# Patient Record
Sex: MALE | Race: OTHER | NOT HISPANIC OR LATINO | URBAN - METROPOLITAN AREA
[De-identification: names, ages, dates, MRNs, and addresses within clinical notes are randomized per-mention and may not be internally consistent; named-entity substitution may affect disease eponyms.]

---

## 2020-09-24 ENCOUNTER — INPATIENT (INPATIENT)
Facility: HOSPITAL | Age: 80
LOS: 1 days | Discharge: HOME | DRG: 603 | End: 2020-09-26
Attending: INTERNAL MEDICINE | Admitting: INTERNAL MEDICINE
Payer: MEDICARE

## 2020-09-24 VITALS
WEIGHT: 134.04 LBS | RESPIRATION RATE: 16 BRPM | TEMPERATURE: 99 F | DIASTOLIC BLOOD PRESSURE: 75 MMHG | OXYGEN SATURATION: 95 % | SYSTOLIC BLOOD PRESSURE: 167 MMHG | HEIGHT: 60 IN | HEART RATE: 79 BPM

## 2020-09-24 LAB
ALBUMIN SERPL ELPH-MCNC: 4.6 G/DL — SIGNIFICANT CHANGE UP (ref 3.3–5)
ALP SERPL-CCNC: 78 U/L — SIGNIFICANT CHANGE UP (ref 40–120)
ALT FLD-CCNC: 30 U/L — SIGNIFICANT CHANGE UP (ref 10–45)
ANION GAP SERPL CALC-SCNC: 15 MMOL/L — SIGNIFICANT CHANGE UP (ref 5–17)
APPEARANCE UR: CLEAR — SIGNIFICANT CHANGE UP
APTT BLD: 30.6 SEC — SIGNIFICANT CHANGE UP (ref 27.5–35.5)
AST SERPL-CCNC: 26 U/L — SIGNIFICANT CHANGE UP (ref 10–40)
BASE EXCESS BLDV CALC-SCNC: 1.4 MMOL/L — SIGNIFICANT CHANGE UP (ref -2–2)
BASOPHILS # BLD AUTO: 0.03 K/UL — SIGNIFICANT CHANGE UP (ref 0–0.2)
BASOPHILS NFR BLD AUTO: 0.5 % — SIGNIFICANT CHANGE UP (ref 0–2)
BILIRUB SERPL-MCNC: 0.4 MG/DL — SIGNIFICANT CHANGE UP (ref 0.2–1.2)
BILIRUB UR-MCNC: NEGATIVE — SIGNIFICANT CHANGE UP
BUN SERPL-MCNC: 27 MG/DL — HIGH (ref 7–23)
CA-I SERPL-SCNC: 1.25 MMOL/L — SIGNIFICANT CHANGE UP (ref 1.12–1.3)
CALCIUM SERPL-MCNC: 9.9 MG/DL — SIGNIFICANT CHANGE UP (ref 8.4–10.5)
CHLORIDE BLDV-SCNC: 103 MMOL/L — SIGNIFICANT CHANGE UP (ref 96–108)
CHLORIDE SERPL-SCNC: 102 MMOL/L — SIGNIFICANT CHANGE UP (ref 96–108)
CO2 BLDV-SCNC: 30 MMOL/L — SIGNIFICANT CHANGE UP (ref 22–30)
CO2 SERPL-SCNC: 25 MMOL/L — SIGNIFICANT CHANGE UP (ref 22–31)
COLOR SPEC: SIGNIFICANT CHANGE UP
CREAT SERPL-MCNC: 1.09 MG/DL — SIGNIFICANT CHANGE UP (ref 0.5–1.3)
DIFF PNL FLD: NEGATIVE — SIGNIFICANT CHANGE UP
EOSINOPHIL # BLD AUTO: 0.12 K/UL — SIGNIFICANT CHANGE UP (ref 0–0.5)
EOSINOPHIL NFR BLD AUTO: 1.8 % — SIGNIFICANT CHANGE UP (ref 0–6)
GAS PNL BLDV: 139 MMOL/L — SIGNIFICANT CHANGE UP (ref 135–145)
GAS PNL BLDV: SIGNIFICANT CHANGE UP
GAS PNL BLDV: SIGNIFICANT CHANGE UP
GLUCOSE BLDV-MCNC: 193 MG/DL — HIGH (ref 70–99)
GLUCOSE SERPL-MCNC: 195 MG/DL — HIGH (ref 70–99)
GLUCOSE UR QL: ABNORMAL
HCO3 BLDV-SCNC: 29 MMOL/L — SIGNIFICANT CHANGE UP (ref 21–29)
HCT VFR BLD CALC: 43 % — SIGNIFICANT CHANGE UP (ref 39–50)
HCT VFR BLDA CALC: 46 % — SIGNIFICANT CHANGE UP (ref 39–50)
HGB BLD CALC-MCNC: 14.8 G/DL — SIGNIFICANT CHANGE UP (ref 13–17)
HGB BLD-MCNC: 13.8 G/DL — SIGNIFICANT CHANGE UP (ref 13–17)
IMM GRANULOCYTES NFR BLD AUTO: 0.2 % — SIGNIFICANT CHANGE UP (ref 0–1.5)
INR BLD: 1.01 RATIO — SIGNIFICANT CHANGE UP (ref 0.88–1.16)
KETONES UR-MCNC: NEGATIVE — SIGNIFICANT CHANGE UP
LACTATE BLDV-MCNC: 3.3 MMOL/L — HIGH (ref 0.7–2)
LEUKOCYTE ESTERASE UR-ACNC: NEGATIVE — SIGNIFICANT CHANGE UP
LYMPHOCYTES # BLD AUTO: 1.96 K/UL — SIGNIFICANT CHANGE UP (ref 1–3.3)
LYMPHOCYTES # BLD AUTO: 29.4 % — SIGNIFICANT CHANGE UP (ref 13–44)
MCHC RBC-ENTMCNC: 31.5 PG — SIGNIFICANT CHANGE UP (ref 27–34)
MCHC RBC-ENTMCNC: 32.1 GM/DL — SIGNIFICANT CHANGE UP (ref 32–36)
MCV RBC AUTO: 98.2 FL — SIGNIFICANT CHANGE UP (ref 80–100)
MONOCYTES # BLD AUTO: 0.5 K/UL — SIGNIFICANT CHANGE UP (ref 0–0.9)
MONOCYTES NFR BLD AUTO: 7.5 % — SIGNIFICANT CHANGE UP (ref 2–14)
NEUTROPHILS # BLD AUTO: 4.04 K/UL — SIGNIFICANT CHANGE UP (ref 1.8–7.4)
NEUTROPHILS NFR BLD AUTO: 60.6 % — SIGNIFICANT CHANGE UP (ref 43–77)
NITRITE UR-MCNC: NEGATIVE — SIGNIFICANT CHANGE UP
NRBC # BLD: 0 /100 WBCS — SIGNIFICANT CHANGE UP (ref 0–0)
PCO2 BLDV: 59 MMHG — HIGH (ref 35–50)
PH BLDV: 7.31 — LOW (ref 7.35–7.45)
PH UR: 6 — SIGNIFICANT CHANGE UP (ref 5–8)
PLATELET # BLD AUTO: 167 K/UL — SIGNIFICANT CHANGE UP (ref 150–400)
PO2 BLDV: 24 MMHG — LOW (ref 25–45)
POTASSIUM BLDV-SCNC: 4.3 MMOL/L — SIGNIFICANT CHANGE UP (ref 3.5–5.3)
POTASSIUM SERPL-MCNC: 4.7 MMOL/L — SIGNIFICANT CHANGE UP (ref 3.5–5.3)
POTASSIUM SERPL-SCNC: 4.7 MMOL/L — SIGNIFICANT CHANGE UP (ref 3.5–5.3)
PROT SERPL-MCNC: 7.3 G/DL — SIGNIFICANT CHANGE UP (ref 6–8.3)
PROT UR-MCNC: SIGNIFICANT CHANGE UP
PROTHROM AB SERPL-ACNC: 12 SEC — SIGNIFICANT CHANGE UP (ref 10.6–13.6)
RBC # BLD: 4.38 M/UL — SIGNIFICANT CHANGE UP (ref 4.2–5.8)
RBC # FLD: 12.2 % — SIGNIFICANT CHANGE UP (ref 10.3–14.5)
SAO2 % BLDV: 33 % — LOW (ref 67–88)
SODIUM SERPL-SCNC: 142 MMOL/L — SIGNIFICANT CHANGE UP (ref 135–145)
SP GR SPEC: 1.03 — HIGH (ref 1.01–1.02)
UROBILINOGEN FLD QL: NEGATIVE — SIGNIFICANT CHANGE UP
WBC # BLD: 6.66 K/UL — SIGNIFICANT CHANGE UP (ref 3.8–10.5)
WBC # FLD AUTO: 6.66 K/UL — SIGNIFICANT CHANGE UP (ref 3.8–10.5)

## 2020-09-24 PROCEDURE — 99285 EMERGENCY DEPT VISIT HI MDM: CPT | Mod: CS,GC

## 2020-09-24 PROCEDURE — 73610 X-RAY EXAM OF ANKLE: CPT | Mod: 26,LT

## 2020-09-24 PROCEDURE — 73590 X-RAY EXAM OF LOWER LEG: CPT | Mod: 26,LT

## 2020-09-24 PROCEDURE — 73620 X-RAY EXAM OF FOOT: CPT | Mod: 26,LT

## 2020-09-24 RX ORDER — SODIUM CHLORIDE 9 MG/ML
1000 INJECTION INTRAMUSCULAR; INTRAVENOUS; SUBCUTANEOUS ONCE
Refills: 0 | Status: COMPLETED | OUTPATIENT
Start: 2020-09-24 | End: 2020-09-24

## 2020-09-24 RX ADMIN — Medication 100 MILLIGRAM(S): at 23:38

## 2020-09-24 RX ADMIN — SODIUM CHLORIDE 500 MILLILITER(S): 9 INJECTION INTRAMUSCULAR; INTRAVENOUS; SUBCUTANEOUS at 23:39

## 2020-09-24 NOTE — ED ADULT NURSE NOTE - OBJECTIVE STATEMENT
PT 80 year old male, A/O X3.PT came in through triage due to left leg injury. PMH- HTN, HLD, DM2. PSH- Open heart surgery (2014), stents placed (2019, on aspirin). PT state PT 80 year old male, A/O X3.PT came in through triage due to left leg injury. PMH- HTN, HLD, DM2. PSH- Open heart surgery (2014), stents placed (2019, on aspirin). PT states 5 days ago, a mirror fell on his left shin. Since that day, PT left lower extremity and left foot became increasingly swollen, red, occasional burning sensation. Ambulates independently. Skin- laceration on left shin (white, no discharge), red. Left foot-red, bruising, 2+ pitting edema. Pedal pulses palpable on right foot. Denies SOB, chest pain, fever, chills, cough, N/V/D, dysuria, lightheadedness/ dizziness, numbness/ tingling.

## 2020-09-25 DIAGNOSIS — L03.90 CELLULITIS, UNSPECIFIED: ICD-10-CM

## 2020-09-25 DIAGNOSIS — L03.116 CELLULITIS OF LEFT LOWER LIMB: ICD-10-CM

## 2020-09-25 DIAGNOSIS — Z29.9 ENCOUNTER FOR PROPHYLACTIC MEASURES, UNSPECIFIED: ICD-10-CM

## 2020-09-25 DIAGNOSIS — Z95.1 PRESENCE OF AORTOCORONARY BYPASS GRAFT: Chronic | ICD-10-CM

## 2020-09-25 DIAGNOSIS — Z95.2 PRESENCE OF PROSTHETIC HEART VALVE: Chronic | ICD-10-CM

## 2020-09-25 DIAGNOSIS — I25.10 ATHEROSCLEROTIC HEART DISEASE OF NATIVE CORONARY ARTERY WITHOUT ANGINA PECTORIS: ICD-10-CM

## 2020-09-25 DIAGNOSIS — E11.9 TYPE 2 DIABETES MELLITUS WITHOUT COMPLICATIONS: ICD-10-CM

## 2020-09-25 LAB
ANION GAP SERPL CALC-SCNC: 11 MMOL/L — SIGNIFICANT CHANGE UP (ref 5–17)
BASOPHILS # BLD AUTO: 0.04 K/UL — SIGNIFICANT CHANGE UP (ref 0–0.2)
BASOPHILS NFR BLD AUTO: 0.5 % — SIGNIFICANT CHANGE UP (ref 0–2)
BUN SERPL-MCNC: 19 MG/DL — SIGNIFICANT CHANGE UP (ref 7–23)
CALCIUM SERPL-MCNC: 9.5 MG/DL — SIGNIFICANT CHANGE UP (ref 8.4–10.5)
CHLORIDE SERPL-SCNC: 103 MMOL/L — SIGNIFICANT CHANGE UP (ref 96–108)
CO2 SERPL-SCNC: 25 MMOL/L — SIGNIFICANT CHANGE UP (ref 22–31)
CREAT SERPL-MCNC: 0.81 MG/DL — SIGNIFICANT CHANGE UP (ref 0.5–1.3)
CULTURE RESULTS: SIGNIFICANT CHANGE UP
EOSINOPHIL # BLD AUTO: 0.14 K/UL — SIGNIFICANT CHANGE UP (ref 0–0.5)
EOSINOPHIL NFR BLD AUTO: 1.9 % — SIGNIFICANT CHANGE UP (ref 0–6)
GLUCOSE SERPL-MCNC: 194 MG/DL — HIGH (ref 70–99)
HCT VFR BLD CALC: 40.4 % — SIGNIFICANT CHANGE UP (ref 39–50)
HGB BLD-MCNC: 13.3 G/DL — SIGNIFICANT CHANGE UP (ref 13–17)
IMM GRANULOCYTES NFR BLD AUTO: 0.1 % — SIGNIFICANT CHANGE UP (ref 0–1.5)
LACTATE BLDV-MCNC: 2.5 MMOL/L — HIGH (ref 0.7–2)
LACTATE SERPL-SCNC: 1.1 MMOL/L — SIGNIFICANT CHANGE UP (ref 0.7–2)
LYMPHOCYTES # BLD AUTO: 2.66 K/UL — SIGNIFICANT CHANGE UP (ref 1–3.3)
LYMPHOCYTES # BLD AUTO: 35.5 % — SIGNIFICANT CHANGE UP (ref 13–44)
MCHC RBC-ENTMCNC: 31.5 PG — SIGNIFICANT CHANGE UP (ref 27–34)
MCHC RBC-ENTMCNC: 32.9 GM/DL — SIGNIFICANT CHANGE UP (ref 32–36)
MCV RBC AUTO: 95.7 FL — SIGNIFICANT CHANGE UP (ref 80–100)
MONOCYTES # BLD AUTO: 0.55 K/UL — SIGNIFICANT CHANGE UP (ref 0–0.9)
MONOCYTES NFR BLD AUTO: 7.3 % — SIGNIFICANT CHANGE UP (ref 2–14)
NEUTROPHILS # BLD AUTO: 4.1 K/UL — SIGNIFICANT CHANGE UP (ref 1.8–7.4)
NEUTROPHILS NFR BLD AUTO: 54.7 % — SIGNIFICANT CHANGE UP (ref 43–77)
NRBC # BLD: 0 /100 WBCS — SIGNIFICANT CHANGE UP (ref 0–0)
PLATELET # BLD AUTO: 170 K/UL — SIGNIFICANT CHANGE UP (ref 150–400)
POTASSIUM SERPL-MCNC: 4.2 MMOL/L — SIGNIFICANT CHANGE UP (ref 3.5–5.3)
POTASSIUM SERPL-SCNC: 4.2 MMOL/L — SIGNIFICANT CHANGE UP (ref 3.5–5.3)
RBC # BLD: 4.22 M/UL — SIGNIFICANT CHANGE UP (ref 4.2–5.8)
RBC # FLD: 12.2 % — SIGNIFICANT CHANGE UP (ref 10.3–14.5)
SARS-COV-2 IGG SERPL QL IA: NEGATIVE — SIGNIFICANT CHANGE UP
SARS-COV-2 IGM SERPL IA-ACNC: <0.1 INDEX — SIGNIFICANT CHANGE UP
SARS-COV-2 RNA SPEC QL NAA+PROBE: SIGNIFICANT CHANGE UP
SODIUM SERPL-SCNC: 139 MMOL/L — SIGNIFICANT CHANGE UP (ref 135–145)
SPECIMEN SOURCE: SIGNIFICANT CHANGE UP
WBC # BLD: 7.5 K/UL — SIGNIFICANT CHANGE UP (ref 3.8–10.5)
WBC # FLD AUTO: 7.5 K/UL — SIGNIFICANT CHANGE UP (ref 3.8–10.5)

## 2020-09-25 PROCEDURE — 99223 1ST HOSP IP/OBS HIGH 75: CPT

## 2020-09-25 PROCEDURE — 93925 LOWER EXTREMITY STUDY: CPT | Mod: 26

## 2020-09-25 PROCEDURE — 99222 1ST HOSP IP/OBS MODERATE 55: CPT

## 2020-09-25 RX ORDER — LISINOPRIL 2.5 MG/1
20 TABLET ORAL DAILY
Refills: 0 | Status: DISCONTINUED | OUTPATIENT
Start: 2020-09-25 | End: 2020-09-26

## 2020-09-25 RX ORDER — ATORVASTATIN CALCIUM 80 MG/1
10 TABLET, FILM COATED ORAL AT BEDTIME
Refills: 0 | Status: DISCONTINUED | OUTPATIENT
Start: 2020-09-25 | End: 2020-09-26

## 2020-09-25 RX ORDER — DEXTROSE 50 % IN WATER 50 %
25 SYRINGE (ML) INTRAVENOUS ONCE
Refills: 0 | Status: DISCONTINUED | OUTPATIENT
Start: 2020-09-25 | End: 2020-09-26

## 2020-09-25 RX ORDER — DEXTROSE 50 % IN WATER 50 %
12.5 SYRINGE (ML) INTRAVENOUS ONCE
Refills: 0 | Status: DISCONTINUED | OUTPATIENT
Start: 2020-09-25 | End: 2020-09-26

## 2020-09-25 RX ORDER — PIPERACILLIN AND TAZOBACTAM 4; .5 G/20ML; G/20ML
3.38 INJECTION, POWDER, LYOPHILIZED, FOR SOLUTION INTRAVENOUS ONCE
Refills: 0 | Status: COMPLETED | OUTPATIENT
Start: 2020-09-25 | End: 2020-09-25

## 2020-09-25 RX ORDER — ASPIRIN/CALCIUM CARB/MAGNESIUM 324 MG
81 TABLET ORAL DAILY
Refills: 0 | Status: DISCONTINUED | OUTPATIENT
Start: 2020-09-25 | End: 2020-09-26

## 2020-09-25 RX ORDER — VANCOMYCIN HCL 1 G
1000 VIAL (EA) INTRAVENOUS EVERY 12 HOURS
Refills: 0 | Status: DISCONTINUED | OUTPATIENT
Start: 2020-09-25 | End: 2020-09-26

## 2020-09-25 RX ORDER — CLOPIDOGREL BISULFATE 75 MG/1
75 TABLET, FILM COATED ORAL DAILY
Refills: 0 | Status: DISCONTINUED | OUTPATIENT
Start: 2020-09-25 | End: 2020-09-26

## 2020-09-25 RX ORDER — PIPERACILLIN AND TAZOBACTAM 4; .5 G/20ML; G/20ML
3.38 INJECTION, POWDER, LYOPHILIZED, FOR SOLUTION INTRAVENOUS EVERY 8 HOURS
Refills: 0 | Status: DISCONTINUED | OUTPATIENT
Start: 2020-09-25 | End: 2020-09-25

## 2020-09-25 RX ORDER — GLUCAGON INJECTION, SOLUTION 0.5 MG/.1ML
1 INJECTION, SOLUTION SUBCUTANEOUS ONCE
Refills: 0 | Status: DISCONTINUED | OUTPATIENT
Start: 2020-09-25 | End: 2020-09-26

## 2020-09-25 RX ORDER — DEXTROSE 50 % IN WATER 50 %
15 SYRINGE (ML) INTRAVENOUS ONCE
Refills: 0 | Status: DISCONTINUED | OUTPATIENT
Start: 2020-09-25 | End: 2020-09-26

## 2020-09-25 RX ORDER — SODIUM CHLORIDE 9 MG/ML
1000 INJECTION, SOLUTION INTRAVENOUS
Refills: 0 | Status: DISCONTINUED | OUTPATIENT
Start: 2020-09-25 | End: 2020-09-26

## 2020-09-25 RX ORDER — METFORMIN HYDROCHLORIDE 850 MG/1
1000 TABLET ORAL
Refills: 0 | Status: DISCONTINUED | OUTPATIENT
Start: 2020-09-25 | End: 2020-09-26

## 2020-09-25 RX ORDER — METOPROLOL TARTRATE 50 MG
50 TABLET ORAL DAILY
Refills: 0 | Status: DISCONTINUED | OUTPATIENT
Start: 2020-09-25 | End: 2020-09-26

## 2020-09-25 RX ORDER — INSULIN LISPRO 100/ML
VIAL (ML) SUBCUTANEOUS
Refills: 0 | Status: DISCONTINUED | OUTPATIENT
Start: 2020-09-25 | End: 2020-09-26

## 2020-09-25 RX ORDER — INSULIN LISPRO 100/ML
VIAL (ML) SUBCUTANEOUS AT BEDTIME
Refills: 0 | Status: DISCONTINUED | OUTPATIENT
Start: 2020-09-25 | End: 2020-09-26

## 2020-09-25 RX ADMIN — CLOPIDOGREL BISULFATE 75 MILLIGRAM(S): 75 TABLET, FILM COATED ORAL at 13:49

## 2020-09-25 RX ADMIN — METFORMIN HYDROCHLORIDE 1000 MILLIGRAM(S): 850 TABLET ORAL at 08:56

## 2020-09-25 RX ADMIN — Medication 250 MILLIGRAM(S): at 17:30

## 2020-09-25 RX ADMIN — Medication 1: at 08:55

## 2020-09-25 RX ADMIN — ATORVASTATIN CALCIUM 10 MILLIGRAM(S): 80 TABLET, FILM COATED ORAL at 22:01

## 2020-09-25 RX ADMIN — Medication 900 MILLIGRAM(S): at 00:54

## 2020-09-25 RX ADMIN — Medication 1: at 17:29

## 2020-09-25 RX ADMIN — METFORMIN HYDROCHLORIDE 1000 MILLIGRAM(S): 850 TABLET ORAL at 18:16

## 2020-09-25 RX ADMIN — Medication 250 MILLIGRAM(S): at 06:53

## 2020-09-25 RX ADMIN — PIPERACILLIN AND TAZOBACTAM 200 GRAM(S): 4; .5 INJECTION, POWDER, LYOPHILIZED, FOR SOLUTION INTRAVENOUS at 06:16

## 2020-09-25 RX ADMIN — Medication 81 MILLIGRAM(S): at 13:49

## 2020-09-25 RX ADMIN — PIPERACILLIN AND TAZOBACTAM 25 GRAM(S): 4; .5 INJECTION, POWDER, LYOPHILIZED, FOR SOLUTION INTRAVENOUS at 13:49

## 2020-09-25 RX ADMIN — Medication 50 MILLIGRAM(S): at 06:19

## 2020-09-25 RX ADMIN — LISINOPRIL 20 MILLIGRAM(S): 2.5 TABLET ORAL at 06:19

## 2020-09-25 NOTE — H&P ADULT - NSHPLABSRESULTS_GEN_ALL_CORE
T(F): 98 (25 Sep 2020 01:59), Max: 99.3 (24 Sep 2020 20:45)  HR: 71 - 84  BP: 150/70 - 173/59  BP(mean): 91 - 96  RR: 16 - 18  SpO2: 95% - 98%

## 2020-09-25 NOTE — ED PROVIDER NOTE - NS ED ROS FT
Gen: Denies fever, weight loss  CV: Denies chest pain, palpitations  HEENT: Denies neck pain, sore throat, eye discharge  Skin: + LLE redness and mild warmth  Resp: Denies SOB, cough  Endo: Denies sensitivity to heat, cold, increased urination  GI: Denies constipation, nausea, vomiting  Msk: Denies back pain, LE swelling, + mild LLE pain  : Denies dysuria, increased frequency  Neuro: Denies LOC, weakness, seizures  Psych: Denies hx of psych, hallucinations, SI

## 2020-09-25 NOTE — ED PROVIDER NOTE - PHYSICAL EXAMINATION
Gen: Alert and oriented. Lying comfortably in bed. Answering questions appropriately  HEENT: extra occular movements intact, no nasal discharge, mucous membranes moist  CV: Regular rate and rhythm, +S1/S2, no murmurs/rubs/gallops, pedal pulses equal and intact bl.   Resp: Clear to ausculation bilaterally, no wheezes/rhonchi/rales  GI: Abdomen soft non-distended, non tender to palpation, no masses  MSK: LLE redness, mild abrasion on left shin. non draining. Mild edema at left foot  Neuro: A&Ox4, following commands, moving all four extremities spontaneously, dorsiflexion and plantarflexion intact on left.   Psych: appropriate mood

## 2020-09-25 NOTE — CONSULT NOTE ADULT - ASSESSMENT
79 yo M with NIDDM2, CAD s/p 3v CABG/bovine AVR 2016, s/p stent 2019 p/w acute LLE redness and swelling.  Patient states he dropped a mirror on left lower shin 5days ago with a small laceration (approximately 1.5 cm) initially. The wound continued to heal, but he noticed swelling and redness of L foot and ankle with intermittent pain/pressure the day of presentation which progressed rapidly. Denies fevers, chill, n/v.  
80 year old male with DM2, CAD s/p 3V CABG/bovine AVR 2016, s/p stent in 2019 with left leg erythema and small laceration after dropping a mirror on it. He has remained afebrile, WBC WNL. Wound healing well. Erythema improving, almost resolved. Blood cultures sent.    Recommend:  #Left leg cellulitis/ small laceration  -Clinically improving  -Discontinue zosyn  -Continue vancomycin  -Monitor vanco level  -F/U blood cultures  -Check MRSA surveillance PCR swab  -If MRSA swab negative, can discharge on keflex 500 mg PO q 6 hours to complete a total of 7 days.    #CAD   -Per primary team    Federico Finnegan MD  Pager [phone]  After 5pm/weekends call [phone]    Discussed plan with NP.

## 2020-09-25 NOTE — PATIENT PROFILE ADULT - NSPROREFERSVCHOMEDIABETES_GEN_A_NUR
Patient recently dropped off forms for Dr. Katelynn Gamez to fill out. Per Dr. Katelynn Gamez: Patient needs to make an appointment. Attempted to reach patient by telephone, no answer. Detailed message left on answering machine informing patient of above. Forms under keyboard in nurse Jain's office, will hold onto forms until appointment. If patient returns call please schedule appointment. no

## 2020-09-25 NOTE — CONSULT NOTE ADULT - SUBJECTIVE AND OBJECTIVE BOX
HPI:  81 yo M with NIDDM2, CAD s/p 3v CABG/bovine AVR , s/p stent 2019 p/w acute LLE redness and swelling.  Patient states he dropped a mirror on left lower shin 5days ago with a small laceration (approximately 1.5 cm) initially. The wound continued to heal, but he noticed swelling and redness of L foot and ankle with intermittent pain/pressure the day of presentation which progressed rapidly. Denies fevers, chill, n/v.       PAST MEDICAL & SURGICAL HISTORY:  3-vessel CAD  s/p 3v CABG 2016, s/p stent 2019    Diabetes    S/P AVR (aortic valve replacement)  bovine 2016    S/P CABG x 3  2016        Allergies    No Known Allergies    Intolerances        ANTIMICROBIALS:  piperacillin/tazobactam IVPB.. 3.375 every 8 hours  vancomycin  IVPB 1000 every 12 hours      OTHER MEDS:  aspirin enteric coated 81 milliGRAM(s) Oral daily  atorvastatin 10 milliGRAM(s) Oral at bedtime  clopidogrel Tablet 75 milliGRAM(s) Oral daily  dextrose 40% Gel 15 Gram(s) Oral once PRN  dextrose 5%. 1000 milliLiter(s) IV Continuous <Continuous>  dextrose 50% Injectable 12.5 Gram(s) IV Push once  dextrose 50% Injectable 25 Gram(s) IV Push once  dextrose 50% Injectable 25 Gram(s) IV Push once  glucagon  Injectable 1 milliGRAM(s) IntraMuscular once PRN  insulin lispro (HumaLOG) corrective regimen sliding scale   SubCutaneous three times a day before meals  insulin lispro (HumaLOG) corrective regimen sliding scale   SubCutaneous at bedtime  lisinopril 20 milliGRAM(s) Oral daily  metFORMIN 1000 milliGRAM(s) Oral two times a day  metoprolol succinate ER 50 milliGRAM(s) Oral daily      SOCIAL HISTORY:    FAMILY HISTORY:  FH: heart disease  father    FH: diabetes mellitus  mother, siblings        Drug Dosing Weight  Height (cm): 152.4 (25 Sep 2020 03:49)  Weight (kg): 60.2 (25 Sep 2020 03:49)  BMI (kg/m2): 25.9 (25 Sep 2020 03:49)  BSA (m2): 1.57 (25 Sep 2020 03:49)    PE:    Vital Signs Last 24 Hrs  T(C): 36.7 (25 Sep 2020 03:49), Max: 37.4 (24 Sep 2020 20:45)  T(F): 98 (25 Sep 2020 03:49), Max: 99.3 (24 Sep 2020 20:45)  HR: 75 (25 Sep 2020 06:33) (71 - 93)  BP: 157/75 (25 Sep 2020 06:33) (150/70 - 173/59)  BP(mean): 96 (25 Sep 2020 00:59) (91 - 96)  RR: 16 (25 Sep 2020 06:33) (16 - 18)  SpO2: 94% (25 Sep 2020 03:49) (94% - 98%)    Gen: AOx3, NAD, non-toxic, pleasant  CV: S1+S2 normal, no murmurs, nontachycardic  Resp: Clear bilat, no resp distress, no crackles/wheezes  Abd: Soft, nontender, +BS  Ext: No LE edema, no wounds  : No Greenberg  IV/Skin: No thrombophlebitis  Msk: No low back pain, no arthralgias, no joint swelling  Neuro: No sensory deficits, no motor deficits    LABS:                          13.3   7.50  )-----------( 170      ( 25 Sep 2020 09:48 )             40.4       09-25    139  |  103  |  19  ----------------------------<  194<H>  4.2   |  25  |  0.81    Ca    9.5      25 Sep 2020 09:48    TPro  7.3  /  Alb  4.6  /  TBili  0.4  /  DBili  x   /  AST  26  /  ALT  30  /  AlkPhos  78  09-24      Urinalysis Basic - ( 24 Sep 2020 23:32 )    Color: Light Yellow / Appearance: Clear / S.029 / pH: x  Gluc: x / Ketone: Negative  / Bili: Negative / Urobili: Negative   Blood: x / Protein: Trace / Nitrite: Negative   Leuk Esterase: Negative / RBC: x / WBC x   Sq Epi: x / Non Sq Epi: x / Bacteria: x    MICROBIOLOGY:  v    RADIOLOGY:    < from: Xray Tibia + Fibula 2 Views, Left (20 @ 23:30) >  IMPRESSION:  No acute fracture or dislocation. No soft tissue gas.      < end of copied text >   HPI:  81 yo M with NIDDM2, CAD s/p 3v CABG/bovine AVR 2016, s/p stent 2019 presents with acute LLE redness and swelling.  Patient states he dropped a mirror on left lower shin 5 days ago with a small laceration (approximately 1.5 cm) initially. The wound continued to heal, but he noticed swelling and redness of L foot and ankle with intermittent pain/pressure the day of presentation which progressed rapidly. Denies fevers, chill. WBC WNL. Patient with minimal redness today. States pain resolved.     PAST MEDICAL & SURGICAL HISTORY:  3-vessel CAD  s/p 3v CABG 2016, s/p stent 2019    Diabetes    S/P AVR (aortic valve replacement)  bovine 2016    S/P CABG x 3  2016      Allergies    No Known Allergies    Intolerances    ANTIMICROBIALS:  piperacillin/tazobactam IVPB.. 3.375 every 8 hours  vancomycin  IVPB 1000 every 12 hours    OTHER MEDS:  aspirin enteric coated 81 milliGRAM(s) Oral daily  atorvastatin 10 milliGRAM(s) Oral at bedtime  clopidogrel Tablet 75 milliGRAM(s) Oral daily  dextrose 40% Gel 15 Gram(s) Oral once PRN  dextrose 5%. 1000 milliLiter(s) IV Continuous <Continuous>  dextrose 50% Injectable 12.5 Gram(s) IV Push once  dextrose 50% Injectable 25 Gram(s) IV Push once  dextrose 50% Injectable 25 Gram(s) IV Push once  glucagon  Injectable 1 milliGRAM(s) IntraMuscular once PRN  insulin lispro (HumaLOG) corrective regimen sliding scale   SubCutaneous three times a day before meals  insulin lispro (HumaLOG) corrective regimen sliding scale   SubCutaneous at bedtime  lisinopril 20 milliGRAM(s) Oral daily  metFORMIN 1000 milliGRAM(s) Oral two times a day  metoprolol succinate ER 50 milliGRAM(s) Oral daily    SOCIAL HISTORY: Former smoker, no alcohol, no drug use    FAMILY HISTORY:  FH: heart disease  father    FH: diabetes mellitus  mother, siblings    Drug Dosing Weight  Height (cm): 152.4 (25 Sep 2020 03:49)  Weight (kg): 60.2 (25 Sep 2020 03:49)  BMI (kg/m2): 25.9 (25 Sep 2020 03:49)  BSA (m2): 1.57 (25 Sep 2020 03:49)    PE:    Vital Signs Last 24 Hrs  T(C): 36.7 (25 Sep 2020 03:49), Max: 37.4 (24 Sep 2020 20:45)  T(F): 98 (25 Sep 2020 03:49), Max: 99.3 (24 Sep 2020 20:45)  HR: 75 (25 Sep 2020 06:33) (71 - 93)  BP: 157/75 (25 Sep 2020 06:33) (150/70 - 173/59)  BP(mean): 96 (25 Sep 2020 00:59) (91 - 96)  RR: 16 (25 Sep 2020 06:33) (16 - 18)  SpO2: 94% (25 Sep 2020 03:49) (94% - 98%)    Gen: AOx3, NAD  CV: S1+S2 normal, no murmurs  Resp: Clear bilat, no resp distress  Abd: Soft, nontender, +BS  Ext: No LE edema, no wounds  : No Greenberg  IV/Skin: No thrombophlebitis, left leg healing wound, minimal erythema  Msk: No low back pain, no arthralgias, no joint swelling  Neuro: No sensory deficits, no motor deficits    LABS:                          13.3   7.50  )-----------( 170      ( 25 Sep 2020 09:48 )             40.4       09-25    139  |  103  |  19  ----------------------------<  194<H>  4.2   |  25  |  0.81    Ca    9.5      25 Sep 2020 09:48    TPro  7.3  /  Alb  4.6  /  TBili  0.4  /  DBili  x   /  AST  26  /  ALT  30  /  AlkPhos  78  09-24      Urinalysis Basic - ( 24 Sep 2020 23:32 )    Color: Light Yellow / Appearance: Clear / S.029 / pH: x  Gluc: x / Ketone: Negative  / Bili: Negative / Urobili: Negative   Blood: x / Protein: Trace / Nitrite: Negative   Leuk Esterase: Negative / RBC: x / WBC x   Sq Epi: x / Non Sq Epi: x / Bacteria: x    MICROBIOLOGY:  v    RADIOLOGY:    < from: Xray Tibia + Fibula 2 Views, Left (20 @ 23:30) >  IMPRESSION:  No acute fracture or dislocation. No soft tissue gas.      < end of copied text >

## 2020-09-25 NOTE — H&P ADULT - PROBLEM SELECTOR PLAN 1
rapidly progressing cellulitis in a diabetic patient  - will treat with Vanco and Zosyn for now  - will monitor clinically for response  - will f/u BCx, lactate improving s/p 1L NS

## 2020-09-25 NOTE — CONSULT NOTE ADULT - SUBJECTIVE AND OBJECTIVE BOX
CHIEF COMPLAINT:      HISTORY OF PRESENT ILLNESS:  81 yo M with NIDDM2, CAD s/p 3v CABG/bovine AVR 2016, s/p stent 2019 p/w acute LLE redness and swelling.  Patient states he dropped a mirror on left lower shin 5days ago with a small laceration (approximately 1.5 cm) initially. The wound continued to heal, but he noticed swelling and redness of L foot and ankle with intermittent pain/pressure the day of presentation which progressed rapidly. Denies fevers, chill, n/v.    PAST MEDICAL & SURGICAL HISTORY:  3-vessel CAD  s/p 3v CABG 2016, s/p stent 2019    Diabetes    S/P AVR (aortic valve replacement)  bovine 2016    S/P CABG x 3  2016            MEDICATIONS:  aspirin enteric coated 81 milliGRAM(s) Oral daily  clopidogrel Tablet 75 milliGRAM(s) Oral daily  lisinopril 20 milliGRAM(s) Oral daily  metoprolol succinate ER 50 milliGRAM(s) Oral daily    piperacillin/tazobactam IVPB.. 3.375 Gram(s) IV Intermittent every 8 hours  vancomycin  IVPB 1000 milliGRAM(s) IV Intermittent every 12 hours          atorvastatin 10 milliGRAM(s) Oral at bedtime  dextrose 40% Gel 15 Gram(s) Oral once PRN  dextrose 50% Injectable 12.5 Gram(s) IV Push once  dextrose 50% Injectable 25 Gram(s) IV Push once  dextrose 50% Injectable 25 Gram(s) IV Push once  glucagon  Injectable 1 milliGRAM(s) IntraMuscular once PRN  insulin lispro (HumaLOG) corrective regimen sliding scale   SubCutaneous three times a day before meals  insulin lispro (HumaLOG) corrective regimen sliding scale   SubCutaneous at bedtime  metFORMIN 1000 milliGRAM(s) Oral two times a day    dextrose 5%. 1000 milliLiter(s) IV Continuous <Continuous>      FAMILY HISTORY:  FH: heart disease  father    FH: diabetes mellitus  mother, siblings        SOCIAL HISTORY:    [ ] Non-smoker  [ ] Smoker  [ ] Alcohol    Allergies    No Known Allergies    Intolerances    	    REVIEW OF SYSTEMS:  CONSTITUTIONAL: No fever, weight loss, or fatigue  EYES: No eye pain, visual disturbances, or discharge  ENMT:  No difficulty hearing, tinnitus, vertigo; No sinus or throat pain  NECK: No pain or stiffness  RESPIRATORY: No cough, wheezing, chills or hemoptysis; No Shortness of Breath  CARDIOVASCULAR: No chest pain, palpitations, passing out, dizziness, or leg swelling  GASTROINTESTINAL: No abdominal or epigastric pain. No nausea, vomiting, or hematemesis; No diarrhea or constipation. No melena or hematochezia.  GENITOURINARY: No dysuria, frequency, hematuria, or incontinence  NEUROLOGICAL: No headaches, memory loss, loss of strength, numbness, or tremors  SKIN: No itching, burning, rashes, +lesions   LYMPH Nodes: No enlarged glands  ENDOCRINE: No heat or cold intolerance; No hair loss  MUSCULOSKELETAL:+ joint pain or swelling; No muscle, back, or extremity pain  PSYCHIATRIC: No depression, anxiety, mood swings, or difficulty sleeping  HEME/LYMPH: No easy bruising, or bleeding gums  ALLERY AND IMMUNOLOGIC: No hives or eczema	    [ ] All others negative	  [ ] Unable to obtain    PHYSICAL EXAM:  T(C): 36.7 (09-25-20 @ 03:49), Max: 37.4 (09-24-20 @ 20:45)  HR: 75 (09-25-20 @ 06:33) (71 - 93)  BP: 157/75 (09-25-20 @ 06:33) (150/70 - 173/59)  RR: 16 (09-25-20 @ 06:33) (16 - 18)  SpO2: 94% (09-25-20 @ 03:49) (94% - 98%)  Wt(kg): --  I&O's Summary    24 Sep 2020 07:01  -  25 Sep 2020 07:00  --------------------------------------------------------  IN: 470 mL / OUT: 0 mL / NET: 470 mL        Appearance: NAD	  HEENT:   Normal oral mucosa, PERRL, EOMI	  Lymphatic: No lymphadenopathy  Cardiovascular: Normal S1 S2, No JVD, No murmurs, No edema  Respiratory: Lungs clear to auscultation	  Psychiatry: A & O x 3, Mood & affect appropriate  Gastrointestinal:  Soft, Non-tender, + BS	  Skin: No rashes, No ecchymoses, No cyanosis	  Neurologic: Non-focal  Extremities: L foot ecchymosis, small wound mid shin are with erythema   Vascular: Peripheral pulses palpable 2+ bilaterally    TELEMETRY: 	    ECG:  	  RADIOLOGY:  < from: Xray Tibia + Fibula 2 Views, Left (09.24.20 @ 23:30) >    EXAM:  FOOT 2VIEWS LT                          EXAM:  TIBIA AND FIBULA AP AND LAT LT                          EXAM:  ANKLE LEFT (MINIMUM 3 VIEWS)                            PROCEDURE DATE:  09/24/2020            INTERPRETATION:  CLINICAL INFORMATION: Left foot redness.    EXAM: 3 views of the left ankle, and 2 views of the left foot, and 2 views of the left lower leg.    COMPARISON: No similar prior studies available for comparison.    FINDINGS:  No acute fracture or dislocation of the left foot, left ankle, left lower leg  Preserved joint spaces. Degenerative-appearing cystic change in the medial first metatarsal head.  Vascular calcifications. Soft tissue edema about the left ankle. No tracking soft tissue gas collections or radiopaque foreign bodies. No periosteal reaction or cortical destruction to suggest osteomyelitis. Calcaneal enthesophyte.    IMPRESSION:  No acute fracture or dislocation. No soft tissue gas.              CLARE ROWAN M.D., RADIOLOGY RESIDENT  This document has been electronically signed.  ALONZO BAEZA M.D., ATTENDING RADIOLOGIST  This document has been electronically signed. Sep 25 2020  8:23AM    < end of copied text >    OTHER: 	  	  LABS:	 	    CARDIAC MARKERS:      COVID-19 PCR: NotDetec: This test has been validated by [employer] to be accurate;                             13.3   7.50  )-----------( 170      ( 25 Sep 2020 09:48 )             40.4     09-25    139  |  103  |  19  ----------------------------<  194<H>  4.2   |  25  |  0.81    Ca    9.5      25 Sep 2020 09:48    TPro  7.3  /  Alb  4.6  /  TBili  0.4  /  DBili  x   /  AST  26  /  ALT  30  /  AlkPhos  78  09-24    proBNP:   Lipid Profile:   HgA1c:   TSH:

## 2020-09-25 NOTE — H&P ADULT - NSHPATTENDINGPLANDISCUSS_GEN_ALL_CORE
Dr. Salgado who requested for initial evaluation/H&P and will assume care of this patient later today.

## 2020-09-25 NOTE — ED PROVIDER NOTE - CLINICAL SUMMARY MEDICAL DECISION MAKING FREE TEXT BOX
Joseph Frankel PGY2: 79 yo with DM presenting for LLE redness. VSS. Patient looks well and is non toxic appearing. PE as above. Most likely cellulitis. Bruising on left toes most likely secondary to mirror falling on them. Do not look necrotic at all. will get labs, xrays to ro gas and fracture, and AB. Will admit. Joseph Frankel PGY2: 81 yo with DM presenting for LLE redness. VSS. Patient looks well and is non toxic appearing. PE as above. Most likely cellulitis. Bruising on left toes most likely secondary to mirror falling on them. Do not look necrotic at all. will get labs, xrays to ro gas and fracture, and AB. Will admit.    STEFANY Early MD: 81 yo with DM presenting for LLE redness. VSS. Bruising on left toes most likely secondary to mirror falling on them. No open wounds to LEs. Plan: basic labs, IV abx given diabetic and difficult to treat on its own, XR to r/o fx or gas, TBA

## 2020-09-25 NOTE — ED PROVIDER NOTE - ATTENDING CONTRIBUTION TO CARE
I saw and evaluated patient with resident. I discussed H+P and MDM with resident. I agree with the statements made by the resident unless otherwise noted.    The care of this patient was in support of the [facility] countermeasures to Covid-19.

## 2020-09-25 NOTE — H&P ADULT - HISTORY OF PRESENT ILLNESS
81 yo M with T2DM, CAD sp stents and bypass presenting for LLE redness. States 4 days ago he dropped a mirror on left shin. Initially with some pain and a small abrasion on left shin. Denies any pus or oozing. Today noticed some redness on shin and bruising on toes. Denies fevers, chill, n/v. 81 yo M with NIDDM2, CAD s/p 3v CABG/bovine AVR 2016, s/p stent 2019 p/w acute LLE redness and swelling.  Patient states he dropped a mirror on left lower shin 5days ago with a small laceration (approximately 1.5 cm) initially. The wound continued to heal, but he noticed swelling and redness of L foot and ankle with intermittent pain/pressure the day of presentation which progressed rapidly. Denies fevers, chill, n/v.

## 2020-09-25 NOTE — ED PROVIDER NOTE - OBJECTIVE STATEMENT
81 yo M with T2DM, CAD sp stents and bypass presenting for LLE redness. States 4 days ago he dropped a mirror on left shin. Initially with some pain and a small abrasion on left shin. Denies any pus or oozing. Today noticed some redness on shin and bruising on toes. Denies fevers, chill, n/v.

## 2020-09-25 NOTE — H&P ADULT - ASSESSMENT
79 yo M with T2DM, CAD sp stents and bypass presenting for LLE redness 79 yo M with NIDDM2, CAD s/p 3v CABG/bovine AVR 2016, s/p stent 2019 p/w acute LLE redness and swelling a/w cellulitis.

## 2020-09-26 ENCOUNTER — TRANSCRIPTION ENCOUNTER (OUTPATIENT)
Age: 80
End: 2020-09-26

## 2020-09-26 VITALS
DIASTOLIC BLOOD PRESSURE: 66 MMHG | HEART RATE: 80 BPM | TEMPERATURE: 98 F | RESPIRATION RATE: 18 BRPM | SYSTOLIC BLOOD PRESSURE: 153 MMHG | OXYGEN SATURATION: 93 %

## 2020-09-26 LAB
A1C WITH ESTIMATED AVERAGE GLUCOSE RESULT: 5.7 % — HIGH (ref 4–5.6)
ESTIMATED AVERAGE GLUCOSE: 117 MG/DL — HIGH (ref 68–114)
MRSA PCR RESULT.: SIGNIFICANT CHANGE UP
S AUREUS DNA NOSE QL NAA+PROBE: SIGNIFICANT CHANGE UP
VANCOMYCIN TROUGH SERPL-MCNC: 13.3 UG/ML — SIGNIFICANT CHANGE UP (ref 10–20)

## 2020-09-26 PROCEDURE — 82803 BLOOD GASES ANY COMBINATION: CPT

## 2020-09-26 PROCEDURE — 81003 URINALYSIS AUTO W/O SCOPE: CPT

## 2020-09-26 PROCEDURE — 87641 MR-STAPH DNA AMP PROBE: CPT

## 2020-09-26 PROCEDURE — 73620 X-RAY EXAM OF FOOT: CPT

## 2020-09-26 PROCEDURE — 85730 THROMBOPLASTIN TIME PARTIAL: CPT

## 2020-09-26 PROCEDURE — 83605 ASSAY OF LACTIC ACID: CPT

## 2020-09-26 PROCEDURE — 99285 EMERGENCY DEPT VISIT HI MDM: CPT | Mod: 25

## 2020-09-26 PROCEDURE — U0003: CPT

## 2020-09-26 PROCEDURE — 85014 HEMATOCRIT: CPT

## 2020-09-26 PROCEDURE — 85025 COMPLETE CBC W/AUTO DIFF WBC: CPT

## 2020-09-26 PROCEDURE — 93925 LOWER EXTREMITY STUDY: CPT

## 2020-09-26 PROCEDURE — 82330 ASSAY OF CALCIUM: CPT

## 2020-09-26 PROCEDURE — 82962 GLUCOSE BLOOD TEST: CPT

## 2020-09-26 PROCEDURE — 73610 X-RAY EXAM OF ANKLE: CPT

## 2020-09-26 PROCEDURE — 82947 ASSAY GLUCOSE BLOOD QUANT: CPT

## 2020-09-26 PROCEDURE — 80202 ASSAY OF VANCOMYCIN: CPT

## 2020-09-26 PROCEDURE — 85610 PROTHROMBIN TIME: CPT

## 2020-09-26 PROCEDURE — 96365 THER/PROPH/DIAG IV INF INIT: CPT

## 2020-09-26 PROCEDURE — 84132 ASSAY OF SERUM POTASSIUM: CPT

## 2020-09-26 PROCEDURE — 86769 SARS-COV-2 COVID-19 ANTIBODY: CPT

## 2020-09-26 PROCEDURE — 82565 ASSAY OF CREATININE: CPT

## 2020-09-26 PROCEDURE — 82435 ASSAY OF BLOOD CHLORIDE: CPT

## 2020-09-26 PROCEDURE — 85018 HEMOGLOBIN: CPT

## 2020-09-26 PROCEDURE — 80053 COMPREHEN METABOLIC PANEL: CPT

## 2020-09-26 PROCEDURE — 87086 URINE CULTURE/COLONY COUNT: CPT

## 2020-09-26 PROCEDURE — 87040 BLOOD CULTURE FOR BACTERIA: CPT

## 2020-09-26 PROCEDURE — 87640 STAPH A DNA AMP PROBE: CPT

## 2020-09-26 PROCEDURE — 73590 X-RAY EXAM OF LOWER LEG: CPT

## 2020-09-26 PROCEDURE — 84295 ASSAY OF SERUM SODIUM: CPT

## 2020-09-26 PROCEDURE — 80048 BASIC METABOLIC PNL TOTAL CA: CPT

## 2020-09-26 PROCEDURE — 93005 ELECTROCARDIOGRAM TRACING: CPT

## 2020-09-26 PROCEDURE — 83036 HEMOGLOBIN GLYCOSYLATED A1C: CPT

## 2020-09-26 RX ORDER — CEPHALEXIN 500 MG
1 CAPSULE ORAL
Qty: 28 | Refills: 0
Start: 2020-09-26 | End: 2020-10-02

## 2020-09-26 RX ADMIN — Medication 1: at 12:27

## 2020-09-26 RX ADMIN — Medication 250 MILLIGRAM(S): at 17:15

## 2020-09-26 RX ADMIN — CLOPIDOGREL BISULFATE 75 MILLIGRAM(S): 75 TABLET, FILM COATED ORAL at 11:29

## 2020-09-26 RX ADMIN — Medication 50 MILLIGRAM(S): at 05:35

## 2020-09-26 RX ADMIN — ATORVASTATIN CALCIUM 10 MILLIGRAM(S): 80 TABLET, FILM COATED ORAL at 21:18

## 2020-09-26 RX ADMIN — Medication 81 MILLIGRAM(S): at 11:29

## 2020-09-26 RX ADMIN — METFORMIN HYDROCHLORIDE 1000 MILLIGRAM(S): 850 TABLET ORAL at 05:35

## 2020-09-26 RX ADMIN — Medication 250 MILLIGRAM(S): at 05:35

## 2020-09-26 RX ADMIN — METFORMIN HYDROCHLORIDE 1000 MILLIGRAM(S): 850 TABLET ORAL at 17:14

## 2020-09-26 RX ADMIN — LISINOPRIL 20 MILLIGRAM(S): 2.5 TABLET ORAL at 05:35

## 2020-09-26 RX ADMIN — Medication 1: at 09:10

## 2020-09-26 NOTE — DISCHARGE NOTE PROVIDER - NSDCCPCAREPLAN_GEN_ALL_CORE_FT
PRINCIPAL DISCHARGE DIAGNOSIS  Diagnosis: Cellulitis  Assessment and Plan of Treatment:       SECONDARY DISCHARGE DIAGNOSES  Diagnosis: Sepsis  Assessment and Plan of Treatment:

## 2020-09-26 NOTE — DISCHARGE NOTE PROVIDER - HOSPITAL COURSE
79 yo M with NIDDM2, CAD s/p 3v CABG/bovine AVR 2016, s/p stent 2019 p/w acute LLE redness and swelling.  Patient states he dropped a mirror on left lower shin 5days ago with a small laceration (approximately 1.5 cm) initially. The wound continued to heal, but he noticed swelling and redness of L foot and ankle with intermittent pain/pressure the day of presentation which progressed rapidly. Denies fevers, chill, n/v.   81 yo M with NIDDM2, CAD s/p 3v CABG/bovine AVR 2016, s/p stent 2019 p/w acute LLE redness and swelling.  Patient states he dropped a mirror on left lower shin 5days ago with a small laceration (approximately 1.5 cm) initially. The wound continued to heal, but he noticed swelling and redness of L foot and ankle with intermittent pain/pressure the day of presentation which progressed rapidly. Denies fevers, chill, n/v.  Pt was placed on IV antibiotics Vanco and Zosyn with good responses, will discharge with oral antibiotics Keflex for 1 week.     Hospital course will be done by attending.

## 2020-09-26 NOTE — CHART NOTE - NSCHARTNOTEFT_GEN_A_CORE
Discharge Note:    requested by attending to faciliate pt to d/c home.  V/s, labs, diagnostics reviewed, pt stable to d/c now.  medication reconcilation reviewed, revised, and resolved with Dr Salgado who medically cleared w/ f/u as direced. please refer to d/c note for hospital details.

## 2020-09-26 NOTE — DISCHARGE NOTE PROVIDER - NSDCACTIVITY_GEN_ALL_CORE
Walking - Outdoors allowed/No heavy lifting/straining/Bathing allowed/Showering allowed/Walking - Indoors allowed

## 2020-09-26 NOTE — DISCHARGE NOTE PROVIDER - CARE PROVIDER_API CALL
Lazaro Salgado  CARDIOLOGY  [address] [place] [address], [facility] [address]  [place] [address]  Phone: [phone]  Fax: [phone]  Follow Up Time:

## 2020-09-26 NOTE — DISCHARGE NOTE PROVIDER - NSDCMRMEDTOKEN_GEN_ALL_CORE_FT
Aspir 81 oral delayed release tablet: 1 tab(s) orally once a day  glyBURIDE 2.5 mg oral tablet: 1 tab(s) orally once a day  Invokana 100 mg oral tablet: 1 tab(s) orally once a day  Keflex 500 mg oral capsule: 1 cap(s) orally every 6 hours   Lipitor 10 mg oral tablet: 1 tab(s) orally once a day  metFORMIN 1000 mg oral tablet: 1 tab(s) orally 2 times a day  Plavix 75 mg oral tablet: 1 tab(s) orally once a day  ramipril 5 mg oral capsule: 1 cap(s) orally once a day  Toprol-XL 50 mg oral tablet, extended release: 1 tab(s) orally once a day

## 2020-09-27 RX ORDER — CEPHALEXIN 500 MG
1 CAPSULE ORAL
Qty: 28 | Refills: 0
Start: 2020-09-27 | End: 2020-10-03

## 2020-09-30 LAB
CULTURE RESULTS: SIGNIFICANT CHANGE UP
CULTURE RESULTS: SIGNIFICANT CHANGE UP
SPECIMEN SOURCE: SIGNIFICANT CHANGE UP
SPECIMEN SOURCE: SIGNIFICANT CHANGE UP

## 2020-10-01 PROBLEM — Z00.00 ENCOUNTER FOR PREVENTIVE HEALTH EXAMINATION: Status: ACTIVE | Noted: 2020-10-01

## 2020-10-12 ENCOUNTER — APPOINTMENT (OUTPATIENT)
Dept: VASCULAR SURGERY | Facility: CLINIC | Age: 80
End: 2020-10-12
Payer: MEDICARE

## 2020-10-12 PROCEDURE — 93923 UPR/LXTR ART STDY 3+ LVLS: CPT

## 2020-10-12 PROCEDURE — 99203 OFFICE O/P NEW LOW 30 MIN: CPT

## 2020-10-12 NOTE — CONSULT LETTER
[Dear  ___] : Dear  [unfilled], [Consult Letter:] : I had the pleasure of evaluating your patient, [unfilled]. [Please see my note below.] : Please see my note below. [Consult Closing:] : Thank you very much for allowing me to participate in the care of this patient.  If you have any questions, please do not hesitate to contact me. [Sincerely,] : Sincerely, [FreeTextEntry3] : Dominic Salgado M.D., F.RACHANAS., R.P.NAYE.I.\par  of Vascular Surgery\par Assistant Professor of Radiology\par Director of Endovascular Program/ Vascular Access Center\par [facility]

## 2020-10-12 NOTE — PHYSICAL EXAM
[Normal Breath Sounds] : Normal breath sounds [Normal Heart Sounds] : normal heart sounds [2+] : left 2+ [Ankle Swelling (On Exam)] : present [Ankle Swelling On The Left] : of the left ankle [Ankle Swelling On The Right] : mild [Skin Ulcer] : ulcer [Alert] : alert [Oriented to Person] : oriented to person [Oriented to Place] : oriented to place [JVD] : no jugular venous distention  [Varicose Veins Of Lower Extremities] : not present [] : not present [Abdomen Masses] : No abdominal masses

## 2020-10-12 NOTE — HISTORY OF PRESENT ILLNESS
[FreeTextEntry1] : Patient is a 80-year-old male with past medical history significant for diabetes, status post trauma to the left lower extremity with subsequent cellulitis.  Patient was admitted to the hospital and received intravenous antibiotics with improvement of the redness.  This was about 2 weeks ago and currently patient has no fever or chills.  Left leg is somewhat swollen even though the swelling has improved significantly since last week.  No rest pain no claudication.  No history of smoking.

## 2020-10-12 NOTE — ASSESSMENT
[FreeTextEntry1] : Patient with left leg resolving cellulitis with small ulceration.  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  No vascular intervention necessary.  Continue local wound care with antibiotics and conservative management.  Follow-up in 6 months.

## 2020-10-13 PROBLEM — I25.10 ATHEROSCLEROTIC HEART DISEASE OF NATIVE CORONARY ARTERY WITHOUT ANGINA PECTORIS: Chronic | Status: ACTIVE | Noted: 2020-09-25

## 2021-08-16 ENCOUNTER — APPOINTMENT (OUTPATIENT)
Dept: VASCULAR SURGERY | Facility: CLINIC | Age: 81
End: 2021-08-16
Payer: MEDICARE

## 2021-08-16 VITALS
HEART RATE: 67 BPM | DIASTOLIC BLOOD PRESSURE: 57 MMHG | HEIGHT: 60 IN | TEMPERATURE: 97.8 F | BODY MASS INDEX: 25.91 KG/M2 | WEIGHT: 132 LBS | SYSTOLIC BLOOD PRESSURE: 136 MMHG

## 2021-08-16 PROCEDURE — 93970 EXTREMITY STUDY: CPT

## 2021-08-16 PROCEDURE — 99213 OFFICE O/P EST LOW 20 MIN: CPT

## 2021-08-18 NOTE — PHYSICAL EXAM
[2+] : left 2+ [Ankle Swelling (On Exam)] : present [Ankle Swelling On The Right] : mild [Alert] : alert [Normal Breath Sounds] : Normal breath sounds [JVD] : no jugular venous distention  [Normal Heart Sounds] : normal heart sounds [Ankle Swelling On The Left] : of the left ankle [Varicose Veins Of Lower Extremities] : not present [] : not present [Abdomen Masses] : No abdominal masses [Skin Ulcer] : ulcer [Oriented to Person] : oriented to person [Oriented to Place] : oriented to place

## 2021-08-18 NOTE — ASSESSMENT
[FreeTextEntry1] : 80 yo male with history of dm presents for follow up with b/l lower extremity venous stasis skin changes \par \par venous duplex shows heavily calcified gsv of the left lower extremity and right gsv in the calf only, no evidence of insufficiency noted \par \par at this time no vascular surgical intervention would recommend diabetic socks and elevation \par pt to follow up as needed

## 2021-08-18 NOTE — HISTORY OF PRESENT ILLNESS
[FreeTextEntry1] : 80 yo male with history of dm presents for follow up.  pt without any complaints denies any history of lower extremity claudications, states that he is able to walk more then 2-3 blocks without stopping.  pt denies any lower extremity rest pain.  pt does report occasional edema of the lower extremities and some skin changes of the medial malleolus

## 2021-09-01 ENCOUNTER — APPOINTMENT (OUTPATIENT)
Dept: CT IMAGING | Facility: CLINIC | Age: 81
End: 2021-09-01
Payer: MEDICARE

## 2021-09-01 ENCOUNTER — OUTPATIENT (OUTPATIENT)
Dept: OUTPATIENT SERVICES | Facility: HOSPITAL | Age: 81
LOS: 1 days | End: 2021-09-01
Payer: MEDICARE

## 2021-09-01 DIAGNOSIS — Z95.2 PRESENCE OF PROSTHETIC HEART VALVE: Chronic | ICD-10-CM

## 2021-09-01 DIAGNOSIS — Z95.1 PRESENCE OF AORTOCORONARY BYPASS GRAFT: Chronic | ICD-10-CM

## 2021-09-01 DIAGNOSIS — Z00.8 ENCOUNTER FOR OTHER GENERAL EXAMINATION: ICD-10-CM

## 2021-09-01 PROCEDURE — 82565 ASSAY OF CREATININE: CPT

## 2021-09-01 PROCEDURE — 70498 CT ANGIOGRAPHY NECK: CPT | Mod: 26,MH

## 2021-09-01 PROCEDURE — 70498 CT ANGIOGRAPHY NECK: CPT | Mod: MH

## 2021-09-03 ENCOUNTER — APPOINTMENT (OUTPATIENT)
Dept: VASCULAR SURGERY | Facility: CLINIC | Age: 81
End: 2021-09-03
Payer: MEDICARE

## 2021-09-03 VITALS
DIASTOLIC BLOOD PRESSURE: 80 MMHG | HEIGHT: 60 IN | HEART RATE: 64 BPM | WEIGHT: 134 LBS | BODY MASS INDEX: 26.31 KG/M2 | SYSTOLIC BLOOD PRESSURE: 154 MMHG

## 2021-09-03 DIAGNOSIS — Z87.891 PERSONAL HISTORY OF NICOTINE DEPENDENCE: ICD-10-CM

## 2021-09-03 DIAGNOSIS — Z86.39 PERSONAL HISTORY OF OTHER ENDOCRINE, NUTRITIONAL AND METABOLIC DISEASE: ICD-10-CM

## 2021-09-03 DIAGNOSIS — Z86.79 PERSONAL HISTORY OF OTHER DISEASES OF THE CIRCULATORY SYSTEM: ICD-10-CM

## 2021-09-03 PROBLEM — Z00.00 ENCOUNTER FOR PREVENTIVE HEALTH EXAMINATION: Noted: 2021-09-03

## 2021-09-03 PROCEDURE — 99214 OFFICE O/P EST MOD 30 MIN: CPT

## 2021-09-03 RX ORDER — EMPAGLIFLOZIN 25 MG/1
TABLET, FILM COATED ORAL
Refills: 0 | Status: ACTIVE | COMMUNITY

## 2021-09-03 NOTE — HISTORY OF PRESENT ILLNESS
[FreeTextEntry1] : Patient is a 81-year-old with history of coronary artery disease who is being seen by our office for lower extremity circulation evaluation in the past, comes in today for evaluation of carotid disease.  No history of CVA or TIA.  Patient had carotid duplex and CAT scan done through the cardiology office which showed severe left-sided internal carotid artery stenosis.  Currently patient denies any recent TIA symptoms.  No significant claudication or rest pain.  Patient currently is on aspirin and Plavix.

## 2021-09-03 NOTE — PHYSICAL EXAM
[JVD] : no jugular venous distention  [Normal Breath Sounds] : Normal breath sounds [Normal Heart Sounds] : normal heart sounds [2+] : left 2+ [Right Carotid Bruit] : right carotid bruit heard [Ankle Swelling (On Exam)] : not present [Varicose Veins Of Lower Extremities] : not present [] : not present [Abdomen Masses] : No abdominal masses

## 2021-09-03 NOTE — ASSESSMENT
[FreeTextEntry1] : Patient with asymptomatic left-sided severe carotid stenosis.  Will recommend left carotid endarterectomy.  I had a long conversation with the patient and the patient's son regarding risks benefits and alternative modes of treatment including risk of stroke.  They agree with the procedure.  We will schedule him after he comes back from their trip to [place].  Continue aspirin and Plavix.

## 2021-09-06 ENCOUNTER — APPOINTMENT (OUTPATIENT)
Dept: CT IMAGING | Facility: CLINIC | Age: 81
End: 2021-09-06

## 2021-09-17 ENCOUNTER — APPOINTMENT (OUTPATIENT)
Dept: VASCULAR SURGERY | Facility: CLINIC | Age: 81
End: 2021-09-17

## 2021-10-01 ENCOUNTER — OUTPATIENT (OUTPATIENT)
Dept: OUTPATIENT SERVICES | Facility: HOSPITAL | Age: 81
LOS: 1 days | End: 2021-10-01
Payer: MEDICARE

## 2021-10-01 VITALS
OXYGEN SATURATION: 97 % | HEART RATE: 64 BPM | HEIGHT: 60 IN | TEMPERATURE: 98 F | DIASTOLIC BLOOD PRESSURE: 78 MMHG | RESPIRATION RATE: 18 BRPM | WEIGHT: 132.06 LBS | SYSTOLIC BLOOD PRESSURE: 142 MMHG

## 2021-10-01 DIAGNOSIS — Z95.5 PRESENCE OF CORONARY ANGIOPLASTY IMPLANT AND GRAFT: Chronic | ICD-10-CM

## 2021-10-01 DIAGNOSIS — I65.29 OCCLUSION AND STENOSIS OF UNSPECIFIED CAROTID ARTERY: ICD-10-CM

## 2021-10-01 DIAGNOSIS — E11.9 TYPE 2 DIABETES MELLITUS WITHOUT COMPLICATIONS: ICD-10-CM

## 2021-10-01 DIAGNOSIS — Z95.1 PRESENCE OF AORTOCORONARY BYPASS GRAFT: Chronic | ICD-10-CM

## 2021-10-01 DIAGNOSIS — I25.10 ATHEROSCLEROTIC HEART DISEASE OF NATIVE CORONARY ARTERY WITHOUT ANGINA PECTORIS: ICD-10-CM

## 2021-10-01 DIAGNOSIS — Z01.818 ENCOUNTER FOR OTHER PREPROCEDURAL EXAMINATION: ICD-10-CM

## 2021-10-01 DIAGNOSIS — Z95.2 PRESENCE OF PROSTHETIC HEART VALVE: Chronic | ICD-10-CM

## 2021-10-01 DIAGNOSIS — Z29.9 ENCOUNTER FOR PROPHYLACTIC MEASURES, UNSPECIFIED: ICD-10-CM

## 2021-10-01 LAB
A1C WITH ESTIMATED AVERAGE GLUCOSE RESULT: 6.8 % — HIGH (ref 4–5.6)
ANION GAP SERPL CALC-SCNC: 13 MMOL/L — SIGNIFICANT CHANGE UP (ref 5–17)
BLD GP AB SCN SERPL QL: NEGATIVE — SIGNIFICANT CHANGE UP
BUN SERPL-MCNC: 22 MG/DL — SIGNIFICANT CHANGE UP (ref 7–23)
CALCIUM SERPL-MCNC: 10 MG/DL — SIGNIFICANT CHANGE UP (ref 8.4–10.5)
CHLORIDE SERPL-SCNC: 106 MMOL/L — SIGNIFICANT CHANGE UP (ref 96–108)
CO2 SERPL-SCNC: 24 MMOL/L — SIGNIFICANT CHANGE UP (ref 22–31)
CREAT SERPL-MCNC: 0.83 MG/DL — SIGNIFICANT CHANGE UP (ref 0.5–1.3)
ESTIMATED AVERAGE GLUCOSE: 148 MG/DL — HIGH (ref 68–114)
GLUCOSE SERPL-MCNC: 184 MG/DL — HIGH (ref 70–99)
HCT VFR BLD CALC: 43.4 % — SIGNIFICANT CHANGE UP (ref 39–50)
HGB BLD-MCNC: 14.1 G/DL — SIGNIFICANT CHANGE UP (ref 13–17)
MCHC RBC-ENTMCNC: 31.1 PG — SIGNIFICANT CHANGE UP (ref 27–34)
MCHC RBC-ENTMCNC: 32.5 GM/DL — SIGNIFICANT CHANGE UP (ref 32–36)
MCV RBC AUTO: 95.6 FL — SIGNIFICANT CHANGE UP (ref 80–100)
NRBC # BLD: 0 /100 WBCS — SIGNIFICANT CHANGE UP (ref 0–0)
PLATELET # BLD AUTO: 163 K/UL — SIGNIFICANT CHANGE UP (ref 150–400)
POTASSIUM SERPL-MCNC: 4.1 MMOL/L — SIGNIFICANT CHANGE UP (ref 3.5–5.3)
POTASSIUM SERPL-SCNC: 4.1 MMOL/L — SIGNIFICANT CHANGE UP (ref 3.5–5.3)
RBC # BLD: 4.54 M/UL — SIGNIFICANT CHANGE UP (ref 4.2–5.8)
RBC # FLD: 12.3 % — SIGNIFICANT CHANGE UP (ref 10.3–14.5)
RH IG SCN BLD-IMP: POSITIVE — SIGNIFICANT CHANGE UP
SODIUM SERPL-SCNC: 143 MMOL/L — SIGNIFICANT CHANGE UP (ref 135–145)
WBC # BLD: 6.01 K/UL — SIGNIFICANT CHANGE UP (ref 3.8–10.5)
WBC # FLD AUTO: 6.01 K/UL — SIGNIFICANT CHANGE UP (ref 3.8–10.5)

## 2021-10-01 PROCEDURE — 86901 BLOOD TYPING SEROLOGIC RH(D): CPT

## 2021-10-01 PROCEDURE — 86900 BLOOD TYPING SEROLOGIC ABO: CPT

## 2021-10-01 PROCEDURE — 36415 COLL VENOUS BLD VENIPUNCTURE: CPT

## 2021-10-01 PROCEDURE — G0463: CPT

## 2021-10-01 PROCEDURE — 80048 BASIC METABOLIC PNL TOTAL CA: CPT

## 2021-10-01 PROCEDURE — 85027 COMPLETE CBC AUTOMATED: CPT

## 2021-10-01 PROCEDURE — 86850 RBC ANTIBODY SCREEN: CPT

## 2021-10-01 PROCEDURE — 83036 HEMOGLOBIN GLYCOSYLATED A1C: CPT

## 2021-10-01 RX ORDER — METOPROLOL TARTRATE 50 MG
1 TABLET ORAL
Qty: 0 | Refills: 0 | DISCHARGE

## 2021-10-01 RX ORDER — CEFAZOLIN SODIUM 1 G
2000 VIAL (EA) INJECTION ONCE
Refills: 0 | Status: DISCONTINUED | OUTPATIENT
Start: 2021-10-07 | End: 2021-10-08

## 2021-10-01 RX ORDER — CANAGLIFLOZIN 100 MG/1
1 TABLET, FILM COATED ORAL
Qty: 0 | Refills: 0 | DISCHARGE

## 2021-10-01 RX ORDER — ATORVASTATIN CALCIUM 80 MG/1
1 TABLET, FILM COATED ORAL
Qty: 0 | Refills: 0 | DISCHARGE

## 2021-10-01 RX ORDER — GLYBURIDE 5 MG
1 TABLET ORAL
Qty: 0 | Refills: 0 | DISCHARGE

## 2021-10-01 NOTE — H&P PST ADULT - NSICDXPASTSURGICALHX_GEN_ALL_CORE_FT
PAST SURGICAL HISTORY:  S/P AVR (aortic valve replacement) bovine 2016    S/P CABG x 3 2016    S/P coronary artery stent placement x1 2019

## 2021-10-01 NOTE — H&P PST ADULT - ASSESSMENT
CAPRINI SCORE [CLOT updated 18]    AGE RELATED RISK FACTORS                                                       MOBILITY RELATED FACTORS  [ ] Age 41-60 years                                            (1 Point)                    [ ] Bed rest                                                        (1 Point)  [ ] Age: 61-74 years                                           (2 Points)                  [ ] Plaster cast                                                   (2 Points)  [ ] Age= 75 years                                              (3 Points)                    [ ] Bed bound for more than 72 hours                 (2 Points)    DISEASE RELATED RISK FACTORS                                               GENDER SPECIFIC FACTORS  [ ] Edema in the lower extremities                       (1 Point)              [ ] Pregnancy                                                     (1 Point)  [ ] Varicose veins                                               (1 Point)                     [ ] Post-partum < 6 weeks                                   (1 Point)             [ ] BMI > 25 Kg/m2                                            (1 Point)                     [ ] Hormonal therapy  or oral contraception          (1 Point)                 [ ] Sepsis (in the previous month)                        (1 Point)               [ ] History of pregnancy complications                 (1 point)  [ ] Pneumonia or serious lung disease                                               [ ] Unexplained or recurrent                     (1 Point)           (in the previous month)                               (1 Point)  [ ] Abnormal pulmonary function test                     (1 Point)                 SURGERY RELATED RISK FACTORS  [ ] Acute myocardial infarction                              (1 Point)               [ ]  Section                                             (1 Point)  [ ] Congestive heart failure (in the previous month)  (1 Point)      [ ] Minor surgery                                                  (1 Point)   [ ] Inflammatory bowel disease                             (1 Point)               [ ] Arthroscopic surgery                                        (2 Points)  [ ] Central venous access                                      (2 Points)                [ ] General surgery lasting more than 45 minutes (2 points)  [ ] Present or previous malignancy                     (2 Points)                [ ] Elective arthroplasty                                         (5 points)    [ ] Stroke (in the previous month)                          (5 Points)                                                                                                                                                           HEMATOLOGY RELATED FACTORS                                                 TRAUMA RELATED RISK FACTORS  [ ] Prior episodes of VTE                                     (3 Points)                [ ] Fracture of the hip, pelvis, or leg                       (5 Points)  [ ] Positive family history for VTE                         (3 Points)             [ ] Acute spinal cord injury (in the previous month)  (5 Points)  [ ] Prothrombin 61569 A                                     (3 Points)               [ ] Paralysis  (less than 1 month)                             (5 Points)  [ ] Factor V Leiden                                             (3 Points)                  [ ] Multiple Trauma within 1 month                        (5 Points)  [ ] Lupus anticoagulants                                     (3 Points)                                                           [ ] Anticardiolipin antibodies                               (3 Points)                                                       [ ] High homocysteine in the blood                      (3 Points)                                             [ ] Other congenital or acquired thrombophilia      (3 Points)                                                [ ] Heparin induced thrombocytopenia                  (3 Points)                                     Total Score [  6   ]

## 2021-10-01 NOTE — H&P PST ADULT - NSICDXPASTMEDICALHX_GEN_ALL_CORE_FT
PAST MEDICAL HISTORY:  3-vessel CAD s/p 3v CABG 2016, s/p stent 2019    DM2 (diabetes mellitus, type 2)     HLD (hyperlipidemia)     HTN (hypertension)     Occlusion and stenosis of unspecified carotid artery

## 2021-10-01 NOTE — H&P PST ADULT - PROBLEM SELECTOR PLAN 4
Last dose of Plavix on 10/3/21 and pt to continue ASA. Followed by Cards. Routine testing (Echo & Stress) completed ~11 days ago as per pt.

## 2021-10-01 NOTE — H&P PST ADULT - NSWEIGHTCALCTOOLDRUG_GEN_A_CORE
Class I (easy) - visualization of the soft palate, fauces, uvula, and both anterior and posterior pillars  used

## 2021-10-01 NOTE — H&P PST ADULT - PROBLEM SELECTOR PLAN 2
FS to be done in AM day of sx. Written instructions given regarding all medications as well. HgbA1C drawn in PST today.

## 2021-10-01 NOTE — H&P PST ADULT - HISTORY OF PRESENT ILLNESS
81yr old male presents to PST for a Left Carotid Endarterectomy w/ EEG Monitoring on 10/7/21. Pt states he had routine testing which showed severe left sided carotid stenosis. He is currently asymptomatic, denies TIA's, CVA, headaches, vision change, dizziness or syncope. PMH: HTN, HLD, DM2 and CAD s/p CABG x3/AVR and stent x1 (on Plavix and ASA). Denies recent fevers, chills, cough, chest pain or SOB and feels well otherwise. States had routine cardiac work-up ~11 days ago. Scheduled for COVID PCR at [facility] on 10/4/21.

## 2021-10-01 NOTE — H&P PST ADULT - PROBLEM SELECTOR PLAN 1
Pt scheduled for sx n 10/7/21. Surgical and chlorhexidine instructions reviewed w/ pt. Written instructions also provided. COVID testing scheduled at [facility] on 10/4/21.

## 2021-10-04 ENCOUNTER — OUTPATIENT (OUTPATIENT)
Dept: OUTPATIENT SERVICES | Facility: HOSPITAL | Age: 81
LOS: 1 days | End: 2021-10-04

## 2021-10-04 DIAGNOSIS — Z11.52 ENCOUNTER FOR SCREENING FOR COVID-19: ICD-10-CM

## 2021-10-04 DIAGNOSIS — Z95.1 PRESENCE OF AORTOCORONARY BYPASS GRAFT: Chronic | ICD-10-CM

## 2021-10-04 DIAGNOSIS — Z95.5 PRESENCE OF CORONARY ANGIOPLASTY IMPLANT AND GRAFT: Chronic | ICD-10-CM

## 2021-10-04 DIAGNOSIS — Z95.2 PRESENCE OF PROSTHETIC HEART VALVE: Chronic | ICD-10-CM

## 2021-10-04 LAB — SARS-COV-2 RNA SPEC QL NAA+PROBE: SIGNIFICANT CHANGE UP

## 2021-10-06 ENCOUNTER — TRANSCRIPTION ENCOUNTER (OUTPATIENT)
Age: 81
End: 2021-10-06

## 2021-10-06 NOTE — PRE-ANESTHESIA EVALUATION ADULT - NSANTHNECKRD_ENT_A_CORE
----- Message from Polly Rosenberg NP sent at 5/9/2017  3:52 PM CDT -----  Stool cultures are all normal   No

## 2021-10-06 NOTE — PRE-ANESTHESIA EVALUATION ADULT - NSANTHPMHFT_GEN_ALL_CORE
81 M PMH: CAD ( s/p CABG x3 '16, PCI '19), AVR '16, HTN. HLD, T2DM ( A1C=6.8), NKA, found with >90% Left Carotid Stenosis on routine screening, Pt asymptomatic . For Surgery 10/7/21

## 2021-10-07 ENCOUNTER — APPOINTMENT (OUTPATIENT)
Dept: VASCULAR SURGERY | Facility: HOSPITAL | Age: 81
End: 2021-10-07
Payer: MEDICARE

## 2021-10-07 ENCOUNTER — INPATIENT (INPATIENT)
Facility: HOSPITAL | Age: 81
LOS: 0 days | Discharge: HOME | DRG: 38 | End: 2021-10-08
Attending: SURGERY | Admitting: SURGERY
Payer: MEDICARE

## 2021-10-07 ENCOUNTER — TRANSCRIPTION ENCOUNTER (OUTPATIENT)
Age: 81
End: 2021-10-07

## 2021-10-07 ENCOUNTER — RESULT REVIEW (OUTPATIENT)
Age: 81
End: 2021-10-07

## 2021-10-07 VITALS
DIASTOLIC BLOOD PRESSURE: 65 MMHG | RESPIRATION RATE: 16 BRPM | WEIGHT: 132.06 LBS | HEIGHT: 60 IN | SYSTOLIC BLOOD PRESSURE: 170 MMHG | OXYGEN SATURATION: 96 % | TEMPERATURE: 97 F | HEART RATE: 79 BPM

## 2021-10-07 DIAGNOSIS — Z95.1 PRESENCE OF AORTOCORONARY BYPASS GRAFT: Chronic | ICD-10-CM

## 2021-10-07 DIAGNOSIS — Z95.2 PRESENCE OF PROSTHETIC HEART VALVE: ICD-10-CM

## 2021-10-07 DIAGNOSIS — Z95.5 PRESENCE OF CORONARY ANGIOPLASTY IMPLANT AND GRAFT: Chronic | ICD-10-CM

## 2021-10-07 DIAGNOSIS — I65.29 OCCLUSION AND STENOSIS OF UNSPECIFIED CAROTID ARTERY: ICD-10-CM

## 2021-10-07 DIAGNOSIS — Z95.2 PRESENCE OF PROSTHETIC HEART VALVE: Chronic | ICD-10-CM

## 2021-10-07 LAB
GLUCOSE BLDC GLUCOMTR-MCNC: 152 MG/DL — HIGH (ref 70–99)
GLUCOSE BLDC GLUCOMTR-MCNC: 159 MG/DL — HIGH (ref 70–99)
GLUCOSE BLDC GLUCOMTR-MCNC: 170 MG/DL — HIGH (ref 70–99)
RH IG SCN BLD-IMP: POSITIVE — SIGNIFICANT CHANGE UP

## 2021-10-07 PROCEDURE — 35301 RECHANNELING OF ARTERY: CPT | Mod: LT

## 2021-10-07 PROCEDURE — 88304 TISSUE EXAM BY PATHOLOGIST: CPT | Mod: 26

## 2021-10-07 PROCEDURE — 88311 DECALCIFY TISSUE: CPT | Mod: 26

## 2021-10-07 RX ORDER — METOPROLOL TARTRATE 50 MG
100 TABLET ORAL DAILY
Refills: 0 | Status: DISCONTINUED | OUTPATIENT
Start: 2021-10-07 | End: 2021-10-08

## 2021-10-07 RX ORDER — GLIMEPIRIDE 1 MG
1 TABLET ORAL
Qty: 0 | Refills: 0 | DISCHARGE

## 2021-10-07 RX ORDER — CLOPIDOGREL BISULFATE 75 MG/1
1 TABLET, FILM COATED ORAL
Qty: 0 | Refills: 0 | DISCHARGE

## 2021-10-07 RX ORDER — CHLORHEXIDINE GLUCONATE 213 G/1000ML
1 SOLUTION TOPICAL ONCE
Refills: 0 | Status: DISCONTINUED | OUTPATIENT
Start: 2021-10-07 | End: 2021-10-07

## 2021-10-07 RX ORDER — ASPIRIN/CALCIUM CARB/MAGNESIUM 324 MG
81 TABLET ORAL DAILY
Refills: 0 | Status: DISCONTINUED | OUTPATIENT
Start: 2021-10-07 | End: 2021-10-08

## 2021-10-07 RX ORDER — DEXTROSE MONOHYDRATE, SODIUM CHLORIDE, AND POTASSIUM CHLORIDE 50; .745; 4.5 G/1000ML; G/1000ML; G/1000ML
1000 INJECTION, SOLUTION INTRAVENOUS
Refills: 0 | Status: DISCONTINUED | OUTPATIENT
Start: 2021-10-07 | End: 2021-10-08

## 2021-10-07 RX ORDER — ASPIRIN/CALCIUM CARB/MAGNESIUM 324 MG
1 TABLET ORAL
Qty: 0 | Refills: 0 | DISCHARGE

## 2021-10-07 RX ORDER — INSULIN LISPRO 100/ML
VIAL (ML) SUBCUTANEOUS
Refills: 0 | Status: DISCONTINUED | OUTPATIENT
Start: 2021-10-07 | End: 2021-10-08

## 2021-10-07 RX ORDER — EMPAGLIFLOZIN 10 MG/1
1 TABLET, FILM COATED ORAL
Qty: 0 | Refills: 0 | DISCHARGE

## 2021-10-07 RX ORDER — ATORVASTATIN CALCIUM 80 MG/1
80 TABLET, FILM COATED ORAL AT BEDTIME
Refills: 0 | Status: DISCONTINUED | OUTPATIENT
Start: 2021-10-07 | End: 2021-10-08

## 2021-10-07 RX ORDER — FENTANYL CITRATE 50 UG/ML
25 INJECTION INTRAVENOUS
Refills: 0 | Status: DISCONTINUED | OUTPATIENT
Start: 2021-10-07 | End: 2021-10-07

## 2021-10-07 RX ORDER — METOPROLOL TARTRATE 50 MG
1 TABLET ORAL
Qty: 0 | Refills: 0 | DISCHARGE

## 2021-10-07 RX ORDER — SODIUM CHLORIDE 9 MG/ML
3 INJECTION INTRAMUSCULAR; INTRAVENOUS; SUBCUTANEOUS EVERY 8 HOURS
Refills: 0 | Status: DISCONTINUED | OUTPATIENT
Start: 2021-10-07 | End: 2021-10-07

## 2021-10-07 RX ORDER — PHENYLEPHRINE HYDROCHLORIDE 10 MG/ML
0.3 INJECTION INTRAVENOUS
Qty: 40 | Refills: 0 | Status: DISCONTINUED | OUTPATIENT
Start: 2021-10-07 | End: 2021-10-07

## 2021-10-07 RX ORDER — CLOPIDOGREL BISULFATE 75 MG/1
75 TABLET, FILM COATED ORAL DAILY
Refills: 0 | Status: DISCONTINUED | OUTPATIENT
Start: 2021-10-07 | End: 2021-10-08

## 2021-10-07 RX ORDER — ONDANSETRON 8 MG/1
4 TABLET, FILM COATED ORAL EVERY 6 HOURS
Refills: 0 | Status: DISCONTINUED | OUTPATIENT
Start: 2021-10-07 | End: 2021-10-08

## 2021-10-07 RX ORDER — HEPARIN SODIUM 5000 [USP'U]/ML
5000 INJECTION INTRAVENOUS; SUBCUTANEOUS EVERY 8 HOURS
Refills: 0 | Status: DISCONTINUED | OUTPATIENT
Start: 2021-10-07 | End: 2021-10-08

## 2021-10-07 RX ORDER — ONDANSETRON 8 MG/1
4 TABLET, FILM COATED ORAL ONCE
Refills: 0 | Status: DISCONTINUED | OUTPATIENT
Start: 2021-10-07 | End: 2021-10-07

## 2021-10-07 RX ORDER — ACETAMINOPHEN 500 MG
650 TABLET ORAL EVERY 6 HOURS
Refills: 0 | Status: DISCONTINUED | OUTPATIENT
Start: 2021-10-07 | End: 2021-10-08

## 2021-10-07 RX ORDER — LIDOCAINE HCL 20 MG/ML
0.2 VIAL (ML) INJECTION ONCE
Refills: 0 | Status: DISCONTINUED | OUTPATIENT
Start: 2021-10-07 | End: 2021-10-07

## 2021-10-07 RX ORDER — INFLUENZA VIRUS VACCINE 15; 15; 15; 15 UG/.5ML; UG/.5ML; UG/.5ML; UG/.5ML
0.5 SUSPENSION INTRAMUSCULAR ONCE
Refills: 0 | Status: DISCONTINUED | OUTPATIENT
Start: 2021-10-07 | End: 2021-10-08

## 2021-10-07 RX ORDER — ATORVASTATIN CALCIUM 80 MG/1
1 TABLET, FILM COATED ORAL
Qty: 0 | Refills: 0 | DISCHARGE

## 2021-10-07 RX ORDER — RAMIPRIL 5 MG
1 CAPSULE ORAL
Qty: 0 | Refills: 0 | DISCHARGE

## 2021-10-07 RX ORDER — METFORMIN HYDROCHLORIDE 850 MG/1
1 TABLET ORAL
Qty: 0 | Refills: 0 | DISCHARGE

## 2021-10-07 RX ORDER — LISINOPRIL 2.5 MG/1
5 TABLET ORAL DAILY
Refills: 0 | Status: DISCONTINUED | OUTPATIENT
Start: 2021-10-07 | End: 2021-10-08

## 2021-10-07 RX ADMIN — Medication 650 MILLIGRAM(S): at 20:07

## 2021-10-07 RX ADMIN — PHENYLEPHRINE HYDROCHLORIDE 6.74 MICROGRAM(S)/KG/MIN: 10 INJECTION INTRAVENOUS at 11:35

## 2021-10-07 RX ADMIN — Medication 1: at 17:18

## 2021-10-07 RX ADMIN — Medication 81 MILLIGRAM(S): at 13:14

## 2021-10-07 RX ADMIN — CLOPIDOGREL BISULFATE 75 MILLIGRAM(S): 75 TABLET, FILM COATED ORAL at 13:14

## 2021-10-07 RX ADMIN — HEPARIN SODIUM 5000 UNIT(S): 5000 INJECTION INTRAVENOUS; SUBCUTANEOUS at 21:20

## 2021-10-07 RX ADMIN — ATORVASTATIN CALCIUM 80 MILLIGRAM(S): 80 TABLET, FILM COATED ORAL at 21:20

## 2021-10-07 RX ADMIN — HEPARIN SODIUM 5000 UNIT(S): 5000 INJECTION INTRAVENOUS; SUBCUTANEOUS at 13:18

## 2021-10-07 RX ADMIN — Medication 650 MILLIGRAM(S): at 19:37

## 2021-10-07 RX ADMIN — DEXTROSE MONOHYDRATE, SODIUM CHLORIDE, AND POTASSIUM CHLORIDE 75 MILLILITER(S): 50; .745; 4.5 INJECTION, SOLUTION INTRAVENOUS at 13:34

## 2021-10-07 NOTE — CONSULT NOTE ADULT - PROBLEM SELECTOR RECOMMENDATION 3
s/p bioprosthetic AVR   has moderate bioprosthetic AS  Stable and compensated at present time   cont with home meds

## 2021-10-07 NOTE — DISCHARGE NOTE NURSING/CASE MANAGEMENT/SOCIAL WORK - PATIENT PORTAL LINK FT
You can access the FollowMyHealth Patient Portal offered by [facility] by registering at the following website: [email or web address]. By joining [employer]’s FollowMyHealth portal, you will also be able to view your health information using other applications (apps) compatible with our system.

## 2021-10-07 NOTE — DISCHARGE NOTE PROVIDER - NSDCCPTREATMENT_GEN_ALL_CORE_FT
PRINCIPAL PROCEDURE  Procedure: Left carotid endarterectomy  Findings and Treatment: · Operative Findings	dacron patch

## 2021-10-07 NOTE — PROGRESS NOTE ADULT - SUBJECTIVE AND OBJECTIVE BOX
SURGERY DAILY PROGRESS NOTE    STATUS POST:  Left carotid endarterectomy    Left carotid endarterectomy        SUBJECTIVE: Pt seen and examined at bedside. Admits to some incisional pain, otherwise feels well. Denies chest pain, SOB, palpitations, HA, fever, chills, N/V, dizziness.  Tolerating clears and voiding.  Has not ambulated yet.      OBJECTIVE:  Vital Signs Last 24 Hrs  T(C): 36.3 (07 Oct 2021 10:50), Max: 36.3 (07 Oct 2021 06:42)  T(F): 97.3 (07 Oct 2021 10:50), Max: 97.3 (07 Oct 2021 06:42)  HR: 75 (07 Oct 2021 17:00) (68 - 97)  BP: 116/57 (07 Oct 2021 17:00) (99/53 - 170/65)  BP(mean): 79 (07 Oct 2021 17:00) (71 - 86)  RR: 16 (07 Oct 2021 17:00) (14 - 16)  SpO2: 95% (07 Oct 2021 17:00) (95% - 100%)    I&O's Summary    07 Oct 2021 07:01  -  07 Oct 2021 17:28  --------------------------------------------------------  IN: 320.2 mL / OUT: 935 mL / NET: -614.8 mL        Physical Exam:  General Appearance: Appears well, NAD, A& O x 3  Neuro: CN intact  Neck: Supple, no active bleeding or hematoma present, dressing c/d/i, JACQUI- ss  Chest: Equal expansion bilaterally, equal breath sounds  CV: Pulse regular presently  Abdomen: Soft, ND, ND  Extremities: Grossly symmetric, SCD's in place, FROM/ sensation/motor intact b/l    LABS:                RADIOLOGY & ADDITIONAL STUDIES:

## 2021-10-07 NOTE — CONSULT NOTE ADULT - SUBJECTIVE AND OBJECTIVE BOX
CHIEF COMPLAINT:    HISTORY OF PRESENT ILLNESS:  in PACU at present time.       PAST MEDICAL & SURGICAL HISTORY:  3-vessel CAD  s/p 3v CABG 2016, s/p stent 2019    HTN (hypertension)    DM2 (diabetes mellitus, type 2)    Occlusion and stenosis of unspecified carotid artery    HLD (hyperlipidemia)    S/P CABG x 3  2016    S/P AVR (aortic valve replacement)  bovine 2016    S/P coronary artery stent placement  x1 2019            MEDICATIONS:  aspirin enteric coated 81 milliGRAM(s) Oral daily  clopidogrel Tablet 75 milliGRAM(s) Oral daily  heparin   Injectable 5000 Unit(s) SubCutaneous every 8 hours  lisinopril 5 milliGRAM(s) Oral daily  metoprolol succinate  milliGRAM(s) Oral daily  phenylephrine    Infusion 0.3 MICROgram(s)/kG/Min IV Continuous <Continuous>    ceFAZolin   IVPB 2000 milliGRAM(s) IV Intermittent once      fentaNYL    Injectable 25 MICROGram(s) IV Push every 5 minutes PRN  ondansetron Injectable 4 milliGRAM(s) IV Push every 6 hours PRN  ondansetron Injectable 4 milliGRAM(s) IV Push once PRN      atorvastatin 80 milliGRAM(s) Oral at bedtime    dextrose 5% + sodium chloride 0.45% with potassium chloride 20 mEq/L 1000 milliLiter(s) IV Continuous <Continuous>      FAMILY HISTORY:  FH: diabetes mellitus  mother, siblings    FH: heart disease  father        SOCIAL HISTORY:    [ ] Non-smoker  [ ] Smoker  [ ] Alcohol    Allergies    No Known Allergies    Intolerances    	    REVIEW OF SYSTEMS:  CONSTITUTIONAL: No fever, weight loss, + fatigue  EYES: No eye pain, visual disturbances, or discharge  ENMT:  No difficulty hearing, tinnitus, vertigo; No sinus or throat pain  NECK: No pain or stiffness  RESPIRATORY: No cough, wheezing, chills or hemoptysis; No Shortness of Breath  CARDIOVASCULAR: No chest pain, palpitations, passing out, dizziness, or leg swelling  GASTROINTESTINAL: No abdominal or epigastric pain. No nausea, vomiting, or hematemesis; No diarrhea or constipation. No melena or hematochezia.  GENITOURINARY: No dysuria, frequency, hematuria, or incontinence  NEUROLOGICAL: No headaches, memory loss, loss of strength, numbness, or tremors  SKIN: No itching, burning, rashes, or lesions   LYMPH Nodes: No enlarged glands  ENDOCRINE: No heat or cold intolerance; No hair loss  MUSCULOSKELETAL: No joint pain or swelling; No muscle, back, or extremity pain  PSYCHIATRIC: No depression, anxiety, mood swings, or difficulty sleeping  HEME/LYMPH: No easy bruising, or bleeding gums  ALLERY AND IMMUNOLOGIC: No hives or eczema	    [ ] All others negative	  [ ] Unable to obtain    PHYSICAL EXAM:  T(C): 36.3 (10-07-21 @ 10:50), Max: 36.3 (10-07-21 @ 06:42)  HR: 72 (10-07-21 @ 12:30) (70 - 97)  BP: 117/58 (10-07-21 @ 12:30) (99/53 - 170/65)  RR: 14 (10-07-21 @ 12:30) (14 - 16)  SpO2: 100% (10-07-21 @ 12:30) (96% - 100%)  Wt(kg): --  I&O's Summary    07 Oct 2021 07:01  -  07 Oct 2021 12:38  --------------------------------------------------------  IN: 9 mL / OUT: 0 mL / NET: 9 mL        Appearance: NAD  HEENT:  Dry  oral mucosa, PERRL, EOMI	  Lymphatic: No lymphadenopathy L CEA bandaged   Cardiovascular: Normal S1 S2, No JVD, + murmurs, No edema  Respiratory: Lungs clear to auscultation	  Psychiatry: A & O x 3, Mood & affect appropriate  Gastrointestinal:  Soft, Non-tender, + BS	  Skin: No rashes, No ecchymoses, No cyanosis	  Neurologic: Non-focal  Extremities: Normal range of motion, No clubbing, cyanosis or edema  Vascular: Peripheral pulses palpable 2+ bilaterally    TELEMETRY: 	SR    ECG:  	NSR Anterior infarct, (Chronic)  RADIOLOGY: < from: CT Angio Neck w/ IV Cont (09.01.21 @ 08:48) >    EXAM:  CT ANGIO NECK (W)AW IC        PROCEDURE DATE:  09/01/2021           INTERPRETATION:  Clinical indications: Internal carotid artery stenosis.    Following injection of approximately 90 cc of Omnipaque 350 IV CTA of the neck was performed. Coronal and sagittal structures were performed. 3-D MIPS were performed.    Calcification is seen involving the bilateral carotid bifurcation region.    Calcification involving the origin of the proximal right common carotid artery is seen. Mild narrowing of the distal right common carotid artery is seen with associated soft tissue plaque. Using the NASCET criteria there is evidence of a 62% stenosis involving the proximal right internal carotid artery. Associated area of ulceration is suspected.    Greater than 90% stenosis is seen involving the distalmost aspect of the left common carotid as well as proximal left internal carotid artery. This does have associated calcification and soft tissue plaque. This area of stenosis extends approximately 1.3 cm in length. Mild narrowing of the proximal left external carotid artery is seen.    Both vertebral arteries demonstrate normal enhancement.    Calcifications are seen involving both distal internal carotid arteries with associated areas of stenosis. The stenosis is more prominent on the left side than the right.    Evaluation of the cervical of [place] appears unremarkable.    Evaluation of the soft tissue neck region appears normal.    The visualized portions of both lung apices appear clear.    Impression: Carotid stenosis as described above.    --- End of Report ---               TOM LOREDO MD; Attending Radiologist   This document has been electronically signed. Sep  2 2021  2:22PM    < end of copied text >    OTHER: 	  	  LABS:	 	    CARDIAC MARKERS:                    proBNP:   Lipid Profile:   HgA1c:   TSH:

## 2021-10-07 NOTE — DISCHARGE NOTE PROVIDER - NSDCCPCAREPLAN_GEN_ALL_CORE_FT
PRINCIPAL DISCHARGE DIAGNOSIS  Diagnosis: Occlusion and stenosis of unspecified carotid artery  Assessment and Plan of Treatment: Return to ER for temperatures greater than 101, chills sweats, pain not controlled with pain medications, persistent headaches, nausea and/or vomiting, asymmetrical weakness, neurological or mental status changes.  Please keep incision sites clean and dry, shower only.  Do not bathe or immerse incision sites in water for a prolonged amount of time.  May remove gauze dressing in 24hrs.  Steri-strips may fall of on their own in the shower.   follow up with Dr. Salgado within 2 weeks upon discharge.  follow up with your PMD within 1 week regarding your hospitalization and surgery.

## 2021-10-07 NOTE — CONSULT NOTE ADULT - ASSESSMENT
81yr old male w/ HTN, HLD, DM2 and CAD s/p CABG x3/AVR and stent x1 (on Plavix and ASA) . He had routine testing which showed severe left sided carotid stenosis.    He is now s/p Left Carotid Endarterectomy w/ EEG Monitoring on 10/7/21   he denies chest pain or SOB and feels well otherwise.

## 2021-10-07 NOTE — DISCHARGE NOTE PROVIDER - HOSPITAL COURSE
81yr old male presents to PST for a Left Carotid Endarterectomy w/ EEG Monitoring on 10/7/21. Pt states he had routine testing which showed severe left sided carotid stenosis. He is currently asymptomatic, denies TIA's, CVA, headaches, vision change, dizziness or syncope. PMH: HTN, HLD, DM2 and CAD s/p CABG x3/AVR and stent x1 (on Plavix and ASA). Denies recent fevers, chills, cough, chest pain or SOB and feels well otherwise. States had routine cardiac work-up ~11 days ago. Scheduled for COVID PCR at [facility] on 10/4/21.     Pt. tolerated procedure well and was transferred to the recovery room where pt was closely managed for postoperative pain and blood pressure control, and then transferred to the floor without incidence.  Pt. was mainly managed for postoperative pain, wound care, as well as close monitoring of fluid resuscitation, neurological status and electrolyte repletion.  Pt has been tolerating a diet, voiding, ambulating, and the pain is now well controlled.   JACQUI tej prior to dc.  Pt. is ready for discharge home in stable condition, and will follow up in two weeks as an outpatient with Dr. Salgado within 2 weeks upon discharge.

## 2021-10-07 NOTE — DISCHARGE NOTE PROVIDER - NSDCMRMEDTOKEN_GEN_ALL_CORE_FT
Aspir 81 oral delayed release tablet: 1 tab(s) orally once a day  atorvastatin 80 mg oral tablet: 1 tab(s) orally once a day  glimepiride 2 mg oral tablet: 1 tab(s) orally once a day  Jardiance 10 mg oral tablet: 1 tab(s) orally once a day (in the morning)  metFORMIN 1000 mg oral tablet: 1 tab(s) orally 2 times a day  metoprolol succinate 100 mg oral tablet, extended release: 1 tab(s) orally once a day  Plavix 75 mg oral tablet: 1 tab(s) orally once a day; last dose on 10/3/21 for sx  ramipril 5 mg oral capsule: 1 cap(s) orally once a day   acetaminophen 325 mg oral tablet: 2 tab(s) orally every 6 hours, As needed, Mild Pain (1 - 3), Moderate Pain (4 - 6), Severe Pain (7 - 10)  Aspir 81 oral delayed release tablet: 1 tab(s) orally once a day  atorvastatin 80 mg oral tablet: 1 tab(s) orally once a day  glimepiride 2 mg oral tablet: 1 tab(s) orally once a day  Jardiance 10 mg oral tablet: 1 tab(s) orally once a day (in the morning)  metFORMIN 1000 mg oral tablet: 1 tab(s) orally 2 times a day  metoprolol succinate 100 mg oral tablet, extended release: 1 tab(s) orally once a day  Plavix 75 mg oral tablet: 1 tab(s) orally once a day; last dose on 10/3/21 for sx  ramipril 5 mg oral capsule: 1 cap(s) orally once a day

## 2021-10-07 NOTE — PATIENT PROFILE ADULT - FLU SEASON?
Patient found crying in bed. Said she is feeling anxious, like she is about to have a panic 
attack despite receiving PRN anxiety medication at approximately 0040. Offered self to 
patient and used grounding techniques as previously instructed by family member. Asked 
patient if she would like to notify . Patient agreed to notify . Called 
 and informed him of situation.  said he is on his way. Patient currently 
resting in recliner chair. No s/s of distress or c/o pain noted as of this time. 
Respirations even and unlabored. All safety measures in place. Will continue to monitor. Yes...

## 2021-10-07 NOTE — DISCHARGE NOTE PROVIDER - CARE PROVIDER_API CALL
Dominic Salgado)  Vascular Surgery  [address], [address]  [place] [address]  Phone: [phone]  Fax: [phone]  Follow Up Time: 2 weeks

## 2021-10-07 NOTE — PROGRESS NOTE ADULT - ASSESSMENT
ASSESSMENT/PLAN: 81yr old male w/ HTN, HLD, DM2 and CAD s/p CABG x3/AVR and stent x1 (on Plavix and ASA).  Now s/p L CEA, recovering appropriately.    - CLD  - dvt ppx  - statin/asa/plavix  - OOB/ambulate  - AM labs  - JACQUI monitor output  - I&Os  - dispo: likely dc home tomorrow    Vascular Surgery   [id number]

## 2021-10-08 VITALS — DIASTOLIC BLOOD PRESSURE: 73 MMHG | SYSTOLIC BLOOD PRESSURE: 126 MMHG | HEART RATE: 80 BPM | TEMPERATURE: 98 F

## 2021-10-08 LAB
A1C WITH ESTIMATED AVERAGE GLUCOSE RESULT: 6.5 % — HIGH (ref 4–5.6)
ANION GAP SERPL CALC-SCNC: 13 MMOL/L — SIGNIFICANT CHANGE UP (ref 5–17)
BUN SERPL-MCNC: 12 MG/DL — SIGNIFICANT CHANGE UP (ref 7–23)
CALCIUM SERPL-MCNC: 9.1 MG/DL — SIGNIFICANT CHANGE UP (ref 8.4–10.5)
CHLORIDE SERPL-SCNC: 103 MMOL/L — SIGNIFICANT CHANGE UP (ref 96–108)
CO2 SERPL-SCNC: 24 MMOL/L — SIGNIFICANT CHANGE UP (ref 22–31)
COVID-19 SPIKE DOMAIN AB INTERP: POSITIVE
COVID-19 SPIKE DOMAIN ANTIBODY RESULT: 182 U/ML — HIGH
CREAT SERPL-MCNC: 0.86 MG/DL — SIGNIFICANT CHANGE UP (ref 0.5–1.3)
ESTIMATED AVERAGE GLUCOSE: 140 MG/DL — HIGH (ref 68–114)
GLUCOSE BLDC GLUCOMTR-MCNC: 205 MG/DL — HIGH (ref 70–99)
GLUCOSE SERPL-MCNC: 200 MG/DL — HIGH (ref 70–99)
HCT VFR BLD CALC: 39.2 % — SIGNIFICANT CHANGE UP (ref 39–50)
HGB BLD-MCNC: 12.6 G/DL — LOW (ref 13–17)
LDLC SERPL DIRECT ASSAY-MCNC: 50 MG/DL — SIGNIFICANT CHANGE UP
MAGNESIUM SERPL-MCNC: 2 MG/DL — SIGNIFICANT CHANGE UP (ref 1.6–2.6)
MCHC RBC-ENTMCNC: 31 PG — SIGNIFICANT CHANGE UP (ref 27–34)
MCHC RBC-ENTMCNC: 32.1 GM/DL — SIGNIFICANT CHANGE UP (ref 32–36)
MCV RBC AUTO: 96.6 FL — SIGNIFICANT CHANGE UP (ref 80–100)
NRBC # BLD: 0 /100 WBCS — SIGNIFICANT CHANGE UP (ref 0–0)
PHOSPHATE SERPL-MCNC: 2.6 MG/DL — SIGNIFICANT CHANGE UP (ref 2.5–4.5)
PLATELET # BLD AUTO: 146 K/UL — LOW (ref 150–400)
POTASSIUM SERPL-MCNC: 4.6 MMOL/L — SIGNIFICANT CHANGE UP (ref 3.5–5.3)
POTASSIUM SERPL-SCNC: 4.6 MMOL/L — SIGNIFICANT CHANGE UP (ref 3.5–5.3)
RBC # BLD: 4.06 M/UL — LOW (ref 4.2–5.8)
RBC # FLD: 12.5 % — SIGNIFICANT CHANGE UP (ref 10.3–14.5)
SARS-COV-2 IGG+IGM SERPL QL IA: 182 U/ML — HIGH
SARS-COV-2 IGG+IGM SERPL QL IA: POSITIVE
SODIUM SERPL-SCNC: 140 MMOL/L — SIGNIFICANT CHANGE UP (ref 135–145)
WBC # BLD: 5.75 K/UL — SIGNIFICANT CHANGE UP (ref 3.8–10.5)
WBC # FLD AUTO: 5.75 K/UL — SIGNIFICANT CHANGE UP (ref 3.8–10.5)

## 2021-10-08 RX ORDER — ACETAMINOPHEN 500 MG
2 TABLET ORAL
Qty: 0 | Refills: 0 | DISCHARGE
Start: 2021-10-08

## 2021-10-08 RX ADMIN — Medication 81 MILLIGRAM(S): at 13:26

## 2021-10-08 RX ADMIN — Medication 100 MILLIGRAM(S): at 05:57

## 2021-10-08 RX ADMIN — CLOPIDOGREL BISULFATE 75 MILLIGRAM(S): 75 TABLET, FILM COATED ORAL at 13:26

## 2021-10-08 RX ADMIN — Medication 2: at 08:13

## 2021-10-08 RX ADMIN — HEPARIN SODIUM 5000 UNIT(S): 5000 INJECTION INTRAVENOUS; SUBCUTANEOUS at 05:56

## 2021-10-08 RX ADMIN — LISINOPRIL 5 MILLIGRAM(S): 2.5 TABLET ORAL at 05:57

## 2021-10-08 NOTE — PROGRESS NOTE ADULT - SUBJECTIVE AND OBJECTIVE BOX
VASCULAR SURGERY PROGRESS NOTE  POD #1 -- L CEA      SUBJECTIVE  Pt seen and examined at bedside. No complaints.  Pain controlled. Denies N/V. Tolerating diet.   No acute events overnight.       PMHx  ·	3-vessel CAD  ·	HTN (hypertension)  ·	DM2 (diabetes mellitus, type 2)  ·	HLD (hyperlipidemia)      OBJECTIVE:    PHYSICAL EXAM   General Appearance: Appears well, NAD  HEENT: dressing clean, dry, intact. JPx1 serosang  Resp: Patent airway, non-labored breathing  CV: RRR  Abdomen: Soft, Nontender, Nondistended      Vital Signs Last 24 Hrs  T(C): 36.7 (08 Oct 2021 01:03), Max: 37.2 (07 Oct 2021 19:58)  T(F): 98 (08 Oct 2021 01:03), Max: 99 (07 Oct 2021 19:58)  HR: 81 (08 Oct 2021 01:03) (68 - 97)  BP: 109/69 (08 Oct 2021 01:03) (99/53 - 170/65)  BP(mean): 84 (07 Oct 2021 18:00) (71 - 86)  RR: 18 (08 Oct 2021 01:03) (14 - 18)  SpO2: 94% (08 Oct 2021 01:03) (93% - 100%)    I's & O's    10-07-21 @ 07:01  -  10-08-21 @ 01:47  --------------------------------------------------------  IN: 680.2 mL / OUT: 1605 mL / NET: -924.8 mL      MEDICATIONS:  ·	DVT PROPHYLAXIS: heparin   Injectable 5000 Unit(s)  ·	ANTIBIOTICS: ceFAZolin   IVPB 2000 milliGRAM(s)     VASCULAR SURGERY PROGRESS NOTE  POD #1 -- L CEA      SUBJECTIVE  Pt seen and examined at bedside. No complaints.  Pain controlled. Denies N/V. Tolerating diet.   No acute events overnight.       PMHx  ·	3-vessel CAD  ·	HTN (hypertension)  ·	DM2 (diabetes mellitus, type 2)  ·	HLD (hyperlipidemia)      OBJECTIVE:    PHYSICAL EXAM   General Appearance: Appears well, NAD, A&O x3  HEENT: dressing clean, dry, intact. JPx1 serosang  Resp: Patent airway, non-labored breathing  CV: RRR  Abdomen: Soft, Nontender, Nondistended  Extremities: symmetrical, FROM, sensation/motor intact    Vital Signs Last 24 Hrs  T(C): 36.7 (08 Oct 2021 05:42), Max: 37.2 (07 Oct 2021 19:58)  T(F): 98.1 (08 Oct 2021 05:42), Max: 99 (07 Oct 2021 19:58)  HR: 83 (08 Oct 2021 05:42) (68 - 97)  BP: 140/56 (08 Oct 2021 05:42) (99/53 - 147/72)  BP(mean): 84 (07 Oct 2021 18:00) (71 - 86)  RR: 16 (08 Oct 2021 05:42) (14 - 18)  SpO2: 95% (08 Oct 2021 05:42) (93% - 100%)        I&O's Detail    07 Oct 2021 07:01  -  08 Oct 2021 07:00  --------------------------------------------------------  IN:    dextrose 5% + sodium chloride 0.45% w/ Additives: 1200 mL    Oral Fluid: 240 mL    Phenylephrine: 20.2 mL  Total IN: 1460.2 mL    OUT:    Bulb (mL): 50 mL    Voided (mL): 1810 mL  Total OUT: 1860 mL    Total NET: -399.8 mL          MEDICATIONS:  ·	DVT PROPHYLAXIS: heparin   Injectable 5000 Unit(s)  ·	ANTIBIOTICS: ceFAZolin   IVPB 2000 milliGRAM(s)

## 2021-10-08 NOTE — PROGRESS NOTE ADULT - SUBJECTIVE AND OBJECTIVE BOX
Subjective: Patient seen and examined. No new events except as noted.   POD 1 L CEA   drain removed this am   feels ok   no cp or sob   REVIEW OF SYSTEMS:    CONSTITUTIONAL: + weakness, fevers or chills  EYES/ENT: No visual changes;  No vertigo or throat pain   NECK: No pain or stiffness  RESPIRATORY: No cough, wheezing, hemoptysis; No shortness of breath  CARDIOVASCULAR: No chest pain or palpitations  GASTROINTESTINAL: No abdominal or epigastric pain. No nausea, vomiting, or hematemesis; No diarrhea or constipation. No melena or hematochezia.  GENITOURINARY: No dysuria, frequency or hematuria  NEUROLOGICAL: No numbness or weakness  SKIN: No itching, burning, rashes, or lesions   All other review of systems is negative unless indicated above.    MEDICATIONS:  MEDICATIONS  (STANDING):  aspirin enteric coated 81 milliGRAM(s) Oral daily  atorvastatin 80 milliGRAM(s) Oral at bedtime  ceFAZolin   IVPB 2000 milliGRAM(s) IV Intermittent once  clopidogrel Tablet 75 milliGRAM(s) Oral daily  heparin   Injectable 5000 Unit(s) SubCutaneous every 8 hours  influenza   Vaccine 0.5 milliLiter(s) IntraMuscular once  insulin lispro (ADMELOG) corrective regimen sliding scale   SubCutaneous three times a day before meals  lisinopril 5 milliGRAM(s) Oral daily  metoprolol succinate  milliGRAM(s) Oral daily      PHYSICAL EXAM:  T(C): 36.7 (10-08-21 @ 05:42), Max: 37.2 (10-07-21 @ 19:58)  HR: 83 (10-08-21 @ 05:42) (68 - 97)  BP: 140/56 (10-08-21 @ 05:42) (99/53 - 147/72)  RR: 16 (10-08-21 @ 05:42) (14 - 18)  SpO2: 95% (10-08-21 @ 05:42) (93% - 100%)  Wt(kg): --  I&O's Summary    07 Oct 2021 07:01  -  08 Oct 2021 07:00  --------------------------------------------------------  IN: 1460.2 mL / OUT: 1860 mL / NET: -399.8 mL          Appearance: NAD  HEENT:  Dry  oral mucosa, PERRL, EOMI	  Lymphatic: No lymphadenopathy L CEA bandaged   Cardiovascular: Normal S1 S2, No JVD, + murmurs, No edema  Respiratory: Lungs clear to auscultation	  Psychiatry: A & O x 3, Mood & affect appropriate  Gastrointestinal:  Soft, Non-tender, + BS	  Skin: No rashes, No ecchymoses, No cyanosis	  Neurologic: Non-focal  Extremities: Normal range of motion, No clubbing, cyanosis or edema  Vascular: Peripheral pulses palpable 2+ bilaterally        LABS:    CARDIAC MARKERS:                                12.6   5.75  )-----------( 146      ( 08 Oct 2021 06:31 )             39.2     10-08    140  |  103  |  12  ----------------------------<  200<H>  4.6   |  24  |  0.86    Ca    9.1      08 Oct 2021 06:29  Phos  2.6     10-08  Mg     2.0     10-08      proBNP:   Lipid Profile:   HgA1c:   TSH:             TELEMETRY: 	    ECG:  	  RADIOLOGY:   DIAGNOSTIC TESTING:  [ ] Echocardiogram:  [ ]  Catheterization:  [ ] Stress Test:    OTHER:

## 2021-10-08 NOTE — PROGRESS NOTE ADULT - ASSESSMENT
81y M w/PMHx of HTN, HLD, DM2 and CAD s/p CABG x3/AVR and stent x1 (on Plavix and ASA). Now s/p L CEA, recovering appropriately. Dispo today.    PLAN  - advance to regular diet  - SQH q8h  - statin/asa/plavix  - OOB/ambulate  - AM labs  - JACQUI monitor output  - I&Os      VASCULAR  x9007    81y M w/PMHx of HTN, HLD, DM2 and CAD s/p CABG x3/AVR and stent x1 (on Plavix and ASA). Now s/p L CEA, recovering appropriately. Dispo today.    PLAN  - advance to regular diet  - SQH q8h  - statin/asa/plavix  - OOB/ambulate  - AM labs  - d/c JACQUI  - I&Os  - dispo: dc home today    VASCULAR  [id number]

## 2021-10-08 NOTE — PROGRESS NOTE ADULT - PROBLEM SELECTOR PLAN 3
s/p bioprosthetic AVR   has moderate bioprosthetic AS  Stable and compensated at present time   cont with home meds.

## 2021-10-13 LAB — SURGICAL PATHOLOGY STUDY: SIGNIFICANT CHANGE UP

## 2021-10-25 ENCOUNTER — APPOINTMENT (OUTPATIENT)
Dept: VASCULAR SURGERY | Facility: CLINIC | Age: 81
End: 2021-10-25
Payer: MEDICARE

## 2021-10-25 VITALS
HEART RATE: 70 BPM | HEIGHT: 60 IN | WEIGHT: 133 LBS | SYSTOLIC BLOOD PRESSURE: 140 MMHG | BODY MASS INDEX: 26.11 KG/M2 | DIASTOLIC BLOOD PRESSURE: 78 MMHG

## 2021-10-25 PROBLEM — I10 ESSENTIAL (PRIMARY) HYPERTENSION: Chronic | Status: ACTIVE | Noted: 2021-10-01

## 2021-10-25 PROBLEM — E78.5 HYPERLIPIDEMIA, UNSPECIFIED: Chronic | Status: ACTIVE | Noted: 2021-10-01

## 2021-10-25 PROBLEM — I65.29 OCCLUSION AND STENOSIS OF UNSPECIFIED CAROTID ARTERY: Chronic | Status: ACTIVE | Noted: 2021-10-01

## 2021-10-25 PROBLEM — E11.9 TYPE 2 DIABETES MELLITUS WITHOUT COMPLICATIONS: Chronic | Status: ACTIVE | Noted: 2021-10-01

## 2021-10-25 PROCEDURE — 99024 POSTOP FOLLOW-UP VISIT: CPT

## 2021-10-25 NOTE — REASON FOR VISIT
[de-identified] : Status post left carotid endarterectomy.  Patient with no complaints.  No difficulty swallowing.  No complaint of neurological deficits.  Incision is clean.  Well-healed.  Follow-up in 2 to 3 weeks for carotid duplex.

## 2021-10-26 PROCEDURE — C1768: CPT

## 2021-10-26 PROCEDURE — U0005: CPT

## 2021-10-26 PROCEDURE — C1889: CPT

## 2021-10-26 PROCEDURE — 88304 TISSUE EXAM BY PATHOLOGIST: CPT

## 2021-10-26 PROCEDURE — 86769 SARS-COV-2 COVID-19 ANTIBODY: CPT

## 2021-10-26 PROCEDURE — 83036 HEMOGLOBIN GLYCOSYLATED A1C: CPT

## 2021-10-26 PROCEDURE — 88311 DECALCIFY TISSUE: CPT

## 2021-10-26 PROCEDURE — C9399: CPT

## 2021-10-26 PROCEDURE — 83735 ASSAY OF MAGNESIUM: CPT

## 2021-10-26 PROCEDURE — 80048 BASIC METABOLIC PNL TOTAL CA: CPT

## 2021-10-26 PROCEDURE — 85027 COMPLETE CBC AUTOMATED: CPT

## 2021-10-26 PROCEDURE — 83721 ASSAY OF BLOOD LIPOPROTEIN: CPT

## 2021-10-26 PROCEDURE — U0003: CPT

## 2021-10-26 PROCEDURE — 84100 ASSAY OF PHOSPHORUS: CPT

## 2021-10-26 PROCEDURE — 82962 GLUCOSE BLOOD TEST: CPT

## 2021-10-26 PROCEDURE — C9803: CPT

## 2021-11-22 ENCOUNTER — APPOINTMENT (OUTPATIENT)
Dept: VASCULAR SURGERY | Facility: CLINIC | Age: 81
End: 2021-11-22
Payer: MEDICARE

## 2021-11-22 PROCEDURE — 93880 EXTRACRANIAL BILAT STUDY: CPT

## 2021-11-22 PROCEDURE — 99024 POSTOP FOLLOW-UP VISIT: CPT

## 2021-11-22 NOTE — REASON FOR VISIT
[de-identified] : Status post left carotid endarterectomy.  Patient with no complaints.  No difficulty swallowing.  No complaint of neurological deficits.  Incision is clean.  Well-healed.  Follow-up in 6 months for carotid duplex.

## 2022-05-23 ENCOUNTER — APPOINTMENT (OUTPATIENT)
Dept: VASCULAR SURGERY | Facility: CLINIC | Age: 82
End: 2022-05-23
Payer: MEDICARE

## 2022-05-23 PROCEDURE — 93880 EXTRACRANIAL BILAT STUDY: CPT

## 2022-06-28 NOTE — H&P PST ADULT - BLOOD AVOIDANCE/RESTRICTIONS, PROFILE
Detail Level: Detailed Quality 226: Preventive Care And Screening: Tobacco Use: Screening And Cessation Intervention: Patient screened for tobacco use and is an ex/non-smoker Quality 431: Preventive Care And Screening: Unhealthy Alcohol Use - Screening: Patient not identified as an unhealthy alcohol user when screened for unhealthy alcohol use using a systematic screening method Quality 110: Preventive Care And Screening: Influenza Immunization: Influenza Immunization previously received during influenza season Quality 130: Documentation Of Current Medications In The Medical Record: Current Medications Documented none

## 2022-10-31 NOTE — H&P PST ADULT - NEGATIVE PSYCHIATRIC SYMPTOMS
PT RESTING IN BED. VITAL SIGNS STABLE NO OTHER NEEDS AT THIS TIME. no suicidal ideation/no depression/no anxiety

## 2023-08-01 NOTE — H&P ADULT - PROBLEM SELECTOR PROBLEM 3
[FreeTextEntry1] : Mrs. Guzman is a pleasant 61-year-old  woman with history of chronic hepatitis B currently managed on entecavir since 07/2021, prediabetes, on metformin, hyperlipidemia, on rosuvastatin, breast cancer, s/p left breast mastectomy (07/2018), completed chemotherapy (7/2019) and is in remission and has since been on anastrozole.   # Hepatitis B:  - We have discussed the natural history of the disease and the potential benefits of continued antiviral treatment for viral suppression for reducing risks of cirrhosis and hepatocellular carcinoma (HCC). We have discussed the importance of continued 100% medication adherence without gaps in treatment and that discontinuation of her  treatment carries a risk of HBV reactivation/flare that could potentially be life-threatening.  -Risks and benefits of switching from Entecavir to Vemlidy discussed with patient and her daughter. I will follow up with our Hepatology Pharmacist, Leonor Layton to discuss cost breakdown.  - Continue Entecavir 0.5 mg po daily, refilled today. -Her most recent labs (03/22/2023) with stable liver chemistries and detectable HBV PCR with a low viral load.  - Monitor labs including HBV DNA every 6 months (ordered today), and HBsAg and HBsAb annually. UA and serum phosphorus ordered today to monitor for entecavir nephrotoxicity.  - We have discussed the importance of lifelong surveillance for HCC every 6 months. No imagning available for review today. Abdominal US ordered today. Recent AFP results are <8.3 ng/mL (03/23/23), continue to monitor with routine labs. - FibroScan ordered with next follow up.   # Health maintenance:  - HAV IgG ordered today to assess for immunity.  - DEXA: reportedly had a DEXA done in [place] that suggested osteopenia. Pending repeat testing with oncologist.  - Ms. Guzman was counseled to: abstain from alcohol and all illicit drugs; avoid use of herbal and dietary supplements due to potential hepatotoxicity; limit use of acetaminophen to <2 grams per day.   Next Follow up: 1 month with Dr. Bennett   Plan of care reviewed and discussed with Dr. Donald Ponce, YVROSE-BC Hepatology Nurse Practitioner  [facility] [address] [place] [address] [place] [address] Tel: [phone]  |  Fax: [phone]  Coronary artery disease involving native coronary artery of native heart without angina pectoris

## 2024-06-18 ENCOUNTER — APPOINTMENT (OUTPATIENT)
Dept: CARDIOTHORACIC SURGERY | Facility: CLINIC | Age: 84
End: 2024-06-18
Payer: MEDICARE

## 2024-06-18 ENCOUNTER — RESULT REVIEW (OUTPATIENT)
Age: 84
End: 2024-06-18

## 2024-06-18 ENCOUNTER — APPOINTMENT (OUTPATIENT)
Dept: CARDIOTHORACIC SURGERY | Facility: CLINIC | Age: 84
End: 2024-06-18

## 2024-06-18 ENCOUNTER — OUTPATIENT (OUTPATIENT)
Dept: OUTPATIENT SERVICES | Facility: HOSPITAL | Age: 84
LOS: 1 days | End: 2024-06-18
Payer: MEDICARE

## 2024-06-18 VITALS
HEIGHT: 60 IN | WEIGHT: 125 LBS | SYSTOLIC BLOOD PRESSURE: 195 MMHG | DIASTOLIC BLOOD PRESSURE: 69 MMHG | RESPIRATION RATE: 16 BRPM | BODY MASS INDEX: 24.54 KG/M2 | OXYGEN SATURATION: 95 % | HEART RATE: 79 BPM

## 2024-06-18 VITALS — DIASTOLIC BLOOD PRESSURE: 92 MMHG | SYSTOLIC BLOOD PRESSURE: 179 MMHG

## 2024-06-18 DIAGNOSIS — Z95.1 PRESENCE OF AORTOCORONARY BYPASS GRAFT: Chronic | ICD-10-CM

## 2024-06-18 DIAGNOSIS — I35.0 NONRHEUMATIC AORTIC (VALVE) STENOSIS: ICD-10-CM

## 2024-06-18 DIAGNOSIS — Z95.2 PRESENCE OF PROSTHETIC HEART VALVE: Chronic | ICD-10-CM

## 2024-06-18 DIAGNOSIS — Z95.5 PRESENCE OF CORONARY ANGIOPLASTY IMPLANT AND GRAFT: Chronic | ICD-10-CM

## 2024-06-18 DIAGNOSIS — T82.01XA BREAKDOWN (MECHANICAL) OF HEART VALVE PROSTHESIS, INITIAL ENCOUNTER: ICD-10-CM

## 2024-06-18 PROCEDURE — 99205 OFFICE O/P NEW HI 60 MIN: CPT

## 2024-06-18 PROCEDURE — 93306 TTE W/DOPPLER COMPLETE: CPT

## 2024-06-18 PROCEDURE — 93306 TTE W/DOPPLER COMPLETE: CPT | Mod: 26

## 2024-06-18 RX ORDER — CLOPIDOGREL BISULFATE 75 MG/1
75 TABLET, FILM COATED ORAL
Refills: 0 | Status: DISCONTINUED | COMMUNITY
End: 2024-06-18

## 2024-06-18 RX ORDER — GLIMEPIRIDE 2 MG/1
2 TABLET ORAL DAILY
Refills: 0 | Status: ACTIVE | COMMUNITY
Start: 2024-06-18

## 2024-06-18 RX ORDER — METOPROLOL SUCCINATE 100 MG/1
100 TABLET, EXTENDED RELEASE ORAL DAILY
Refills: 0 | Status: ACTIVE | COMMUNITY
Start: 2024-06-18

## 2024-06-18 RX ORDER — EMPAGLIFLOZIN 10 MG/1
10 TABLET, FILM COATED ORAL DAILY
Refills: 0 | Status: ACTIVE | COMMUNITY
Start: 2024-06-18

## 2024-06-18 RX ORDER — ATORVASTATIN CALCIUM 80 MG/1
80 TABLET, FILM COATED ORAL DAILY
Refills: 0 | Status: ACTIVE | COMMUNITY
Start: 2024-06-18

## 2024-06-18 RX ORDER — GLIMEPIRIDE 4 MG/1
4 TABLET ORAL
Refills: 0 | Status: DISCONTINUED | COMMUNITY
End: 2024-06-18

## 2024-06-18 RX ORDER — CLOPIDOGREL BISULFATE 75 MG/1
75 TABLET, FILM COATED ORAL DAILY
Qty: 1 | Refills: 3 | Status: ACTIVE | COMMUNITY
Start: 2024-06-18

## 2024-06-18 RX ORDER — APIXABAN 2.5 MG/1
2.5 TABLET, FILM COATED ORAL
Refills: 0 | Status: ACTIVE | COMMUNITY
Start: 2024-06-18

## 2024-06-18 RX ORDER — RAMIPRIL 5 MG/1
5 CAPSULE ORAL DAILY
Refills: 0 | Status: ACTIVE | COMMUNITY
Start: 2024-06-18

## 2024-06-18 RX ORDER — METFORMIN HYDROCHLORIDE 625 MG/1
TABLET ORAL
Refills: 0 | Status: ACTIVE | COMMUNITY

## 2024-06-18 RX ORDER — METFORMIN HYDROCHLORIDE 500 MG/1
500 TABLET, COATED ORAL TWICE DAILY
Refills: 0 | Status: ACTIVE | COMMUNITY
Start: 2024-06-18

## 2024-06-18 RX ORDER — ATORVASTATIN CALCIUM 40 MG/1
40 TABLET, FILM COATED ORAL
Refills: 0 | Status: DISCONTINUED | COMMUNITY
End: 2024-06-18

## 2024-06-18 RX ORDER — METOPROLOL TARTRATE 75 MG/1
TABLET, FILM COATED ORAL
Refills: 0 | Status: ACTIVE | COMMUNITY

## 2024-06-18 RX ORDER — ASPIRIN 81 MG
81 TABLET, DELAYED RELEASE (ENTERIC COATED) ORAL
Refills: 0 | Status: DISCONTINUED | COMMUNITY
End: 2024-06-18

## 2024-06-18 NOTE — DISCUSSION/SUMMARY
[FreeTextEntry1] : ENAYATIAN: Symptomatic severe AS of a bio AVR (size and type unknown, done at [facility] in 2011). CAD with prior stents. Family is away from 6/25-7/8. I suggested he do his CT next week to confirm if his anatomy is suitable for a TAV in [place]. He would like to do the catheterization after 7/8/24 when his son returns from vacation. His symptoms are minor and stable at this time. If his anatomy is suitable, I would plan on a Valve in Valve TAVR in mid July. Please try and either obtain the OP report from [facility] and/or call the manufacturers to get the information on his AVR.

## 2024-06-18 NOTE — CARDIOLOGY SUMMARY
[de-identified] : 5/29/24: PMD: JOSIANE 0.7 cm2, mean Gradient 20 mmHg, peak 44 mmHg, AoV .3. m/s, Mild AI, Mild to moderate MR. LVEF 75%

## 2024-06-18 NOTE — REASON FOR VISIT
[Structural Heart and Valve Disease] : structural heart and valve disease [Consultation] : a consultation visit [FreeTextEntry3] :

## 2024-06-18 NOTE — DISCUSSION/SUMMARY
[FreeTextEntry1] : ENAYATIAN: Symptomatic severe AS of a bio AVR (size and type unknown, done at [facility] in 2011). CAD with prior stents. Family is away from 6/25-7/8. I suggested he do his CT next week to confirm if his anatomy is suitable for a TAV in [place]. He would like to do the catheterization after 7/8/24 when his son returns from vacation. His symptoms are minor and stable at this time. If his anatomy is suitable, I would plan on a Valve in Valve TAVR in mid July. Please try and either obtain the OP report from [facility] and/or call the manufacturers to get the information on his AVR.

## 2024-06-18 NOTE — REVIEW OF SYSTEMS
[Feeling Fatigued] : feeling fatigued [Lower Ext Edema] : lower extremity edema [Negative] : Heme/Lymph [Fever] : no fever [Chills] : no chills [SOB] : no shortness of breath [Dyspnea on exertion] : not dyspnea during exertion [Chest Discomfort] : no chest discomfort

## 2024-06-18 NOTE — ASSESSMENT
[FreeTextEntry1] : Mr. Bautista is an 85 y/o male who is referred to us by Dr. Salgado for evaluation of his Aortic Stenosis. He has a past medical history significant for CAD with PCI and CABG, HTN, Type 2 DM, HPL, AFib, Left CEA and PCI.  He had a Transthoracic Echocardiogram today which was evaluated with Dr. 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  He has mild JACOBSEN and exertional fatigue that has progressed markedly over the last 2 mths.  His valve was done in [facility] but he does not have information on the valve.  He will need Cardiac CT and then Cardiac cath prior to Valve in Valve TAVR which is his preference and ours.

## 2024-06-18 NOTE — HISTORY OF PRESENT ILLNESS
[Diabetes Mellitus] : Diabetes Mellitus [Oral] : controlled with oral diabetes medication [Dyslipidemia] : Dyslipidemia [Hypertension] : Hypertension [CVD Carotid Stenosis] : CVD Carotid Stenosis   [Left] : Left [Heart Failure within 2 Weeks] : Heart Failure in last 2 weeks [Class III] : Class III [Prior Heart Failure] : Prior Heart Failure [FreeTextEntry1] : Mr. Bautista is an 85 y/o male who is referred to us by Dr. Salgado for evaluation of his Aortic Stenosis. He has a past medical history significant for CAD with PCI and CABG, HTN, Type 2 DM, HPL, AFib, Left CEA and PCI.  He had a Transthoracic Echocardiogram today which was evaluated with Dr. Betzy Figueroa, and demonstrates    NYHA Classification: STS 30 day Mortality Risk Score:

## 2024-06-18 NOTE — PHYSICAL EXAM
[Well Developed] : well developed [No Acute Distress] : no acute distress [Normal S1, S2] : normal S1, S2 [No Rub] : no rub [Murmur] : murmur [Clear Lung Fields] : clear lung fields [Good Air Entry] : good air entry [No Respiratory Distress] : no respiratory distress  [No Edema] : no edema [No Cyanosis] : no cyanosis [Normal] : alert and oriented, normal memory [de-identified] : II/VI ASH

## 2024-06-18 NOTE — CARDIOLOGY SUMMARY
[de-identified] : 5/29/24: PMD: JOSIANE 0.7 cm2, mean Gradient 20 mmHg, peak 44 mmHg, AoV .3. m/s, Mild AI, Mild to moderate MR. LVEF 75%

## 2024-06-18 NOTE — PHYSICAL EXAM
[Well Developed] : well developed [No Acute Distress] : no acute distress [Normal S1, S2] : normal S1, S2 [No Rub] : no rub [Murmur] : murmur [Clear Lung Fields] : clear lung fields [Good Air Entry] : good air entry [No Respiratory Distress] : no respiratory distress  [No Edema] : no edema [No Cyanosis] : no cyanosis [Normal] : alert and oriented, normal memory [de-identified] : II/VI ASH

## 2024-06-18 NOTE — ASSESSMENT
[FreeTextEntry1] : Mr. Bautista is an 83 y/o male who is referred to us by Dr. Salgado for evaluation of his Aortic Stenosis. He has a past medical history significant for CAD with PCI and CABG, HTN, Type 2 DM, HPL, AFib, Left CEA and PCI.  He had a Transthoracic Echocardiogram today which was evaluated with Dr. 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  He has mild JACOBSEN and exertional fatigue that has progressed markedly over the last 2 mths.  His valve was done in [facility] but he does not have information on the valve.  He will need Cardiac CT and then Cardiac cath prior to Valve in Valve TAVR which is his preference and ours.

## 2024-06-19 LAB
ALBUMIN SERPL ELPH-MCNC: 4.7 G/DL
ALP BLD-CCNC: 114 U/L
ALT SERPL-CCNC: 53 U/L
ANION GAP SERPL CALC-SCNC: 13 MMOL/L
AST SERPL-CCNC: 32 U/L
BASOPHILS # BLD AUTO: 0.03 K/UL
BASOPHILS NFR BLD AUTO: 0.4 %
BILIRUB SERPL-MCNC: 0.4 MG/DL
BUN SERPL-MCNC: 27 MG/DL
CALCIUM SERPL-MCNC: 10.2 MG/DL
CHLORIDE SERPL-SCNC: 100 MMOL/L
CO2 SERPL-SCNC: 26 MMOL/L
CREAT SERPL-MCNC: 1.01 MG/DL
EGFR: 74 ML/MIN/1.73M2
EOSINOPHIL # BLD AUTO: 0.08 K/UL
EOSINOPHIL NFR BLD AUTO: 1 %
GLUCOSE SERPL-MCNC: 255 MG/DL
HCT VFR BLD CALC: 47.8 %
HGB BLD-MCNC: 15.5 G/DL
IMM GRANULOCYTES NFR BLD AUTO: 0.2 %
LYMPHOCYTES # BLD AUTO: 3.37 K/UL
LYMPHOCYTES NFR BLD AUTO: 41.2 %
MAN DIFF?: NORMAL
MCHC RBC-ENTMCNC: 30.5 PG
MCHC RBC-ENTMCNC: 32.4 GM/DL
MCV RBC AUTO: 94.1 FL
MONOCYTES # BLD AUTO: 0.68 K/UL
MONOCYTES NFR BLD AUTO: 8.3 %
NEUTROPHILS # BLD AUTO: 4 K/UL
NEUTROPHILS NFR BLD AUTO: 48.9 %
NT-PROBNP SERPL-MCNC: 1269 PG/ML
PLATELET # BLD AUTO: 194 K/UL
POTASSIUM SERPL-SCNC: 5.3 MMOL/L
PROT SERPL-MCNC: 7.9 G/DL
RBC # BLD: 5.08 M/UL
RBC # FLD: 12.7 %
SODIUM SERPL-SCNC: 140 MMOL/L
WBC # FLD AUTO: 8.18 K/UL

## 2024-06-29 ENCOUNTER — NON-APPOINTMENT (OUTPATIENT)
Age: 84
End: 2024-06-29

## 2024-07-12 ENCOUNTER — OUTPATIENT (OUTPATIENT)
Dept: OUTPATIENT SERVICES | Facility: HOSPITAL | Age: 84
LOS: 1 days | End: 2024-07-12
Payer: MEDICARE

## 2024-07-12 ENCOUNTER — NON-APPOINTMENT (OUTPATIENT)
Age: 84
End: 2024-07-12

## 2024-07-12 ENCOUNTER — APPOINTMENT (OUTPATIENT)
Dept: CARDIOLOGY | Facility: CLINIC | Age: 84
End: 2024-07-12

## 2024-07-12 VITALS
TEMPERATURE: 98 F | WEIGHT: 121.03 LBS | OXYGEN SATURATION: 97 % | RESPIRATION RATE: 16 BRPM | HEIGHT: 60 IN | DIASTOLIC BLOOD PRESSURE: 78 MMHG | HEART RATE: 72 BPM | SYSTOLIC BLOOD PRESSURE: 122 MMHG

## 2024-07-12 DIAGNOSIS — I48.91 UNSPECIFIED ATRIAL FIBRILLATION: ICD-10-CM

## 2024-07-12 DIAGNOSIS — I25.10 ATHEROSCLEROTIC HEART DISEASE OF NATIVE CORONARY ARTERY WITHOUT ANGINA PECTORIS: ICD-10-CM

## 2024-07-12 DIAGNOSIS — Z98.890 OTHER SPECIFIED POSTPROCEDURAL STATES: Chronic | ICD-10-CM

## 2024-07-12 DIAGNOSIS — T82.01XA BREAKDOWN (MECHANICAL) OF HEART VALVE PROSTHESIS, INITIAL ENCOUNTER: ICD-10-CM

## 2024-07-12 DIAGNOSIS — Z95.5 PRESENCE OF CORONARY ANGIOPLASTY IMPLANT AND GRAFT: Chronic | ICD-10-CM

## 2024-07-12 DIAGNOSIS — Z00.00 ENCOUNTER FOR GENERAL ADULT MEDICAL EXAMINATION WITHOUT ABNORMAL FINDINGS: ICD-10-CM

## 2024-07-12 DIAGNOSIS — I35.0 NONRHEUMATIC AORTIC (VALVE) STENOSIS: ICD-10-CM

## 2024-07-12 DIAGNOSIS — Z01.818 ENCOUNTER FOR OTHER PREPROCEDURAL EXAMINATION: ICD-10-CM

## 2024-07-12 DIAGNOSIS — Z95.2 PRESENCE OF PROSTHETIC HEART VALVE: Chronic | ICD-10-CM

## 2024-07-12 DIAGNOSIS — Z95.1 PRESENCE OF AORTOCORONARY BYPASS GRAFT: Chronic | ICD-10-CM

## 2024-07-12 DIAGNOSIS — Z29.9 ENCOUNTER FOR PROPHYLACTIC MEASURES, UNSPECIFIED: ICD-10-CM

## 2024-07-12 DIAGNOSIS — E11.9 TYPE 2 DIABETES MELLITUS WITHOUT COMPLICATIONS: ICD-10-CM

## 2024-07-12 LAB
ADD ON TEST-SPECIMEN IN LAB: SIGNIFICANT CHANGE UP
ALBUMIN SERPL ELPH-MCNC: 4.5 G/DL — SIGNIFICANT CHANGE UP (ref 3.3–5)
ALP SERPL-CCNC: 120 U/L — SIGNIFICANT CHANGE UP (ref 40–120)
ALT FLD-CCNC: 44 U/L — SIGNIFICANT CHANGE UP (ref 10–45)
ANION GAP SERPL CALC-SCNC: 15 MMOL/L — SIGNIFICANT CHANGE UP (ref 5–17)
AST SERPL-CCNC: 30 U/L — SIGNIFICANT CHANGE UP (ref 10–40)
BASOPHILS # BLD AUTO: 0.03 K/UL — SIGNIFICANT CHANGE UP (ref 0–0.2)
BASOPHILS NFR BLD AUTO: 0.4 % — SIGNIFICANT CHANGE UP (ref 0–2)
BILIRUB SERPL-MCNC: 0.4 MG/DL — SIGNIFICANT CHANGE UP (ref 0.2–1.2)
BLD GP AB SCN SERPL QL: NEGATIVE — SIGNIFICANT CHANGE UP
BUN SERPL-MCNC: 28 MG/DL — HIGH (ref 7–23)
CALCIUM SERPL-MCNC: 10.3 MG/DL — SIGNIFICANT CHANGE UP (ref 8.4–10.5)
CHLORIDE SERPL-SCNC: 99 MMOL/L — SIGNIFICANT CHANGE UP (ref 96–108)
CO2 SERPL-SCNC: 23 MMOL/L — SIGNIFICANT CHANGE UP (ref 22–31)
CREAT SERPL-MCNC: 1 MG/DL — SIGNIFICANT CHANGE UP (ref 0.5–1.3)
EGFR: 75 ML/MIN/1.73M2 — SIGNIFICANT CHANGE UP
EOSINOPHIL # BLD AUTO: 0.07 K/UL — SIGNIFICANT CHANGE UP (ref 0–0.5)
EOSINOPHIL NFR BLD AUTO: 1 % — SIGNIFICANT CHANGE UP (ref 0–6)
GLUCOSE SERPL-MCNC: 287 MG/DL — HIGH (ref 70–99)
HCT VFR BLD CALC: 44.7 % — SIGNIFICANT CHANGE UP (ref 39–50)
HGB BLD-MCNC: 14.5 G/DL — SIGNIFICANT CHANGE UP (ref 13–17)
IMM GRANULOCYTES NFR BLD AUTO: 0.1 % — SIGNIFICANT CHANGE UP (ref 0–0.9)
LYMPHOCYTES # BLD AUTO: 2.32 K/UL — SIGNIFICANT CHANGE UP (ref 1–3.3)
LYMPHOCYTES # BLD AUTO: 33.4 % — SIGNIFICANT CHANGE UP (ref 13–44)
MCHC RBC-ENTMCNC: 31.2 PG — SIGNIFICANT CHANGE UP (ref 27–34)
MCHC RBC-ENTMCNC: 32.4 GM/DL — SIGNIFICANT CHANGE UP (ref 32–36)
MCV RBC AUTO: 96.1 FL — SIGNIFICANT CHANGE UP (ref 80–100)
MONOCYTES # BLD AUTO: 0.35 K/UL — SIGNIFICANT CHANGE UP (ref 0–0.9)
MONOCYTES NFR BLD AUTO: 5 % — SIGNIFICANT CHANGE UP (ref 2–14)
MRSA PCR RESULT.: SIGNIFICANT CHANGE UP
NEUTROPHILS # BLD AUTO: 4.16 K/UL — SIGNIFICANT CHANGE UP (ref 1.8–7.4)
NEUTROPHILS NFR BLD AUTO: 60.1 % — SIGNIFICANT CHANGE UP (ref 43–77)
PLATELET # BLD AUTO: 223 K/UL — SIGNIFICANT CHANGE UP (ref 150–400)
POTASSIUM SERPL-MCNC: 5.7 MMOL/L — HIGH (ref 3.5–5.3)
POTASSIUM SERPL-SCNC: 5.7 MMOL/L — HIGH (ref 3.5–5.3)
PROT SERPL-MCNC: 8.1 G/DL — SIGNIFICANT CHANGE UP (ref 6–8.3)
RBC # BLD: 4.65 M/UL — SIGNIFICANT CHANGE UP (ref 4.2–5.8)
RBC # FLD: 13.2 % — SIGNIFICANT CHANGE UP (ref 10.3–14.5)
RH IG SCN BLD-IMP: POSITIVE — SIGNIFICANT CHANGE UP
S AUREUS DNA NOSE QL NAA+PROBE: SIGNIFICANT CHANGE UP
SODIUM SERPL-SCNC: 137 MMOL/L — SIGNIFICANT CHANGE UP (ref 135–145)
WBC # BLD: 6.94 K/UL — SIGNIFICANT CHANGE UP (ref 3.8–10.5)
WBC # FLD AUTO: 6.94 K/UL — SIGNIFICANT CHANGE UP (ref 3.8–10.5)

## 2024-07-12 PROCEDURE — 83036 HEMOGLOBIN GLYCOSYLATED A1C: CPT

## 2024-07-12 PROCEDURE — 86923 COMPATIBILITY TEST ELECTRIC: CPT

## 2024-07-12 PROCEDURE — 87640 STAPH A DNA AMP PROBE: CPT

## 2024-07-12 PROCEDURE — 74174 CTA ABD&PLVS W/CONTRAST: CPT | Mod: 26

## 2024-07-12 PROCEDURE — 86901 BLOOD TYPING SEROLOGIC RH(D): CPT

## 2024-07-12 PROCEDURE — 86850 RBC ANTIBODY SCREEN: CPT

## 2024-07-12 PROCEDURE — 80053 COMPREHEN METABOLIC PANEL: CPT

## 2024-07-12 PROCEDURE — 74174 CTA ABD&PLVS W/CONTRAST: CPT

## 2024-07-12 PROCEDURE — 75574 CT ANGIO HRT W/3D IMAGE: CPT | Mod: 26

## 2024-07-12 PROCEDURE — 75574 CT ANGIO HRT W/3D IMAGE: CPT

## 2024-07-12 PROCEDURE — G0463: CPT

## 2024-07-12 PROCEDURE — 71275 CT ANGIOGRAPHY CHEST: CPT

## 2024-07-12 PROCEDURE — 86900 BLOOD TYPING SEROLOGIC ABO: CPT

## 2024-07-12 PROCEDURE — 87641 MR-STAPH DNA AMP PROBE: CPT

## 2024-07-12 PROCEDURE — 71275 CT ANGIOGRAPHY CHEST: CPT | Mod: 26

## 2024-07-12 PROCEDURE — 85025 COMPLETE CBC W/AUTO DIFF WBC: CPT

## 2024-07-12 RX ORDER — CEFUROXIME AXETIL 250 MG
1500 TABLET ORAL ONCE
Refills: 0 | Status: COMPLETED | OUTPATIENT
Start: 2024-07-24 | End: 2024-07-24

## 2024-07-12 NOTE — H&P PST ADULT - HISTORY OF PRESENT ILLNESS
84 year old RHD male presents in PST with his son reports having " easily getting tired recenly ". Pt states for  the past few months, he noticed getting tired easily he becomes more fatigued with his usual activities, so he does everything slower. Pt also states he go upstairs just the last little bit" he "has to relax for 10 seconds and then I go" on with his activity. He lives with his wife and is independent in his ADL's.  Recent echocardiogram by cardiologist,  consistent with severe degeneration/stenosis of the bioprosthetic AVR . Presents in PST prior to scheduled TAVR on 07/25/26.  Denies  CP, dizziness, recent fevers, chills, cough, chest pain or SOB and feels well otherwise, he sleeps with one pillow and has not woken up with SOB or has had no hospitalizations.      PMH/PSH  significant for  HTN, HLD, DM2 , CAD with PCI CABG x3/ AVR( 2011, [facility], He does not have his valve card) and coronary stent x1 ( 5/2019, on Plavix ),  AFib  on Eliquis (last dose 7/21), Left Carotid Endarterectomy on 10/7/21.?

## 2024-07-12 NOTE — H&P PST ADULT - NSICDXPASTSURGICALHX_GEN_ALL_CORE_FT
PAST SURGICAL HISTORY:  History of CEA (carotid endarterectomy)     S/P AVR (aortic valve replacement) bovine 2016    S/P CABG x 3 2016    S/P coronary artery stent placement x1 2019

## 2024-07-12 NOTE — H&P PST ADULT - PROBLEM SELECTOR PLAN 5
Will follow up with labs/  Preop HgA1c ordered Instructed last dose Jardiance 7/21 , No Metformin  & or Glimepiride the morning of surgery. STAT FS  on the day of surgery ordered.

## 2024-07-12 NOTE — H&P PST ADULT - ASSESSMENT
TAVR on 24    Activity:   walks about a mile 3-4x/week, take stairs  Energy Expenditure score (DASI SCORE METS): 4.3  Symptoms : tired "denies chest pain, palpitations, dyspnea, dizziness or  JACOBSEN,   Dental: Patient denies Loose teeth/ + Denture upper & lower.       CAPRINI SCORE [CLOT updated 18]    AGE RELATED RISK FACTORS                                                       MOBILITY RELATED FACTORS  [ ] Age 41-60 years                                            (1 Point)                    [ ] Bed rest                                                        (1 Point)  [ ] Age: 61-74 years                                           (2 Points)                  [ ] Plaster cast                                                   (2 Points)  [x ] Age= 75 years                                              (3 Points)                    [ ] Bed bound for more than 72 hours                 (2 Points)    DISEASE RELATED RISK FACTORS                                               GENDER SPECIFIC FACTORS  [x ] Edema in the lower extremities                       (1 Point)              [ ] Pregnancy                                                     (1 Point)  [ ] Varicose veins                                               (1 Point)                     [ ] Post-partum < 6 weeks                                   (1 Point)             [ ] BMI > 25 Kg/m2                                            (1 Point)                     [ ] Hormonal therapy  or oral contraception          (1 Point)                 [ ] Sepsis (in the previous month)                        (1 Point)               [ ] History of pregnancy complications                 (1 point)  [ ] Pneumonia or serious lung disease                                               [ ] Unexplained or recurrent                     (1 Point)           (in the previous month)                               (1 Point)  [ ] Abnormal pulmonary function test                     (1 Point)                 SURGERY RELATED RISK FACTORS  [ ] Acute myocardial infarction                              (1 Point)               [ ]  Section                                             (1 Point)  [ ] Congestive heart failure (in the previous month)  (1 Point)      [ ] Minor surgery                                                  (1 Point)   [ ] Inflammatory bowel disease                             (1 Point)               [ ] Arthroscopic surgery                                        (2 Points)  [ ] Central venous access                                      (2 Points)                [x ] General surgery lasting more than 45 minutes (2 points)  [ ] Present or previous malignancy                     (2 Points)                [ ] Elective arthroplasty                                         (5 points)    [ ] Stroke (in the previous month)                          (5 Points)                                                                                                                                                           HEMATOLOGY RELATED FACTORS                                                 TRAUMA RELATED RISK FACTORS  [ ] Prior episodes of VTE                                     (3 Points)                [ ] Fracture of the hip, pelvis, or leg                       (5 Points)  [ ] Positive family history for VTE                         (3 Points)             [ ] Acute spinal cord injury (in the previous month)  (5 Points)  [ ] Prothrombin 94127 A                                     (3 Points)               [ ] Paralysis  (less than 1 month)                             (5 Points)  [ ] Factor V Leiden                                             (3 Points)                  [ ] Multiple Trauma within 1 month                        (5 Points)  [ ] Lupus anticoagulants                                     (3 Points)                                                           [ ] Anticardiolipin antibodies                               (3 Points)                                                       [ ] High homocysteine in the blood                      (3 Points)                                             [ ] Other congenital or acquired thrombophilia      (3 Points)                                                [ ] Heparin induced thrombocytopenia                  (3 Points)                                     Total Score [  6   ]

## 2024-07-12 NOTE — H&P PST ADULT - MUSCULOSKELETAL
negative ROM intact/no joint swelling/no joint warmth/no calf tenderness/normal gait/strength 5/5 bilateral upper extremities/strength 5/5 bilateral lower extremities

## 2024-07-12 NOTE — H&P PST ADULT - PROBLEM SELECTOR PLAN 1
TAVR On 07/25/24  Preop cbc anemia index, CMP, Aic, BNP, T/s, mrsa/mssa sent.  Advised to take Metoprolol & Plavix in AM of suregry .  No ramipril in AM of suregry.     Instructed to inform surgeon & seek medical attention if any changes in health  status like fever, chills, rash, infections, chest pain or any changes in health status like loss of taste or smell, throat pain or diarhea . PST  instructions given in written/ explained about NPO, PREOP SKIN CARE  with antibacterial chlorhexidine wash x3 days preop(Hibicleans given) and ARRIVAL TIME  2 hours prior to OR time in [place] 1> sda.Pre-op education provided - all questions answered. Pt verbalized understanding.

## 2024-07-12 NOTE — H&P PST ADULT - NSICDXPROCEDURE_GEN_ALL_CORE_FT
PROCEDURES:  TAVR, open femoral artery approach 12-Jul-2024 10:45:01 TAVR on 07/25/24 [facility]

## 2024-07-13 LAB
A1C WITH ESTIMATED AVERAGE GLUCOSE RESULT: 8.5 % — HIGH (ref 4–5.6)
ESTIMATED AVERAGE GLUCOSE: 197 MG/DL — HIGH (ref 68–114)

## 2024-07-17 RX ORDER — APIXABAN 5 MG/1
1 TABLET, FILM COATED ORAL
Qty: 0 | Refills: 0 | DISCHARGE
Start: 2024-07-17

## 2024-07-18 NOTE — H&P CARDIOLOGY - HISTORY OF PRESENT ILLNESS
Cardiologist: Dr. Lazaro Salgado  CTS: Dr. Lazaro Cisse  Structuralist: Dr. Pedro Sevilla  Pharmacy: [facility] ([address] [place] [id number])    83 y/o male w/ PMHx of HTN, HLD, Type 2 Diabetes, CAD s/p CABG x3/bioprosthetic AVR 2011 at [facility] and subsequent PCI 5/2019 (on Plavix, last dose _), Afib (on Eliquis, last dose __), severe aortic valve degeneration/stenosis, left carotid stenosis s/p CEA 10/2021 initially presented to CTS office c/o increased fatigue w/ ADLs. Reports he has to relax prior to continuing with his daily activities. Recent echo by cardiologist revealed severe degeneration/stenosis of bioprosthetic AVR. In light of pt's risk factors, CCS class II-III anginal-equivalent symptoms and abnormal TTE; pt referred to [facility] for recommended cardiac catheterization w/ possible intervention if clinically indicated as pre-operative testing prior to scheduled TAVR 7/25/24.     Review of studies:  TTE 6/18/24: LVEF 56%, no regional WMA, mild LA dilatation, bioprosthetic valve replacement present in aortic position (peak transaortic velocity 3.64m/s, pGr 52.9mmHg, mGr 23.7, LVOT/aortic valve VTI ratio 0.27, JOSIANE  0.83 cm², EOA and DVI c/w severe degeneration/stenosis of aortic bioprosthesis) Cardiologist: Dr. Lazaro Salgado  CTS: Dr. Lazaro Cisse  Structuralist: Dr. Pedro Sevilla  Pharmacy: [facility] ([address] [place] [id number])    85 y/o male w/ PMHx of HTN, HLD, Type 2 Diabetes, CAD s/p CABG x3/bioprosthetic AVR 2011 at [facility] and subsequent PCI 5/2019 (on Plavix, last dose this AM), Afib (on Eliquis, last dose 7/19/24), severe aortic valve degeneration/stenosis, left carotid stenosis s/p CEA 10/2021 initially presented to CTS office c/o increased fatigue w/ ADLs. Reports he has to relax prior to continuing with his daily activities. Recent echo by cardiologist revealed severe degeneration/stenosis of bioprosthetic AVR. In light of pt's risk factors, CCS class II-III anginal-equivalent symptoms and abnormal TTE; pt referred to [facility] for recommended cardiac catheterization w/ possible intervention if clinically indicated as pre-operative testing prior to scheduled TAVR 7/25/24.     Review of studies:  TTE 6/18/24: LVEF 56%, no regional WMA, mild LA dilatation, bioprosthetic valve replacement present in aortic position (peak transaortic velocity 3.64m/s, pGr 52.9mmHg, mGr 23.7, LVOT/aortic valve VTI ratio 0.27, JOSIANE  0.83 cm², EOA and DVI c/w severe degeneration/stenosis of aortic bioprosthesis)

## 2024-07-18 NOTE — H&P CARDIOLOGY - NSICDXPASTMEDICALHX_GEN_ALL_CORE_FT
PAST MEDICAL HISTORY:  3-vessel CAD s/p 3v CABG 2016, s/p stent 2019    Chronic atrial fibrillation     DM2 (diabetes mellitus, type 2)     HLD (hyperlipidemia)     HTN (hypertension)     Occlusion and stenosis of unspecified carotid artery

## 2024-07-19 ENCOUNTER — TRANSCRIPTION ENCOUNTER (OUTPATIENT)
Age: 84
End: 2024-07-19

## 2024-07-19 ENCOUNTER — OUTPATIENT (OUTPATIENT)
Dept: OUTPATIENT SERVICES | Facility: HOSPITAL | Age: 84
LOS: 1 days | End: 2024-07-19
Payer: MEDICARE

## 2024-07-19 VITALS
HEART RATE: 85 BPM | WEIGHT: 125 LBS | DIASTOLIC BLOOD PRESSURE: 71 MMHG | TEMPERATURE: 98 F | HEIGHT: 60 IN | SYSTOLIC BLOOD PRESSURE: 166 MMHG | RESPIRATION RATE: 16 BRPM | OXYGEN SATURATION: 95 %

## 2024-07-19 VITALS
SYSTOLIC BLOOD PRESSURE: 153 MMHG | RESPIRATION RATE: 18 BRPM | OXYGEN SATURATION: 95 % | DIASTOLIC BLOOD PRESSURE: 75 MMHG | HEART RATE: 70 BPM

## 2024-07-19 DIAGNOSIS — Z98.890 OTHER SPECIFIED POSTPROCEDURAL STATES: Chronic | ICD-10-CM

## 2024-07-19 DIAGNOSIS — Z95.5 PRESENCE OF CORONARY ANGIOPLASTY IMPLANT AND GRAFT: Chronic | ICD-10-CM

## 2024-07-19 DIAGNOSIS — Z95.2 PRESENCE OF PROSTHETIC HEART VALVE: Chronic | ICD-10-CM

## 2024-07-19 DIAGNOSIS — I35.0 NONRHEUMATIC AORTIC (VALVE) STENOSIS: ICD-10-CM

## 2024-07-19 DIAGNOSIS — Z95.1 PRESENCE OF AORTOCORONARY BYPASS GRAFT: Chronic | ICD-10-CM

## 2024-07-19 LAB
ANION GAP SERPL CALC-SCNC: 13 MMOL/L — SIGNIFICANT CHANGE UP (ref 5–17)
BUN SERPL-MCNC: 22 MG/DL — SIGNIFICANT CHANGE UP (ref 7–23)
CALCIUM SERPL-MCNC: 9.8 MG/DL — SIGNIFICANT CHANGE UP (ref 8.4–10.5)
CHLORIDE SERPL-SCNC: 102 MMOL/L — SIGNIFICANT CHANGE UP (ref 96–108)
CO2 SERPL-SCNC: 25 MMOL/L — SIGNIFICANT CHANGE UP (ref 22–31)
CREAT SERPL-MCNC: 0.93 MG/DL — SIGNIFICANT CHANGE UP (ref 0.5–1.3)
EGFR: 82 ML/MIN/1.73M2 — SIGNIFICANT CHANGE UP
GLUCOSE BLDC GLUCOMTR-MCNC: 226 MG/DL — HIGH (ref 70–99)
GLUCOSE SERPL-MCNC: 248 MG/DL — HIGH (ref 70–99)
HCT VFR BLD CALC: 45.2 % — SIGNIFICANT CHANGE UP (ref 39–50)
HGB BLD-MCNC: 14.8 G/DL — SIGNIFICANT CHANGE UP (ref 13–17)
MCHC RBC-ENTMCNC: 31.5 PG — SIGNIFICANT CHANGE UP (ref 27–34)
MCHC RBC-ENTMCNC: 32.7 GM/DL — SIGNIFICANT CHANGE UP (ref 32–36)
MCV RBC AUTO: 96.2 FL — SIGNIFICANT CHANGE UP (ref 80–100)
NRBC # BLD: 0 /100 WBCS — SIGNIFICANT CHANGE UP (ref 0–0)
PLATELET # BLD AUTO: 164 K/UL — SIGNIFICANT CHANGE UP (ref 150–400)
POTASSIUM SERPL-MCNC: 4.7 MMOL/L — SIGNIFICANT CHANGE UP (ref 3.5–5.3)
POTASSIUM SERPL-SCNC: 4.7 MMOL/L — SIGNIFICANT CHANGE UP (ref 3.5–5.3)
RBC # BLD: 4.7 M/UL — SIGNIFICANT CHANGE UP (ref 4.2–5.8)
RBC # FLD: 13.6 % — SIGNIFICANT CHANGE UP (ref 10.3–14.5)
SODIUM SERPL-SCNC: 140 MMOL/L — SIGNIFICANT CHANGE UP (ref 135–145)
WBC # BLD: 6.22 K/UL — SIGNIFICANT CHANGE UP (ref 3.8–10.5)
WBC # FLD AUTO: 6.22 K/UL — SIGNIFICANT CHANGE UP (ref 3.8–10.5)

## 2024-07-19 PROCEDURE — 93455 CORONARY ART/GRFT ANGIO S&I: CPT

## 2024-07-19 PROCEDURE — 93567 NJX CAR CTH SPRVLV AORTGRPHY: CPT

## 2024-07-19 PROCEDURE — 93010 ELECTROCARDIOGRAM REPORT: CPT

## 2024-07-19 PROCEDURE — C1769: CPT

## 2024-07-19 PROCEDURE — 99152 MOD SED SAME PHYS/QHP 5/>YRS: CPT

## 2024-07-19 PROCEDURE — 80048 BASIC METABOLIC PNL TOTAL CA: CPT

## 2024-07-19 PROCEDURE — 93005 ELECTROCARDIOGRAM TRACING: CPT

## 2024-07-19 PROCEDURE — 93455 CORONARY ART/GRFT ANGIO S&I: CPT | Mod: 26

## 2024-07-19 PROCEDURE — 85027 COMPLETE CBC AUTOMATED: CPT

## 2024-07-19 PROCEDURE — C1887: CPT

## 2024-07-19 PROCEDURE — 82962 GLUCOSE BLOOD TEST: CPT

## 2024-07-19 PROCEDURE — C1894: CPT

## 2024-07-19 PROCEDURE — T1013: CPT

## 2024-07-19 NOTE — ASU DISCHARGE PLAN (ADULT/PEDIATRIC) - NS MD DC FALL RISK RISK
For information on Fall & Injury Prevention, visit: [email or web address] OR  [email or web address] OR  https://www.cdc.gov/steadi/patient.html

## 2024-07-19 NOTE — ASU PATIENT PROFILE, ADULT - FALL HARM RISK - UNIVERSAL INTERVENTIONS
Bed in lowest position, wheels locked, appropriate side rails in place/Call bell, personal items and telephone in reach/Instruct patient to call for assistance before getting out of bed or chair/Non-slip footwear when patient is out of bed/[place] to call system/Physically safe environment - no spills, clutter or unnecessary equipment/Purposeful Proactive Rounding/Room/bathroom lighting operational, light cord in reach

## 2024-07-19 NOTE — ASU DISCHARGE PLAN (ADULT/PEDIATRIC) - CARE PROVIDER_API CALL
Pedro Sevilla  Interventional Cardiology  [address] [place] [address]  Phone: [phone]  Fax: [phone]  Follow Up Time: Routine    Lazaro Salgado  Cardiology  [address], [address]  [place] [address]  Phone: [phone]  Fax: [phone]  Established Patient  Follow Up Time: 1 month

## 2024-07-19 NOTE — ASU DISCHARGE PLAN (ADULT/PEDIATRIC) - PROVIDER TOKENS
PROVIDER:[TOKEN:[[id number]],FOLLOWUP:[Routine]],PROVIDER:[TOKEN:[[id number]],FOLLOWUP:[1 month],ESTABLISHEDPATIENT:[T]]

## 2024-07-23 ENCOUNTER — TRANSCRIPTION ENCOUNTER (OUTPATIENT)
Age: 84
End: 2024-07-23

## 2024-07-24 ENCOUNTER — RESULT REVIEW (OUTPATIENT)
Age: 84
End: 2024-07-24

## 2024-07-24 ENCOUNTER — APPOINTMENT (OUTPATIENT)
Dept: CARDIOTHORACIC SURGERY | Facility: HOSPITAL | Age: 84
End: 2024-07-24

## 2024-07-24 ENCOUNTER — INPATIENT (INPATIENT)
Facility: HOSPITAL | Age: 84
LOS: 6 days | Discharge: NURSING, REHAB OR LONG-TERM CARE | DRG: 266 | End: 2024-07-31
Attending: THORACIC SURGERY (CARDIOTHORACIC VASCULAR SURGERY) | Admitting: THORACIC SURGERY (CARDIOTHORACIC VASCULAR SURGERY)
Payer: MEDICARE

## 2024-07-24 VITALS
WEIGHT: 121.25 LBS | HEIGHT: 60 IN | HEART RATE: 70 BPM | SYSTOLIC BLOOD PRESSURE: 148 MMHG | RESPIRATION RATE: 18 BRPM | TEMPERATURE: 98 F | DIASTOLIC BLOOD PRESSURE: 63 MMHG

## 2024-07-24 DIAGNOSIS — Z95.2 PRESENCE OF PROSTHETIC HEART VALVE: Chronic | ICD-10-CM

## 2024-07-24 DIAGNOSIS — Z95.1 PRESENCE OF AORTOCORONARY BYPASS GRAFT: Chronic | ICD-10-CM

## 2024-07-24 DIAGNOSIS — Z95.5 PRESENCE OF CORONARY ANGIOPLASTY IMPLANT AND GRAFT: Chronic | ICD-10-CM

## 2024-07-24 DIAGNOSIS — Z98.890 OTHER SPECIFIED POSTPROCEDURAL STATES: Chronic | ICD-10-CM

## 2024-07-24 DIAGNOSIS — I35.0 NONRHEUMATIC AORTIC (VALVE) STENOSIS: ICD-10-CM

## 2024-07-24 LAB
ANION GAP SERPL CALC-SCNC: 24 MMOL/L — HIGH (ref 5–17)
APTT BLD: 76.9 SEC — HIGH (ref 24.5–35.6)
BUN SERPL-MCNC: 25 MG/DL — HIGH (ref 7–23)
CALCIUM SERPL-MCNC: 7.9 MG/DL — LOW (ref 8.4–10.5)
CHLORIDE SERPL-SCNC: 103 MMOL/L — SIGNIFICANT CHANGE UP (ref 96–108)
CO2 SERPL-SCNC: 17 MMOL/L — LOW (ref 22–31)
CREAT SERPL-MCNC: 1.09 MG/DL — SIGNIFICANT CHANGE UP (ref 0.5–1.3)
EGFR: 68 ML/MIN/1.73M2 — SIGNIFICANT CHANGE UP
GAS PNL BLDA: SIGNIFICANT CHANGE UP
GLUCOSE BLDC GLUCOMTR-MCNC: 215 MG/DL — HIGH (ref 70–99)
GLUCOSE BLDC GLUCOMTR-MCNC: 244 MG/DL — HIGH (ref 70–99)
GLUCOSE SERPL-MCNC: 227 MG/DL — HIGH (ref 70–99)
HCT VFR BLD CALC: 30.8 % — LOW (ref 39–50)
HGB BLD-MCNC: 10 G/DL — LOW (ref 13–17)
INR BLD: 1.33 RATIO — HIGH (ref 0.85–1.18)
MCHC RBC-ENTMCNC: 32.3 PG — SIGNIFICANT CHANGE UP (ref 27–34)
MCHC RBC-ENTMCNC: 32.5 GM/DL — SIGNIFICANT CHANGE UP (ref 32–36)
MCV RBC AUTO: 99.4 FL — SIGNIFICANT CHANGE UP (ref 80–100)
NRBC # BLD: 0 /100 WBCS — SIGNIFICANT CHANGE UP (ref 0–0)
PLATELET # BLD AUTO: 166 K/UL — SIGNIFICANT CHANGE UP (ref 150–400)
POTASSIUM SERPL-MCNC: 5.2 MMOL/L — SIGNIFICANT CHANGE UP (ref 3.5–5.3)
POTASSIUM SERPL-SCNC: 5.2 MMOL/L — SIGNIFICANT CHANGE UP (ref 3.5–5.3)
PROTHROM AB SERPL-ACNC: 13.8 SEC — HIGH (ref 9.5–13)
RBC # BLD: 3.1 M/UL — LOW (ref 4.2–5.8)
RBC # FLD: 13.9 % — SIGNIFICANT CHANGE UP (ref 10.3–14.5)
SODIUM SERPL-SCNC: 144 MMOL/L — SIGNIFICANT CHANGE UP (ref 135–145)
WBC # BLD: 11.09 K/UL — HIGH (ref 3.8–10.5)
WBC # FLD AUTO: 11.09 K/UL — HIGH (ref 3.8–10.5)

## 2024-07-24 PROCEDURE — 35665 BPG ILIOFEMORAL: CPT | Mod: LT

## 2024-07-24 PROCEDURE — 35558 ART BYP GRFT FEM-FEMORAL: CPT | Mod: GC,LT

## 2024-07-24 PROCEDURE — 88304 TISSUE EXAM BY PATHOLOGIST: CPT | Mod: 26

## 2024-07-24 PROCEDURE — 93355 ECHO TRANSESOPHAGEAL (TEE): CPT

## 2024-07-24 PROCEDURE — 35302 RECHANNELING OF ARTERY: CPT | Mod: GC,LT

## 2024-07-24 PROCEDURE — 88300 SURGICAL PATH GROSS: CPT | Mod: 26,59

## 2024-07-24 PROCEDURE — 33361 REPLACE AORTIC VALVE PERQ: CPT | Mod: 62,Q0

## 2024-07-24 PROCEDURE — 93010 ELECTROCARDIOGRAM REPORT: CPT

## 2024-07-24 DEVICE — SURGIFOAM PAD 8CM X 12.5CM X 10MM (100): Type: IMPLANTABLE DEVICE | Status: FUNCTIONAL

## 2024-07-24 DEVICE — GELFOAM 12 X 7MM: Type: IMPLANTABLE DEVICE | Site: LEFT | Status: FUNCTIONAL

## 2024-07-24 DEVICE — CATH FOGARTY 3FR X 80CM: Type: IMPLANTABLE DEVICE | Status: FUNCTIONAL

## 2024-07-24 DEVICE — SYS DELIVERY EVOLUT PROPLUS 23/26/29 MM: Type: IMPLANTABLE DEVICE | Status: FUNCTIONAL

## 2024-07-24 DEVICE — SHEATH INTRODUCER TERUMO PINNACLE CORONARY 8FR X 10CM X 0.038" MINI WIRE: Type: IMPLANTABLE DEVICE | Status: FUNCTIONAL

## 2024-07-24 DEVICE — GRAFT VASC PROPATEN 6MMX60X70CM TW REMOV RING: Type: IMPLANTABLE DEVICE | Status: FUNCTIONAL

## 2024-07-24 DEVICE — SURGICEL FIBRILLAR 2 X 4": Type: IMPLANTABLE DEVICE | Site: LEFT | Status: FUNCTIONAL

## 2024-07-24 DEVICE — CATH VASCULAR EXPO VENTRICULAR PIGTAIL CURVE (PIG) 0.045" X 5FR X 100CM: Type: IMPLANTABLE DEVICE | Status: FUNCTIONAL

## 2024-07-24 DEVICE — CATH FOGARTY 5FR X 80CM: Type: IMPLANTABLE DEVICE | Site: LEFT | Status: FUNCTIONAL

## 2024-07-24 DEVICE — IMPLANTABLE DEVICE: Type: IMPLANTABLE DEVICE | Site: LEFT | Status: FUNCTIONAL

## 2024-07-24 DEVICE — GWIRE STR .038X180 STIFF LONG TAPR: Type: IMPLANTABLE DEVICE | Status: FUNCTIONAL

## 2024-07-24 DEVICE — OCCLUDER INTERNAL VESSEL FLO-RESTER 1 X 12MM: Type: IMPLANTABLE DEVICE | Site: LEFT | Status: FUNCTIONAL

## 2024-07-24 DEVICE — CATH ANGIO OMNI FLUSH 4FR X 65CM: Type: IMPLANTABLE DEVICE | Status: FUNCTIONAL

## 2024-07-24 DEVICE — CATH DIAG IMPULSE 5F FR4 MOD: Type: IMPLANTABLE DEVICE | Status: FUNCTIONAL

## 2024-07-24 DEVICE — LIGATING CLIPS WECK HORIZON SMALL-WIDE (RED) 24: Type: IMPLANTABLE DEVICE | Status: FUNCTIONAL

## 2024-07-24 DEVICE — CATH VASCULAR EXPO VENTRICULAR PIGTAIL CURVE (PIG 145) 0.045" X 5FR X 10CM: Type: IMPLANTABLE DEVICE | Status: FUNCTIONAL

## 2024-07-24 DEVICE — IMPLANTABLE DEVICE: Type: IMPLANTABLE DEVICE | Status: FUNCTIONAL

## 2024-07-24 DEVICE — SUT PERCLOSE PROGLIDE 6FR: Type: IMPLANTABLE DEVICE | Status: FUNCTIONAL

## 2024-07-24 DEVICE — GWIRE INQWIRE JTIP .035X260MM: Type: IMPLANTABLE DEVICE | Status: FUNCTIONAL

## 2024-07-24 DEVICE — KIT PACE ELCTR BIPOLAR 5FR: Type: IMPLANTABLE DEVICE | Status: FUNCTIONAL

## 2024-07-24 DEVICE — CLIP APPLIER COVIDIEN SURGICLIP 11.5" MEDIUM: Type: IMPLANTABLE DEVICE | Site: LEFT | Status: FUNCTIONAL

## 2024-07-24 DEVICE — SURGCEL 4 X 8": Type: IMPLANTABLE DEVICE | Site: LEFT | Status: FUNCTIONAL

## 2024-07-24 DEVICE — CLIP APPLIER COVIDIEN SURGICLIP II 9.75" MEDIUM: Type: IMPLANTABLE DEVICE | Site: LEFT | Status: FUNCTIONAL

## 2024-07-24 DEVICE — CATH FOGARTY 3FR X 80CM: Type: IMPLANTABLE DEVICE | Site: LEFT | Status: FUNCTIONAL

## 2024-07-24 DEVICE — LIGATING CLIPS WECK HORIZON MEDIUM (BLUE) 24: Type: IMPLANTABLE DEVICE | Status: FUNCTIONAL

## 2024-07-24 DEVICE — AGENT HEMOSTATIC HEMOBLAST 1.65G 10CM: Type: IMPLANTABLE DEVICE | Site: LEFT | Status: FUNCTIONAL

## 2024-07-24 DEVICE — ANGIOSEAL VASC CLOS VIP 8FR: Type: IMPLANTABLE DEVICE | Status: FUNCTIONAL

## 2024-07-24 DEVICE — VLV AORTIC EVOLUT FX PLUS 23MM: Type: IMPLANTABLE DEVICE | Status: FUNCTIONAL

## 2024-07-24 DEVICE — CATH EMB FOGARTY 6FRX80CM: Type: IMPLANTABLE DEVICE | Site: LEFT | Status: FUNCTIONAL

## 2024-07-24 DEVICE — CLIP APPLIER COVIDIEN SURGICLIP III 9" SM: Type: IMPLANTABLE DEVICE | Site: LEFT | Status: FUNCTIONAL

## 2024-07-24 DEVICE — BLLN CATH TRUE DILATION 18MMX4.5CM: Type: IMPLANTABLE DEVICE | Status: FUNCTIONAL

## 2024-07-24 DEVICE — INTRODUCER SHEATH DRYSEAL FLEX 18FR X 33CM: Type: IMPLANTABLE DEVICE | Status: FUNCTIONAL

## 2024-07-24 DEVICE — SHEATH INTRODUCER TERUMO PINNACLE CORONARY 6FR X 10CM X 0.038" MINI WIRE: Type: IMPLANTABLE DEVICE | Status: FUNCTIONAL

## 2024-07-24 DEVICE — WIRE GD SAFARI2 275CM XSML CRV: Type: IMPLANTABLE DEVICE | Status: FUNCTIONAL

## 2024-07-24 DEVICE — GLDWIRE ADVANTAGE .035X180 CM: Type: IMPLANTABLE DEVICE | Status: FUNCTIONAL

## 2024-07-24 DEVICE — KIT A-LINE 1LUM 18G X 16CM: Type: IMPLANTABLE DEVICE | Status: FUNCTIONAL

## 2024-07-24 DEVICE — CATH DIAG 5F AL1: Type: IMPLANTABLE DEVICE | Status: FUNCTIONAL

## 2024-07-24 DEVICE — GWIRE GUID  0.035INX150CM: Type: IMPLANTABLE DEVICE | Status: FUNCTIONAL

## 2024-07-24 DEVICE — SEALANT VISTASEAL FIBRIN HUMAN 2ML: Type: IMPLANTABLE DEVICE | Status: FUNCTIONAL

## 2024-07-24 DEVICE — KIT RADIAL MINI ACCESS PRELUDE SHEATH: Type: IMPLANTABLE DEVICE | Status: FUNCTIONAL

## 2024-07-24 DEVICE — VISTASEAL FIBRIN HUMAN 4ML: Type: IMPLANTABLE DEVICE | Site: LEFT | Status: FUNCTIONAL

## 2024-07-24 DEVICE — INTRO SHEATH PERC 6FRX45CM: Type: IMPLANTABLE DEVICE | Status: FUNCTIONAL

## 2024-07-24 DEVICE — SURGIFLO HEMOSTATIC MATRIX KIT: Type: IMPLANTABLE DEVICE | Site: LEFT | Status: FUNCTIONAL

## 2024-07-24 DEVICE — LOADING SYSTEM EVOLUT FX 23-29MM: Type: IMPLANTABLE DEVICE | Status: FUNCTIONAL

## 2024-07-24 DEVICE — CATH FOGARTY 4FR X 80CM: Type: IMPLANTABLE DEVICE | Site: LEFT | Status: FUNCTIONAL

## 2024-07-24 DEVICE — GUIDEWIRE AMPLATZ EXTRA-STIFF CURVED .035" X 260CM: Type: IMPLANTABLE DEVICE | Status: FUNCTIONAL

## 2024-07-24 RX ORDER — ATORVASTATIN CALCIUM 40 MG/1
80 TABLET, FILM COATED ORAL AT BEDTIME
Refills: 0 | Status: DISCONTINUED | OUTPATIENT
Start: 2024-07-24 | End: 2024-07-24

## 2024-07-24 RX ORDER — ASPIRIN 500 MG
325 TABLET ORAL ONCE
Refills: 0 | Status: COMPLETED | OUTPATIENT
Start: 2024-07-24 | End: 2024-07-24

## 2024-07-24 RX ORDER — BACTERIOSTATIC SODIUM CHLORIDE 0.9 %
3 VIAL (ML) INJECTION EVERY 8 HOURS
Refills: 0 | Status: DISCONTINUED | OUTPATIENT
Start: 2024-07-24 | End: 2024-07-24

## 2024-07-24 RX ORDER — CEFUROXIME AXETIL 250 MG
1500 TABLET ORAL EVERY 8 HOURS
Refills: 0 | Status: DISCONTINUED | OUTPATIENT
Start: 2024-07-24 | End: 2024-07-24

## 2024-07-24 RX ORDER — DEXTROSE MONOHYDRATE, SODIUM CHLORIDE, SODIUM LACTATE, CALCIUM CHLORIDE, MAGNESIUM CHLORIDE 1.5; 538; 448; 18.4; 5.08 G/100ML; MG/100ML; MG/100ML; MG/100ML; MG/100ML
1000 SOLUTION INTRAPERITONEAL
Refills: 0 | Status: DISCONTINUED | OUTPATIENT
Start: 2024-07-24 | End: 2024-07-24

## 2024-07-24 RX ORDER — CLOPIDOGREL BISULFATE 75 MG/1
600 TABLET, FILM COATED ORAL ONCE
Refills: 0 | Status: COMPLETED | OUTPATIENT
Start: 2024-07-24 | End: 2024-07-24

## 2024-07-24 RX ORDER — HEPARIN SODIUM 1000 [USP'U]/ML
800 INJECTION, SOLUTION INTRAVENOUS; SUBCUTANEOUS
Qty: 25000 | Refills: 0 | Status: DISCONTINUED | OUTPATIENT
Start: 2024-07-24 | End: 2024-07-24

## 2024-07-24 RX ORDER — GLUCAGON INJECTION, SOLUTION 0.5 MG/.1ML
1 INJECTION, SOLUTION SUBCUTANEOUS ONCE
Refills: 0 | Status: DISCONTINUED | OUTPATIENT
Start: 2024-07-24 | End: 2024-07-24

## 2024-07-24 RX ORDER — DEXTROSE 4 G
25 TABLET,CHEWABLE ORAL ONCE
Refills: 0 | Status: DISCONTINUED | OUTPATIENT
Start: 2024-07-24 | End: 2024-07-24

## 2024-07-24 RX ORDER — HYDROMORPHONE HCL IN 0.9% NACL 0.2 MG/ML
0.25 PLASTIC BAG, INJECTION (ML) INTRAVENOUS
Refills: 0 | Status: DISCONTINUED | OUTPATIENT
Start: 2024-07-24 | End: 2024-07-24

## 2024-07-24 RX ORDER — INSULIN LISPRO 100/ML
VIAL (ML) SUBCUTANEOUS
Refills: 0 | Status: DISCONTINUED | OUTPATIENT
Start: 2024-07-24 | End: 2024-07-24

## 2024-07-24 RX ORDER — DEXTROSE 4 G
15 TABLET,CHEWABLE ORAL ONCE
Refills: 0 | Status: DISCONTINUED | OUTPATIENT
Start: 2024-07-24 | End: 2024-07-24

## 2024-07-24 RX ADMIN — Medication 325 MILLIGRAM(S): at 18:44

## 2024-07-24 RX ADMIN — Medication 0.25 MILLIGRAM(S): at 20:05

## 2024-07-24 RX ADMIN — CLOPIDOGREL BISULFATE 600 MILLIGRAM(S): 75 TABLET, FILM COATED ORAL at 18:52

## 2024-07-24 RX ADMIN — Medication 4: at 19:43

## 2024-07-24 RX ADMIN — Medication 0.25 MILLIGRAM(S): at 19:49

## 2024-07-24 NOTE — BRIEF OPERATIVE NOTE - NSICDXBRIEFPROCEDURE_GEN_ALL_CORE_FT
PROCEDURES:  Creation of bypass from right iliac artery to right femoral artery with endarterectomy 24-Jul-2024 17:08:08  William Christie  
PROCEDURES:  Percutaneous transcatheter aortic valve replacement (TAVR) by femoral artery approach 24-Jul-2024 17:28:23  Ricardo Lam

## 2024-07-24 NOTE — CONSULT NOTE ADULT - SUBJECTIVE AND OBJECTIVE BOX
CHIEF COMPLAINT:    HISTORY OF PRESENT ILLNESS:    PAST MEDICAL & SURGICAL HISTORY:  84 year old well known to me from office with recent worsening exertional fatigue.  Pt states for  the past few months, he noticed getting tired easily he becomes more fatigued with his usual activities, so he does everything slower. Pt also states he go upstairs just the last little bit" he "has to relax for 10 seconds and then I go" on with his activity. He lives with his wife and is independent in his ADL's.  Recent echocardiogram by in my office consistent with severe degeneration/stenosis of the bioprosthetic AVR . Hes  scheduled TAVRtoday.   Denies  CP, dizziness, recent fevers, chills, cough, chest pain or SOB and feels well otherwise, he sleeps with one pillow and has not woken up with SOB or has had no hospitalizations.     PMH/PSH  significant for  HTN, HLD, DM2 , CAD with PCI CABG x3/ AVR( 2011, [facility], He does not have his valve card) and coronary stent x1 ( 5/2019, on Plavix ),  AFib  on Eliquis (last dose 7/21), Left Carotid Endarterectomy on 10/7/21.?      3-vessel CAD  s/p 3v CABG 2016, s/p stent 2019      HTN (hypertension)      DM2 (diabetes mellitus, type 2)< from: Cardiac Catheterization (07.19.24 @ 08:23) >    Occlusion and stenosis of unspecified carotid artery  HLD (hyperlipidemia)  Chronic atrial fibrillation  S/P CABG x 3  2016  S/P AVR (aortic valve replacement)  bovine 2016  S/P coronary artery stent placement  x1 2019  History of CEA (carotid endarterectomy)  MEDICATIONS:  cefuroxime  IVPB 1500 milliGRAM(s) IV Intermittent once  FAMILY HISTORY:  FH: diabetes mellitus  mother, siblings    FH: heart disease  fathe  SOCIAL HISTORY:    [ ] Non-smoker  [ ] Smoker  [ ] Alcohol    Allergies    No Known Allergies    Intolerances  REVIEW OF SYSTEMS:  CONSTITUTIONAL: No fever, weight loss, +fatigue  EYES: No eye pain, visual disturbances, or discharge  ENMT:  No difficulty hearing, tinnitus, vertigo; No sinus or throat pain  NECK: No pain or stiffness  RESPIRATORY: No cough, wheezing, chills or hemoptysis; + Shortness of Breath  CARDIOVASCULAR: No chest pain, palpitations, passing out, dizziness, or leg swelling  GASTROINTESTINAL: No abdominal or epigastric pain. No nausea, vomiting, or hematemesis; No diarrhea or constipation. No melena or hematochezia.  GENITOURINARY: No dysuria, frequency, hematuria, or incontinence  NEUROLOGICAL: No headaches, memory loss, loss of strength, numbness, or tremors  SKIN: No itching, burning, rashes, or lesions   LYMPH Nodes: No enlarged glands  ENDOCRINE: No heat or cold intolerance; No hair loss  MUSCULOSKELETAL: No joint pain or swelling; No muscle, back, or extremity pain  PSYCHIATRIC: No depression, anxiety, mood swings, or difficulty sleeping  HEME/LYMPH: No easy bruising, or bleeding gums  ALLERY AND IMMUNOLOGIC: No hives or eczema	    [ ] All others negative	  [ ] Unable to obtain    PHYSICAL EXAM:  T(C): 36.7 (07-24-24 @ 08:35), Max: 36.7 (07-24-24 @ 08:32)  HR: 70 (07-24-24 @ 08:35) (70 - 70)  BP: 148/63 (07-24-24 @ 08:35) (148/63 - 148/63)  RR: 18 (07-24-24 @ 08:35) (18 - 18)  SpO2: 96% (07-24-24 @ 08:35) (96% - 96%)  Wt(kg): --  I&O's Summary      Appearance: NAD  HEENT:   Normal oral mucosa, PERRL, EOMI	  Lymphatic: No lymphadenopathy  Cardiovascular: Irregular  S1 S2, No JVD, 4/6 systolic  murmurs, No edema  Respiratory: Lungs clear to auscultation	  Psychiatry: A & O x 3, Mood & affect appropriate  Gastrointestinal:  Soft, Non-tender, + BS	  Skin: No rashes, No ecchymoses, No cyanosis	  Neurologic: Non-focal  Extremities: Normal range of motion, No clubbing, cyanosis or edema  Vascular: Peripheral pulses palpable 2+ bilaterally    TELEMETRY: 	 AF   ECG:  	< from: TTE W or WO Ultrasound Enhancing Agent (06.18.24 @ 12:14) >    TRANSTHORACIC ECHOCARDIOGRAM REPORT  ________________________________________________________________________________                                      _______       Pt. Name:       ERIBERTO GOLDSTEIN  Study Date:    6/18/2024  MRN:            [id number]          YOB: 1940  Accession #:    [id number]           Age:           84 years  Account#:       [id number]        Gender:        M  Visit ID#  Heart Rate:     77 bpm              Height:        60.00 in (152.40 cm)  Rhythm:        atrial fibrillation Weight:        125.00 lb (56.70 kg)  Blood Pressure: 160/65 mmHg         BSA/BMI:       1.53 m² / 24.41 kg/m²  ________________________________________________________________________________________  Referring Physician:  [id number] Pedro Sevilla  Interpreting Physician: Betzy Figueroa  Primary Sonographer:    Devi Barrientos    CPT:               ECHO TTE WO CON COMP W DOPP - 02317.m  Indication(s):     Nonrheumatic aortic (valve) stenosis - I35.0  Procedure:         Transthoracic echocardiogram with 2-D, M-mode and complete                     spectral and color flow Doppler.  Ordering Location: [facility]  Admission Status:  Outpatient  Study Information: Image quality for this study is good.    _______________________________________________________________________________________     CONCLUSIONS:      1. The left ventricular cavity is normal in size. Left ventricular systolic function is normal with a calculated ejection fraction of 56 % by the De Oliveira's biplane method of disks. There are no regional wall motion abnormalities seen.   2. The right ventricular cavity is normal in size and normal systolic function.   3. Mild left atrial dilatation.   4. A bioprosthetic valve replacement is present in the aortic position. The prosthetic valve has thickened leaflets. The peak transaortic velocity is 3.64 m/s, peak transaortic gradient is 52.9 mmHg and mean transaortic gradient is 23.7 mmHg with an LVOT/aortic valve VTI ratio of 0.27. The aortic valve acceleration time is 111 msec. The effective orifice area is estimated at 0.83 cm² by the continuity equation. The EOA and DVI are consistent with severe degeneration/stenosis of the aortic bioprosthesis. There is no intravalvular regurgitation. There is no paravalvular regurgitation.   5. There is no other significant valve disease.   6. No prior echocardiogram is available for comparison.    ________________________________________________________________________________________  FINDINGS:     Left Ventricle:  The left ventricular cavity is normal in size. Left ventricular systolic function is normal with a calculated ejection fraction of 56 % by the De Oliveira's biplane method of disks. There are no regional wall motion abnormalities seen. Analysis of left ventricular diastolic function and filling pressure is made challenging by the presence of atrial fibrillation.     Right Ventricle:  The right ventricular cavity is normal in size and normal systolic function.     Left Atrium:  The left atriumis mildly dilated with an indexed volume of 41.02 ml/m².     Right Atrium:  The right atrium is normal in size with an indexed volume of 17.62 ml/m² and an indexed area of 8.67 cm²/m².     Aortic Valve:  A bioprosthetic valve replacement is present in the aortic position. The prosthetic valve has thickened leaflets. The peak transaortic velocity is 3.64 m/s, peak transaortic gradient is 52.9 mmHg and mean transaortic gradient is 23.7 mmHg with an LVOT/aortic valve VTI ratio of 0.27. The aortic valve acceleration time is 111 msec. The effective orifice area is estimated at 0.83 cm² by the continuity equation. There is degeneration of the aortic valve prosthesis. There is no intravalvular regurgitation. There is no paravalvular regurgitation. The EOA and DVI are consistent with severe degeneration/stenosis of the aortic bioprosthesis.     Mitral Valve:  Structurally normal mitral valve with normal leaflet excursion. There is calcification of the mitral valve annulus. There is mild mitral regurgitation. There is blunting of the pulmonary venous flow consistent with elevated left atrial pressure.     Tricuspid Valve:  Structurally normal tricuspid valve with normal leaflet excursion. There is trace tricuspid regurgitation. There is insufficient tricuspid regurgitation detected to calculate pulmonary artery systolic pressure.     Pulmonic Valve:  Structurally normal pulmonic valve with normal leaflet excursion. There is trace pulmonic regurgitation.     Aorta:  The aortic root at the sinuses of Valsalva is normal in size, measuring 2.90 cm (indexed 1.90 cm/m²).     Pericardium:  No pericardial effusion seen.     Systemic Veins:  The inferior vena cava is normal in size (normal <2.1cm) with abnormal inspiratory collapse (abnormal <50%) consistent with mildly elevated right atrial pressure (~8, range 5-10mmHg).  ____________________________________________________________________    < end of copied text >    RADIOLOGY:  < from: CT Angio Chest TAVR DEWEY w/wo IV Cont (07.12.24 @ 10:34) >    ACC: [id number] EXAM:  CT ANGIO AB PEL TAVR DEWEY WAWIC   ORDERED BY: GIA GEORGE     ACC: [id number] EXAM:  CT ANGIO CHEST TAVR DEWEY WAWIC   ORDERED BY: GIA GEORGE     ACC: [id number] EXAM:  CT ANGIO CORONARY TAVR DEWEY IC   ORDERED BY: GIA GEORGE     PROCEDURE DATE:  07/12/2024          INTERPRETATION:  Indication:  Transcatheter aortic valve replacement   (TAVR)    History:  Mr. Goldstein is an 84-year-old man with bioprosthetic aortic   valve dysfunction.    Scanner:  [employer]    Acquisitions:  1.  Non-contrast prospectively-gated 320-multidetector volumetric   computed tomography heart  2.  Contrast-enhanced retrospectively-gated 320-multidetector volumetric   computed tomography heart  3.  Non-gated helical computed tomography chest, abdomen, and pelvis  4.  Delayed-phase prospectively-gated 320-multidetector volumetric   computed tomography heart    Contrast:  70 mL Omnipaque 350    Quality:  Good    Post-processing:  Three-dimensional volume-rendered and maximal intensity   projection images reconstructed with Vitrea software.    FINDINGS:    Cardiovascular:  Median sternotomy wires and surgical clips noted.    There is no thoracic aortic dissection or aneurysm. Plaque-stenosis noted   at the origin of the left common carotid artery. Moderate to severe   predominantly calcified plaque is present in the thoracic and abdominal   aorta.    Aortic valve:  Bioprosthetic aortic with mild cusp calcification and   thickening    Mobility in the left coronary cusp appears decreased.    Aortic root:  Mildly calcified  Ascending aorta:  Mildly calcified  Aortic arch:  Moderately calcified  Descending thoracic aorta:  Moderately calcified  Abdominal aorta:  Severely calcified    This study was not optimized for coronary artery or bypass graft   evaluation. Coronary artery calcifications are noted in the coronary   arteries. The left internal mammary artery is anastomosed to the left   anterior descending coronary artery. A graft (with a short stent at its   origin) originating from the anterior aspect of the ascending aorta at   the level of the main pulmonary artery is anastomosed to the right   posterior descending artery. Please refer to invasive coronary   angiography performed pre-TAVR for coronary artery and bypass graft   assessment.    There is no pericardial effusion.    No filling defect noted in the left atrial appendage. The left atrium is   dilated.    There is mild mitral annular calcification and leaflet thickening.    Aortic measurements  Bioprosthetic aortic valve inner diameter (systole, mm):  15.9  Perpendicular bioprosthetic aortic valve inner diameter (systole, mm):    15.0  Bioprosthetic aortic valve inner perimeter (systole, mm):  49  Bioprosthetic aortic valve inner area (systole, mm2):  188  LVOT minimum diameter (systole, mm):  18.4  LVOT 90 cross (systole, mm):  25.2  LVOT calcification:  Moderate  Bioprosthetic aortic valve to Left Main coronary artery (diastole, mm):    6.4  Bioprosthetic aortic valve to Right Coronary artery (diastole, mm):  6.1  Sinus of Valsalva diameter, Right coronary (diastole, mm):  21.4  Sinus of Valsalva diameter, Left coronary (diastole, mm):  21.7  Sinus of Valsalva diameter, Non coronary (diastole, mm):  22.1  Diameter at the sinotubular junction (diastole, mm):  21.1  Maximum ascending aorta diameter at 40 mm above bioprosthetic aortic   valve (diastole, mm):  28.7  Aortic root angulation (diastole, degrees):  42.6    Abdominal Aorta:        Minimum lumen diameter (MLD) (mm):  8.6        Diameter perpendicular to MLD (mm):  10.1    Peripheral vascular measurements:    Right Common Iliac:        Minimum Lumen Diameter (mm):  6.5        Diameter perpendicular to MLD (mm):  7.4        Calcification:  Moderate    LeftCommon Iliac:         Minimum Lumen Diameter (mm):  6.5        Diameter perpendicular to MLD (mm):  7.2        Calcification:  Moderate    Right External Iliac:        Minimum Lumen Diameter (mm):  7.1        Diameter perpendicular to MLD (mm):  7.4        Calcification:  Minimal    Left External Iliac:        Minimum Lumen Diameter (mm):  7.1        Diameter perpendicular to MLD (mm):  7.4        Calcification:  Mild    Right Femoral:        Minimum Lumen Diameter (mm):  3.4        Diameter perpendicular to MLD (mm):  6.7        Calcification:  Moderate    Left Femoral:        Minimum Lumen Diameter (mm):  5.9        Diameter perpendicular to MLD (mm):  5.9        Calcification:  Mild    Non-cardiac:  No enlarged axillary, mediastinal or hilar lymph nodes. Evaluation of the   lungs demonstrate minimal bilateral lower lung linear areas of   atelectasis and/or scarring. Tiny foci of calcifications within the lower   lobes, left greater than right. No central endobronchial lesions.    The liver, spleen, pancreas, adrenal glands and the kidneys are   unremarkable. Cholelithiasis. The urinary bladder is distended. Prostate   is noted.    No bowel obstruction or free intraperitoneal air. Appendix is normal.   Sternotomy wires. Degenerativechanges of the spine.    IMPRESSION:  1.  Bioprosthetic aortic valve with mildly thickened and calcified cusps.   Mobility in the left coronary cusp appears decreased.  2.  Aortic and peripheral access vessel measurements as reported above.  3.  Distended urinary bladder.    --- End of Report ---          JESSICA BISHOP MD; Attending Cardiologist  This document has been electronically signed.  DO AMEZQUITA MD; Attending Radiologist  This document has been electronically signed. Jul 12 2024  3:40PM    ca< from: Cardiac Catheterization (07.19.24 @ 08:23) >      OTHER: 	  	  LABS:	 	    CARDIAC MARKERS:  Diagnostic Conclusions:     Severe native 3VD with patent grafts to LAD and RPDA. Medical therapy  and proceed with TAVR workup    Procedure Narrative:   The risks and alternatives of the procedures and conscious sedation  were explained to the patient and informed consent was  obtained. The patient was brought to the cath lab and placed on the  exam table.  Access   Right radial artery:   The puncture site was infiltrated with 1% Lidocaine. Vascular access  was obtained using modified seldinger technique.  Right femoral artery:   The puncture site was infiltrated with 1% Lidocaine. Vascular access  was obtained using modified seldinger technique.    Coronary Angiography   Left Coronary System:   A catheter was positioned into the vessel ostia under fluoroscopic  guidance. Angiograms were obtained in multiple views.  Right Coronary System:   A catheter was positioned into the vessel ostia under fluoroscopic  guidance. Angiograms were obtained in multiple views.    Diagnostic Findings:     Coronary Angiography   The coronary circulation is right dominant.      LM   Proximal left main: There is a 30 % stenosis.      LAD   Left anterior descending artery: There is a 100 % stenosis.      Patient: ERIBERTO GOLDSTEIN      MRN: [id number]  Study Date: 07/19/2024   08:23 AM      Page 1 of 3          CX   Circumflex: Thereis a 100 % stenosis.      RCA   Right coronary artery: There is a 100 % stenosis.      Ramus   Ramus intermedius: Angiography shows no disease.      Graft Angiography   LIMA graft to Mid left anterior descending: Angiography shows no  disease.  SVG graft to Right posterior descending artery: Angiography shows  moderate atherosclerosis.    Aorta   The aortic root was satisfactorily visualized. The root exhibits  normal size.      < end of copied text >                    proBNP:   Lipid Profile:   HgA1c:   TSH:

## 2024-07-24 NOTE — PATIENT PROFILE ADULT - HOME ACCESSIBILITY CONCERNS
to nursing, please tell patient to go to immediate care--especially in view of breathing sx. They can do onsite testing for flu, RSV, COVID. Also they can do CXR if needed. Thanks. none

## 2024-07-24 NOTE — PRE-ANESTHESIA EVALUATION ADULT - NSANTHPMHFT_GEN_ALL_CORE
PMH/PSH  significant for  HTN, HLD, DM2 , CAD with PCI CABG x3/ AVR( 2011, [facility], He does not have his valve card) and coronary stent x1 ( 5/2019, on Plavix ),  AFib  on Eliquis (last dose 7/21), Left Carotid Endarterectomy on 10/7/21.?   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  left main stent placed intraop
84 year old RHD male presents in PST with his son reports having " easily getting tired recenly ". Pt states for  the past few months, he noticed getting tired easily he becomes more fatigued with his usual activities, so he does everything slower. Pt also states he go upstairs just the last little bit" he "has to relax for 10 seconds and then I go" on with his activity. He lives with his wife and is independent in his ADL's.  Recent echocardiogram by cardiologist,  consistent with severe degeneration/stenosis of the bioprosthetic AVR . Presents in PST prior to scheduled TAVR on 07/25/26.  Denies  CP, dizziness, recent fevers, chills, cough, chest pain or SOB and feels well otherwise, he sleeps with one pillow and has not woken up with SOB or has had no hospitalizations.      PMH/PSH  significant for  HTN, HLD, DM2 , CAD with PCI CABG x3/ AVR( 2011, [facility], He does not have his valve card) and coronary stent x1 ( 5/2019, on Plavix ),  AFib  on Eliquis (last dose 7/21), Left Carotid Endarterectomy on 10/7/21.?

## 2024-07-24 NOTE — PATIENT PROFILE ADULT - DEAF OR HARD OF HEARING?
Pt arrives SBA in  from triage w/ c/o trigeminal neuralgia left sided facial pain worsening since Monday. Pt has been unable to eat or drink anything for past couple of days. Pt seen in ER yesterday and received 1000mg push of Dilantin, per daughter report. Also was seen earlier in the week where she received 500mg push. Has had multiple procedures for this issue in the past. Had brain stem surgery roughly a month ago. Pt feeling exceptionally weak upon arrival. AxOx4.    no

## 2024-07-24 NOTE — PRE-ANESTHESIA EVALUATION ADULT - LAST ECHOCARDIOGRAM
06/18/ 2024  EF 56 %, stenosis of the bioprosthetic AVR
06/18/ 2024  EF 56 %, stenosis of the bioprosthetic AVR

## 2024-07-24 NOTE — PRE-ANESTHESIA EVALUATION ADULT - NSANTHPEFT_GEN_ALL_CORE
irreg irreg, CTA, NAD
GENERAL: NAD  HEAD:  Atraumatic, Normocephalic  CHEST/LUNG: Clear to auscultation bilaterally  HEART: irregular S1/S2  PSYCH: AAOx3

## 2024-07-24 NOTE — CHART NOTE - NSCHARTNOTEFT_GEN_A_CORE
Structural Heart    Signout as per Dr. Sevilla     Pily TAVR   Anesthesia  Valve 23 CV  Access TF  Right groin 18FrA sheath - closed with Perclose x 2   Left groin 6FrA sheath removed with Perclose x 1    Left groin 8FrV sheath - manual compression, TVP removed in the lab    During closure the left femoral artery perclose failed. Dr. Wade was called in and performed a cut down with an endarterectomy and an external iliac- SFA bypass (see vascular op note).  In PACU his right foot was noted to be dusky with no palpable pulses and no dopplerable signal. He had normal sensation but could not move his toes.  Vascular surgery made the decision to return to the OR.    Arrived to PACU s/p TAVR under conscious sedation  Patient is alert and oriented and answering questions appropriately  Denies pain or SOB    T(C): 36.9 (07-24-24 @ 17:35), Max: 36.9 (07-24-24 @ 17:35)  HR: 71 (07-24-24 @ 18:15) (62 - 71)  BP: 98/48 (07-24-24 @ 18:15) (98/48 - 148/63)  RR: 14 (07-24-24 @ 18:15) (14 - 18)  SpO2: 100% (07-24-24 @ 18:15) (96% - 100%)    EKG:   Preop- afib@73   Post TAVR afib@68       Plan    - loaded with Plavix 600mg and ASA 325mg  - returning to the OR with vascular surgery     Signout given to [facility] Structural Heart    Signout as per Dr. Sevilla     Pily TAVR   Anesthesia  Valve 23 CV  Access TF  Right groin 18FrA sheath - closed with Perclose x 2   Left groin 6FrA sheath removed with Perclose x 1    Left groin 8FrV sheath - manual compression, TVP removed in the lab    During closure the left femoral artery perclose failed. Dr. Wade was called in and performed a cut down with an endarterectomy and an external iliac- SFA bypass (see vascular op note).  In PACU his right foot was noted to be dusky with no palpable pulses and no dopplerable signal. He had normal sensation but could not move his toes.  Vascular surgery made the decision to return to the OR.    Arrived to PACU s/p TAVR under conscious sedation  Patient is alert and oriented and answering questions appropriately  Denies pain or SOB    T(C): 36.9 (07-24-24 @ 17:35), Max: 36.9 (07-24-24 @ 17:35)  HR: 71 (07-24-24 @ 18:15) (62 - 71)  BP: 98/48 (07-24-24 @ 18:15) (98/48 - 148/63)  RR: 14 (07-24-24 @ 18:15) (14 - 18)  SpO2: 100% (07-24-24 @ 18:15) (96% - 100%)    EKG:   Preop- afib@73   Post TAVR afib@68       Plan    - loaded with Plavix 600mg and ASA 325mg for LM stent  - returning to the OR with vascular surgery  - Eliquis ordered for am  -lipitor ordered for tonight  - hold metformin, jardiance and glimeperide. Sliding scale ordered  - hold metoprolol and ramipril for now    Signout given to [facility]

## 2024-07-24 NOTE — PRE-ANESTHESIA EVALUATION ADULT - NSRADCARDRESULTSFT_GEN_ALL_CORE
< from: TTE W or WO Ultrasound Enhancing Agent (06.18.24 @ 12:14) >    CONCLUSIONS:      1. The left ventricular cavity is normal in size. Left ventricular systolic function is normal with a calculated ejection fraction of 56 % by the De Oliveira's biplane method of disks. There are no regional wall motion abnormalities seen.   2. The right ventricular cavity is normal in size and normal systolic function.   3. Mild left atrial dilatation.   4. A bioprosthetic valve replacement is present in the aortic position. The prosthetic valve has thickened leaflets. The peak transaortic velocity is 3.64 m/s, peak transaortic gradient is 52.9 mmHg and mean transaortic gradient is 23.7 mmHg with an LVOT/aortic valve VTI ratio of 0.27. The aortic valve acceleration time is 111 msec. The effective orifice area is estimated at 0.83 cm² by the continuity equation. The EOA and DVI are consistent with severe degeneration/stenosis of the aortic bioprosthesis. There is no intravalvular regurgitation. There is no paravalvular regurgitation.   5. There is no other significant valve disease.   6. No prior echocardiogram is available for comparison.      < end of copied text >    < from: Cardiac Catheterization (07.19.24 @ 08:23) >    Coronary Angiography   The coronary circulation is right dominant.      LM   Proximal left main: There is a 30 % stenosis.      LAD   Left anterior descending artery: There is a 100 % stenosis.      CX   Circumflex: Thereis a 100 % stenosis.      RCA   Right coronary artery: There is a 100 % stenosis.      Ramus   Ramus intermedius: Angiography shows no disease.      Graft Angiography   LIMA graft to Mid left anterior descending: Angiography shows no  disease.  SVG graft to Right posterior descending artery: Angiography shows  moderate atherosclerosis.    < end of copied text >
< from: TTE W or WO Ultrasound Enhancing Agent (06.18.24 @ 12:14) >    ONCLUSIONS:      1. The left ventricular cavity is normal in size. Left ventricular systolic function is normal with a calculated ejection fraction of 56 % by the De Oliveira's biplane method of disks. There are no regional wall motion abnormalities seen.   2. The right ventricular cavity is normal in size and normal systolic function.   3. Mild left atrial dilatation.   4. A bioprosthetic valve replacement is present in the aortic position. The prosthetic valve has thickened leaflets. The peak transaortic velocity is 3.64 m/s, peak transaortic gradient is 52.9 mmHg and mean transaortic gradient is 23.7 mmHg with an LVOT/aortic valve VTI ratio of 0.27. The aortic valve acceleration time is 111 msec. The effective orifice area is estimated at 0.83 cm² by the continuity equation. The EOA and DVI are consistent with severe degeneration/stenosis of the aortic bioprosthesis. There is no intravalvular regurgitation. There is no paravalvular regurgitation.   5. There is no other significant valve disease.   6. No prior echocardiogram is available for comparison.    < end of copied text >    < from: Cardiac Catheterization (07.19.24 @ 08:23) >    Diagnostic Conclusions:     Severe native 3VD with patent grafts to LAD and RPDA. Medical therapy  and proceed with TAVR workup    < end of copied text >

## 2024-07-24 NOTE — PRE-ANESTHESIA EVALUATION ADULT - NS MD HP INPLANTS MED DEV
coronary : stent x1, Aortic bio valve, den ures/Vascular stents/Clips
coronary : stent x1, Aortic bio valve, den ures/Vascular stents/Clips

## 2024-07-25 ENCOUNTER — RESULT REVIEW (OUTPATIENT)
Age: 84
End: 2024-07-25

## 2024-07-25 DIAGNOSIS — I73.9 PERIPHERAL VASCULAR DISEASE, UNSPECIFIED: ICD-10-CM

## 2024-07-25 DIAGNOSIS — E11.9 TYPE 2 DIABETES MELLITUS WITHOUT COMPLICATIONS: ICD-10-CM

## 2024-07-25 LAB
ADD ON TEST-SPECIMEN IN LAB: SIGNIFICANT CHANGE UP
ALBUMIN SERPL ELPH-MCNC: 2.7 G/DL — LOW (ref 3.3–5)
ALP SERPL-CCNC: 47 U/L — SIGNIFICANT CHANGE UP (ref 40–120)
ALT FLD-CCNC: 24 U/L — SIGNIFICANT CHANGE UP (ref 10–45)
ANION GAP SERPL CALC-SCNC: 15 MMOL/L — SIGNIFICANT CHANGE UP (ref 5–17)
APTT BLD: 28.9 SEC — SIGNIFICANT CHANGE UP (ref 24.5–35.6)
APTT BLD: 89.5 SEC — HIGH (ref 24.5–35.6)
APTT BLD: >200 SEC — CRITICAL HIGH (ref 24.5–35.6)
AST SERPL-CCNC: 58 U/L — HIGH (ref 10–40)
BASOPHILS # BLD AUTO: 0.02 K/UL — SIGNIFICANT CHANGE UP (ref 0–0.2)
BASOPHILS NFR BLD AUTO: 0.2 % — SIGNIFICANT CHANGE UP (ref 0–2)
BILIRUB SERPL-MCNC: 0.3 MG/DL — SIGNIFICANT CHANGE UP (ref 0.2–1.2)
BLD GP AB SCN SERPL QL: NEGATIVE — SIGNIFICANT CHANGE UP
BUN SERPL-MCNC: 22 MG/DL — SIGNIFICANT CHANGE UP (ref 7–23)
CALCIUM SERPL-MCNC: 9.6 MG/DL — SIGNIFICANT CHANGE UP (ref 8.4–10.5)
CHLORIDE SERPL-SCNC: 104 MMOL/L — SIGNIFICANT CHANGE UP (ref 96–108)
CO2 SERPL-SCNC: 22 MMOL/L — SIGNIFICANT CHANGE UP (ref 22–31)
CREAT SERPL-MCNC: 0.93 MG/DL — SIGNIFICANT CHANGE UP (ref 0.5–1.3)
EGFR: 82 ML/MIN/1.73M2 — SIGNIFICANT CHANGE UP
EOSINOPHIL # BLD AUTO: 0 K/UL — SIGNIFICANT CHANGE UP (ref 0–0.5)
EOSINOPHIL NFR BLD AUTO: 0 % — SIGNIFICANT CHANGE UP (ref 0–6)
FIBRINOGEN PPP-MCNC: 218 MG/DL — SIGNIFICANT CHANGE UP (ref 200–445)
GAS PNL BLDA: SIGNIFICANT CHANGE UP
GLUCOSE BLDC GLUCOMTR-MCNC: 120 MG/DL — HIGH (ref 70–99)
GLUCOSE BLDC GLUCOMTR-MCNC: 162 MG/DL — HIGH (ref 70–99)
GLUCOSE BLDC GLUCOMTR-MCNC: 163 MG/DL — HIGH (ref 70–99)
GLUCOSE BLDC GLUCOMTR-MCNC: 182 MG/DL — HIGH (ref 70–99)
GLUCOSE BLDC GLUCOMTR-MCNC: 183 MG/DL — HIGH (ref 70–99)
GLUCOSE BLDC GLUCOMTR-MCNC: 189 MG/DL — HIGH (ref 70–99)
GLUCOSE BLDC GLUCOMTR-MCNC: 199 MG/DL — HIGH (ref 70–99)
GLUCOSE BLDC GLUCOMTR-MCNC: 207 MG/DL — HIGH (ref 70–99)
GLUCOSE SERPL-MCNC: 175 MG/DL — HIGH (ref 70–99)
HCT VFR BLD CALC: 35.2 % — LOW (ref 39–50)
HGB BLD-MCNC: 11.8 G/DL — LOW (ref 13–17)
IMM GRANULOCYTES NFR BLD AUTO: 0.3 % — SIGNIFICANT CHANGE UP (ref 0–0.9)
INR BLD: 1.12 RATIO — SIGNIFICANT CHANGE UP (ref 0.85–1.18)
INR BLD: 1.28 RATIO — HIGH (ref 0.85–1.18)
INR BLD: 1.34 RATIO — HIGH (ref 0.85–1.18)
LYMPHOCYTES # BLD AUTO: 2.1 K/UL — SIGNIFICANT CHANGE UP (ref 1–3.3)
LYMPHOCYTES # BLD AUTO: 24.2 % — SIGNIFICANT CHANGE UP (ref 13–44)
MAGNESIUM SERPL-MCNC: 2.2 MG/DL — SIGNIFICANT CHANGE UP (ref 1.6–2.6)
MCHC RBC-ENTMCNC: 30.7 PG — SIGNIFICANT CHANGE UP (ref 27–34)
MCHC RBC-ENTMCNC: 33.5 GM/DL — SIGNIFICANT CHANGE UP (ref 32–36)
MCV RBC AUTO: 91.7 FL — SIGNIFICANT CHANGE UP (ref 80–100)
MONOCYTES # BLD AUTO: 0.67 K/UL — SIGNIFICANT CHANGE UP (ref 0–0.9)
MONOCYTES NFR BLD AUTO: 7.7 % — SIGNIFICANT CHANGE UP (ref 2–14)
NEUTROPHILS # BLD AUTO: 5.87 K/UL — SIGNIFICANT CHANGE UP (ref 1.8–7.4)
NEUTROPHILS NFR BLD AUTO: 67.6 % — SIGNIFICANT CHANGE UP (ref 43–77)
NRBC # BLD: 0 /100 WBCS — SIGNIFICANT CHANGE UP (ref 0–0)
PHOSPHATE SERPL-MCNC: 4.4 MG/DL — SIGNIFICANT CHANGE UP (ref 2.5–4.5)
PLATELET # BLD AUTO: 106 K/UL — LOW (ref 150–400)
POTASSIUM SERPL-MCNC: 4.5 MMOL/L — SIGNIFICANT CHANGE UP (ref 3.5–5.3)
POTASSIUM SERPL-SCNC: 4.5 MMOL/L — SIGNIFICANT CHANGE UP (ref 3.5–5.3)
PROT SERPL-MCNC: 4.8 G/DL — LOW (ref 6–8.3)
PROTHROM AB SERPL-ACNC: 12.3 SEC — SIGNIFICANT CHANGE UP (ref 9.5–13)
PROTHROM AB SERPL-ACNC: 13.3 SEC — HIGH (ref 9.5–13)
PROTHROM AB SERPL-ACNC: 14 SEC — HIGH (ref 9.5–13)
RBC # BLD: 3.84 M/UL — LOW (ref 4.2–5.8)
RBC # FLD: 14.2 % — SIGNIFICANT CHANGE UP (ref 10.3–14.5)
RH IG SCN BLD-IMP: POSITIVE — SIGNIFICANT CHANGE UP
SODIUM SERPL-SCNC: 141 MMOL/L — SIGNIFICANT CHANGE UP (ref 135–145)
WBC # BLD: 8.69 K/UL — SIGNIFICANT CHANGE UP (ref 3.8–10.5)
WBC # FLD AUTO: 8.69 K/UL — SIGNIFICANT CHANGE UP (ref 3.8–10.5)

## 2024-07-25 PROCEDURE — 71045 X-RAY EXAM CHEST 1 VIEW: CPT | Mod: 26

## 2024-07-25 PROCEDURE — 99291 CRITICAL CARE FIRST HOUR: CPT

## 2024-07-25 PROCEDURE — 93306 TTE W/DOPPLER COMPLETE: CPT | Mod: 26

## 2024-07-25 PROCEDURE — 99292 CRITICAL CARE ADDL 30 MIN: CPT

## 2024-07-25 RX ORDER — CLOPIDOGREL BISULFATE 75 MG/1
75 TABLET, FILM COATED ORAL DAILY
Refills: 0 | Status: DISCONTINUED | OUTPATIENT
Start: 2024-07-25 | End: 2024-07-31

## 2024-07-25 RX ORDER — INSULIN LISPRO 100/ML
VIAL (ML) SUBCUTANEOUS
Refills: 0 | Status: DISCONTINUED | OUTPATIENT
Start: 2024-07-25 | End: 2024-07-31

## 2024-07-25 RX ORDER — METOPROLOL TARTRATE 100 MG
12.5 TABLET ORAL EVERY 8 HOURS
Refills: 0 | Status: DISCONTINUED | OUTPATIENT
Start: 2024-07-25 | End: 2024-07-26

## 2024-07-25 RX ORDER — BACTERIOSTATIC SODIUM CHLORIDE 0.9 %
1000 VIAL (ML) INJECTION
Refills: 0 | Status: DISCONTINUED | OUTPATIENT
Start: 2024-07-25 | End: 2024-07-26

## 2024-07-25 RX ORDER — INSULIN LISPRO 100/ML
6 VIAL (ML) SUBCUTANEOUS
Refills: 0 | Status: DISCONTINUED | OUTPATIENT
Start: 2024-07-25 | End: 2024-07-26

## 2024-07-25 RX ORDER — MEPERIDINE HCL IN 0.9 % NACL 10 MG/ML
25 PREFILLED PUMP RESERVOIR INTRAVENOUS ONCE
Refills: 0 | Status: DISCONTINUED | OUTPATIENT
Start: 2024-07-25 | End: 2024-07-25

## 2024-07-25 RX ORDER — APIXABAN 5 MG/1
TABLET, FILM COATED ORAL
Refills: 0 | Status: DISCONTINUED | OUTPATIENT
Start: 2024-07-25 | End: 2024-07-25

## 2024-07-25 RX ORDER — APIXABAN 5 MG/1
5 TABLET, FILM COATED ORAL
Refills: 0 | Status: DISCONTINUED | OUTPATIENT
Start: 2024-07-25 | End: 2024-07-25

## 2024-07-25 RX ORDER — CEFAZOLIN SODIUM 10 G
1000 VIAL (EA) INJECTION EVERY 8 HOURS
Refills: 0 | Status: COMPLETED | OUTPATIENT
Start: 2024-07-25 | End: 2024-07-26

## 2024-07-25 RX ORDER — ATORVASTATIN CALCIUM 40 MG/1
80 TABLET, FILM COATED ORAL AT BEDTIME
Refills: 0 | Status: DISCONTINUED | OUTPATIENT
Start: 2024-07-25 | End: 2024-07-31

## 2024-07-25 RX ORDER — INSULIN GLARGINE-YFGN 100 [IU]/ML
18 INJECTION, SOLUTION SUBCUTANEOUS AT BEDTIME
Refills: 0 | Status: DISCONTINUED | OUTPATIENT
Start: 2024-07-25 | End: 2024-07-26

## 2024-07-25 RX ORDER — APIXABAN 5 MG/1
2.5 TABLET, FILM COATED ORAL EVERY 12 HOURS
Refills: 0 | Status: DISCONTINUED | OUTPATIENT
Start: 2024-07-25 | End: 2024-07-25

## 2024-07-25 RX ORDER — APIXABAN 5 MG/1
2.5 TABLET, FILM COATED ORAL EVERY 12 HOURS
Refills: 0 | Status: DISCONTINUED | OUTPATIENT
Start: 2024-07-26 | End: 2024-07-31

## 2024-07-25 RX ORDER — APIXABAN 5 MG/1
2.5 TABLET, FILM COATED ORAL ONCE
Refills: 0 | Status: COMPLETED | OUTPATIENT
Start: 2024-07-25 | End: 2024-07-25

## 2024-07-25 RX ORDER — APIXABAN 5 MG/1
TABLET, FILM COATED ORAL
Refills: 0 | Status: DISCONTINUED | OUTPATIENT
Start: 2024-07-25 | End: 2024-07-31

## 2024-07-25 RX ORDER — ONDANSETRON HCL/PF 4 MG/2 ML
4 VIAL (ML) INJECTION ONCE
Refills: 0 | Status: COMPLETED | OUTPATIENT
Start: 2024-07-25 | End: 2024-07-25

## 2024-07-25 RX ORDER — INSULIN LISPRO 100/ML
VIAL (ML) SUBCUTANEOUS AT BEDTIME
Refills: 0 | Status: DISCONTINUED | OUTPATIENT
Start: 2024-07-25 | End: 2024-07-31

## 2024-07-25 RX ORDER — ASPIRIN 500 MG
81 TABLET ORAL DAILY
Refills: 0 | Status: DISCONTINUED | OUTPATIENT
Start: 2024-07-25 | End: 2024-07-31

## 2024-07-25 RX ORDER — APIXABAN 5 MG/1
5 TABLET, FILM COATED ORAL ONCE
Refills: 0 | Status: DISCONTINUED | OUTPATIENT
Start: 2024-07-25 | End: 2024-07-25

## 2024-07-25 RX ORDER — CHLORHEXIDINE GLUCONATE 500 MG/1
15 CLOTH TOPICAL EVERY 12 HOURS
Refills: 0 | Status: DISCONTINUED | OUTPATIENT
Start: 2024-07-25 | End: 2024-07-31

## 2024-07-25 RX ORDER — PANTOPRAZOLE SODIUM 20 MG/1
40 TABLET, DELAYED RELEASE ORAL
Refills: 0 | Status: DISCONTINUED | OUTPATIENT
Start: 2024-07-25 | End: 2024-07-31

## 2024-07-25 RX ADMIN — Medication 1: at 17:22

## 2024-07-25 RX ADMIN — INSULIN GLARGINE-YFGN 18 UNIT(S): 100 INJECTION, SOLUTION SUBCUTANEOUS at 22:32

## 2024-07-25 RX ADMIN — Medication 12.5 MILLIGRAM(S): at 22:19

## 2024-07-25 RX ADMIN — Medication 6 UNIT(S): at 17:22

## 2024-07-25 RX ADMIN — Medication 100 MILLIGRAM(S): at 14:04

## 2024-07-25 RX ADMIN — Medication 100 MILLIGRAM(S): at 22:19

## 2024-07-25 RX ADMIN — CHLORHEXIDINE GLUCONATE 15 MILLILITER(S): 500 CLOTH TOPICAL at 05:30

## 2024-07-25 RX ADMIN — Medication 81 MILLIGRAM(S): at 08:54

## 2024-07-25 RX ADMIN — Medication 12.5 MILLIGRAM(S): at 11:47

## 2024-07-25 RX ADMIN — Medication 100 MILLIGRAM(S): at 05:30

## 2024-07-25 RX ADMIN — CLOPIDOGREL BISULFATE 75 MILLIGRAM(S): 75 TABLET, FILM COATED ORAL at 08:54

## 2024-07-25 RX ADMIN — Medication 4 MILLIGRAM(S): at 11:47

## 2024-07-25 RX ADMIN — CHLORHEXIDINE GLUCONATE 15 MILLILITER(S): 500 CLOTH TOPICAL at 18:06

## 2024-07-25 RX ADMIN — ATORVASTATIN CALCIUM 80 MILLIGRAM(S): 40 TABLET, FILM COATED ORAL at 22:19

## 2024-07-25 RX ADMIN — APIXABAN 2.5 MILLIGRAM(S): 5 TABLET, FILM COATED ORAL at 15:29

## 2024-07-25 NOTE — PROGRESS NOTE ADULT - SUBJECTIVE AND OBJECTIVE BOX
HPI:  84 year old RHD male presents in PST with his son reports having " easily getting tired recenly ". Pt states for  the past few months, he noticed getting tired easily he becomes more fatigued with his usual activities, so he does everything slower. Pt also states he go upstairs just the last little bit" he "has to relax for 10 seconds and then I go" on with his activity. He lives with his wife and is independent in his ADL's.  Recent echocardiogram by cardiologist,  consistent with severe degeneration/stenosis of the bioprosthetic AVR . Presents in PST prior to scheduled TAVR on 07/25/26.  Denies  CP, dizziness, recent fevers, chills, cough, chest pain or SOB and feels well otherwise, he sleeps with one pillow and has not woken up with SOB or has had no hospitalizations.      PMH/PSH  significant for  HTN, HLD, DM2 , CAD with PCI CABG x3/ AVR( 2011, [facility], He does not have his valve card) and coronary stent x1 ( 5/2019, on Plavix ),  AFib  on Eliquis (last dose 7/21), Left Carotid Endarterectomy on 10/7/21.?   (12 Jul 2024 10:44)    Patient underwent a valve in valve TAVR 7/24 along with a stent to the left main to avoid occlusion of the coronary by the valve. Intraoperatively patient required a right ileofemoral bypass. Subsequently, in the PACU, patient developed a cold left leg. He was found to have an occluded left SFA due to perclose device. He returned to the OR for a resection and vein graft at the SFA. He is now in CTU recovering.    Patient seen and examined at the bedside.    Remained critically ill on continuous ICU monitoring.    OBJECTIVE:  Vital Signs Last 24 Hrs  T(C): 37 (25 Jul 2024 04:00), Max: 37 (25 Jul 2024 04:00)  T(F): 98.6 (25 Jul 2024 04:00), Max: 98.6 (25 Jul 2024 04:00)  HR: 75 (25 Jul 2024 04:00) (62 - 79)  BP: 123/60 (25 Jul 2024 04:00) (81/42 - 148/63)  BP(mean): 86 (25 Jul 2024 04:00) (64 - 86)  RR: 19 (25 Jul 2024 02:00) (14 - 19)  SpO2: 100% (25 Jul 2024 04:00) (95% - 100%)    Parameters below as of 25 Jul 2024 04:00  Patient On (Oxygen Delivery Method): nasal cannula    Physical Exam:   General: Elderly man, breathing comfortably at rest  Neurology: Drowsy, but answers appropriately and moves all extremities to command  Eyes: bilateral pupils equal and reactive   ENT/Neck: Neck supple, trachea midline, No JVD   Respiratory: Clear bilaterally   CV: S1S2, no murmurs        [x] Afib  Abdominal: Soft, NT, ND +BS,   Extremities: No pedal edema noted, wasting of LE, left foot dp pulse by palpation, right foot  pulse by doppler only  Skin: No Rashes, Hematoma, Ecchymosis                           Assessment:    NEURO: Without focal deficit, monitor closely after prolonged anesthesia over the past 24 hours   RESP: Continue close monitoring of respiratory rate, pulse oximetry, breathing pattern and intermittent blood gas analysis with adequate pain control to prevent atelectasis.  CVS: Patient has required volume resuscitation, and IV Norepinephrine and IV Vasopressin infusions for hypovolemic shock with associated lactic acidosis. Ongoing resuscitative efforts and follow up of lactate.  GI: Stress ulcer prophylaxis with protonix.  RENAL: Optimize renal perfusion with adequate volume resuscitation and maintenance of blood pressure with close monitoring of fluid balance, urine output and electrolytes.  ENDOCRINE: Metabolic stability and prevention of infection and stress hyperglycemia/type 2 diabetes with hyperglycemia required an insulin infusion and hourly glucose checks  HEME: Anemia due to acute blood loss, no current plant for transfusion.     HPI:  84 year old RHD male presents in PST with his son reports having " easily getting tired recenly ". Pt states for  the past few months, he noticed getting tired easily he becomes more fatigued with his usual activities, so he does everything slower. Pt also states he go upstairs just the last little bit" he "has to relax for 10 seconds and then I go" on with his activity. He lives with his wife and is independent in his ADL's.  Recent echocardiogram by cardiologist,  consistent with severe degeneration/stenosis of the bioprosthetic AVR . Presents in PST prior to scheduled TAVR on 07/25/26.  Denies  CP, dizziness, recent fevers, chills, cough, chest pain or SOB and feels well otherwise, he sleeps with one pillow and has not woken up with SOB or has had no hospitalizations.      PMH/PSH  significant for  HTN, HLD, DM2 , CAD with PCI CABG x3/ AVR( 2011, [facility], He does not have his valve card) and coronary stent x1 ( 5/2019, on Plavix ),  AFib  on Eliquis (last dose 7/21), Left Carotid Endarterectomy on 10/7/21.?   (12 Jul 2024 10:44)    Patient underwent a valve in valve TAVR 7/24 along with a stent to the left main to avoid occlusion of the coronary by the valve. Intraoperatively patient required a right ileofemoral bypass. Subsequently, in the PACU, patient developed a cold left leg. He was found to have an occluded left SFA due to vessel closure device. He returned to the OR for a resection and vein graft at the SFA. He is now in CTU recovering.    Patient seen and examined at the bedside.    Remained critically ill on continuous ICU monitoring.    OBJECTIVE:  Vital Signs Last 24 Hrs  T(C): 37 (25 Jul 2024 04:00), Max: 37 (25 Jul 2024 04:00)  T(F): 98.6 (25 Jul 2024 04:00), Max: 98.6 (25 Jul 2024 04:00)  HR: 75 (25 Jul 2024 04:00) (62 - 79)  BP: 123/60 (25 Jul 2024 04:00) (81/42 - 148/63)  BP(mean): 86 (25 Jul 2024 04:00) (64 - 86)  RR: 19 (25 Jul 2024 02:00) (14 - 19)  SpO2: 100% (25 Jul 2024 04:00) (95% - 100%)    Parameters below as of 25 Jul 2024 04:00  Patient On (Oxygen Delivery Method): nasal cannula    Physical Exam:   General: Elderly man, breathing comfortably at rest  Neurology: Drowsy, but answers appropriately and moves all extremities to command  Eyes: bilateral pupils equal and reactive   ENT/Neck: Neck supple, trachea midline, No JVD   Respiratory: Clear bilaterally   CV: S1S2, no murmurs        [x] Afib  Abdominal: Soft, NT, ND +BS,   Extremities: No pedal edema noted, wasting of LE, left foot dp pulse by palpation, right foot peroneal pulse by doppler only  Skin: No Rashes, Hematoma, Ecchymosis                           Assessment:    NEURO: Without focal deficit, monitor closely after prolonged anesthesia over the past 24 hours   RESP: Continue close monitoring of respiratory rate, pulse oximetry, breathing pattern and intermittent blood gas analysis. Mobilize to prevent atelectasis  CVS: Patient has required volume resuscitation, and an IV Norepinephrine infusion for hypovolemic shock in the operating room, Norepinephrine now weaned to off. Continue close monitoring of blood pressure and heart rate to prevent decompensation. Rate controlled afib.  GI: Stress ulcer prophylaxis with protonix.  RENAL: Optimize renal perfusion with adequate volume resuscitation and maintenance of blood pressure with close monitoring of fluid balance, urine output and electrolytes.  ENDOCRINE: Metabolic stability and prevention of infection and stress hyperglycemia/type 2 diabetes with hyperglycemia required an insulin infusion and hourly glucose checks  HEME: Anemia due to acute blood loss, patient received 2 units of packed red blood cells in the OR. No heparin for now as per vascular. Will continue ASA and discuss heparin/plavix with the team.    Patient requires continuous monitoring with EKG, arterial line, pulse oximetry, close monitoring of breathing pattern and intermittent blood gas analysis in order to prevent or respond to cardiopulmonary instability or decompensation.    Care plan discussed with ICU care team. I have spent 40 minutes providing non routine post op care for this critically ill patient.

## 2024-07-25 NOTE — PROGRESS NOTE ADULT - ASSESSMENT
Pt is s/p Left SFA bypass with bypass from external iliac artery to SFA bypass (both end-to-end anastamosis) performed with 6mm PTFE. Profunda end-to-side anastamosis onto bypass graft then s/p take back and Left SFA interposition bypass w/ rGSV after failed perclose in TAVR ; POD #0, in CTICU and is doing well.     PLAN:  - Continue to monitor lower extremity vascular exam   - Aquacel to be removed POD5   - Groins soft and dressings intact   - Rest of care per CTICU     Vascular Surgery, [id number]

## 2024-07-25 NOTE — CONSULT NOTE ADULT - ASSESSMENT
84M pmHX HTN, HLD, DM2 , CAD with PCI CABG x3/ AVR( 2011, [facility], He does not have his valve card) and coronary stent x1 ( 5/2019, on Plavix ),  AFib  on Eliquis (last dose 7/21), Left Carotid Endarterectomy on 10/7/21.?  Patient underwent a valve in valve TAVR 7/24 along with a stent to the left main to avoid occlusion of the coronary by the valve. Intraoperatively patient required a right ileofemoral bypass. Subsequently, in the PACU, patient developed a cold left leg. He was found to have an occluded left SFA due to vessel closure device. He returned to the OR for a resection and vein graft at the SFA.     Assessment  DMT2: 84y Male with DM T2 with hyperglycemia, A1C 8.5% , was on oral meds  at home, now on basal bolus insulin with coverage, blood sugars running high, not eating full meals, nausea   s/p TAVR. CTU postop   CAD: on medications, stable, monitored.  HTN: on antihypertensive medications, monitored, asymptomatic.    Discussed plan and management wit Dr Oneil Riggs MD  Cell: [phone]  Office: [phone]               84M pmHX HTN, HLD, DM2 , CAD with PCI CABG x3/ AVR( 2011, [facility], He does not have his valve card) and coronary stent x1 ( 5/2019, on Plavix ),  AFib  on Eliquis (last dose 7/21), Left Carotid Endarterectomy on 10/7/21.?  Patient underwent a valve in valve TAVR 7/24 along with a stent to the left main to avoid occlusion of the coronary by the valve. Intraoperatively patient required a right ileofemoral bypass. Subsequently, in the PACU, patient developed a cold left leg. He was found to have an occluded left SFA due to vessel closure device. He returned to the OR for a resection and vein graft at the SFA.       Assessment  DMT2: 84y Male with DM T2 with hyperglycemia, A1C 8.5% , was on oral meds  at home, now on basal bolus insulin with coverage, blood sugars running high, not eating full meals, nausea   s/p TAVR. CTU postop   CAD: on medications, stable, monitored.  HTN: on antihypertensive medications, monitored, asymptomatic.    Discussed plan and management wit Dr Oneil Riggs MD  Cell: [phone]  Office: [phone]

## 2024-07-25 NOTE — PROGRESS NOTE ADULT - SUBJECTIVE AND OBJECTIVE BOX
GENERAL SURGERY PROGRESS NOTE    Patient: ERIBERTO GOLDSTEIN , 84y ([birth year])Male   MRN: [id number]  Location: [address]  Visit: 07-24-24 Inpatient  Date: 07-25-24 @ 07:28    Subjective: No acute events overnight. Patient resting comfortably in bed, no complaints.    PAST MEDICAL & SURGICAL HISTORY:  3-vessel CAD  s/p 3v CABG 2016, s/p stent 2019      HTN (hypertension)      DM2 (diabetes mellitus, type 2)      Occlusion and stenosis of unspecified carotid artery      HLD (hyperlipidemia)      Chronic atrial fibrillation      S/P CABG x 3  2016      S/P AVR (aortic valve replacement)  bovine 2016      S/P coronary artery stent placement  x1 2019      History of CEA (carotid endarterectomy)          Vitals: T(F): 98.6 (07-25-24 @ 04:00), Max: 98.6 (07-25-24 @ 04:00)  HR: 94 (07-25-24 @ 07:00)  BP: 101/54 (07-25-24 @ 07:00)  RR: 16 (07-25-24 @ 07:00)  SpO2: 98% (07-25-24 @ 07:00)      Diet, Regular  Diet, Clear Liquid  Diet, NPO      07-24-24 @ 07:01  -  07-25-24 @ 07:00  --------------------------------------------------------  IN:    sodium chloride 0.9%: 70 mL  Total IN: 70 mL    OUT:    Indwelling Catheter - Urethral (mL): 685 mL  Total OUT: 685 mL    Total NET: -615 mL    PHYSICAL EXAM  GEN: No acute distress   CV: RRR, no JVD  LUNGS: Respirations unlabored, no use of accessory muscles  ABD: Abdomen soft, ND, NT   GROIN: B/L groins soft. L groin aquacel. R groin gauze clean/dry/intact. Palpable D/P pulses b/l.   EXT: Palpable D/P pulses b/l. Feet are warm. Motor/sensory intact.     MEDICATIONS  (STANDING):  aspirin  chewable 81 milliGRAM(s) Oral daily  ceFAZolin   IVPB 1000 milliGRAM(s) IV Intermittent every 8 hours  cefuroxime  IVPB 1500 milliGRAM(s) IV Intermittent once  chlorhexidine 0.12% Liquid 15 milliLiter(s) Oral Mucosa every 12 hours  clopidogrel Tablet 75 milliGRAM(s) Oral daily  pantoprazole    Tablet 40 milliGRAM(s) Oral before breakfast  sodium chloride 0.9%. 1000 milliLiter(s) (10 mL/Hr) IV Continuous <Continuous>    MEDICATIONS  (PRN):      DVT PROPHYLAXIS: SCDs,   GI PROPHYLAXIS: pantoprazole    Tablet 40 milliGRAM(s) Oral before breakfast    ANTICOAGULATION:   ANTIBIOTICS: ceFAZolin   IVPB 1000 milliGRAM(s)  cefuroxime  IVPB 1500 milliGRAM(s)        LAB/STUDIES:  CAPILLARY BLOOD GLUCOSE  182 (25 Jul 2024 07:00)  199 (25 Jul 2024 06:00)      POCT Blood Glucose.: 182 mg/dL (25 Jul 2024 07:01)  POCT Blood Glucose.: 199 mg/dL (25 Jul 2024 06:15)  POCT Blood Glucose.: 189 mg/dL (25 Jul 2024 01:29)  POCT Blood Glucose.: 244 mg/dL (24 Jul 2024 19:11)  POCT Blood Glucose.: 215 mg/dL (24 Jul 2024 08:11)                          11.8   8.69  )-----------( 106      ( 25 Jul 2024 01:51 )             35.2     07-25    141  |  104  |  22  ----------------------------<  175<H>  4.5   |  22  |  0.93    Ca    9.6      25 Jul 2024 01:52  Phos  4.4     07-25  Mg     2.2     07-25    TPro  4.8<L>  /  Alb  2.7<L>  /  TBili  0.3  /  DBili  x   /  AST  58<H>  /  ALT  24  /  AlkPhos  47  07-25               4.8  | 0.3  | 58       ------------------[47      ( 25 Jul 2024 01:52 )  2.7  | x    | 24          Lipase:x      Amylase:x        PT/INR - ( 25 Jul 2024 03:01 )   PT: 13.3 sec;   INR: 1.28 ratio         PTT - ( 25 Jul 2024 03:01 )  PTT:89.5 sec  Urinalysis Basic - ( 25 Jul 2024 01:52 )    Color: x / Appearance: x / SG: x / pH: x  Gluc: 175 mg/dL / Ketone: x  / Bili: x / Urobili: x   Blood: x / Protein: x / Nitrite: x   Leuk Esterase: x / RBC: x / WBC x   Sq Epi: x / Non Sq Epi: x / Bacteria: x          ABG - ( 25 Jul 2024 01:36 )  pH, Arterial: 7.37  pH, Blood: x     /  pCO2: 41    /  pO2: 138   / HCO3: 24    / Base Excess: -1.5  /  SaO2: 98.8

## 2024-07-25 NOTE — CHART NOTE - NSCHARTNOTEFT_GEN_A_CORE
SURGERY POST OP CHECK    STATUS POST PROCEDURE: Left SFA bypass with bypass from OLIVE to R Femoral artery     SUBJECTIVE: Pt seen and examined at bedside. Patient reports appropriate surgical incisional pain; pain is controlled. Denies fevers/chills, chest pain, dyspnea, nausea, vomiting       --------------------------------------------------------------------------------------------------------------------------------------------------------------------------------------------------------------  OBJECTIVE:  Vital Signs Last 24 Hrs  T(C): 37 (25 Jul 2024 04:00), Max: 37 (25 Jul 2024 04:00)  T(F): 98.6 (25 Jul 2024 04:00), Max: 98.6 (25 Jul 2024 04:00)  HR: 82 (25 Jul 2024 05:00) (62 - 82)  BP: 124/57 (25 Jul 2024 05:00) (81/42 - 148/63)  BP(mean): 82 (25 Jul 2024 05:00) (64 - 86)  RR: 17 (25 Jul 2024 04:00) (14 - 19)  SpO2: 99% (25 Jul 2024 05:00) (95% - 100%)    Parameters below as of 25 Jul 2024 04:00  Patient On (Oxygen Delivery Method): nasal cannula  O2 Flow (L/min): 4    I&O's Summary    24 Jul 2024 07:01  -  25 Jul 2024 05:22  --------------------------------------------------------  IN: 60 mL / OUT: 510 mL / NET: -450 mL        PHYSICAL EXAM:  Constitutional: Well developed, well nourished, NAD  Chest: Symmetric chest rise bilaterally  Abdomen: Soft, nontender, nondistended,   Groin: right femoral site Soft, nontender, no ecchymosis no erythema, no edema covered with gauze and tape, left femoral site with aquacel.   Extremities: palpable left DP and peroneal pulse, palpable right peroneal pulse. right foot warm to touch, soft, normal strength, sensory and motor intact.  ----------------------------------------------------------------------------------------------------------------------------------------------------------------------------------------------------------------  ASSESSMENT: HPI   .   Pt is s/p Left SFA bypass with bypass from OLIVE to R Femoral artery after failed perclose in TAVR ; POD #0, in CTICU and is doing well.     PLAN:  - Diet: Advanced as tolerated   - Analgesia and antiemetics as needed  - Neurovascular checks q1h   - OOB as tolerated  - DVT prophylaxis: SCD  - Monitor overnight  - Dispo: CTICU

## 2024-07-25 NOTE — CONSULT NOTE ADULT - PROBLEM SELECTOR RECOMMENDATION 9
Severe stenosis   Plan for TAVR today
Will start Lantus 18 u at bedtime.  Will start Admelog 6 u before each meal and continue Admelog correction scale coverage. Will continue monitoring FS and FU.

## 2024-07-25 NOTE — BRIEF OPERATIVE NOTE - OPERATION/FINDINGS
Left SFA interposition bypass w/ rGSV  Palpable DP pulse   Q1 neurovascular and compartment checks 
TF Pily TAVR using a 23mm Medtronic Corevalve EVOLUT FX placed in a 19mm Mitroflow surgical valve. Perclose failed in right femoral artery resulting in need for femoral cutdown and peripheral bypass as detailed in vascular surgery op note 
Intraoperative consult for RLE acute limb ischemia 2/2 closure device failure s/p TAVR  Cutdown on right femoral artery  Distal embolectomy with #3 Esperanza catheter  Femoral endarterectomy; however, vessel too thin to patch. Therefore, decision made to do interposition bypass. However, proximal anastomosis would not hold 2/2 to friable and thin walled nature of CFA. Incision extended and external iliac artery to SFA bypass (both end-to-end anastamosis) performed with 6mm PTFE. Profunda end-to-side anastamosis onto bypass graft.  Completion angiogram demonstrating peroneal runoff  Peroneal signal present in RLE at end of case

## 2024-07-25 NOTE — CONSULT NOTE ADULT - PROBLEM SELECTOR RECOMMENDATION 3
Rate controlled   On eliquis
On medications,  no chest pain, stable, monitored and followed up by primary cardiothoracic team/cardiology team.

## 2024-07-25 NOTE — PROGRESS NOTE ADULT - SUBJECTIVE AND OBJECTIVE BOX
Subjective: Patient seen and examined. No new events except as noted.   IN CTICU   s/p LM stent and TF Pily TAVR using a 23mm Medtronic Corevalve EVOLUT FX placed in a 19mm Mitroflow surgical valve. Perclose failed in right femoral artery resulting in need for femoral cutdown and peripheral bypass complicated by R CFA occlusion s/p Left SFA interposition bypass w/ rGSV  While in PACU, found to have no LLE pulses, taken back to OR s/p Bypass   feels ok   adequate UO   was on phone with family throughout the night  family at bedside       REVIEW OF SYSTEMS:    CONSTITUTIONAL:+ weakness, fevers or chills  EYES/ENT: No visual changes;  No vertigo or throat pain   NECK: No pain or stiffness  RESPIRATORY: No cough, wheezing, hemoptysis; No shortness of breath  CARDIOVASCULAR: No chest pain or palpitations  GASTROINTESTINAL: No abdominal or epigastric pain. No nausea, vomiting, or hematemesis; No diarrhea or constipation. No melena or hematochezia.  GENITOURINARY: No dysuria, frequency or hematuria  NEUROLOGICAL: No numbness or weakness  SKIN: No itching, burning, rashes, or lesions   All other review of systems is negative unless indicated above.    MEDICATIONS:  MEDICATIONS  (STANDING):  aspirin  chewable 81 milliGRAM(s) Oral daily  ceFAZolin   IVPB 1000 milliGRAM(s) IV Intermittent every 8 hours  cefuroxime  IVPB 1500 milliGRAM(s) IV Intermittent once  chlorhexidine 0.12% Liquid 15 milliLiter(s) Oral Mucosa every 12 hours  clopidogrel Tablet 75 milliGRAM(s) Oral daily  pantoprazole    Tablet 40 milliGRAM(s) Oral before breakfast  sodium chloride 0.9%. 1000 milliLiter(s) (10 mL/Hr) IV Continuous <Continuous>      PHYSICAL EXAM:  T(C): 37 (07-25-24 @ 04:00), Max: 37 (07-25-24 @ 04:00)  HR: 94 (07-25-24 @ 07:00) (62 - 94)  BP: 101/54 (07-25-24 @ 07:00) (81/42 - 138/62)  RR: 16 (07-25-24 @ 07:00) (14 - 19)  SpO2: 98% (07-25-24 @ 07:00) (95% - 100%)  Wt(kg): --  I&O's Summary    24 Jul 2024 07:01  -  25 Jul 2024 07:00  --------------------------------------------------------  IN: 70 mL / OUT: 685 mL / NET: -615 mL      Height (cm): 152.4 (07-24 @ 19:42)  Weight (kg): 53.5 (07-24 @ 19:42)  BMI (kg/m2): 23 (07-24 @ 19:42)  BSA (m2): 1.49 (07-24 @ 19:42)    Appearance: NAD	  HEENT:  Dry  oral mucosa, PERRL, EOMI	  Lymphatic: No lymphadenopathy , no edema  Cardiovascular: Normal S1 S2, No JVD, No murmurs , Peripheral pulses palpable 2+ bilaterally  Respiratory: Lungs clear to auscultation, normal effort 	  Gastrointestinal:  Soft, Non-tender, + BS	  Skin: No rashes, No ecchymoses, No cyanosis, warm to touch  Musculoskeletal: Normal range of motion, normal strength  Psychiatry:  Mood & affect appropriate  Ext: No edema  Bilateral groins tender ecchymotic   LLE palpable distal pulses   RLE dopplerable distal pulses both feet warm to touch   mildly mottled     LABS:    CARDIAC MARKERS:                                11.8   8.69  )-----------( 106      ( 25 Jul 2024 01:51 )             35.2     07-25    141  |  104  |  22  ----------------------------<  175<H>  4.5   |  22  |  0.93    Ca    9.6      25 Jul 2024 01:52  Phos  4.4     07-25  Mg     2.2     07-25    TPro  4.8<L>  /  Alb  2.7<L>  /  TBili  0.3  /  DBili  x   /  AST  58<H>  /  ALT  24  /  AlkPhos  47  07-25    proBNP:   Lipid Profile:   HgA1c:   TSH:             TELEMETRY: AF	    ECG:  	  RADIOLOGY:   DIAGNOSTIC TESTING:  [ ] Echocardiogram:  [ ]  Catheterization:  [ ] Stress Test:    OTHER:

## 2024-07-25 NOTE — CONSULT NOTE ADULT - PROBLEM SELECTOR RECOMMENDATION 2
S/p surgery, on medications, monitoring, care per primary/surgical team. TAVR
s/p CABG   Cath as above

## 2024-07-25 NOTE — PROGRESS NOTE ADULT - SUBJECTIVE AND OBJECTIVE BOX
CRITICAL CARE ATTENDING - CTICU    MEDICATIONS  (STANDING):  aspirin  chewable 81 milliGRAM(s) Oral daily  ceFAZolin   IVPB 1000 milliGRAM(s) IV Intermittent every 8 hours  cefuroxime  IVPB 1500 milliGRAM(s) IV Intermittent once  chlorhexidine 0.12% Liquid 15 milliLiter(s) Oral Mucosa every 12 hours  clopidogrel Tablet 75 milliGRAM(s) Oral daily  pantoprazole    Tablet 40 milliGRAM(s) Oral before breakfast  sodium chloride 0.9%. 1000 milliLiter(s) (10 mL/Hr) IV Continuous <Continuous>                                    11.8   8.69  )-----------( 106      ( 25 Jul 2024 01:51 )             35.2       07-25    141  |  104  |  22  ----------------------------<  175<H>  4.5   |  22  |  0.93    Ca    9.6      25 Jul 2024 01:52  Phos  4.4     07-25  Mg     2.2     07-25    TPro  4.8<L>  /  Alb  2.7<L>  /  TBili  0.3  /  DBili  x   /  AST  58<H>  /  ALT  24  /  AlkPhos  47  07-25      PT/INR - ( 25 Jul 2024 08:37 )   PT: 12.3 sec;   INR: 1.12 ratio         PTT - ( 25 Jul 2024 08:37 )  PTT:28.9 sec        Daily Height in cm: 152.4 (24 Jul 2024 19:42)    Daily       07-24 @ 07:01 - 07-25 @ 07:00  --------------------------------------------------------  IN: 70 mL / OUT: 685 mL / NET: -615 mL    07-25 @ 07:01 - 07-25 @ 10:02  --------------------------------------------------------  IN: 200 mL / OUT: 100 mL / NET: 100 mL        Critically Ill patient  : [ ] preoperative ,   [x] post operative    Requires :  [x] Arterial Line   [ ] Central Line  [ ] PA catheter  [ ] IABP  [ ] ECMO  [ ] LVAD  [ ] Ventilator  [ ] pacemaker [ ] Impella.                      [x] ABG's     [x] Pulse Oxymetry Monitoring  Bedside evaluation , monitoring , treatment of hemodynamics , fluids , IVP/ IVCD meds.        Diagnosis:     POD 1 - TAVR valve-in-valve +LT main stent, Right ileofemoral bypass, Left SFA resection with vein graft replacement    Requires chest PT, pulmonary toilet, ambu bagging, suctioning to maintain SaO2,  patent airway and treat atelectasis.     Afib    Respiratory insufficiency    Hypoxemia - Requires [ ] BIPAP  [ ] HF O2 [x] NC - 4L    Hypovolemia    Thrombocytopenia    Hypernatremia    Prerenal Azotemia    Renal Failure - Acute Kidney Injury     Requires bedside physical therapy, mobilization and total [facility] care.       I, Uche Medina, personally performed the services described in this documentation. All medical record entries made by the [facility] were at my direction and in my presence. I have reviewed the chart and agree that the record reflects my personal performance and is accurate and complete.   Uche Medina MD.       By signing my name below, I, Tayla Carvalho, attest that this documentation has been prepared under the direction and in the presence of Uche Medina MD.   Electronically Signed: Daisy Loco 07-25-24 @ 10:02        Discussed with CT surgeon, Physician Assistant - Nurse Practitioner- Critical care medicine team.   Dicussed at  AM / PM rounds.   Chart, labs , films reviewed.    Cumulative Critical Care Time Given Today:  CRITICAL CARE ATTENDING - CTICU    MEDICATIONS  (STANDING):  aspirin  chewable 81 milliGRAM(s) Oral daily  ceFAZolin   IVPB 1000 milliGRAM(s) IV Intermittent every 8 hours  cefuroxime  IVPB 1500 milliGRAM(s) IV Intermittent once  chlorhexidine 0.12% Liquid 15 milliLiter(s) Oral Mucosa every 12 hours  clopidogrel Tablet 75 milliGRAM(s) Oral daily  pantoprazole    Tablet 40 milliGRAM(s) Oral before breakfast  sodium chloride 0.9%. 1000 milliLiter(s) (10 mL/Hr) IV Continuous <Continuous>                                    11.8   8.69  )-----------( 106      ( 25 Jul 2024 01:51 )             35.2       07-25    141  |  104  |  22  ----------------------------<  175<H>  4.5   |  22  |  0.93    Ca    9.6      25 Jul 2024 01:52  Phos  4.4     07-25  Mg     2.2     07-25    TPro  4.8<L>  /  Alb  2.7<L>  /  TBili  0.3  /  DBili  x   /  AST  58<H>  /  ALT  24  /  AlkPhos  47  07-25      PT/INR - ( 25 Jul 2024 08:37 )   PT: 12.3 sec;   INR: 1.12 ratio         PTT - ( 25 Jul 2024 08:37 )  PTT:28.9 sec        Daily Height in cm: 152.4 (24 Jul 2024 19:42)    Daily       07-24 @ 07:01 - 07-25 @ 07:00  --------------------------------------------------------  IN: 70 mL / OUT: 685 mL / NET: -615 mL    07-25 @ 07:01 - 07-25 @ 10:02  --------------------------------------------------------  IN: 200 mL / OUT: 100 mL / NET: 100 mL        Critically Ill patient  : [ ] preoperative ,   [x] post operative    Requires :  [x] Arterial Line   [ ] Central Line  [ ] PA catheter  [ ] IABP  [ ] ECMO  [ ] LVAD  [ ] Ventilator  [ ] pacemaker [ ] Impella.                      [x] ABG's     [x] Pulse Oxymetry Monitoring  Bedside evaluation , monitoring , treatment of hemodynamics , fluids , IVP/ IVCD meds.        Diagnosis:     POD 1 - TAVR valve-in-valve +LT main stent, Right ileofemoral bypass, Left SFA resection with vein graft replacement    Hypotension     Hemodynamic lability,  instability. Requires IVCD [ ] vasopressors [ ] inotropes  [ ] vasodilator  [ x]IVSS fluid  to maintain MAP, perfusion, C.I.     Requires chest PT, pulmonary toilet,  suctioning to maintain SaO2,  patent airway and treat atelectasis.     Afib    Respiratory insufficiency    Hypoxemia - Requires [ ] BIPAP  [ ] HF O2 [x] NC - 4L    Hypovolemia    Thrombocytopenia    Requires bedside physical therapy, mobilization and total care home care.       I, Uche Medina, personally performed the services described in this documentation. All medical record entries made by the [facility] were at my direction and in my presence. I have reviewed the chart and agree that the record reflects my personal performance and is accurate and complete.   Uche Medina MD.       By signing my name below, I, Tayla Carvalho, attest that this documentation has been prepared under the direction and in the presence of Uche Medina MD.   Electronically Signed: Daisy Loco 07-25-24 @ 10:02        Discussed with CT surgeon, Physician Assistant - Nurse Practitioner- Critical care medicine team.   Dicussed at  AM / PM rounds.   Chart, labs , films reviewed.    Cumulative Critical Care Time Given Today:  30m

## 2024-07-25 NOTE — PROGRESS NOTE ADULT - PROBLEM SELECTOR PLAN 1
s/p LM stent and TF Pily TAVR using a 23mm Medtronic Corevalve EVOLUT FX [facility] in [facility].  DAPT   statin  Post op TTE

## 2024-07-25 NOTE — CONSULT NOTE ADULT - SUBJECTIVE AND OBJECTIVE BOX
HPI:  84 year old RHD male presents in PST with his son reports having " easily getting tired recenly ". Pt states for  the past few months, he noticed getting tired easily he becomes more fatigued with his usual activities, so he does everything slower. Pt also states he go upstairs just the last little bit" he "has to relax for 10 seconds and then I go" on with his activity. He lives with his wife and is independent in his ADL's.  Recent echocardiogram by cardiologist,  consistent with severe degeneration/stenosis of the bioprosthetic AVR . Presents in PST prior to scheduled TAVR on 07/25/26.  Denies  CP, dizziness, recent fevers, chills, cough, chest pain or SOB and feels well otherwise, he sleeps with one pillow and has not woken up with SOB or has had no hospitalizations.      PMH/PSH  significant for  HTN, HLD, DM2 , CAD with PCI CABG x3/ AVR( 2011, [facility], He does not have his valve card) and coronary stent x1 ( 5/2019, on Plavix ),  AFib  on Eliquis (last dose 7/21), Left Carotid Endarterectomy on 10/7/21.?   (12 Jul 2024 10:44)      Patient has history of diabetes, A1C 8.5 %   Endo was consulted for glycemic control.      PAST MEDICAL & SURGICAL HISTORY:  3-vessel CAD  s/p 3v CABG 2016, s/p stent 2019      HTN (hypertension)      DM2 (diabetes mellitus, type 2)      Occlusion and stenosis of unspecified carotid artery      HLD (hyperlipidemia)      Chronic atrial fibrillation      S/P CABG x 3  2016      S/P AVR (aortic valve replacement)  bovine 2016      S/P coronary artery stent placement  x1 2019      History of CEA (carotid endarterectomy)          FAMILY HISTORY:  FH: diabetes mellitus  mother, siblings    FH: heart disease  father        Social History:            HOME MEDICATIONS:  Home Medications:  atorvastatin 80 mg oral tablet: 1 tab(s) orally once a day (24 Jul 2024 08:24)  Eliquis 5 mg oral tablet: 1 tab(s) orally once a day RESUME ON 7/20/24 in AM (24 Jul 2024 08:24)  glimepiride 2 mg oral tablet: 1 tab(s) orally once a day (24 Jul 2024 08:24)  Jardiance 10 mg oral tablet: 1 tab(s) orally once a day (in the morning) (24 Jul 2024 08:24)  metFORMIN 500 mg oral tablet: 1 tab(s) orally 2 times a day RESUME ON 7/22/24 (24 Jul 2024 08:24)  metoprolol succinate 100 mg oral tablet, extended release: 1 tab(s) orally once a day (24 Jul 2024 08:24)  Plavix 75 mg oral tablet: 1 tab(s) orally once a day (24 Jul 2024 08:24)  ramipril 5 mg oral capsule: 1 cap(s) orally once a day (24 Jul 2024 08:24)            MEDICATIONS  (STANDING):  apixaban      aspirin  chewable 81 milliGRAM(s) Oral daily  atorvastatin 80 milliGRAM(s) Oral at bedtime  ceFAZolin   IVPB 1000 milliGRAM(s) IV Intermittent every 8 hours  cefuroxime  IVPB 1500 milliGRAM(s) IV Intermittent once  chlorhexidine 0.12% Liquid 15 milliLiter(s) Oral Mucosa every 12 hours  clopidogrel Tablet 75 milliGRAM(s) Oral daily  insulin glargine Injectable (LANTUS) 18 Unit(s) SubCutaneous at bedtime  insulin lispro (ADMELOG) corrective regimen sliding scale   SubCutaneous three times a day before meals  insulin lispro (ADMELOG) corrective regimen sliding scale   SubCutaneous at bedtime  insulin lispro Injectable (ADMELOG) 6 Unit(s) SubCutaneous three times a day before meals  metoprolol tartrate 12.5 milliGRAM(s) Oral every 8 hours  pantoprazole    Tablet 40 milliGRAM(s) Oral before breakfast  sodium chloride 0.9%. 1000 milliLiter(s) (10 mL/Hr) IV Continuous <Continuous>    MEDICATIONS  (PRN):      Allergies    No Known Allergies    Intolerances        Review of Systems:  Neuro: No HA, no dizziness  Cardiovascular: No chest pain, no palpitations  Respiratory: no SOB, no cough  GI: No nausea, vomiting, abdominal pain  MSK: Denies joint/muscle pain      ALL OTHER SYSTEMS REVIEWED AND NEGATIVE        PHYSICAL EXAM:  VITALS: T(C): 37.5 (07-25-24 @ 12:00)  T(F): 99.5 (07-25-24 @ 12:00), Max: 99.5 (07-25-24 @ 12:00)  HR: 86 (07-25-24 @ 14:00) (62 - 95)  BP: 121/55 (07-25-24 @ 14:00) (81/42 - 138/62)  RR:  (14 - 19)  SpO2:  (95% - 100%)  Wt(kg): --  GENERAL: NAD, well-groomed, well-developed  NEURO:  alert and oriented  RESPIRATORY: Clear to auscultation bilaterally; No rales, rhonchi, wheezing  CARDIOVASCULAR: Si S2  GI: Soft, non distended, normal bowel sounds  MUSCULOSKELETAL: Moves all extremities equally       POCT Blood Glucose.: 163 mg/dL (07-25-24 @ 11:46)  POCT Blood Glucose.: 120 mg/dL (07-25-24 @ 09:42)  POCT Blood Glucose.: 162 mg/dL (07-25-24 @ 07:58)  POCT Blood Glucose.: 182 mg/dL (07-25-24 @ 07:01)  POCT Blood Glucose.: 199 mg/dL (07-25-24 @ 06:15)  POCT Blood Glucose.: 189 mg/dL (07-25-24 @ 01:29)  POCT Blood Glucose.: 244 mg/dL (07-24-24 @ 19:11)  POCT Blood Glucose.: 215 mg/dL (07-24-24 @ 08:11)                            11.8   8.69  )-----------( 106 ( 25 Jul 2024 01:51 )             35.2       07-25    141  |  104  |  22  ----------------------------<  175<H>  4.5   |  22  |  0.93    eGFR: 82    Ca    9.6      07-25  Mg     2.2     07-25  Phos  4.4     07-25    TPro  4.8<L>  /  Alb  2.7<L>  /  TBili  0.3  /  DBili  x   /  AST  58<H>  /  ALT  24  /  AlkPhos  47  07-25      Thyroid Function Tests:    Diet, Regular:   Consistent Carbohydrate No Snacks (CSTCHO)  Kosher (07-25-24 @ 10:23) [Active]  Diet, Clear Liquid (07-25-24 @ 01:36) [Available for Activation]          A1C with Estimated Average Glucose Result: 8.5 % (07-12-24 @ 12:41)                     HPI:  84 year old RHD male presents in PST with his son reports having " easily getting tired recenly ". Pt states for  the past few months, he noticed getting tired easily he becomes more fatigued with his usual activities, so he does everything slower. Pt also states he go upstairs just the last little bit" he "has to relax for 10 seconds and then I go" on with his activity. He lives with his wife and is independent in his ADL's.  Recent echocardiogram by cardiologist,  consistent with severe degeneration/stenosis of the bioprosthetic AVR . Presents in PST prior to scheduled TAVR on 07/25/26.  Denies  CP, dizziness, recent fevers, chills, cough, chest pain or SOB and feels well otherwise, he sleeps with one pillow and has not woken up with SOB or has had no hospitalizations.      PMH/PSH  significant for  HTN, HLD, DM2 , CAD with PCI CABG x3/ AVR( 2011, [facility], He does not have his valve card) and coronary stent x1 ( 5/2019, on Plavix ),  AFib  on Eliquis (last dose 7/21), Left Carotid Endarterectomy on 10/7/21.?   (12 Jul 2024 10:44)        Patient has history of diabetes, A1C 8.5 %   Endo was consulted for glycemic control.      PAST MEDICAL & SURGICAL HISTORY:  3-vessel CAD  s/p 3v CABG 2016, s/p stent 2019      HTN (hypertension)      DM2 (diabetes mellitus, type 2)      Occlusion and stenosis of unspecified carotid artery      HLD (hyperlipidemia)      Chronic atrial fibrillation      S/P CABG x 3  2016      S/P AVR (aortic valve replacement)  bovine 2016      S/P coronary artery stent placement  x1 2019      History of CEA (carotid endarterectomy)          FAMILY HISTORY:  FH: diabetes mellitus  mother, siblings    FH: heart disease  father        Social History:            HOME MEDICATIONS:  Home Medications:  atorvastatin 80 mg oral tablet: 1 tab(s) orally once a day (24 Jul 2024 08:24)  Eliquis 5 mg oral tablet: 1 tab(s) orally once a day RESUME ON 7/20/24 in AM (24 Jul 2024 08:24)  glimepiride 2 mg oral tablet: 1 tab(s) orally once a day (24 Jul 2024 08:24)  Jardiance 10 mg oral tablet: 1 tab(s) orally once a day (in the morning) (24 Jul 2024 08:24)  metFORMIN 500 mg oral tablet: 1 tab(s) orally 2 times a day RESUME ON 7/22/24 (24 Jul 2024 08:24)  metoprolol succinate 100 mg oral tablet, extended release: 1 tab(s) orally once a day (24 Jul 2024 08:24)  Plavix 75 mg oral tablet: 1 tab(s) orally once a day (24 Jul 2024 08:24)  ramipril 5 mg oral capsule: 1 cap(s) orally once a day (24 Jul 2024 08:24)            MEDICATIONS  (STANDING):  apixaban      aspirin  chewable 81 milliGRAM(s) Oral daily  atorvastatin 80 milliGRAM(s) Oral at bedtime  ceFAZolin   IVPB 1000 milliGRAM(s) IV Intermittent every 8 hours  cefuroxime  IVPB 1500 milliGRAM(s) IV Intermittent once  chlorhexidine 0.12% Liquid 15 milliLiter(s) Oral Mucosa every 12 hours  clopidogrel Tablet 75 milliGRAM(s) Oral daily  insulin glargine Injectable (LANTUS) 18 Unit(s) SubCutaneous at bedtime  insulin lispro (ADMELOG) corrective regimen sliding scale   SubCutaneous three times a day before meals  insulin lispro (ADMELOG) corrective regimen sliding scale   SubCutaneous at bedtime  insulin lispro Injectable (ADMELOG) 6 Unit(s) SubCutaneous three times a day before meals  metoprolol tartrate 12.5 milliGRAM(s) Oral every 8 hours  pantoprazole    Tablet 40 milliGRAM(s) Oral before breakfast  sodium chloride 0.9%. 1000 milliLiter(s) (10 mL/Hr) IV Continuous <Continuous>    MEDICATIONS  (PRN):      Allergies    No Known Allergies    Intolerances        Review of Systems:  Neuro: No HA, no dizziness  Cardiovascular: No chest pain, no palpitations  Respiratory: no SOB, no cough  GI: No nausea, vomiting, abdominal pain  MSK: Denies joint/muscle pain      ALL OTHER SYSTEMS REVIEWED AND NEGATIVE        PHYSICAL EXAM:  VITALS: T(C): 37.5 (07-25-24 @ 12:00)  T(F): 99.5 (07-25-24 @ 12:00), Max: 99.5 (07-25-24 @ 12:00)  HR: 86 (07-25-24 @ 14:00) (62 - 95)  BP: 121/55 (07-25-24 @ 14:00) (81/42 - 138/62)  RR:  (14 - 19)  SpO2:  (95% - 100%)  Wt(kg): --  GENERAL: NAD, well-groomed, well-developed  NEURO:  alert and oriented  RESPIRATORY: Clear to auscultation bilaterally; No rales, rhonchi, wheezing  CARDIOVASCULAR: Si S2  GI: Soft, non distended, normal bowel sounds  MUSCULOSKELETAL: Moves all extremities equally       POCT Blood Glucose.: 163 mg/dL (07-25-24 @ 11:46)  POCT Blood Glucose.: 120 mg/dL (07-25-24 @ 09:42)  POCT Blood Glucose.: 162 mg/dL (07-25-24 @ 07:58)  POCT Blood Glucose.: 182 mg/dL (07-25-24 @ 07:01)  POCT Blood Glucose.: 199 mg/dL (07-25-24 @ 06:15)  POCT Blood Glucose.: 189 mg/dL (07-25-24 @ 01:29)  POCT Blood Glucose.: 244 mg/dL (07-24-24 @ 19:11)  POCT Blood Glucose.: 215 mg/dL (07-24-24 @ 08:11)                            11.8   8.69  )-----------( 106 ( 25 Jul 2024 01:51 )             35.2       07-25    141  |  104  |  22  ----------------------------<  175<H>  4.5   |  22  |  0.93    eGFR: 82    Ca    9.6      07-25  Mg     2.2     07-25  Phos  4.4     07-25    TPro  4.8<L>  /  Alb  2.7<L>  /  TBili  0.3  /  DBili  x   /  AST  58<H>  /  ALT  24  /  AlkPhos  47  07-25      Thyroid Function Tests:    Diet, Regular:   Consistent Carbohydrate No Snacks (CSTCHO)  Kosher (07-25-24 @ 10:23) [Active]  Diet, Clear Liquid (07-25-24 @ 01:36) [Available for Activation]          A1C with Estimated Average Glucose Result: 8.5 % (07-12-24 @ 12:41)

## 2024-07-26 DIAGNOSIS — R33.9 RETENTION OF URINE, UNSPECIFIED: ICD-10-CM

## 2024-07-26 DIAGNOSIS — Z95.3 PRESENCE OF XENOGENIC HEART VALVE: ICD-10-CM

## 2024-07-26 LAB
ALBUMIN SERPL ELPH-MCNC: 3 G/DL — LOW (ref 3.3–5)
ALP SERPL-CCNC: 46 U/L — SIGNIFICANT CHANGE UP (ref 40–120)
ALT FLD-CCNC: 21 U/L — SIGNIFICANT CHANGE UP (ref 10–45)
ANION GAP SERPL CALC-SCNC: 16 MMOL/L — SIGNIFICANT CHANGE UP (ref 5–17)
AST SERPL-CCNC: 78 U/L — HIGH (ref 10–40)
BASOPHILS # BLD AUTO: 0 K/UL — SIGNIFICANT CHANGE UP (ref 0–0.2)
BASOPHILS NFR BLD AUTO: 0 % — SIGNIFICANT CHANGE UP (ref 0–2)
BILIRUB SERPL-MCNC: 0.5 MG/DL — SIGNIFICANT CHANGE UP (ref 0.2–1.2)
BUN SERPL-MCNC: 27 MG/DL — HIGH (ref 7–23)
CALCIUM SERPL-MCNC: 9 MG/DL — SIGNIFICANT CHANGE UP (ref 8.4–10.5)
CHLORIDE SERPL-SCNC: 99 MMOL/L — SIGNIFICANT CHANGE UP (ref 96–108)
CO2 SERPL-SCNC: 25 MMOL/L — SIGNIFICANT CHANGE UP (ref 22–31)
CREAT SERPL-MCNC: 1.13 MG/DL — SIGNIFICANT CHANGE UP (ref 0.5–1.3)
EGFR: 65 ML/MIN/1.73M2 — SIGNIFICANT CHANGE UP
EOSINOPHIL # BLD AUTO: 0 K/UL — SIGNIFICANT CHANGE UP (ref 0–0.5)
EOSINOPHIL NFR BLD AUTO: 0 % — SIGNIFICANT CHANGE UP (ref 0–6)
GLUCOSE BLDC GLUCOMTR-MCNC: 111 MG/DL — HIGH (ref 70–99)
GLUCOSE BLDC GLUCOMTR-MCNC: 112 MG/DL — HIGH (ref 70–99)
GLUCOSE BLDC GLUCOMTR-MCNC: 113 MG/DL — HIGH (ref 70–99)
GLUCOSE BLDC GLUCOMTR-MCNC: 118 MG/DL — HIGH (ref 70–99)
GLUCOSE BLDC GLUCOMTR-MCNC: 144 MG/DL — HIGH (ref 70–99)
GLUCOSE BLDC GLUCOMTR-MCNC: 165 MG/DL — HIGH (ref 70–99)
GLUCOSE BLDC GLUCOMTR-MCNC: 170 MG/DL — HIGH (ref 70–99)
GLUCOSE BLDC GLUCOMTR-MCNC: 196 MG/DL — HIGH (ref 70–99)
GLUCOSE BLDC GLUCOMTR-MCNC: 216 MG/DL — HIGH (ref 70–99)
GLUCOSE BLDC GLUCOMTR-MCNC: 219 MG/DL — HIGH (ref 70–99)
GLUCOSE BLDC GLUCOMTR-MCNC: 88 MG/DL — SIGNIFICANT CHANGE UP (ref 70–99)
GLUCOSE SERPL-MCNC: 223 MG/DL — HIGH (ref 70–99)
HCT VFR BLD CALC: 31.3 % — LOW (ref 39–50)
HGB BLD-MCNC: 10.2 G/DL — LOW (ref 13–17)
LYMPHOCYTES # BLD AUTO: 0.99 K/UL — LOW (ref 1–3.3)
LYMPHOCYTES # BLD AUTO: 7.8 % — LOW (ref 13–44)
MAGNESIUM SERPL-MCNC: 2 MG/DL — SIGNIFICANT CHANGE UP (ref 1.6–2.6)
MANUAL SMEAR VERIFICATION: SIGNIFICANT CHANGE UP
MCHC RBC-ENTMCNC: 30.5 PG — SIGNIFICANT CHANGE UP (ref 27–34)
MCHC RBC-ENTMCNC: 32.6 GM/DL — SIGNIFICANT CHANGE UP (ref 32–36)
MCV RBC AUTO: 93.7 FL — SIGNIFICANT CHANGE UP (ref 80–100)
MONOCYTES # BLD AUTO: 0.56 K/UL — SIGNIFICANT CHANGE UP (ref 0–0.9)
MONOCYTES NFR BLD AUTO: 4.4 % — SIGNIFICANT CHANGE UP (ref 2–14)
MYELOCYTES NFR BLD: 0.9 % — HIGH (ref 0–0)
NEUTROPHILS # BLD AUTO: 10.98 K/UL — HIGH (ref 1.8–7.4)
NEUTROPHILS NFR BLD AUTO: 79.1 % — HIGH (ref 43–77)
NEUTS BAND # BLD: 7.8 % — SIGNIFICANT CHANGE UP (ref 0–8)
PHOSPHATE SERPL-MCNC: 3.9 MG/DL — SIGNIFICANT CHANGE UP (ref 2.5–4.5)
PLAT MORPH BLD: NORMAL — SIGNIFICANT CHANGE UP
PLATELET # BLD AUTO: 112 K/UL — LOW (ref 150–400)
POTASSIUM SERPL-MCNC: 4.3 MMOL/L — SIGNIFICANT CHANGE UP (ref 3.5–5.3)
POTASSIUM SERPL-SCNC: 4.3 MMOL/L — SIGNIFICANT CHANGE UP (ref 3.5–5.3)
PROT SERPL-MCNC: 5.3 G/DL — LOW (ref 6–8.3)
RBC # BLD: 3.34 M/UL — LOW (ref 4.2–5.8)
RBC # FLD: 15 % — HIGH (ref 10.3–14.5)
RBC BLD AUTO: SIGNIFICANT CHANGE UP
SODIUM SERPL-SCNC: 140 MMOL/L — SIGNIFICANT CHANGE UP (ref 135–145)
WBC # BLD: 12.64 K/UL — HIGH (ref 3.8–10.5)
WBC # FLD AUTO: 12.64 K/UL — HIGH (ref 3.8–10.5)

## 2024-07-26 PROCEDURE — 99232 SBSQ HOSP IP/OBS MODERATE 35: CPT

## 2024-07-26 PROCEDURE — 71045 X-RAY EXAM CHEST 1 VIEW: CPT | Mod: 26

## 2024-07-26 RX ORDER — METOPROLOL TARTRATE 100 MG
25 TABLET ORAL EVERY 8 HOURS
Refills: 0 | Status: DISCONTINUED | OUTPATIENT
Start: 2024-07-26 | End: 2024-07-29

## 2024-07-26 RX ORDER — INSULIN LISPRO 100/ML
4 VIAL (ML) SUBCUTANEOUS
Refills: 0 | Status: DISCONTINUED | OUTPATIENT
Start: 2024-07-26 | End: 2024-07-28

## 2024-07-26 RX ORDER — TAMSULOSIN HCL 0.4 MG
0.4 CAPSULE ORAL AT BEDTIME
Refills: 0 | Status: DISCONTINUED | OUTPATIENT
Start: 2024-07-26 | End: 2024-07-31

## 2024-07-26 RX ORDER — INSULIN GLARGINE-YFGN 100 [IU]/ML
14 INJECTION, SOLUTION SUBCUTANEOUS AT BEDTIME
Refills: 0 | Status: DISCONTINUED | OUTPATIENT
Start: 2024-07-26 | End: 2024-07-27

## 2024-07-26 RX ORDER — ONDANSETRON HCL/PF 4 MG/2 ML
4 VIAL (ML) INJECTION ONCE
Refills: 0 | Status: COMPLETED | OUTPATIENT
Start: 2024-07-26 | End: 2024-07-26

## 2024-07-26 RX ORDER — METOPROLOL TARTRATE 100 MG
12.5 TABLET ORAL ONCE
Refills: 0 | Status: COMPLETED | OUTPATIENT
Start: 2024-07-26 | End: 2024-07-26

## 2024-07-26 RX ORDER — METOPROLOL TARTRATE 100 MG
25 TABLET ORAL
Refills: 0 | Status: DISCONTINUED | OUTPATIENT
Start: 2024-07-26 | End: 2024-07-26

## 2024-07-26 RX ORDER — MELATONIN 3 MG
3 TABLET ORAL AT BEDTIME
Refills: 0 | Status: DISCONTINUED | OUTPATIENT
Start: 2024-07-26 | End: 2024-07-31

## 2024-07-26 RX ADMIN — Medication 12.5 MILLIGRAM(S): at 09:41

## 2024-07-26 RX ADMIN — Medication 2: at 17:36

## 2024-07-26 RX ADMIN — Medication 100 MILLIGRAM(S): at 06:24

## 2024-07-26 RX ADMIN — Medication 4 UNIT(S): at 17:36

## 2024-07-26 RX ADMIN — INSULIN GLARGINE-YFGN 14 UNIT(S): 100 INJECTION, SOLUTION SUBCUTANEOUS at 21:42

## 2024-07-26 RX ADMIN — Medication 1: at 11:23

## 2024-07-26 RX ADMIN — ATORVASTATIN CALCIUM 80 MILLIGRAM(S): 40 TABLET, FILM COATED ORAL at 21:42

## 2024-07-26 RX ADMIN — APIXABAN 2.5 MILLIGRAM(S): 5 TABLET, FILM COATED ORAL at 06:24

## 2024-07-26 RX ADMIN — Medication 25 MILLIGRAM(S): at 21:42

## 2024-07-26 RX ADMIN — Medication 3 MILLIGRAM(S): at 00:34

## 2024-07-26 RX ADMIN — Medication 4 UNIT(S): at 11:22

## 2024-07-26 RX ADMIN — Medication 0.4 MILLIGRAM(S): at 21:42

## 2024-07-26 RX ADMIN — Medication 12.5 MILLIGRAM(S): at 06:24

## 2024-07-26 RX ADMIN — Medication 3 MILLIGRAM(S): at 21:42

## 2024-07-26 RX ADMIN — PANTOPRAZOLE SODIUM 40 MILLIGRAM(S): 20 TABLET, DELAYED RELEASE ORAL at 06:24

## 2024-07-26 RX ADMIN — Medication 81 MILLIGRAM(S): at 11:27

## 2024-07-26 RX ADMIN — Medication 100 MILLIGRAM(S): at 13:06

## 2024-07-26 RX ADMIN — APIXABAN 2.5 MILLIGRAM(S): 5 TABLET, FILM COATED ORAL at 17:37

## 2024-07-26 RX ADMIN — CLOPIDOGREL BISULFATE 75 MILLIGRAM(S): 75 TABLET, FILM COATED ORAL at 11:23

## 2024-07-26 RX ADMIN — Medication 25 MILLIGRAM(S): at 13:06

## 2024-07-26 NOTE — PROGRESS NOTE ADULT - ASSESSMENT
Pt is s/p Left SFA bypass with bypass from external iliac artery to SFA bypass (both end-to-end anastamosis) performed with 6mm PTFE. Profunda end-to-side anastamosis onto bypass graft then s/p take back and Left SFA interposition bypass w/ rGSV after failed perclose in TAVR ; Patient in CTICU and is recovering well.     PLAN:  - Continue to monitor lower extremity vascular exam   - Groins soft and dressings intact   - Patient reports RLE pain/weakness while standing/ambulating, will monitor   - Work with physical therapy   - Rest of care per CTICU     Vascular Surgery, [id number]

## 2024-07-26 NOTE — PROGRESS NOTE ADULT - ASSESSMENT
84 year old RHD male presents in PST with his son reports having " easily getting tired recenly ". Pt states for  the past few months, he noticed getting tired easily he becomes more fatigued with his usual activities, so he does everything slower. Pt also states he go upstairs just the last little bit" he "has to relax for 10 seconds and then I go" on with his activity. He lives with his wife and is independent in his ADL's.  Recent echocardiogram by cardiologist,  consistent with severe degeneration/stenosis of the bioprosthetic AVR . Presents in PST prior to scheduled TAVR on 07/25/26.  Denies  CP, dizziness, recent fevers, chills, cough, chest pain or SOB and feels well otherwise, he sleeps with one pillow and has not woken up with SOB or has had no hospitalizations.      PMH/PSH  significant for  HTN, HLD, DM2 , CAD with PCI CABG x3/ AVR( 2011, [facility], He does not have his valve card) and coronary stent x1 ( 5/2019, on Plavix ),  AFib  on Eliquis (last dose 7/21), Left Carotid Endarterectomy on 10/7/21.?    Intraoperative consult for RLE acute limb ischemia 2/2 closure device failure s/p TAVR  Cutdown on right femoral artery  Distal embolectomy with #3 Esperanza catheter  Femoral endarterectomy; however, vessel too thin to patch. Therefore, decision made to do interposition bypass. However, proximal anastomosis would not hold 2/2 to friable and thin walled nature of CFA. Incision extended and external iliac artery to SFA bypass (both end-to-end anastamosis) performed with 6mm PTFE. Profunda end-to-side anastamosis onto bypass graft.  Completion angiogram demonstrating peroneal runoff  Peroneal signal present in RLE at end of case    TF Pily TAVR using a 23mm Medtronic Corevalve EVOLUT FX placed in a 19mm Mitroflow surgical valve. Perclose failed in right femoral artery resulting in need for femoral cutdown and peripheral bypass as detailed in vascular surgery op note    Pily TAVR   Anesthesia  Valve 23 CV  Access TF  Right groin 18FrA sheath - closed with Perclose x 2   Left groin 6FrA sheath removed with Perclose x 1    Left groin 8FrV sheath - manual compression, TVP removed in the lab    During closure the left femoral artery perclose failed. Dr. Wade was called in and performed a cut down with an endarterectomy and an external iliac- SFA bypass (see vascular op note).  In PACU his right foot was noted to be dusky with no palpable pulses and no dopplerable signal. He had normal sensation but could not move his toes.  Vascular surgery made the decision to return to the OR.    Arrived to PACU s/p TAVR under conscious sedation  Patient is alert and oriented and answering questions appropriately  Denies pain or SOB  Left SFA interposition bypass w/ rGSV  Palpable DP pulse     s/p Left SFA bypass with bypass from OLIVE to R Femoral artery after failed perclose in TAVR ; POD #0, in CTICU and is doing well.  85 yo male with PMH/PSHx significant for  HTN, HLD, DM2 , CAD with PCI CABG x3/ AVR ( 2011, [facility], He does not have his valve card) and coronary stent x1 ( 5/2019, on Plavix ), AFib on Eliquis (last dose 7/21), Left Carotid Endarterectomy on 10/7/21. Presents in PST prior to scheduled TAVR on 07/24/26.  Denies CP, dizziness, recent fevers, chills, cough, chest pain or SOB and feels well otherwise, he sleeps with one pillow and has not woken up with SOB or has had no hospitalizations.      Hospital Course:   7/24/24 s/p TF Pily TAVR using a 23mm Medtronic Corevalve EVOLUT FX placed in a 19mm Mitroflow surgical valve with a stent to the left main to avoid occlusion of the coronary by the valve. Right groin 18FrA sheath - closed with Perclose x 2. Left groin 6FrA sheath removed with Perclose x 1. Left groin 8FrV sheath - manual compression, TVP removed in the lab.   Intra op complicated by perclose failed in right femoral artery resulting in need for femoral cutdown and underwent peripheral bypass with Vascular.   7/24 Return to OR for s/p Cutdown on right femoral artery. Distal embolectomy with #3 Esperanza catheter. Femoral endarterectomy; however, vessel too thin to patch. Therefore, decision made to do interposition bypass. However, proximal anastomosis would not hold 2/2 to friable and thin walled nature of CFA. Incision extended and external iliac artery to SFA bypass (both end-to-end anastamosis) performed with 6mm PTFE. Profunda end-to-side anastamosis onto bypass graft. Completion angiogram demonstrating peroneal runoff. Peroneal signal present in RLE at end of case.   In PACU Left foot was noted to be dusky with no palpable pulses and no dopplerable signal. He had normal sensation but could not move his toes. Vascular surgery made the decision to return to the OR   7/25 s/p Left SFA interposition bypass w/ rGSV.   Post op Course:   Transferred to CTU for post op recovery. Post op TTE revealed: (07.25.24 @ 07:30) A 23 mm TAVR (valve-in-valve) is present in the aortic position. The prosthetic valve is well seated with normal function. No intravalvular regurgitation No paravalvular regurgitation. No pericardial effusion seen.  Stress Hyperglycemia / h/o DM2 --> Endocrine following   7/26 VSS; Greenberg D/C'd --> failed TOV (+) urinary retention.  Greenberg reinserted. Started on Flomax 0.4 mg PO HS.  Transferred to [facility] Telemetry floor.  Structural Team following. Daily EKG.  Daily PT. Started on Eliquis 2.5 mg PO BID for chronic Afib.     Disposition: Home once medically cleared.

## 2024-07-26 NOTE — PROGRESS NOTE ADULT - SUBJECTIVE AND OBJECTIVE BOX
Chief complaint  Patient is a 84y old  Male who presents with a chief complaint of stenosis of the bioprosthetic AVR (12 Jul 2024 10:44)         Labs and Fingersticks  CAPILLARY BLOOD GLUCOSE  165 (26 Jul 2024 11:00)  113 (26 Jul 2024 09:00)  88 (26 Jul 2024 08:00)  112 (26 Jul 2024 07:00)  111 (26 Jul 2024 05:00)  118 (26 Jul 2024 04:00)  144 (26 Jul 2024 03:00)  170 (26 Jul 2024 02:00)  219 (26 Jul 2024 01:00)  207 (25 Jul 2024 22:00)      POCT Blood Glucose.: 165 mg/dL (26 Jul 2024 11:19)  POCT Blood Glucose.: 113 mg/dL (26 Jul 2024 09:11)  POCT Blood Glucose.: 88 mg/dL (26 Jul 2024 08:11)  POCT Blood Glucose.: 112 mg/dL (26 Jul 2024 07:10)  POCT Blood Glucose.: 111 mg/dL (26 Jul 2024 05:11)  POCT Blood Glucose.: 118 mg/dL (26 Jul 2024 04:03)  POCT Blood Glucose.: 144 mg/dL (26 Jul 2024 03:16)  POCT Blood Glucose.: 170 mg/dL (26 Jul 2024 02:11)  POCT Blood Glucose.: 219 mg/dL (26 Jul 2024 00:55)  POCT Blood Glucose.: 207 mg/dL (25 Jul 2024 22:25)  POCT Blood Glucose.: 183 mg/dL (25 Jul 2024 17:15)      Anion Gap: 16 (07-26 @ 01:22)  Anion Gap: 15 (07-25 @ 01:52)  Anion Gap: 24 *H* (07-24 @ 20:37)      Calcium: 9.0 (07-26 @ 01:22)  Calcium: 9.6 (07-25 @ 01:52)  Calcium: 7.9 *L* (07-24 @ 20:37)  Albumin: 3.0 *L* (07-26 @ 01:22)  Albumin: 2.7 *L* (07-25 @ 01:52)    Alanine Aminotransferase (ALT/SGPT): 21 (07-26 @ 01:22)  Alanine Aminotransferase (ALT/SGPT): 24 (07-25 @ 01:52)  Alkaline Phosphatase: 46 (07-26 @ 01:22)  Alkaline Phosphatase: 47 (07-25 @ 01:52)  Aspartate Aminotransferase (AST/SGOT): 78 *H* (07-26 @ 01:22)  Aspartate Aminotransferase (AST/SGOT): 58 *H* (07-25 @ 01:52)        07-26    140  |  99  |  27<H>  ----------------------------<  223<H>  4.3   |  25  |  1.13    Ca    9.0      26 Jul 2024 01:22  Phos  3.9     07-26  Mg     2.0     07-26    TPro  5.3<L>  /  Alb  3.0<L>  /  TBili  0.5  /  DBili  x   /  AST  78<H>  /  ALT  21  /  AlkPhos  46  07-26                        10.2   12.64 )-----------( 112      ( 26 Jul 2024 01:22 )             31.3     Medications  MEDICATIONS  (STANDING):  apixaban 2.5 milliGRAM(s) Oral every 12 hours  apixaban      aspirin  chewable 81 milliGRAM(s) Oral daily  atorvastatin 80 milliGRAM(s) Oral at bedtime  chlorhexidine 0.12% Liquid 15 milliLiter(s) Oral Mucosa every 12 hours  clopidogrel Tablet 75 milliGRAM(s) Oral daily  insulin glargine Injectable (LANTUS) 14 Unit(s) SubCutaneous at bedtime  insulin lispro (ADMELOG) corrective regimen sliding scale   SubCutaneous three times a day before meals  insulin lispro (ADMELOG) corrective regimen sliding scale   SubCutaneous at bedtime  insulin lispro Injectable (ADMELOG) 4 Unit(s) SubCutaneous three times a day before meals  melatonin 3 milliGRAM(s) Oral at bedtime  metoprolol tartrate 25 milliGRAM(s) Oral every 8 hours  pantoprazole    Tablet 40 milliGRAM(s) Oral before breakfast  tamsulosin 0.4 milliGRAM(s) Oral at bedtime      Physical Exam  General: Patient comfortable in bed   Vital Signs Last 12 Hrs  T(F): 98.7 (07-26-24 @ 13:00), Max: 99 (07-26-24 @ 12:00)  HR: 93 (07-26-24 @ 13:00) (81 - 103)  BP: 124/65 (07-26-24 @ 13:00) (107/50 - 145/65)  BP(mean): 94 (07-26-24 @ 12:00) (72 - 95)  RR: 16 (07-26-24 @ 13:00) (16 - 19)  SpO2: 96% (07-26-24 @ 13:00) (93% - 100%)    CVS: S1S2   Respiratory: No wheezing, no crepitations  GI: Abdomen soft, bowel sounds positive  Musculoskeletal:  moves all extremities  : Voiding                 Chief complaint  Patient is a 84y old  Male who presents with a chief complaint of stenosis of the bioprosthetic AVR (12 Jul 2024 10:44)       Labs and Fingersticks  CAPILLARY BLOOD GLUCOSE  165 (26 Jul 2024 11:00)  113 (26 Jul 2024 09:00)  88 (26 Jul 2024 08:00)  112 (26 Jul 2024 07:00)  111 (26 Jul 2024 05:00)  118 (26 Jul 2024 04:00)  144 (26 Jul 2024 03:00)  170 (26 Jul 2024 02:00)  219 (26 Jul 2024 01:00)  207 (25 Jul 2024 22:00)      POCT Blood Glucose.: 165 mg/dL (26 Jul 2024 11:19)  POCT Blood Glucose.: 113 mg/dL (26 Jul 2024 09:11)  POCT Blood Glucose.: 88 mg/dL (26 Jul 2024 08:11)  POCT Blood Glucose.: 112 mg/dL (26 Jul 2024 07:10)  POCT Blood Glucose.: 111 mg/dL (26 Jul 2024 05:11)  POCT Blood Glucose.: 118 mg/dL (26 Jul 2024 04:03)  POCT Blood Glucose.: 144 mg/dL (26 Jul 2024 03:16)  POCT Blood Glucose.: 170 mg/dL (26 Jul 2024 02:11)  POCT Blood Glucose.: 219 mg/dL (26 Jul 2024 00:55)  POCT Blood Glucose.: 207 mg/dL (25 Jul 2024 22:25)  POCT Blood Glucose.: 183 mg/dL (25 Jul 2024 17:15)      Anion Gap: 16 (07-26 @ 01:22)  Anion Gap: 15 (07-25 @ 01:52)  Anion Gap: 24 *H* (07-24 @ 20:37)      Calcium: 9.0 (07-26 @ 01:22)  Calcium: 9.6 (07-25 @ 01:52)  Calcium: 7.9 *L* (07-24 @ 20:37)  Albumin: 3.0 *L* (07-26 @ 01:22)  Albumin: 2.7 *L* (07-25 @ 01:52)    Alanine Aminotransferase (ALT/SGPT): 21 (07-26 @ 01:22)  Alanine Aminotransferase (ALT/SGPT): 24 (07-25 @ 01:52)  Alkaline Phosphatase: 46 (07-26 @ 01:22)  Alkaline Phosphatase: 47 (07-25 @ 01:52)  Aspartate Aminotransferase (AST/SGOT): 78 *H* (07-26 @ 01:22)  Aspartate Aminotransferase (AST/SGOT): 58 *H* (07-25 @ 01:52)        07-26    140  |  99  |  27<H>  ----------------------------<  223<H>  4.3   |  25  |  1.13    Ca    9.0      26 Jul 2024 01:22  Phos  3.9     07-26  Mg     2.0     07-26    TPro  5.3<L>  /  Alb  3.0<L>  /  TBili  0.5  /  DBili  x   /  AST  78<H>  /  ALT  21  /  AlkPhos  46  07-26                        10.2   12.64 )-----------( 112      ( 26 Jul 2024 01:22 )             31.3     Medications  MEDICATIONS  (STANDING):  apixaban 2.5 milliGRAM(s) Oral every 12 hours  apixaban      aspirin  chewable 81 milliGRAM(s) Oral daily  atorvastatin 80 milliGRAM(s) Oral at bedtime  chlorhexidine 0.12% Liquid 15 milliLiter(s) Oral Mucosa every 12 hours  clopidogrel Tablet 75 milliGRAM(s) Oral daily  insulin glargine Injectable (LANTUS) 14 Unit(s) SubCutaneous at bedtime  insulin lispro (ADMELOG) corrective regimen sliding scale   SubCutaneous three times a day before meals  insulin lispro (ADMELOG) corrective regimen sliding scale   SubCutaneous at bedtime  insulin lispro Injectable (ADMELOG) 4 Unit(s) SubCutaneous three times a day before meals  melatonin 3 milliGRAM(s) Oral at bedtime  metoprolol tartrate 25 milliGRAM(s) Oral every 8 hours  pantoprazole    Tablet 40 milliGRAM(s) Oral before breakfast  tamsulosin 0.4 milliGRAM(s) Oral at bedtime      Physical Exam  General: Patient comfortable in bed   Vital Signs Last 12 Hrs  T(F): 98.7 (07-26-24 @ 13:00), Max: 99 (07-26-24 @ 12:00)  HR: 93 (07-26-24 @ 13:00) (81 - 103)  BP: 124/65 (07-26-24 @ 13:00) (107/50 - 145/65)  BP(mean): 94 (07-26-24 @ 12:00) (72 - 95)  RR: 16 (07-26-24 @ 13:00) (16 - 19)  SpO2: 96% (07-26-24 @ 13:00) (93% - 100%)    CVS: S1S2   Respiratory: No wheezing, no crepitations  GI: Abdomen soft, bowel sounds positive  Musculoskeletal:  moves all extremities  : Voiding

## 2024-07-26 NOTE — PROGRESS NOTE ADULT - SUBJECTIVE AND OBJECTIVE BOX
GENERAL SURGERY PROGRESS NOTE    Patient: ERIBERTO GOLDSTEIN , 84y ([birth year])Male   MRN: [id number]  Location: [address]  Visit: 07-24-24 Inpatient  Date: 07-26-24 @ 06:18    Subjective: No acute events overnight. Patient resting comfortably in bed, no complaints.     PAST MEDICAL & SURGICAL HISTORY:  3-vessel CAD  s/p 3v CABG 2016, s/p stent 2019      HTN (hypertension)      DM2 (diabetes mellitus, type 2)      Occlusion and stenosis of unspecified carotid artery      HLD (hyperlipidemia)      Chronic atrial fibrillation      S/P CABG x 3  2016      S/P AVR (aortic valve replacement)  bovine 2016      S/P coronary artery stent placement  x1 2019      History of CEA (carotid endarterectomy)          Vitals: T(F): 98.8 (07-26-24 @ 00:00), Max: 99.5 (07-25-24 @ 12:00)  HR: 101 (07-26-24 @ 04:00)  BP: 113/55 (07-26-24 @ 04:00)  RR: 19 (07-26-24 @ 04:00)  SpO2: 97% (07-26-24 @ 04:00)      Diet, Regular:   Consistent Carbohydrate No Snacks (CSTCHO)  Kosher  Diet, Clear Liquid      07-24-24 @ 07:01  -  07-25-24 @ 07:00  --------------------------------------------------------  IN:    sodium chloride 0.9%: 70 mL  Total IN: 70 mL    OUT:    Indwelling Catheter - Urethral (mL): 685 mL  Total OUT: 685 mL    Total NET: -615 mL    PHYSICAL EXAM  GEN: No acute distress   CV: RRR, no JVD  LUNGS: Respirations unlabored, no use of accessory muscles  ABD: Abdomen soft, ND, NT   GROIN: B/L groins soft. B/L groin gauze clean/dry/intact.   EXT: Palpable D/P pulses b/l. R DP & peroneal triphasic doppler signal. Feet are warm. Motor/sensory intact. Weakness in R foot.     MEDICATIONS  (STANDING):  apixaban      apixaban 2.5 milliGRAM(s) Oral every 12 hours  aspirin  chewable 81 milliGRAM(s) Oral daily  atorvastatin 80 milliGRAM(s) Oral at bedtime  ceFAZolin   IVPB 1000 milliGRAM(s) IV Intermittent every 8 hours  chlorhexidine 0.12% Liquid 15 milliLiter(s) Oral Mucosa every 12 hours  clopidogrel Tablet 75 milliGRAM(s) Oral daily  insulin glargine Injectable (LANTUS) 18 Unit(s) SubCutaneous at bedtime  insulin lispro (ADMELOG) corrective regimen sliding scale   SubCutaneous three times a day before meals  insulin lispro (ADMELOG) corrective regimen sliding scale   SubCutaneous at bedtime  insulin lispro Injectable (ADMELOG) 6 Unit(s) SubCutaneous three times a day before meals  melatonin 3 milliGRAM(s) Oral at bedtime  metoprolol tartrate 12.5 milliGRAM(s) Oral every 8 hours  pantoprazole    Tablet 40 milliGRAM(s) Oral before breakfast  sodium chloride 0.9%. 1000 milliLiter(s) (10 mL/Hr) IV Continuous <Continuous>    MEDICATIONS  (PRN):      DVT PROPHYLAXIS: SCDs,   GI PROPHYLAXIS: pantoprazole    Tablet 40 milliGRAM(s) Oral before breakfast    ANTICOAGULATION:   ANTIBIOTICS: ceFAZolin   IVPB 1000 milliGRAM(s)        LAB/STUDIES:  CAPILLARY BLOOD GLUCOSE  118 (26 Jul 2024 04:00)  144 (26 Jul 2024 03:00)  170 (26 Jul 2024 02:00)  219 (26 Jul 2024 01:00)  207 (25 Jul 2024 22:00)  130 (25 Jul 2024 09:00)  162 (25 Jul 2024 08:00)  182 (25 Jul 2024 07:00)      POCT Blood Glucose.: 111 mg/dL (26 Jul 2024 05:11)  POCT Blood Glucose.: 118 mg/dL (26 Jul 2024 04:03)  POCT Blood Glucose.: 144 mg/dL (26 Jul 2024 03:16)  POCT Blood Glucose.: 170 mg/dL (26 Jul 2024 02:11)  POCT Blood Glucose.: 219 mg/dL (26 Jul 2024 00:55)  POCT Blood Glucose.: 207 mg/dL (25 Jul 2024 22:25)  POCT Blood Glucose.: 183 mg/dL (25 Jul 2024 17:15)  POCT Blood Glucose.: 163 mg/dL (25 Jul 2024 11:46)  POCT Blood Glucose.: 120 mg/dL (25 Jul 2024 09:42)  POCT Blood Glucose.: 162 mg/dL (25 Jul 2024 07:58)  POCT Blood Glucose.: 182 mg/dL (25 Jul 2024 07:01)                          10.2   12.64 )-----------( 112      ( 26 Jul 2024 01:22 )             31.3     07-26    140  |  99  |  27<H>  ----------------------------<  223<H>  4.3   |  25  |  1.13    Ca    9.0      26 Jul 2024 01:22  Phos  3.9     07-26  Mg     2.0     07-26    TPro  5.3<L>  /  Alb  3.0<L>  /  TBili  0.5  /  DBili  x   /  AST  78<H>  /  ALT  21  /  AlkPhos  46  07-26               5.3  | 0.5  | 78       ------------------[46      ( 26 Jul 2024 01:22 )  3.0  | x    | 21          Lipase:x      Amylase:x        PT/INR - ( 25 Jul 2024 08:37 )   PT: 12.3 sec;   INR: 1.12 ratio         PTT - ( 25 Jul 2024 08:37 )  PTT:28.9 sec  Urinalysis Basic - ( 26 Jul 2024 01:22 )    Color: x / Appearance: x / SG: x / pH: x  Gluc: 223 mg/dL / Ketone: x  / Bili: x / Urobili: x   Blood: x / Protein: x / Nitrite: x   Leuk Esterase: x / RBC: x / WBC x   Sq Epi: x / Non Sq Epi: x / Bacteria: x          ABG - ( 25 Jul 2024 01:36 )  pH, Arterial: 7.37  pH, Blood: x     /  pCO2: 41    /  pO2: 138   / HCO3: 24    / Base Excess: -1.5  /  SaO2: 98.8

## 2024-07-26 NOTE — PROGRESS NOTE ADULT - PROBLEM SELECTOR PLAN 4
Vascular Surgery following  7/24 s/p Cutdown on right femoral artery.  7/25 s/p Left SFA interposition bypass w/ rGSV. 7/24 s/p Cutdown on right femoral artery.  7/25 s/p Left SFA interposition bypass w/ rGSV.  Vascular Surgery following  Daily PT   Vascular checks q4 hours

## 2024-07-26 NOTE — PROGRESS NOTE ADULT - PROBLEM SELECTOR PLAN 1
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 Vega presents to clinic today for his weekly on-treatment visit.  He is tolerating treatment well so far.  He has no concerns today.  He is still taking all nutrition by mouth.    Nursing ROS:   Nutrition Alteration  Diet Type: Patient's Preference  Skin  Skin Reaction: 1 - Faint erythema or dry desquamation  Skin Progress: Aquaphor     ENT and Mouth Exam  Mucositis - Current: 0 - None   ENT/Mouth Note: S&S rinses     Gastrointestinal  Nausea: 0 - None    Pain Assessment  0-10 Pain Scale: 0  Pain Note: Gabapentin    PEG Tube: No  Electronic Cardiac Implant: No    Objective:   /81   Pulse 74   Wt 105.2 kg (232 lb)   BMI 31.42 kg/m    Gen: Appears well, NAD  HEENT: No mucositis or thrush  Skin: Minimal erythema  Neck: Tenderness elicited with palpation over left level 5 node    Laboratory:  Lab Results   Component Value Date    WBC 6.2 2023    HGB 12.8 (L) 2023    HCT 38.8 (L) 2023    MCV 89 2023     2023     Lab Results   Component Value Date     2023    POTASSIUM 4.3 2023    CHLORIDE 102 2023    CO2 26 2023     (H) 2023     Magnesium   Date Value Ref Range Status   2023 2.3 1.7 - 2.3 mg/dL Final       Treatment-related toxicities (CTCAE v5.0):  Anorexia: Grade 1: Loss of appetite without alteration in eating habits  Fatigue: Grade 1: Fatigue relieved by rest  Nausea: Grade 0: No toxicity  Pain: Grade 1: Mild pain  Dry mouth:  Grade 0: No toxicity  Dysphagia: Grade 1: Symptomatic, able to eat regular diet  Mucositis: Grade 1: Asymptomatic or mild symptoms; intervention not indicated  Dermatitis: Grade 1: Faint erythema or dry desquamation    ED visits/Hospitalizations: None    Missed Treatments: None    Mosaiq chart and setup information reviewed  IGRT images reviewed    Medication Review  Med list reviewed with patient?: Yes  Med list printed and given: Yes    Assessment:    Mr. 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  He has received neoadjuvant chemotherapy and is now undergoing concurrent chemoradiation.  He is tolerating treatment well.    Plan:   1.  Continue treatment as planned  2.  Again discussed placing prophylactic PEG given that his bilateral neck nodes will go to full dose and we anticipate he will be quite symptomatic with odynophagia and mucositis.  We discussed the pros and cons of a prophylactic PEG tube and, should he choose not to have one placed, what options were available later on in treatment.  He will think about it and get back to us with his decision.      Yessica Parada  Department of Radiation Oncology  [facility] [place]              Structural Team following   Continue with ASA 81 mg PO Daily and Plavix 75 mg PO daily   Continue with Lopressor 25 mg PO every 8 hours   Continue with Lipitor 80 mg PO HS    Increase activity as tolerated.   Encourage Chest PT / Pulmonary toileting and Incentive spirometry every 1 hour x 10 while awake.   Continue with Protonix 40 mg PO daily for PUD prophylaxis.   Daily Shower   D/C plan home once medically cleared   Plan of care discussed with attending Structural Team following   Continue with ASA 81 mg PO Daily and Plavix 75 mg PO daily   Continue with Lopressor 25 mg PO every 8 hours   Continue with Lipitor 80 mg PO HS   Daily EKG    Increase activity as tolerated.   Encourage Chest PT / Pulmonary toileting and Incentive spirometry every 1 hour x 10 while awake.   Continue with Protonix 40 mg PO daily for PUD prophylaxis.   Daily Shower   D/C plan home with [facility] once medically cleared   Plan of care discussed with attending

## 2024-07-26 NOTE — PROGRESS NOTE ADULT - SUBJECTIVE AND OBJECTIVE BOX
Subjective: Patient seen and examined. No new events except as noted.   remains in CTICU   Right calf pain   no appetite     REVIEW OF SYSTEMS:    CONSTITUTIONAL: + weakness, fevers or chills  EYES/ENT: No visual changes;  No vertigo or throat pain   NECK: No pain or stiffness  RESPIRATORY: No cough, wheezing, hemoptysis; No shortness of breath  CARDIOVASCULAR: No chest pain or palpitations  GASTROINTESTINAL: No abdominal or epigastric pain. No nausea, vomiting, or hematemesis; No diarrhea or constipation. No melena or hematochezia.  GENITOURINARY: No dysuria, frequency or hematuria  NEUROLOGICAL: No numbness or weakness  SKIN: No itching, burning, rashes, or lesions   All other review of systems is negative unless indicated above.    MEDICATIONS:  MEDICATIONS  (STANDING):  apixaban 2.5 milliGRAM(s) Oral every 12 hours  apixaban      aspirin  chewable 81 milliGRAM(s) Oral daily  atorvastatin 80 milliGRAM(s) Oral at bedtime  ceFAZolin   IVPB 1000 milliGRAM(s) IV Intermittent every 8 hours  chlorhexidine 0.12% Liquid 15 milliLiter(s) Oral Mucosa every 12 hours  clopidogrel Tablet 75 milliGRAM(s) Oral daily  insulin glargine Injectable (LANTUS) 18 Unit(s) SubCutaneous at bedtime  insulin lispro (ADMELOG) corrective regimen sliding scale   SubCutaneous three times a day before meals  insulin lispro (ADMELOG) corrective regimen sliding scale   SubCutaneous at bedtime  insulin lispro Injectable (ADMELOG) 6 Unit(s) SubCutaneous three times a day before meals  melatonin 3 milliGRAM(s) Oral at bedtime  metoprolol tartrate 12.5 milliGRAM(s) Oral once  metoprolol tartrate 25 milliGRAM(s) Oral two times a day  ondansetron Injectable 4 milliGRAM(s) IV Push once  pantoprazole    Tablet 40 milliGRAM(s) Oral before breakfast      PHYSICAL EXAM:  T(C): 37.1 (07-26-24 @ 08:00), Max: 37.5 (07-25-24 @ 12:00)  HR: 96 (07-26-24 @ 09:00) (80 - 103)  BP: 126/61 (07-26-24 @ 09:00) (98/54 - 141/63)  RR: 19 (07-26-24 @ 04:00) (14 - 20)  SpO2: 95% (07-26-24 @ 09:00) (93% - 100%)  Wt(kg): --  I&O's Summary    25 Jul 2024 07:01  -  26 Jul 2024 07:00  --------------------------------------------------------  IN: 1270 mL / OUT: 1365 mL / NET: -95 mL    26 Jul 2024 07:01  -  26 Jul 2024 09:29  --------------------------------------------------------  IN: 50 mL / OUT: 85 mL / NET: -35 mL          Appearance: NAD  HEENT:   Normal oral mucosa, PERRL, EOMI	  Lymphatic: No lymphadenopathy , no edema  Cardiovascular: Normal S1 S2, No JVD, No murmurs , Peripheral pulses palpable 2+ bilaterally  Respiratory: Lungs clear to auscultation, normal effort 	  Gastrointestinal:  Soft, Non-tender, + BS	  Skin: No rashes, No ecchymoses, No cyanosis, warm to touch  Musculoskeletal: Normal range of motion, normal strength  Psychiatry:  Mood & affect appropriate  Ext: No edema  Bilateral groins mildly tender ecchymotic       LABS:    CARDIAC MARKERS:                                10.2   12.64 )-----------( 112      ( 26 Jul 2024 01:22 )             31.3     07-26    140  |  99  |  27<H>  ----------------------------<  223<H>  4.3   |  25  |  1.13    Ca    9.0      26 Jul 2024 01:22  Phos  3.9     07-26  Mg     2.0     07-26    TPro  5.3<L>  /  Alb  3.0<L>  /  TBili  0.5  /  DBili  x   /  AST  78<H>  /  ALT  21  /  AlkPhos  46  07-26        TELEMETRY: 	AF    ECG:  	  RADIOLOGY:   DIAGNOSTIC TESTING:  [ ] Echocardiogram:  < from: TTE W or WO Ultrasound Enhancing Agent (07.25.24 @ 07:30) >    TRANSTHORACIC ECHOCARDIOGRAM REPORT  ________________________________________________________________________________                                      _______       Pt. Name:       ERIBERTO GOLDSTEIN Study Date:    7/25/2024  MRN:            [id number]         YOB: 1940  Accession #:    [id number]          Age:           84 years  Account#:       [id number]       Gender:        M  Heart Rate:                        Height:        60.00 in (152.40 cm)  Rhythm:           Weight:        125.00 lb (56.70 kg)  Blood Pressure: 131/61 mmHg        BSA/BMI:       1.53 m² / 24.41 kg/m²  ________________________________________________________________________________________  Referring Physician:    [id number] Lazaro Cisse  Interpreting Physician: Juvenal Kelley M.D.  Primary Sonographer:    John Paul Islas [facility]    CPT:               ECHO TTE WO CON COMP W DOPP - 49059.m  Indication(s):     Localized swelling, mass and lump, trunk - R22.2  Procedure:         Transthoracic echocardiogram with 2-D, M-mode and complete                     spectral and color flow Doppler.  Ordering Location: PACU  Admission Status:  Inpatient  Study Information: Image quality for this study is fair.    _______________________________________________________________________________________     CONCLUSIONS:      1. Left ventricular cavity is small. Left ventricular wall thickness is normal. Left ventricular systolic function is hyperdynamic with an ejection fraction of 69 %by De Oliveira's method of disks. There are no regional wall motion abnormalities seen.   2. Normal left ventricular diastolic function, with indeterminate left ventricular filling pressure.   3. Normal right ventricular cavity size, with normal wall thickness, and normal right ventricular systolic function.   4. 23 mm TAVR (valve-in-valve) is present in the aortic position.The prosthetic valve is well seated with normal function. No intravalvular regurgitation No paravalvular regurgitation.   5. Normal left and right atrial size.   6. No pericardial effusion seen.   7. Compared to the transesophageal echocardiogram performed on 7/24/2024, there have been no significant interval changes when compared to the post procedure images.    ________________________________________________________________________________________  FINDINGS:     Left Ventricle:  The left ventricular cavity is small. Left ventricular wall thickness is normal. Left ventricular systolic function is hyperdynamic with a calculated ejection fraction of 69 % by the De Oliveira's biplane method of disks. There are no regional wall motion abnormalities seen. There is normal left ventricular diastolic function, with Left ventricular filling pressures are indeterminate.     Right Ventricle:  The right ventricular cavity is normal in size, with normal wall thickness and right ventricular systolic function is normal. Tricuspid annular plane systolic excursion (TAPSE) is 1.1 cm (normal >=1.7 cm).     Left Atrium:  The left atriumis normal in size with an indexed volume of 32.97 ml/m².     Right Atrium:  The right atrium is normal in size with an indexed volume of 15.05 ml/m².     Interatrial Septum:  The interatrial septum appears intact.     Aortic Valve:  23 mm TAVR (valve-in-valve) is present in the aortic position. The prosthetic valve is well seated with normal function. There is no intravalvular regurgitation. There is no paravalvular regurgitation. The peak transaortic velocity is 1.90 m/s, peak transaortic gradient is 14.4 mmHg and mean transaortic gradient is 7.0 mmHg with an LVOT/aortic valve VTI ratio of 0.68. The aortic valve area is estimated at 1.73 cm² by the continuity equation.     Mitral Valve:  Structurally normal mitral valve with normal leaflet excursion. There is no mitral valve stenosis. There is trace mitral regurgitation.     Tricuspid Valve:  Structurally normal tricuspid valve with normal leaflet excursion. There is trace tricuspid regurgitation.     Pulmonic Valve:  Structurally normalpulmonic valve with normal leaflet excursion. There is trace pulmonic regurgitation.     Aorta:  The aortic root appears normal in size.     Pericardium:  No pericardial effusion seen.     Systemic Veins:  The inferior vena cava is normal in size (normal <2.1cm) with normal inspiratory collapse (normal >50%) consistent with normal right atrial pressure (~3, range 0-5mmHg).  ____________________________________________________________________    < end of copied text >    [ ]  Catheterization:  [ ] Stress Test:    OTHER:

## 2024-07-26 NOTE — PROGRESS NOTE ADULT - PROBLEM SELECTOR PLAN 1
s/p LM stent and TF Pily TAVR using a 23mm Medtronic Corevalve EVOLUT FX [facility].  DAPT   statin  Post op TTE reviewed  PT eval   Zofram as needed

## 2024-07-26 NOTE — PROGRESS NOTE ADULT - SUBJECTIVE AND OBJECTIVE BOX
CRITICAL CARE ATTENDING - CTICU    MEDICATIONS  (STANDING):  apixaban 2.5 milliGRAM(s) Oral every 12 hours  apixaban      aspirin  chewable 81 milliGRAM(s) Oral daily  atorvastatin 80 milliGRAM(s) Oral at bedtime  ceFAZolin   IVPB 1000 milliGRAM(s) IV Intermittent every 8 hours  chlorhexidine 0.12% Liquid 15 milliLiter(s) Oral Mucosa every 12 hours  clopidogrel Tablet 75 milliGRAM(s) Oral daily  insulin glargine Injectable (LANTUS) 18 Unit(s) SubCutaneous at bedtime  insulin lispro (ADMELOG) corrective regimen sliding scale   SubCutaneous three times a day before meals  insulin lispro (ADMELOG) corrective regimen sliding scale   SubCutaneous at bedtime  insulin lispro Injectable (ADMELOG) 6 Unit(s) SubCutaneous three times a day before meals  melatonin 3 milliGRAM(s) Oral at bedtime  metoprolol tartrate 12.5 milliGRAM(s) Oral every 8 hours  pantoprazole    Tablet 40 milliGRAM(s) Oral before breakfast                                    10.2   12.64 )-----------( 112      ( 26 Jul 2024 01:22 )             31.3       07-26    140  |  99  |  27<H>  ----------------------------<  223<H>  4.3   |  25  |  1.13    Ca    9.0      26 Jul 2024 01:22  Phos  3.9     07-26  Mg     2.0     07-26    TPro  5.3<L>  /  Alb  3.0<L>  /  TBili  0.5  /  DBili  x   /  AST  78<H>  /  ALT  21  /  AlkPhos  46  07-26      PT/INR - ( 25 Jul 2024 08:37 )   PT: 12.3 sec;   INR: 1.12 ratio         PTT - ( 25 Jul 2024 08:37 )  PTT:28.9 sec        Daily     Daily       07-25 @ 07:01  -  07-26 @ 07:00  --------------------------------------------------------  IN: 1270 mL / OUT: 1365 mL / NET: -95 mL      Critically Ill patient  : [ ] preoperative ,   [x] post operative    Requires :  [x] Arterial Line   [ ] Central Line  [ ] PA catheter  [ ] IABP  [ ] ECMO  [ ] LVAD  [ ] Ventilator  [ ] pacemaker [ ] Impella.                      [x] ABG's     [x] Pulse Oxymetry Monitoring  Bedside evaluation , monitoring , treatment of hemodynamics , fluids , IVP/ IVCD meds.        Diagnosis:     POD 1 - TAVR valve-in-valve +LT main stent, Right ileofemoral bypass, Left SFA resection with vein graft replacement    Hypotension     Hemodynamic lability,  instability. Requires IVCD [ ] vasopressors [ ] inotropes  [ ] vasodilator  [ x]IVSS fluid  to maintain MAP, perfusion, C.I.     Requires chest PT, pulmonary toilet,  suctioning to maintain SaO2,  patent airway and treat atelectasis.     Afib    Respiratory insufficiency    Hypoxemia - Requires [ ] BIPAP  [ ] HF O2 [x] NC - 4L    Hypovolemia    Thrombocytopenia    Requires bedside physical therapy, mobilization and total correction care.       I, Uche Medina, personally performed the services described in this documentation. All medical record entries made by the scribe were at my direction and in my presence. I have reviewed the chart and agree that the record reflects my personal performance and is accurate and complete.   Uche Medina MD.     By signing my name below, I, Anni Welsh, attest that this documentation has been prepared under the direction and in the presence of Uche Medina MD.   Electronically Signed: Daisy Grissom 07-26-24 @ 07:45    Patient requires continuous monitoring with bedside rhythm monitoring, pulse oximetry monitoring, and continuous central venous and arterial pressure monitoring; and intermittent blood gas analysis. Care plan discussed with the ICU care team.   Patient remained critical, at risk for life threatening decompensation.    I have spent 30 minutes providing critical care management to this patient.      Discussed with CT surgeon, Physician Assistant - Nurse Practitioner- Critical care medicine team.   Discussed at  AM / PM rounds.   Chart, labs , films reviewed.

## 2024-07-26 NOTE — PROGRESS NOTE ADULT - ASSESSMENT
84M pmHX HTN, HLD, DM2 , CAD with PCI CABG x3/ AVR( 2011, [facility], He does not have his valve card) and coronary stent x1 ( 5/2019, on Plavix ),  AFib  on Eliquis (last dose 7/21), Left Carotid Endarterectomy on 10/7/21.?  Patient underwent a valve in valve TAVR 7/24 along with a stent to the left main to avoid occlusion of the coronary by the valve. Intraoperatively patient required a right ileofemoral bypass. Subsequently, in the PACU, patient developed a cold left leg. He was found to have an occluded left SFA due to vessel closure device. He returned to the OR for a resection and vein graft at the SFA.     Assessment  DMT2: 84y Male with DM T2 with hyperglycemia, A1C 8.5% , was on oral meds  at home, now on basal bolus insulin with coverage, blood sugars running high postop   s/p TAVR. CTU postop   CAD: on medications, stable, monitored.  HTN: on antihypertensive medications, monitored, asymptomatic.    Discussed plan and management wit Dr Oneil Riggs MD  Cell: [phone]  Office: [phone]               84M pmHX HTN, HLD, DM2 , CAD with PCI CABG x3/ AVR( 2011, [facility], He does not have his valve card) and coronary stent x1 ( 5/2019, on Plavix ),  AFib  on Eliquis (last dose 7/21), Left Carotid Endarterectomy on 10/7/21.?  Patient underwent a valve in valve TAVR 7/24 along with a stent to the left main to avoid occlusion of the coronary by the valve. Intraoperatively patient required a right ileofemoral bypass. Subsequently, in the PACU, patient developed a cold left leg. He was found to have an occluded left SFA due to vessel closure device. He returned to the OR for a resection and vein graft at the SFA.       Assessment  DMT2: 84y Male with DM T2 with hyperglycemia, A1C 8.5% , was on oral meds  at home, now on basal bolus insulin with coverage, blood sugars running high postop   s/p TAVR. CTU postop   CAD: on medications, stable, monitored.  HTN: on antihypertensive medications, monitored, asymptomatic.    Discussed plan and management wit Dr Oneil Riggs MD  Cell: [phone]  Office: [phone]

## 2024-07-26 NOTE — PROGRESS NOTE ADULT - SUBJECTIVE AND OBJECTIVE BOX
VITAL SIGNS    Subjective: "I'm feeling ok" Denies CP, palpitation, SOB, JACOBSEN, HA, dizziness, N/V/D, fever or chills.  No acute event noted overnight.     Telemetry: Afib 70-90       Vital Signs Last 24 Hrs  T(C): 37.1 (07-26-24 @ 13:00), Max: 37.2 (07-26-24 @ 12:00)  T(F): 98.7 (07-26-24 @ 13:00), Max: 99 (07-26-24 @ 12:00)  HR: 93 (07-26-24 @ 13:00) (80 - 103)  BP: 124/65 (07-26-24 @ 13:00) (107/50 - 145/65)  RR: 16 (07-26-24 @ 13:00) (16 - 20)  SpO2: 96% (07-26-24 @ 13:00) (93% - 100%)           07-25 @ 07:01  -  07-26 @ 07:00  --------------------------------------------------------  IN: 1270 mL / OUT: 1365 mL / NET: -95 mL    07-26 @ 07:01  -  07-26 @ 16:13  --------------------------------------------------------  IN: 195 mL / OUT: 220 mL / NET: -25 mL      LABS  07-26    140  |  99  |  27<H>  ----------------------------<  223<H>  4.3   |  25  |  1.13    Ca    9.0      26 Jul 2024 01:22  Phos  3.9     07-26  Mg     2.0     07-26    TPro  5.3<L>  /  Alb  3.0<L>  /  TBili  0.5  /  DBili  x   /  AST  78<H>  /  ALT  21  /  AlkPhos  46  07-26                                 10.2   12.64 )-----------( 112      ( 26 Jul 2024 01:22 )             31.3          PT/INR - ( 25 Jul 2024 08:37 )   PT: 12.3 sec;   INR: 1.12 ratio         PTT - ( 25 Jul 2024 08:37 )  PTT:28.9 sec        Daily     Daily     CAPILLARY BLOOD GLUCOSE  165 (26 Jul 2024 11:00)  113 (26 Jul 2024 09:00)  88 (26 Jul 2024 08:00)  112 (26 Jul 2024 07:00)  111 (26 Jul 2024 05:00)  118 (26 Jul 2024 04:00)  144 (26 Jul 2024 03:00)  170 (26 Jul 2024 02:00)  219 (26 Jul 2024 01:00)  207 (25 Jul 2024 22:00)    POCT Blood Glucose.: 165 mg/dL (26 Jul 2024 11:19)  POCT Blood Glucose.: 113 mg/dL (26 Jul 2024 09:11)  POCT Blood Glucose.: 88 mg/dL (26 Jul 2024 08:11)  POCT Blood Glucose.: 112 mg/dL (26 Jul 2024 07:10)  POCT Blood Glucose.: 111 mg/dL (26 Jul 2024 05:11)  POCT Blood Glucose.: 118 mg/dL (26 Jul 2024 04:03)  POCT Blood Glucose.: 144 mg/dL (26 Jul 2024 03:16)  POCT Blood Glucose.: 170 mg/dL (26 Jul 2024 02:11)  POCT Blood Glucose.: 219 mg/dL (26 Jul 2024 00:55)  POCT Blood Glucose.: 207 mg/dL (25 Jul 2024 22:25)  POCT Blood Glucose.: 183 mg/dL (25 Jul 2024 17:15)        PHYSICAL EXAM    Neurology: alert and oriented x 3, nonfocal, no gross deficits    CV: (+) Irregular S1 and S2, No murmurs, rubs, gallops or clicks     Lungs: CTA B/L     Abdomen: soft, nontender, nondistended, positive bowel sounds, (+) Flatus; (+) BM     :  Voiding               Extremities:  B/L LE (+)  trace edema; negative calf tenderness; (+) 2 DP palpable        apixaban 2.5 milliGRAM(s) Oral every 12 hours  apixaban      aspirin  chewable 81 milliGRAM(s) Oral daily  atorvastatin 80 milliGRAM(s) Oral at bedtime  chlorhexidine 0.12% Liquid 15 milliLiter(s) Oral Mucosa every 12 hours  clopidogrel Tablet 75 milliGRAM(s) Oral daily  insulin glargine Injectable (LANTUS) 14 Unit(s) SubCutaneous at bedtime  insulin lispro (ADMELOG) corrective regimen sliding scale   SubCutaneous three times a day before meals  insulin lispro (ADMELOG) corrective regimen sliding scale   SubCutaneous at bedtime  insulin lispro Injectable (ADMELOG) 4 Unit(s) SubCutaneous three times a day before meals  melatonin 3 milliGRAM(s) Oral at bedtime  metoprolol tartrate 25 milliGRAM(s) Oral every 8 hours  pantoprazole Tablet 40 milliGRAM(s) Oral before breakfast  tamsulosin 0.4 milliGRAM(s) Oral at bedtime    Physical Therapy Rec:   Home  [  ]   Home w/ PT  [ X ]  Rehab  [  ]    Discussed with Cardiothoracic Team at AM rounds.

## 2024-07-27 LAB
ANION GAP SERPL CALC-SCNC: 11 MMOL/L — SIGNIFICANT CHANGE UP (ref 5–17)
BUN SERPL-MCNC: 21 MG/DL — SIGNIFICANT CHANGE UP (ref 7–23)
CALCIUM SERPL-MCNC: 8.8 MG/DL — SIGNIFICANT CHANGE UP (ref 8.4–10.5)
CHLORIDE SERPL-SCNC: 99 MMOL/L — SIGNIFICANT CHANGE UP (ref 96–108)
CO2 SERPL-SCNC: 26 MMOL/L — SIGNIFICANT CHANGE UP (ref 22–31)
CREAT SERPL-MCNC: 0.84 MG/DL — SIGNIFICANT CHANGE UP (ref 0.5–1.3)
EGFR: 87 ML/MIN/1.73M2 — SIGNIFICANT CHANGE UP
GLUCOSE BLDC GLUCOMTR-MCNC: 156 MG/DL — HIGH (ref 70–99)
GLUCOSE BLDC GLUCOMTR-MCNC: 168 MG/DL — HIGH (ref 70–99)
GLUCOSE BLDC GLUCOMTR-MCNC: 193 MG/DL — HIGH (ref 70–99)
GLUCOSE BLDC GLUCOMTR-MCNC: 198 MG/DL — HIGH (ref 70–99)
GLUCOSE BLDC GLUCOMTR-MCNC: 210 MG/DL — HIGH (ref 70–99)
GLUCOSE SERPL-MCNC: 155 MG/DL — HIGH (ref 70–99)
HCT VFR BLD CALC: 27.7 % — LOW (ref 39–50)
HGB BLD-MCNC: 9.1 G/DL — LOW (ref 13–17)
MAGNESIUM SERPL-MCNC: 1.8 MG/DL — SIGNIFICANT CHANGE UP (ref 1.6–2.6)
MCHC RBC-ENTMCNC: 30.7 PG — SIGNIFICANT CHANGE UP (ref 27–34)
MCHC RBC-ENTMCNC: 32.9 GM/DL — SIGNIFICANT CHANGE UP (ref 32–36)
MCV RBC AUTO: 93.6 FL — SIGNIFICANT CHANGE UP (ref 80–100)
NRBC # BLD: 0 /100 WBCS — SIGNIFICANT CHANGE UP (ref 0–0)
PHOSPHATE SERPL-MCNC: 1.7 MG/DL — LOW (ref 2.5–4.5)
PLATELET # BLD AUTO: 103 K/UL — LOW (ref 150–400)
POTASSIUM SERPL-MCNC: 4.2 MMOL/L — SIGNIFICANT CHANGE UP (ref 3.5–5.3)
POTASSIUM SERPL-SCNC: 4.2 MMOL/L — SIGNIFICANT CHANGE UP (ref 3.5–5.3)
RBC # BLD: 2.96 M/UL — LOW (ref 4.2–5.8)
RBC # FLD: 14.6 % — HIGH (ref 10.3–14.5)
SODIUM SERPL-SCNC: 136 MMOL/L — SIGNIFICANT CHANGE UP (ref 135–145)
WBC # BLD: 9.4 K/UL — SIGNIFICANT CHANGE UP (ref 3.8–10.5)
WBC # FLD AUTO: 9.4 K/UL — SIGNIFICANT CHANGE UP (ref 3.8–10.5)

## 2024-07-27 PROCEDURE — 71045 X-RAY EXAM CHEST 1 VIEW: CPT | Mod: 26

## 2024-07-27 PROCEDURE — 93010 ELECTROCARDIOGRAM REPORT: CPT

## 2024-07-27 PROCEDURE — 99232 SBSQ HOSP IP/OBS MODERATE 35: CPT

## 2024-07-27 RX ORDER — MAGNESIUM SULFATE 500 MG/ML
2 VIAL (ML) INJECTION ONCE
Refills: 0 | Status: COMPLETED | OUTPATIENT
Start: 2024-07-27 | End: 2024-07-27

## 2024-07-27 RX ORDER — SODIUM PHOSPHATE, MONOBASIC, MONOHYDRATE 276; 142 MG/ML; MG/ML
15 INJECTION, SOLUTION INTRAVENOUS ONCE
Refills: 0 | Status: COMPLETED | OUTPATIENT
Start: 2024-07-27 | End: 2024-07-27

## 2024-07-27 RX ORDER — INSULIN GLARGINE-YFGN 100 [IU]/ML
17 INJECTION, SOLUTION SUBCUTANEOUS AT BEDTIME
Refills: 0 | Status: DISCONTINUED | OUTPATIENT
Start: 2024-07-27 | End: 2024-07-28

## 2024-07-27 RX ADMIN — INSULIN GLARGINE-YFGN 17 UNIT(S): 100 INJECTION, SOLUTION SUBCUTANEOUS at 22:24

## 2024-07-27 RX ADMIN — Medication 25 GRAM(S): at 11:11

## 2024-07-27 RX ADMIN — ATORVASTATIN CALCIUM 80 MILLIGRAM(S): 40 TABLET, FILM COATED ORAL at 21:28

## 2024-07-27 RX ADMIN — Medication 25 MILLIGRAM(S): at 06:06

## 2024-07-27 RX ADMIN — Medication 4 UNIT(S): at 14:24

## 2024-07-27 RX ADMIN — CHLORHEXIDINE GLUCONATE 15 MILLILITER(S): 500 CLOTH TOPICAL at 17:13

## 2024-07-27 RX ADMIN — CHLORHEXIDINE GLUCONATE 15 MILLILITER(S): 500 CLOTH TOPICAL at 08:43

## 2024-07-27 RX ADMIN — Medication 4 UNIT(S): at 17:13

## 2024-07-27 RX ADMIN — CLOPIDOGREL BISULFATE 75 MILLIGRAM(S): 75 TABLET, FILM COATED ORAL at 08:43

## 2024-07-27 RX ADMIN — PANTOPRAZOLE SODIUM 40 MILLIGRAM(S): 20 TABLET, DELAYED RELEASE ORAL at 06:06

## 2024-07-27 RX ADMIN — Medication 1: at 08:42

## 2024-07-27 RX ADMIN — Medication 0.4 MILLIGRAM(S): at 21:28

## 2024-07-27 RX ADMIN — Medication 3 MILLIGRAM(S): at 21:28

## 2024-07-27 RX ADMIN — Medication 81 MILLIGRAM(S): at 08:43

## 2024-07-27 RX ADMIN — Medication 25 MILLIGRAM(S): at 22:24

## 2024-07-27 RX ADMIN — SODIUM PHOSPHATE, MONOBASIC, MONOHYDRATE 63.75 MILLIMOLE(S): 276; 142 INJECTION, SOLUTION INTRAVENOUS at 13:40

## 2024-07-27 RX ADMIN — Medication 1: at 17:13

## 2024-07-27 RX ADMIN — Medication 1: at 14:23

## 2024-07-27 RX ADMIN — APIXABAN 2.5 MILLIGRAM(S): 5 TABLET, FILM COATED ORAL at 17:13

## 2024-07-27 RX ADMIN — Medication 25 MILLIGRAM(S): at 14:23

## 2024-07-27 RX ADMIN — APIXABAN 2.5 MILLIGRAM(S): 5 TABLET, FILM COATED ORAL at 06:06

## 2024-07-27 RX ADMIN — Medication 4 UNIT(S): at 08:43

## 2024-07-27 NOTE — PROGRESS NOTE ADULT - PROBLEM SELECTOR PLAN 1
s/p LM stent and TF Pily TAVR using a 23mm Medtronic Corevalve EVOLUT FX [facility] in [facility].  DAPT   statin  Post op TTE reviewed  PT eval   Zofran as needed

## 2024-07-27 NOTE — PROGRESS NOTE ADULT - ASSESSMENT
84M pmHX HTN, HLD, DM2 , CAD with PCI CABG x3/ AVR( 2011, [facility], He does not have his valve card) and coronary stent x1 ( 5/2019, on Plavix ),  AFib  on Eliquis (last dose 7/21), Left Carotid Endarterectomy on 10/7/21.?  Patient underwent a valve in valve TAVR 7/24 along with a stent to the left main to avoid occlusion of the coronary by the valve. Intraoperatively patient required a right ileofemoral bypass. Subsequently, in the PACU, patient developed a cold left leg. He was found to have an occluded left SFA due to vessel closure device. He returned to the OR for a resection and vein graft at the SFA.     Assessment  DMT2: 84y Male with DM T2 with hyperglycemia, A1C 8.5% , was on oral meds  at home, now on basal bolus insulin with coverage, blood sugars running high postop   s/p TAVR. CTU postop   CAD: on medications, stable, monitored.  HTN: on antihypertensive medications, monitored, asymptomatic.    Discussed plan and management wit Dr Oneil Riggs MD  Cell: [phone]  Office: [phone]               84M pmHX HTN, HLD, DM2 , CAD with PCI CABG x3/ AVR( 2011, [facility], He does not have his valve card) and coronary stent x1 ( 5/2019, on Plavix ),  AFib  on Eliquis (last dose 7/21), Left Carotid Endarterectomy on 10/7/21.?  Patient underwent a valve in valve TAVR 7/24 along with a stent to the left main to avoid occlusion of the coronary by the valve. Intraoperatively patient required a right ileofemoral bypass. Subsequently, in the PACU, patient developed a cold left leg. He was found to have an occluded left SFA due to vessel closure device. He returned to the OR for a resection and vein graft at the SFA.       Assessment  DMT2: 84y Male with DM T2 with hyperglycemia, A1C 8.5% , was on oral meds  at home, now on basal bolus insulin with coverage, blood sugars running high postop   s/p TAVR. CTU postop   CAD: on medications, stable, monitored.  HTN: on antihypertensive medications, monitored, asymptomatic.    Discussed plan and management wit Dr Oneil Riggs MD  Cell: [phone]  Office: [phone]

## 2024-07-27 NOTE — PROGRESS NOTE ADULT - SUBJECTIVE AND OBJECTIVE BOX
VITAL SIGNS    Subjective: "I'm feeling ok" Denies CP, palpitation, SOB, JACOBSEN, HA, dizziness, N/V/D, fever or chills.  No acute event noted overnight.     Telemetry: Afib       Vital Signs Last 24 Hrs  T(C): 36.7 (24 @ 06:17), Max: 37.2 (24 @ 12:00)  T(F): 98.1 (24 @ 06:17), Max: 99 (24 @ 12:00)  HR: 85 (24 @ 06:17) (81 - 96)  BP: 148/66 (24 @ 06:17) (100/76 - 148/66)  RR: 18 (24 @ 06:17) (16 - 18)  SpO2: 95% (24 @ 06:17) (95% - 100%)           @ 07:01  -   @ 07:00  --------------------------------------------------------  IN: 435 mL / OUT: 720 mL / NET: -285 mL    LABS      136  |  99  |  21  ----------------------------<  155<H>  4.2   |  26  |  0.84    Ca    8.8      2024 06:48  Phos  1.7       Mg     1.8         TPro  5.3<L>  /  Alb  3.0<L>  /  TBili  0.5  /  DBili  x   /  AST  78<H>  /  ALT  21  /  AlkPhos  46                                   9.1    9.40  )-----------( 103      ( 2024 06:48 )             27.7          Daily     Daily Weight in k.2 (2024 08:39)    CAPILLARY BLOOD GLUCOSE  165 (2024 11:00)    POCT Blood Glucose.: 156 mg/dL (2024 08:01)  POCT Blood Glucose.: 196 mg/dL (2024 21:22)  POCT Blood Glucose.: 216 mg/dL (2024 17:03)  POCT Blood Glucose.: 165 mg/dL (2024 11:19)        PHYSICAL EXAM    Neurology: alert and oriented x 3, nonfocal, no gross deficits    CV: (+) S1 and S2, No murmurs, rubs, gallops or clicks     Lungs: CTA B/L     Abdomen: soft, nontender, nondistended, positive bowel sounds, (+) Flatus; (+) BM     : Indwelling niño cath --> SD              Extremities:  B/L LE (+) 1 edema; negative calf tenderness; (+) 2 PT pulses       apixaban      apixaban 2.5 milliGRAM(s) Oral every 12 hours  aspirin  chewable 81 milliGRAM(s) Oral daily  atorvastatin 80 milliGRAM(s) Oral at bedtime  chlorhexidine 0.12% Liquid 15 milliLiter(s) Oral Mucosa every 12 hours  clopidogrel Tablet 75 milliGRAM(s) Oral daily  insulin glargine Injectable (LANTUS) 14 Unit(s) SubCutaneous at bedtime  insulin lispro (ADMELOG) corrective regimen sliding scale   SubCutaneous at bedtime  insulin lispro (ADMELOG) corrective regimen sliding scale   SubCutaneous three times a day before meals  insulin lispro Injectable (ADMELOG) 4 Unit(s) SubCutaneous three times a day before meals  magnesium sulfate  IVPB 2 Gram(s) IV Intermittent once  melatonin 3 milliGRAM(s) Oral at bedtime  metoprolol tartrate 25 milliGRAM(s) Oral every 8 hours  pantoprazole    Tablet 40 milliGRAM(s) Oral before breakfast  sodium phosphate 15 milliMole(s)/250 mL IVPB 15 milliMole(s) IV Intermittent once  tamsulosin 0.4 milliGRAM(s) Oral at bedtime          Physical Therapy Rec:   Home  [  ]   Home w/ PT  [  ]  Rehab  [  ]    Discussed with Cardiothoracic Team at AM rounds.     VITAL SIGNS    Subjective: "I'm feeling ok" Denies CP, palpitation, SOB, JACOBSEN, HA, dizziness, N/V/D, fever or chills.  No acute event noted overnight.     Telemetry: Afib       Vital Signs Last 24 Hrs  T(C): 36.7 (24 @ 06:17), Max: 37.2 (24 @ 12:00)  T(F): 98.1 (24 @ 06:17), Max: 99 (24 @ 12:00)  HR: 85 (24 @ 06:17) (81 - 96)  BP: 148/66 (24 @ 06:17) (100/76 - 148/66)  RR: 18 (24 @ 06:17) (16 - 18)  SpO2: 95% (24 @ 06:17) (95% - 100%)           @ 07:01  -   @ 07:00  --------------------------------------------------------  IN: 435 mL / OUT: 720 mL / NET: -285 mL    LABS      136  |  99  |  21  ----------------------------<  155<H>  4.2   |  26  |  0.84    Ca    8.8      2024 06:48  Phos  1.7       Mg     1.8         TPro  5.3<L>  /  Alb  3.0<L>  /  TBili  0.5  /  DBili  x   /  AST  78<H>  /  ALT  21  /  AlkPhos  46                                   9.1    9.40  )-----------( 103      ( 2024 06:48 )             27.7          Daily     Daily Weight in k.2 (2024 08:39)    CAPILLARY BLOOD GLUCOSE  165 (2024 11:00)    POCT Blood Glucose.: 156 mg/dL (2024 08:01)  POCT Blood Glucose.: 196 mg/dL (2024 21:22)  POCT Blood Glucose.: 216 mg/dL (2024 17:03)  POCT Blood Glucose.: 165 mg/dL (2024 11:19)        PHYSICAL EXAM    Neurology: alert and oriented x 3, nonfocal, no gross deficits    CV: (+) S1 and S2, No murmurs, rubs, gallops or clicks     Lungs: CTA B/L     Abdomen: soft, nontender, nondistended, positive bowel sounds, (+) Flatus; (+) BM     : Indwelling niño cath --> SD              Extremities:  B/L LE (+) 1 edema; negative calf tenderness; (+) 2 PT pulses; B/L groin site with opsite dressing C/D/I. Right heel ecchymotic area.      apixaban      apixaban 2.5 milliGRAM(s) Oral every 12 hours  aspirin  chewable 81 milliGRAM(s) Oral daily  atorvastatin 80 milliGRAM(s) Oral at bedtime  chlorhexidine 0.12% Liquid 15 milliLiter(s) Oral Mucosa every 12 hours  clopidogrel Tablet 75 milliGRAM(s) Oral daily  insulin glargine Injectable (LANTUS) 14 Unit(s) SubCutaneous at bedtime  insulin lispro (ADMELOG) corrective regimen sliding scale   SubCutaneous at bedtime  insulin lispro (ADMELOG) corrective regimen sliding scale   SubCutaneous three times a day before meals  insulin lispro Injectable (ADMELOG) 4 Unit(s) SubCutaneous three times a day before meals  magnesium sulfate  IVPB 2 Gram(s) IV Intermittent once  melatonin 3 milliGRAM(s) Oral at bedtime  metoprolol tartrate 25 milliGRAM(s) Oral every 8 hours  pantoprazole    Tablet 40 milliGRAM(s) Oral before breakfast  sodium phosphate 15 milliMole(s)/250 mL IVPB 15 milliMole(s) IV Intermittent once  tamsulosin 0.4 milliGRAM(s) Oral at bedtime    Physical Therapy Rec:   Home  [  ]   Home w/ PT  [ X ]  Rehab  [  ]    Discussed with Cardiothoracic Team at AM rounds.

## 2024-07-27 NOTE — PROGRESS NOTE ADULT - ASSESSMENT
83 yo male with PMH/PSHx significant for  HTN, HLD, DM2 , CAD with PCI CABG x3/ AVR ( 2011, [facility], He does not have his valve card) and coronary stent x1 ( 5/2019, on Plavix ), AFib on Eliquis (last dose 7/21), Left Carotid Endarterectomy on 10/7/21. Presents in PST prior to scheduled TAVR on 07/24/26.  Denies CP, dizziness, recent fevers, chills, cough, chest pain or SOB and feels well otherwise, he sleeps with one pillow and has not woken up with SOB or has had no hospitalizations.      Hospital Course:   7/24/24 s/p TF Pily TAVR using a 23mm Medtronic Corevalve EVOLUT FX placed in a 19mm Mitroflow surgical valve with a stent to the left main to avoid occlusion of the coronary by the valve. Right groin 18FrA sheath - closed with Perclose x 2. Left groin 6FrA sheath removed with Perclose x 1. Left groin 8FrV sheath - manual compression, TVP removed in the lab.   Intra op complicated by perclose failed in right femoral artery resulting in need for femoral cutdown and underwent peripheral bypass with Vascular.   7/24 Return to OR for s/p Cutdown on right femoral artery. Distal embolectomy with #3 Esperanza catheter. Femoral endarterectomy; however, vessel too thin to patch. Therefore, decision made to do interposition bypass. However, proximal anastomosis would not hold 2/2 to friable and thin walled nature of CFA. Incision extended and external iliac artery to SFA bypass (both end-to-end anastamosis) performed with 6mm PTFE. Profunda end-to-side anastamosis onto bypass graft. Completion angiogram demonstrating peroneal runoff. Peroneal signal present in RLE at end of case.   In PACU Left foot was noted to be dusky with no palpable pulses and no dopplerable signal. He had normal sensation but could not move his toes. Vascular surgery made the decision to return to the OR   7/25 s/p Left SFA interposition bypass w/ rGSV.   Post op Course:   Transferred to CTU for post op recovery. Post op TTE revealed: (07.25.24 @ 07:30) A 23 mm TAVR (valve-in-valve) is present in the aortic position. The prosthetic valve is well seated with normal function. No intravalvular regurgitation No paravalvular regurgitation. No pericardial effusion seen.  Stress Hyperglycemia / h/o DM2 --> Endocrine following   7/26 VSS; Greenberg D/C'd --> failed TOV (+) urinary retention.  Greenberg reinserted. Started on Flomax 0.4 mg PO HS.  Transferred to 2 [facility] Telemetry floor.  Structural Team following. Daily EKG.  Daily PT. Started on Eliquis 2.5 mg PO BID for chronic Afib.  7/27 VSS; Continue with current medication regimen. Structural Team and Vascular following.  Daily EKG. Increase ambulation.  Maintain Greenberg x 24 hours, plan for TOV in AM.        Disposition: Home once medically cleared.

## 2024-07-27 NOTE — PROGRESS NOTE ADULT - SUBJECTIVE AND OBJECTIVE BOX
Patient seen and examined this AM. Feeling improved, pain controlled. OOB walking yesterday    Physical Exam:   General: NAD  Neuro: AOx4  Extremities: B/L LE edema, no wounds, brisk capillary refill  LLE palpable femoral pulse, palpable DP pulse  RLE palpable femoral pulse, eduardo/DP/PT signal

## 2024-07-27 NOTE — PROGRESS NOTE ADULT - SUBJECTIVE AND OBJECTIVE BOX
Subjective: Patient seen and examined. No new events except as noted.   Moved out of ICU   RLE edema and pain       REVIEW OF SYSTEMS:    CONSTITUTIONAL:+ weakness, fevers or chills  EYES/ENT: No visual changes;  No vertigo or throat pain   NECK: No pain or stiffness  RESPIRATORY: No cough, wheezing, hemoptysis; No shortness of breath  CARDIOVASCULAR: No chest pain or palpitations  GASTROINTESTINAL: No abdominal or epigastric pain. No nausea, vomiting, or hematemesis; No diarrhea or constipation. No melena or hematochezia.  GENITOURINARY: No dysuria, frequency or hematuria  NEUROLOGICAL: No numbness or weakness  SKIN: No itching, burning, rashes, or lesions   All other review of systems is negative unless indicated above.    MEDICATIONS:  MEDICATIONS  (STANDING):  apixaban 2.5 milliGRAM(s) Oral every 12 hours  apixaban      aspirin  chewable 81 milliGRAM(s) Oral daily  atorvastatin 80 milliGRAM(s) Oral at bedtime  chlorhexidine 0.12% Liquid 15 milliLiter(s) Oral Mucosa every 12 hours  clopidogrel Tablet 75 milliGRAM(s) Oral daily  insulin glargine Injectable (LANTUS) 17 Unit(s) SubCutaneous at bedtime  insulin lispro (ADMELOG) corrective regimen sliding scale   SubCutaneous three times a day before meals  insulin lispro (ADMELOG) corrective regimen sliding scale   SubCutaneous at bedtime  insulin lispro Injectable (ADMELOG) 4 Unit(s) SubCutaneous three times a day before meals  melatonin 3 milliGRAM(s) Oral at bedtime  metoprolol tartrate 25 milliGRAM(s) Oral every 8 hours  pantoprazole    Tablet 40 milliGRAM(s) Oral before breakfast  tamsulosin 0.4 milliGRAM(s) Oral at bedtime      PHYSICAL EXAM:  T(C): 36.9 (07-27-24 @ 19:58), Max: 36.9 (07-27-24 @ 19:58)  HR: 93 (07-27-24 @ 19:58) (85 - 99)  BP: 120/66 (07-27-24 @ 19:58) (111/62 - 148/66)  RR: 19 (07-27-24 @ 19:58) (17 - 19)  SpO2: 98% (07-27-24 @ 19:58) (95% - 99%)  Wt(kg): --  I&O's Summary    26 Jul 2024 07:01  -  27 Jul 2024 07:00  --------------------------------------------------------  IN: 435 mL / OUT: 720 mL / NET: -285 mL    27 Jul 2024 07:01  -  27 Jul 2024 22:18  --------------------------------------------------------  IN: 480 mL / OUT: 1250 mL / NET: -770 mL              Appearance: NAD  HEENT:   Normal oral mucosa, PERRL, EOMI	  Lymphatic: No lymphadenopathy , no edema  Cardiovascular: Normal S1 S2, No JVD, No murmurs , Peripheral pulses palpable 2+ bilaterally  Respiratory: Lungs clear to auscultation, normal effort 	  Gastrointestinal:  Soft, Non-tender, + BS	  Skin: No rashes, No ecchymoses, No cyanosis, warm to touch  Musculoskeletal: Normal range of motion, normal strength  Psychiatry:  Mood & affect appropriate  Ext: No edema  Bilateral groins mildly tender ecchymotic         LABS:    CARDIAC MARKERS:                                9.1    9.40  )-----------( 103      ( 27 Jul 2024 06:48 )             27.7     07-27    136  |  99  |  21  ----------------------------<  155<H>  4.2   |  26  |  0.84    Ca    8.8      27 Jul 2024 06:48  Phos  1.7     07-27  Mg     1.8     07-27    TPro  5.3<L>  /  Alb  3.0<L>  /  TBili  0.5  /  DBili  x   /  AST  78<H>  /  ALT  21  /  AlkPhos  46  07-26      TELEMETRY: 	  AF  ECG:  	  RADIOLOGY:   DIAGNOSTIC TESTING:  [ ] Echocardiogram:  [ ]  Catheterization:  [ ] Stress Test:    OTHER:

## 2024-07-27 NOTE — PROGRESS NOTE ADULT - PROBLEM SELECTOR PLAN 4
7/24 s/p Cutdown on right femoral artery.  7/25 s/p Left SFA interposition bypass w/ rGSV.  Vascular Surgery following  Daily PT   Vascular checks q4 hours

## 2024-07-27 NOTE — PROGRESS NOTE ADULT - PROBLEM SELECTOR PLAN 1
Will increase Lantus to 17  u at bedtime.  Will increase Admelog to 5 u before each meal and continue Admelog correction scale coverage. Will continue monitoring FS and Follow up.   Will continue monitoring FS, log, and glucose trends, will Follow up.  Patient counseled for compliance with consistent low carb diet and exercise as tolerated outpatient. Will increase Lantus to 17  u at bedtime.  Will increase Admelog to 5 u before each meal and continue Admelog correction scale coverage. Will continue monitoring FS and Follow up.   Will continue monitoring FS, log, and glucose trends, will Follow up.  Patient counseled for compliance with consistent low carb diet and exercise as tolerated outpatient.  May get discharged on his home DM meds  FU outpt

## 2024-07-27 NOTE — PROGRESS NOTE ADULT - SUBJECTIVE AND OBJECTIVE BOX
Chief complaint  Patient is a 84y old  Male who presents with a chief complaint of Severe AS (27 Jul 2024 09:45)         Labs and Fingersticks  CAPILLARY BLOOD GLUCOSE      POCT Blood Glucose.: 210 mg/dL (27 Jul 2024 11:29)  POCT Blood Glucose.: 156 mg/dL (27 Jul 2024 08:01)  POCT Blood Glucose.: 196 mg/dL (26 Jul 2024 21:22)  POCT Blood Glucose.: 216 mg/dL (26 Jul 2024 17:03)      Anion Gap: 11 (07-27 @ 06:48)  Anion Gap: 16 (07-26 @ 01:22)      Calcium: 8.8 (07-27 @ 06:48)  Calcium: 9.0 (07-26 @ 01:22)  Albumin: 3.0 *L* (07-26 @ 01:22)    Alanine Aminotransferase (ALT/SGPT): 21 (07-26 @ 01:22)  Alkaline Phosphatase: 46 (07-26 @ 01:22)  Aspartate Aminotransferase (AST/SGOT): 78 *H* (07-26 @ 01:22)        07-27    136  |  99  |  21  ----------------------------<  155<H>  4.2   |  26  |  0.84    Ca    8.8      27 Jul 2024 06:48  Phos  1.7     07-27  Mg     1.8     07-27    TPro  5.3<L>  /  Alb  3.0<L>  /  TBili  0.5  /  DBili  x   /  AST  78<H>  /  ALT  21  /  AlkPhos  46  07-26                        9.1    9.40  )-----------( 103      ( 27 Jul 2024 06:48 )             27.7     Medications  MEDICATIONS  (STANDING):  apixaban 2.5 milliGRAM(s) Oral every 12 hours  apixaban      aspirin  chewable 81 milliGRAM(s) Oral daily  atorvastatin 80 milliGRAM(s) Oral at bedtime  chlorhexidine 0.12% Liquid 15 milliLiter(s) Oral Mucosa every 12 hours  clopidogrel Tablet 75 milliGRAM(s) Oral daily  insulin glargine Injectable (LANTUS) 17 Unit(s) SubCutaneous at bedtime  insulin lispro (ADMELOG) corrective regimen sliding scale   SubCutaneous at bedtime  insulin lispro (ADMELOG) corrective regimen sliding scale   SubCutaneous three times a day before meals  insulin lispro Injectable (ADMELOG) 4 Unit(s) SubCutaneous three times a day before meals  melatonin 3 milliGRAM(s) Oral at bedtime  metoprolol tartrate 25 milliGRAM(s) Oral every 8 hours  pantoprazole    Tablet 40 milliGRAM(s) Oral before breakfast  sodium phosphate 15 milliMole(s)/250 mL IVPB 15 milliMole(s) IV Intermittent once  tamsulosin 0.4 milliGRAM(s) Oral at bedtime      Physical Exam  General: Patient comfortable in bed   Vital Signs Last 12 Hrs  T(F): 98.2 (07-27-24 @ 12:49), Max: 98.2 (07-27-24 @ 11:15)  HR: 96 (07-27-24 @ 12:49) (85 - 98)  BP: 115/64 (07-27-24 @ 12:49) (111/62 - 148/66)  BP(mean): 78 (07-27-24 @ 11:15) (78 - 78)  RR: 19 (07-27-24 @ 12:49) (17 - 19)  SpO2: 99% (07-27-24 @ 12:49) (95% - 99%)    CVS: S1S2   Respiratory: No wheezing, no crepitations  GI: Abdomen soft, bowel sounds positive  Musculoskeletal:  moves all extremities  : Voiding      Chief complaint  Patient is a 84y old  Male who presents with a chief complaint of Severe AS (27 Jul 2024 09:45)         Labs and Fingersticks  CAPILLARY BLOOD GLUCOSE      POCT Blood Glucose.: 210 mg/dL (27 Jul 2024 11:29)  POCT Blood Glucose.: 156 mg/dL (27 Jul 2024 08:01)  POCT Blood Glucose.: 196 mg/dL (26 Jul 2024 21:22)  POCT Blood Glucose.: 216 mg/dL (26 Jul 2024 17:03)      Anion Gap: 11 (07-27 @ 06:48)  Anion Gap: 16 (07-26 @ 01:22)      Calcium: 8.8 (07-27 @ 06:48)  Calcium: 9.0 (07-26 @ 01:22)  Albumin: 3.0 *L* (07-26 @ 01:22)    Alanine Aminotransferase (ALT/SGPT): 21 (07-26 @ 01:22)  Alkaline Phosphatase: 46 (07-26 @ 01:22)  Aspartate Aminotransferase (AST/SGOT): 78 *H* (07-26 @ 01:22)        07-27    136  |  99  |  21  ----------------------------<  155<H>  4.2   |  26  |  0.84    Ca    8.8      27 Jul 2024 06:48  Phos  1.7     07-27  Mg     1.8     07-27    TPro  5.3<L>  /  Alb  3.0<L>  /  TBili  0.5  /  DBili  x   /  AST  78<H>  /  ALT  21  /  AlkPhos  46  07-26                        9.1    9.40  )-----------( 103      ( 27 Jul 2024 06:48 )             27.7     Medications  MEDICATIONS  (STANDING):  apixaban 2.5 milliGRAM(s) Oral every 12 hours  apixaban      aspirin  chewable 81 milliGRAM(s) Oral daily  atorvastatin 80 milliGRAM(s) Oral at bedtime  chlorhexidine 0.12% Liquid 15 milliLiter(s) Oral Mucosa every 12 hours  clopidogrel Tablet 75 milliGRAM(s) Oral daily  insulin glargine Injectable (LANTUS) 17 Unit(s) SubCutaneous at bedtime  insulin lispro (ADMELOG) corrective regimen sliding scale   SubCutaneous at bedtime  insulin lispro (ADMELOG) corrective regimen sliding scale   SubCutaneous three times a day before meals  insulin lispro Injectable (ADMELOG) 4 Unit(s) SubCutaneous three times a day before meals  melatonin 3 milliGRAM(s) Oral at bedtime  metoprolol tartrate 25 milliGRAM(s) Oral every 8 hours  pantoprazole    Tablet 40 milliGRAM(s) Oral before breakfast  sodium phosphate 15 milliMole(s)/250 mL IVPB 15 milliMole(s) IV Intermittent once  tamsulosin 0.4 milliGRAM(s) Oral at bedtime      Physical Exam  General: Patient comfortable in bed   Vital Signs Last 12 Hrs  T(F): 98.2 (07-27-24 @ 12:49), Max: 98.2 (07-27-24 @ 11:15)  HR: 96 (07-27-24 @ 12:49) (85 - 98)  BP: 115/64 (07-27-24 @ 12:49) (111/62 - 148/66)  BP(mean): 78 (07-27-24 @ 11:15) (78 - 78)  RR: 19 (07-27-24 @ 12:49) (17 - 19)  SpO2: 99% (07-27-24 @ 12:49) (95% - 99%)    CVS: S1S2   Respiratory: No wheezing, no crepitations  GI: Abdomen soft, bowel sounds positive  Musculoskeletal:  moves all extremities  : Voiding

## 2024-07-27 NOTE — PROGRESS NOTE ADULT - ASSESSMENT
83 yo M s/p TAVR access B/L femoral artery c/b failed closure device. Vascular consulted intra-op for RLE ALI s/p R iliofemoral bypass w/ PTFE and RLE angiogram. Post-op, patient w/ LLE ALI RTOR s/p L SFA interposition bypass w/ reversed ipsilateral GSV.     Recommendations:   - Recovering appropriately post-op  - Gentle ACE wrap and B/L LE elevation   - Cover B/L groins   - Continue physical therapy OOB/ambulation   - Global care per primary       Vascular Surgery  [id number]

## 2024-07-27 NOTE — PROGRESS NOTE ADULT - PROBLEM SELECTOR PLAN 1
Structural Team following   Continue with ASA 81 mg PO Daily and Plavix 75 mg PO daily   Continue with Lopressor 25 mg PO every 8 hours   Continue with Lipitor 80 mg PO HS   Daily EKG    Increase activity as tolerated.   Encourage Chest PT / Pulmonary toileting and Incentive spirometry every 1 hour x 10 while awake.   Continue with Protonix 40 mg PO daily for PUD prophylaxis.   Daily Shower   D/C plan home with [facility] once medically cleared   Plan of care discussed with attending

## 2024-07-28 LAB
ANION GAP SERPL CALC-SCNC: 12 MMOL/L — SIGNIFICANT CHANGE UP (ref 5–17)
BUN SERPL-MCNC: 16 MG/DL — SIGNIFICANT CHANGE UP (ref 7–23)
CALCIUM SERPL-MCNC: 8.6 MG/DL — SIGNIFICANT CHANGE UP (ref 8.4–10.5)
CHLORIDE SERPL-SCNC: 100 MMOL/L — SIGNIFICANT CHANGE UP (ref 96–108)
CO2 SERPL-SCNC: 26 MMOL/L — SIGNIFICANT CHANGE UP (ref 22–31)
CREAT SERPL-MCNC: 0.78 MG/DL — SIGNIFICANT CHANGE UP (ref 0.5–1.3)
EGFR: 89 ML/MIN/1.73M2 — SIGNIFICANT CHANGE UP
GLUCOSE BLDC GLUCOMTR-MCNC: 108 MG/DL — HIGH (ref 70–99)
GLUCOSE BLDC GLUCOMTR-MCNC: 108 MG/DL — HIGH (ref 70–99)
GLUCOSE BLDC GLUCOMTR-MCNC: 213 MG/DL — HIGH (ref 70–99)
GLUCOSE BLDC GLUCOMTR-MCNC: 88 MG/DL — SIGNIFICANT CHANGE UP (ref 70–99)
GLUCOSE SERPL-MCNC: 102 MG/DL — HIGH (ref 70–99)
HCT VFR BLD CALC: 26 % — LOW (ref 39–50)
HGB BLD-MCNC: 8.7 G/DL — LOW (ref 13–17)
MAGNESIUM SERPL-MCNC: 2.1 MG/DL — SIGNIFICANT CHANGE UP (ref 1.6–2.6)
MCHC RBC-ENTMCNC: 31.5 PG — SIGNIFICANT CHANGE UP (ref 27–34)
MCHC RBC-ENTMCNC: 33.5 GM/DL — SIGNIFICANT CHANGE UP (ref 32–36)
MCV RBC AUTO: 94.2 FL — SIGNIFICANT CHANGE UP (ref 80–100)
NRBC # BLD: 0 /100 WBCS — SIGNIFICANT CHANGE UP (ref 0–0)
PHOSPHATE SERPL-MCNC: 2 MG/DL — LOW (ref 2.5–4.5)
PLATELET # BLD AUTO: 113 K/UL — LOW (ref 150–400)
POTASSIUM SERPL-MCNC: 3.7 MMOL/L — SIGNIFICANT CHANGE UP (ref 3.5–5.3)
POTASSIUM SERPL-SCNC: 3.7 MMOL/L — SIGNIFICANT CHANGE UP (ref 3.5–5.3)
RBC # BLD: 2.76 M/UL — LOW (ref 4.2–5.8)
RBC # FLD: 14.5 % — SIGNIFICANT CHANGE UP (ref 10.3–14.5)
SODIUM SERPL-SCNC: 138 MMOL/L — SIGNIFICANT CHANGE UP (ref 135–145)
WBC # BLD: 6.75 K/UL — SIGNIFICANT CHANGE UP (ref 3.8–10.5)
WBC # FLD AUTO: 6.75 K/UL — SIGNIFICANT CHANGE UP (ref 3.8–10.5)

## 2024-07-28 PROCEDURE — 71045 X-RAY EXAM CHEST 1 VIEW: CPT | Mod: 26

## 2024-07-28 PROCEDURE — 93970 EXTREMITY STUDY: CPT | Mod: 26

## 2024-07-28 PROCEDURE — 99232 SBSQ HOSP IP/OBS MODERATE 35: CPT

## 2024-07-28 PROCEDURE — 93010 ELECTROCARDIOGRAM REPORT: CPT

## 2024-07-28 RX ORDER — INSULIN LISPRO 100/ML
5 VIAL (ML) SUBCUTANEOUS
Refills: 0 | Status: DISCONTINUED | OUTPATIENT
Start: 2024-07-28 | End: 2024-07-30

## 2024-07-28 RX ORDER — POTASSIUM CHLORIDE 1500 MG/1
20 TABLET, EXTENDED RELEASE ORAL
Refills: 0 | Status: COMPLETED | OUTPATIENT
Start: 2024-07-28 | End: 2024-07-28

## 2024-07-28 RX ORDER — INSULIN GLARGINE-YFGN 100 [IU]/ML
14 INJECTION, SOLUTION SUBCUTANEOUS AT BEDTIME
Refills: 0 | Status: DISCONTINUED | OUTPATIENT
Start: 2024-07-28 | End: 2024-07-30

## 2024-07-28 RX ADMIN — Medication 0.4 MILLIGRAM(S): at 21:14

## 2024-07-28 RX ADMIN — Medication 5 UNIT(S): at 17:28

## 2024-07-28 RX ADMIN — APIXABAN 2.5 MILLIGRAM(S): 5 TABLET, FILM COATED ORAL at 06:50

## 2024-07-28 RX ADMIN — POTASSIUM CHLORIDE 20 MILLIEQUIVALENT(S): 1500 TABLET, EXTENDED RELEASE ORAL at 13:12

## 2024-07-28 RX ADMIN — Medication 3 MILLIGRAM(S): at 21:15

## 2024-07-28 RX ADMIN — POTASSIUM CHLORIDE 20 MILLIEQUIVALENT(S): 1500 TABLET, EXTENDED RELEASE ORAL at 15:10

## 2024-07-28 RX ADMIN — Medication 4 UNIT(S): at 08:39

## 2024-07-28 RX ADMIN — Medication 25 MILLIGRAM(S): at 21:14

## 2024-07-28 RX ADMIN — CHLORHEXIDINE GLUCONATE 15 MILLILITER(S): 500 CLOTH TOPICAL at 06:50

## 2024-07-28 RX ADMIN — PANTOPRAZOLE SODIUM 40 MILLIGRAM(S): 20 TABLET, DELAYED RELEASE ORAL at 06:50

## 2024-07-28 RX ADMIN — Medication 81 MILLIGRAM(S): at 12:07

## 2024-07-28 RX ADMIN — Medication 25 MILLIGRAM(S): at 13:12

## 2024-07-28 RX ADMIN — ATORVASTATIN CALCIUM 80 MILLIGRAM(S): 40 TABLET, FILM COATED ORAL at 21:14

## 2024-07-28 RX ADMIN — CHLORHEXIDINE GLUCONATE 15 MILLILITER(S): 500 CLOTH TOPICAL at 17:50

## 2024-07-28 RX ADMIN — CLOPIDOGREL BISULFATE 75 MILLIGRAM(S): 75 TABLET, FILM COATED ORAL at 12:07

## 2024-07-28 RX ADMIN — INSULIN GLARGINE-YFGN 14 UNIT(S): 100 INJECTION, SOLUTION SUBCUTANEOUS at 21:48

## 2024-07-28 RX ADMIN — Medication 5 UNIT(S): at 12:06

## 2024-07-28 RX ADMIN — Medication 25 MILLIGRAM(S): at 06:50

## 2024-07-28 RX ADMIN — APIXABAN 2.5 MILLIGRAM(S): 5 TABLET, FILM COATED ORAL at 17:50

## 2024-07-28 NOTE — PROGRESS NOTE ADULT - SUBJECTIVE AND OBJECTIVE BOX
Chief complaint  Patient is a 84y old  Male who presents with a chief complaint of Severe AS (27 Jul 2024 09:45)         Labs and Fingersticks  CAPILLARY BLOOD GLUCOSE      POCT Blood Glucose.: 108 mg/dL (28 Jul 2024 11:57)  POCT Blood Glucose.: 108 mg/dL (28 Jul 2024 08:17)  POCT Blood Glucose.: 198 mg/dL (27 Jul 2024 21:36)  POCT Blood Glucose.: 193 mg/dL (27 Jul 2024 16:50)      Anion Gap: 12 (07-28 @ 07:17)  Anion Gap: 11 (07-27 @ 06:48)      Calcium: 8.6 (07-28 @ 07:17)  Calcium: 8.8 (07-27 @ 06:48)          07-28    138  |  100  |  16  ----------------------------<  102<H>  3.7   |  26  |  0.78    Ca    8.6      28 Jul 2024 07:17  Phos  2.0     07-28  Mg     2.1     07-28                          8.7    6.75  )-----------( 113      ( 28 Jul 2024 07:16 )             26.0     Medications  MEDICATIONS  (STANDING):  apixaban      apixaban 2.5 milliGRAM(s) Oral every 12 hours  aspirin  chewable 81 milliGRAM(s) Oral daily  atorvastatin 80 milliGRAM(s) Oral at bedtime  chlorhexidine 0.12% Liquid 15 milliLiter(s) Oral Mucosa every 12 hours  clopidogrel Tablet 75 milliGRAM(s) Oral daily  insulin glargine Injectable (LANTUS) 14 Unit(s) SubCutaneous at bedtime  insulin lispro (ADMELOG) corrective regimen sliding scale   SubCutaneous at bedtime  insulin lispro (ADMELOG) corrective regimen sliding scale   SubCutaneous three times a day before meals  insulin lispro Injectable (ADMELOG) 5 Unit(s) SubCutaneous three times a day before meals  melatonin 3 milliGRAM(s) Oral at bedtime  metoprolol tartrate 25 milliGRAM(s) Oral every 8 hours  pantoprazole    Tablet 40 milliGRAM(s) Oral before breakfast  potassium chloride    Tablet ER 20 milliEquivalent(s) Oral every 2 hours  tamsulosin 0.4 milliGRAM(s) Oral at bedtime      Physical Exam  General: Patient comfortable in bed   Vital Signs Last 12 Hrs  T(F): 97.9 (07-28-24 @ 13:00), Max: 98.1 (07-28-24 @ 04:00)  HR: 105 (07-28-24 @ 13:00) (100 - 105)  BP: 120/54 (07-28-24 @ 13:00) (114/65 - 126/63)  BP(mean): 84 (07-28-24 @ 06:48) (81 - 84)  RR: 18 (07-28-24 @ 13:00) (18 - 18)  SpO2: 97% (07-28-24 @ 13:00) (95% - 98%)    CVS: S1S2   Respiratory: No wheezing, no crepitations  GI: Abdomen soft, bowel sounds positive  Musculoskeletal:  moves all extremities  : Voiding

## 2024-07-28 NOTE — PROGRESS NOTE ADULT - PROBLEM SELECTOR PLAN 1
Will continue Lantus to 14  u at bedtime.  Will continue  Admelog to 5 u before each meal and continue Admelog correction scale coverage. Will continue monitoring FS and Follow up.   Will continue monitoring FS, log, and glucose trends, will Follow up.  Patient counseled for compliance with consistent low carb diet and exercise as tolerated outpatient.  May get discharged on his home DM meds  FU outpt

## 2024-07-28 NOTE — PHYSICAL THERAPY INITIAL EVALUATION ADULT - TRANSFER TRAINING, PT EVAL
GOAL: Pt will perform sit to/from stand transfers independently with rolling walker within 3-4weeks.

## 2024-07-28 NOTE — PHYSICAL THERAPY INITIAL EVALUATION ADULT - PERTINENT HX OF CURRENT PROBLEM, REHAB EVAL
83 yo male with PMH/PSHx significant for  HTN, HLD, DM2 , CAD with PCI CABG x3/ AVR ( 2011, [facility], He does not have his valve card) and coronary stent x1 ( 5/2019, on Plavix ), AFib on Eliquis (last dose 7/21), Left Carotid Endarterectomy on 10/7/21. Presents in PST prior to scheduled TAVR on 07/24/26.  Denies CP, dizziness, recent fevers, chills, cough, chest pain or SOB and feels well otherwise, he sleeps with one pillow and has not woken up with SOB or has had no hospitalizations.

## 2024-07-28 NOTE — PHYSICAL THERAPY INITIAL EVALUATION ADULT - ADDITIONAL COMMENTS
Pt lives with spouse in private apartment, + elevator access. 2 LUL, no stairs inside. PTA, pt was independent with all mobility without use of assistive device.

## 2024-07-28 NOTE — PROGRESS NOTE ADULT - ASSESSMENT
85 yo male with PMH/PSHx significant for  HTN, HLD, DM2 , CAD with PCI CABG x3/ AVR ( 2011, [facility], He does not have his valve card) and coronary stent x1 ( 5/2019, on Plavix ), AFib on Eliquis (last dose 7/21), Left Carotid Endarterectomy on 10/7/21. Presents in PST prior to scheduled TAVR on 07/24/26.  Denies CP, dizziness, recent fevers, chills, cough, chest pain or SOB and feels well otherwise, he sleeps with one pillow and has not woken up with SOB or has had no hospitalizations.      Hospital Course:   7/24/24 s/p TF Pily TAVR using a 23mm Medtronic Corevalve EVOLUT FX placed in a 19mm Mitroflow surgical valve with a stent to the left main to avoid occlusion of the coronary by the valve. Right groin 18FrA sheath - closed with Perclose x 2. Left groin 6FrA sheath removed with Perclose x 1. Left groin 8FrV sheath - manual compression, TVP removed in the lab.   Intra op complicated by perclose failed in right femoral artery resulting in need for femoral cutdown and underwent peripheral bypass with Vascular.   7/24 Return to OR for s/p Cutdown on right femoral artery. Distal embolectomy with #3 Esperanza catheter. Femoral endarterectomy; however, vessel too thin to patch. Therefore, decision made to do interposition bypass. However, proximal anastomosis would not hold 2/2 to friable and thin walled nature of CFA. Incision extended and external iliac artery to SFA bypass (both end-to-end anastamosis) performed with 6mm PTFE. Profunda end-to-side anastamosis onto bypass graft. Completion angiogram demonstrating peroneal runoff. Peroneal signal present in RLE at end of case.   In PACU Left foot was noted to be dusky with no palpable pulses and no dopplerable signal. He had normal sensation but could not move his toes. Vascular surgery made the decision to return to the OR   7/25 s/p Left SFA interposition bypass w/ rGSV.   Post op Course:   Transferred to CTU for post op recovery. Post op TTE revealed: (07.25.24 @ 07:30) A 23 mm TAVR (valve-in-valve) is present in the aortic position. The prosthetic valve is well seated with normal function. No intravalvular regurgitation No paravalvular regurgitation. No pericardial effusion seen.  Stress Hyperglycemia / h/o DM2 --> Endocrine following   7/26 VSS; Greenberg D/C'd --> failed TOV (+) urinary retention.  Greenberg reinserted. Started on Flomax 0.4 mg PO HS.  Transferred to 2 [facility] Telemetry floor.  Structural Team following. Daily EKG.  Daily PT. Started on Eliquis 2.5 mg PO BID for chronic Afib.  7/27 VSS; Continue with current medication regimen. Structural Team and Vascular following.  Daily EKG. Increase ambulation.  Maintain Greenberg x 24 hours, plan for TOV in AM.          Disposition: Home once medically cleared.

## 2024-07-28 NOTE — PROGRESS NOTE ADULT - SUBJECTIVE AND OBJECTIVE BOX
Subjective: Pt states " " denies any CP, SOB, N/V and palpitations. No acute events overnight.     VITAL SIGNS    Telemetry: AFIB 100   Vital Signs Last 24 Hrs  T(C): 36.6 (07-28-24 @ 13:00), Max: 36.9 (07-27-24 @ 19:58)  T(F): 97.9 (07-28-24 @ 13:00), Max: 98.4 (07-27-24 @ 19:58)  HR: 105 (07-28-24 @ 13:00) (93 - 105)  BP: 120/54 (07-28-24 @ 13:00) (114/65 - 147/68)  RR: 18 (07-28-24 @ 13:00) (18 - 19)  SpO2: 97% (07-28-24 @ 13:00) (95% - 98%)            07-27 @ 07:01  -  07-28 @ 07:00  --------------------------------------------------------  IN: 480 mL / OUT: 3550 mL / NET: -3070 mL    07-28 @ 07:01  -  07-28 @ 13:40  --------------------------------------------------------  IN: 0 mL / OUT: 400 mL / NET: -400 mL       CAPILLARY BLOOD GLUCOSE      POCT Blood Glucose.: 108 mg/dL (28 Jul 2024 11:57)  POCT Blood Glucose.: 108 mg/dL (28 Jul 2024 08:17)  POCT Blood Glucose.: 198 mg/dL (27 Jul 2024 21:36)  POCT Blood Glucose.: 193 mg/dL (27 Jul 2024 16:50)  POCT Blood Glucose.: 168 mg/dL (27 Jul 2024 14:02)          MEDICATIONS  apixaban 2.5 milliGRAM(s) Oral every 12 hours  apixaban      aspirin  chewable 81 milliGRAM(s) Oral daily  atorvastatin 80 milliGRAM(s) Oral at bedtime  chlorhexidine 0.12% Liquid 15 milliLiter(s) Oral Mucosa every 12 hours  clopidogrel Tablet 75 milliGRAM(s) Oral daily  insulin glargine Injectable (LANTUS) 14 Unit(s) SubCutaneous at bedtime  insulin lispro (ADMELOG) corrective regimen sliding scale   SubCutaneous at bedtime  insulin lispro (ADMELOG) corrective regimen sliding scale   SubCutaneous three times a day before meals  insulin lispro Injectable (ADMELOG) 5 Unit(s) SubCutaneous three times a day before meals  melatonin 3 milliGRAM(s) Oral at bedtime  metoprolol tartrate 25 milliGRAM(s) Oral every 8 hours  pantoprazole    Tablet 40 milliGRAM(s) Oral before breakfast  potassium chloride    Tablet ER 20 milliEquivalent(s) Oral every 2 hours  tamsulosin 0.4 milliGRAM(s) Oral at bedtime        PHYSICAL EXAM    Neurology: A&Ox3, nonfocal, no gross deficits  CV : RRR+S1S2  Sternal Wound : Stable  Lungs: Respirations non-labored, B/L BS  Abdomen: Soft, NT/ND, +BSx4Q, last BM on 7/28 (-)N/V/D  : Greenberg D/C'd   Extremities: B/L LE edema R>L, negative calf tenderness, +PP       LABS  07-28    138  |  100  |  16  ----------------------------<  102<H>  3.7   |  26  |  0.78    Ca    8.6      28 Jul 2024 07:17  Phos  2.0     07-28  Mg     2.1     07-28                                   8.7    6.75  )-----------( 113      ( 28 Jul 2024 07:16 )             26.0             VA Duplex Lower Ext Vein Scan: FINDINGS:    RIGHT:  Normal compressibility of the RIGHT common femoral, femoral and popliteal   veins.  Doppler examination shows normal spontaneous and phasic flow.  No RIGHT calf vein thrombosis is detected.    LEFT:  Normal compressibility of the LEFT common femoral, femoral and popliteal   veins. Limited evaluation of the left groin due to overlying dressing.  Doppler examination shows normal spontaneous and phasic flow.  No LEFT calf vein thrombosis is detected.    IMPRESSION:  No evidence of deep venous thrombosis in either lower extremity.      PAST MEDICAL & SURGICAL HISTORY:  3-vessel CAD  s/p 3v CABG 2016, s/p stent 2019      HTN (hypertension)      DM2 (diabetes mellitus, type 2)      Occlusion and stenosis of unspecified carotid artery      HLD (hyperlipidemia)      Chronic atrial fibrillation      S/P CABG x 3  2016      S/P AVR (aortic valve replacement)  bovine 2016      S/P coronary artery stent placement  x1 2019      History of CEA (carotid endarterectomy)           Physical Therapy Rec:   Home  [  ]   Home w/ PT  [  ]  Rehab  [  ]  Family wants Rehab awaiting PT consult    Discussed with CT Surgery attending.

## 2024-07-28 NOTE — PHYSICAL THERAPY INITIAL EVALUATION ADULT - FUNCTIONAL LIMITATIONS, PT EVAL
Bilateral Helical Rim Advancement Flap Text: The defect edges were debeveled with a #15 blade scalpel.  Given the location of the defect and the proximity to free margins (helical rim) a bilateral helical rim advancement flap was deemed most appropriate.  Using a sterile surgical marker, the appropriate advancement flaps were drawn incorporating the defect and placing the expected incisions between the helical rim and antihelix where possible.  The area thus outlined was incised through and through with a #15 scalpel blade.  With a skin hook and iris scissors, the flaps were gently and sharply undermined and freed up. self-care/home management/community/leisure

## 2024-07-28 NOTE — PROGRESS NOTE ADULT - PROBLEM SELECTOR PLAN 1
s/p LM stent and TF Pily TAVR using a 23mm Medtronic Corevalve EVOLUT FX placed in a [facility].  DAPT   statin  Post op TTE reviewed  PT genna   wants to go to rehab as he has no one at home to take care of him during the day

## 2024-07-28 NOTE — PROGRESS NOTE ADULT - SUBJECTIVE AND OBJECTIVE BOX
Subjective: Patient seen and examined. No new events except as noted.     REVIEW OF SYSTEMS:    CONSTITUTIONAL: + weakness, fevers or chills  EYES/ENT: No visual changes;  No vertigo or throat pain   NECK: No pain or stiffness  RESPIRATORY: No cough, wheezing, hemoptysis; No shortness of breath  CARDIOVASCULAR: No chest pain or palpitations  GASTROINTESTINAL: No abdominal or epigastric pain. No nausea, vomiting, or hematemesis; No diarrhea or constipation. No melena or hematochezia.  GENITOURINARY: No dysuria, frequency or hematuria  NEUROLOGICAL: No numbness or weakness  SKIN: No itching, burning, rashes, or lesions   All other review of systems is negative unless indicated above.    MEDICATIONS:  MEDICATIONS  (STANDING):  apixaban 2.5 milliGRAM(s) Oral every 12 hours  apixaban      aspirin  chewable 81 milliGRAM(s) Oral daily  atorvastatin 80 milliGRAM(s) Oral at bedtime  chlorhexidine 0.12% Liquid 15 milliLiter(s) Oral Mucosa every 12 hours  clopidogrel Tablet 75 milliGRAM(s) Oral daily  insulin glargine Injectable (LANTUS) 17 Unit(s) SubCutaneous at bedtime  insulin lispro (ADMELOG) corrective regimen sliding scale   SubCutaneous three times a day before meals  insulin lispro (ADMELOG) corrective regimen sliding scale   SubCutaneous at bedtime  insulin lispro Injectable (ADMELOG) 4 Unit(s) SubCutaneous three times a day before meals  melatonin 3 milliGRAM(s) Oral at bedtime  metoprolol tartrate 25 milliGRAM(s) Oral every 8 hours  pantoprazole    Tablet 40 milliGRAM(s) Oral before breakfast  tamsulosin 0.4 milliGRAM(s) Oral at bedtime      PHYSICAL EXAM:  T(C): 36.7 (07-28-24 @ 04:00), Max: 36.9 (07-27-24 @ 19:58)  HR: 105 (07-28-24 @ 06:48) (93 - 105)  BP: 126/63 (07-28-24 @ 06:48) (111/62 - 147/68)  RR: 18 (07-28-24 @ 06:48) (17 - 19)  SpO2: 98% (07-28-24 @ 06:48) (95% - 99%)  Wt(kg): --  I&O's Summary    27 Jul 2024 07:01  -  28 Jul 2024 07:00  --------------------------------------------------------  IN: 480 mL / OUT: 1250 mL / NET: -770 mL          Appearance: Normal	  HEENT:   Normal oral mucosa, PERRL, EOMI	  Lymphatic: No lymphadenopathy , 1+edema  Cardiovascular: Normal S1 S2, No JVD, No murmurs , Peripheral pulses palpable 2+ bilaterally  Respiratory: Lungs clear to auscultation, normal effort 	  Gastrointestinal:  Soft, Non-tender, + BS	  Skin: No rashes, No ecchymoses, No cyanosis, warm to touch  Musculoskeletal: Normal range of motion, normal strength  Psychiatry:  Mood & affect appropriate  Ext: 1+ edema  Right heel wound   +niño      LABS:    CARDIAC MARKERS:                                9.1    9.40  )-----------( 103      ( 27 Jul 2024 06:48 )             27.7     07-27    136  |  99  |  21  ----------------------------<  155<H>  4.2   |  26  |  0.84    Ca    8.8      27 Jul 2024 06:48  Phos  1.7     07-27  Mg     1.8     07-27      proBNP:   Lipid Profile:   HgA1c:   TSH:             TELEMETRY: 	  SAF  ECG:  	  RADIOLOGY:   DIAGNOSTIC TESTING:  [ ] Echocardiogram:  [ ]  Catheterization:  [ ] Stress Test:    OTHER:

## 2024-07-28 NOTE — PROGRESS NOTE ADULT - ASSESSMENT
84M pmHX HTN, HLD, DM2 , CAD with PCI CABG x3/ AVR( 2011, [facility], He does not have his valve card) and coronary stent x1 ( 5/2019, on Plavix ),  AFib  on Eliquis (last dose 7/21), Left Carotid Endarterectomy on 10/7/21.?  Patient underwent a valve in valve TAVR 7/24 along with a stent to the left main to avoid occlusion of the coronary by the valve. Intraoperatively patient required a right ileofemoral bypass. Subsequently, in the PACU, patient developed a cold left leg. He was found to have an occluded left SFA due to vessel closure device. He returned to the OR for a resection and vein graft at the SFA.     Assessment  DMT2: 84y Male with DM T2 with hyperglycemia, A1C 8.5% , was on oral meds  at home, now on basal bolus insulin with coverage, blood sugars running high postop   s/p TAVR. CTU postop   CAD: on medications, stable, monitored.  HTN: on antihypertensive medications, monitored, asymptomatic.    Discussed plan and management wit Dr Oneil Angulo NP

## 2024-07-29 LAB
ALBUMIN SERPL ELPH-MCNC: 2.5 G/DL — LOW (ref 3.3–5)
ALP SERPL-CCNC: 57 U/L — SIGNIFICANT CHANGE UP (ref 40–120)
ALT FLD-CCNC: 15 U/L — SIGNIFICANT CHANGE UP (ref 10–45)
ANION GAP SERPL CALC-SCNC: 10 MMOL/L — SIGNIFICANT CHANGE UP (ref 5–17)
AST SERPL-CCNC: 49 U/L — HIGH (ref 10–40)
BILIRUB SERPL-MCNC: 1 MG/DL — SIGNIFICANT CHANGE UP (ref 0.2–1.2)
BUN SERPL-MCNC: 13 MG/DL — SIGNIFICANT CHANGE UP (ref 7–23)
CALCIUM SERPL-MCNC: 8.4 MG/DL — SIGNIFICANT CHANGE UP (ref 8.4–10.5)
CHLORIDE SERPL-SCNC: 103 MMOL/L — SIGNIFICANT CHANGE UP (ref 96–108)
CO2 SERPL-SCNC: 25 MMOL/L — SIGNIFICANT CHANGE UP (ref 22–31)
CREAT SERPL-MCNC: 0.83 MG/DL — SIGNIFICANT CHANGE UP (ref 0.5–1.3)
EGFR: 87 ML/MIN/1.73M2 — SIGNIFICANT CHANGE UP
GLUCOSE BLDC GLUCOMTR-MCNC: 158 MG/DL — HIGH (ref 70–99)
GLUCOSE BLDC GLUCOMTR-MCNC: 173 MG/DL — HIGH (ref 70–99)
GLUCOSE BLDC GLUCOMTR-MCNC: 197 MG/DL — HIGH (ref 70–99)
GLUCOSE BLDC GLUCOMTR-MCNC: 214 MG/DL — HIGH (ref 70–99)
GLUCOSE SERPL-MCNC: 131 MG/DL — HIGH (ref 70–99)
HCT VFR BLD CALC: 22.5 % — LOW (ref 39–50)
HCT VFR BLD CALC: 23.8 % — LOW (ref 39–50)
HCT VFR BLD CALC: 28 % — LOW (ref 39–50)
HGB BLD-MCNC: 7.4 G/DL — LOW (ref 13–17)
HGB BLD-MCNC: 7.9 G/DL — LOW (ref 13–17)
HGB BLD-MCNC: 9.3 G/DL — LOW (ref 13–17)
MCHC RBC-ENTMCNC: 30.8 PG — SIGNIFICANT CHANGE UP (ref 27–34)
MCHC RBC-ENTMCNC: 31 PG — SIGNIFICANT CHANGE UP (ref 27–34)
MCHC RBC-ENTMCNC: 31.5 PG — SIGNIFICANT CHANGE UP (ref 27–34)
MCHC RBC-ENTMCNC: 32.9 GM/DL — SIGNIFICANT CHANGE UP (ref 32–36)
MCHC RBC-ENTMCNC: 33.2 GM/DL — SIGNIFICANT CHANGE UP (ref 32–36)
MCHC RBC-ENTMCNC: 33.2 GM/DL — SIGNIFICANT CHANGE UP (ref 32–36)
MCV RBC AUTO: 92.7 FL — SIGNIFICANT CHANGE UP (ref 80–100)
MCV RBC AUTO: 94.1 FL — SIGNIFICANT CHANGE UP (ref 80–100)
MCV RBC AUTO: 94.8 FL — SIGNIFICANT CHANGE UP (ref 80–100)
NRBC # BLD: 0 /100 WBCS — SIGNIFICANT CHANGE UP (ref 0–0)
PLATELET # BLD AUTO: 126 K/UL — LOW (ref 150–400)
PLATELET # BLD AUTO: 136 K/UL — LOW (ref 150–400)
PLATELET # BLD AUTO: 145 K/UL — LOW (ref 150–400)
POTASSIUM SERPL-MCNC: 4.3 MMOL/L — SIGNIFICANT CHANGE UP (ref 3.5–5.3)
POTASSIUM SERPL-SCNC: 4.3 MMOL/L — SIGNIFICANT CHANGE UP (ref 3.5–5.3)
PROT SERPL-MCNC: 5.2 G/DL — LOW (ref 6–8.3)
RBC # BLD: 2.39 M/UL — LOW (ref 4.2–5.8)
RBC # BLD: 2.51 M/UL — LOW (ref 4.2–5.8)
RBC # BLD: 3.02 M/UL — LOW (ref 4.2–5.8)
RBC # FLD: 14.5 % — SIGNIFICANT CHANGE UP (ref 10.3–14.5)
RBC # FLD: 14.7 % — HIGH (ref 10.3–14.5)
RBC # FLD: 15.6 % — HIGH (ref 10.3–14.5)
SODIUM SERPL-SCNC: 138 MMOL/L — SIGNIFICANT CHANGE UP (ref 135–145)
SURGICAL PATHOLOGY STUDY: SIGNIFICANT CHANGE UP
WBC # BLD: 6.01 K/UL — SIGNIFICANT CHANGE UP (ref 3.8–10.5)
WBC # BLD: 6.33 K/UL — SIGNIFICANT CHANGE UP (ref 3.8–10.5)
WBC # BLD: 6.73 K/UL — SIGNIFICANT CHANGE UP (ref 3.8–10.5)
WBC # FLD AUTO: 6.01 K/UL — SIGNIFICANT CHANGE UP (ref 3.8–10.5)
WBC # FLD AUTO: 6.33 K/UL — SIGNIFICANT CHANGE UP (ref 3.8–10.5)
WBC # FLD AUTO: 6.73 K/UL — SIGNIFICANT CHANGE UP (ref 3.8–10.5)

## 2024-07-29 PROCEDURE — 74177 CT ABD & PELVIS W/CONTRAST: CPT | Mod: 26

## 2024-07-29 RX ORDER — FUROSEMIDE 10 MG/ML
40 INJECTION, SOLUTION INTRAVENOUS ONCE
Refills: 0 | Status: COMPLETED | OUTPATIENT
Start: 2024-07-29 | End: 2024-07-29

## 2024-07-29 RX ORDER — METOPROLOL TARTRATE 100 MG
50 TABLET ORAL
Refills: 0 | Status: DISCONTINUED | OUTPATIENT
Start: 2024-07-29 | End: 2024-07-31

## 2024-07-29 RX ORDER — FUROSEMIDE 10 MG/ML
20 INJECTION, SOLUTION INTRAVENOUS
Refills: 0 | Status: DISCONTINUED | OUTPATIENT
Start: 2024-07-29 | End: 2024-07-29

## 2024-07-29 RX ORDER — FUROSEMIDE 10 MG/ML
40 INJECTION, SOLUTION INTRAVENOUS
Refills: 0 | Status: COMPLETED | OUTPATIENT
Start: 2024-07-29 | End: 2024-07-30

## 2024-07-29 RX ADMIN — ATORVASTATIN CALCIUM 80 MILLIGRAM(S): 40 TABLET, FILM COATED ORAL at 21:47

## 2024-07-29 RX ADMIN — Medication 81 MILLIGRAM(S): at 12:41

## 2024-07-29 RX ADMIN — INSULIN GLARGINE-YFGN 14 UNIT(S): 100 INJECTION, SOLUTION SUBCUTANEOUS at 21:47

## 2024-07-29 RX ADMIN — Medication 1: at 12:41

## 2024-07-29 RX ADMIN — APIXABAN 2.5 MILLIGRAM(S): 5 TABLET, FILM COATED ORAL at 17:23

## 2024-07-29 RX ADMIN — Medication 0.4 MILLIGRAM(S): at 21:48

## 2024-07-29 RX ADMIN — PANTOPRAZOLE SODIUM 40 MILLIGRAM(S): 20 TABLET, DELAYED RELEASE ORAL at 05:00

## 2024-07-29 RX ADMIN — Medication 5 UNIT(S): at 12:42

## 2024-07-29 RX ADMIN — FUROSEMIDE 40 MILLIGRAM(S): 10 INJECTION, SOLUTION INTRAVENOUS at 17:25

## 2024-07-29 RX ADMIN — Medication 1: at 08:31

## 2024-07-29 RX ADMIN — CLOPIDOGREL BISULFATE 75 MILLIGRAM(S): 75 TABLET, FILM COATED ORAL at 12:41

## 2024-07-29 RX ADMIN — Medication 2: at 17:22

## 2024-07-29 RX ADMIN — Medication 25 MILLIGRAM(S): at 05:00

## 2024-07-29 RX ADMIN — Medication 50 MILLIGRAM(S): at 17:32

## 2024-07-29 RX ADMIN — Medication 5 UNIT(S): at 08:32

## 2024-07-29 RX ADMIN — CHLORHEXIDINE GLUCONATE 15 MILLILITER(S): 500 CLOTH TOPICAL at 05:00

## 2024-07-29 RX ADMIN — Medication 5 UNIT(S): at 17:23

## 2024-07-29 RX ADMIN — Medication 3 MILLIGRAM(S): at 21:48

## 2024-07-29 RX ADMIN — APIXABAN 2.5 MILLIGRAM(S): 5 TABLET, FILM COATED ORAL at 05:00

## 2024-07-29 NOTE — PROGRESS NOTE ADULT - SUBJECTIVE AND OBJECTIVE BOX
Patient discussed on morning rounds with Dr. Cisse    Operation / Date: 7/24/24 TF Valve in Valve - 23mm Medtronic Corevalve EVOLUT placed in [facility].  Perclose failed in right femoral artery resulting in need for femoral cutdown and peripheral bypass as detailed in vascular surgery op note-->    Taken from Vascular op note:  Creation of bypass from right iliac artery to right femoral artery with endarterectomy   Cutdown on right femoral artery  Distal embolectomy with #3 Esperanza catheter  Femoral endarterectomy; however, vessel too thin to patch. Therefore, decision made to do interposition bypass. However, proximal anastomosis would not hold 2/2 to friable and thin walled nature of CFA. Incision extended and external iliac artery to SFA bypass (both end-to-end anastamosis) performed with 6mm PTFE. Profunda end-to-side anastamosis onto bypass graft.  Completion angiogram demonstrating peroneal runoff  Peroneal signal present in RLE at end of case    SUBJECTIVE ASSESSMENT:  Patient feels well overall.  He did report right calf pain, upon ambulation since his TAVR.  Otherwise he's walking well, denies any dizziness or melena.  Son at bedside.  Denies CP/SOB/N/V/D/dizziness/cough/fever/chills        Vital Signs Last 24 Hrs  T(C): 37.3 (29 Jul 2024 05:00), Max: 37.3 (29 Jul 2024 05:00)  T(F): 99.1 (29 Jul 2024 05:00), Max: 99.1 (29 Jul 2024 05:00)  HR: 78 (29 Jul 2024 05:00) (78 - 105)  BP: 103/61 (29 Jul 2024 05:00) (103/61 - 127/62)  BP(mean): 75 (29 Jul 2024 05:00) (75 - 75)  RR: 18 (29 Jul 2024 05:00) (18 - 19)  SpO2: 96% (29 Jul 2024 05:00) (96% - 99%)    Parameters below as of 29 Jul 2024 05:00  Patient On (Oxygen Delivery Method): room air      I&O's Detail    28 Jul 2024 07:01  -  29 Jul 2024 07:00  --------------------------------------------------------  IN:    Oral Fluid: 150 mL  Total IN: 150 mL    OUT:    Indwelling Catheter - Urethral (mL): 400 mL    Voided (mL): 400 mL  Total OUT: 800 mL    Total NET: -650 mL          PHYSICAL EXAM:    GEN: NAD, looks comfortable  Psych: Mood appropriate  Neuro: A&Ox3.  No focal deficits.  Moving all extremities.   HEENT: No obvious abnormalities  CV: S1S2, regular, no murmurs appreciated.  No carotid bruits.  No JVD  Lungs: Clear B/L.  No wheezing, rales or rhonchi  ABD: Soft, non-tender, non-distended.  +Bowel sounds  EXT: Warm and well perfused.  No peripheral edema noted  Musculoskeletal: Moving all extremities with normal ROM, no joint swelling  PV: Pedal pulses palpable  Incision Sites: Small hematoma felt over bandage right groin.  Left groin w/ some ecchymosis (not new per patient).  Calf not particularly tender to palpation, pt states it hurts when he walks.  No sign of infection superficially.     LABS:                        7.9    6.33  )-----------( 136      ( 29 Jul 2024 10:40 )             23.8         07-29    138  |  103  |  13  ----------------------------<  131<H>  4.3   |  25  |  0.83    Ca    8.4      29 Jul 2024 05:06  Phos  2.0     07-28  Mg     2.1     07-28    TPro  5.2<L>  /  Alb  2.5<L>  /  TBili  1.0  /  DBili  x   /  AST  49<H>  /  ALT  15  /  AlkPhos  57  07-29      Urinalysis Basic - ( 29 Jul 2024 05:06 )    Color: x / Appearance: x / SG: x / pH: x  Gluc: 131 mg/dL / Ketone: x  / Bili: x / Urobili: x   Blood: x / Protein: x / Nitrite: x   Leuk Esterase: x / RBC: x / WBC x   Sq Epi: x / Non Sq Epi: x / Bacteria: x      MEDICATIONS  (STANDING):  apixaban 2.5 milliGRAM(s) Oral every 12 hours  apixaban      aspirin  chewable 81 milliGRAM(s) Oral daily  atorvastatin 80 milliGRAM(s) Oral at bedtime  chlorhexidine 0.12% Liquid 15 milliLiter(s) Oral Mucosa every 12 hours  clopidogrel Tablet 75 milliGRAM(s) Oral daily  insulin glargine Injectable (LANTUS) 14 Unit(s) SubCutaneous at bedtime  insulin lispro (ADMELOG) corrective regimen sliding scale   SubCutaneous at bedtime  insulin lispro (ADMELOG) corrective regimen sliding scale   SubCutaneous three times a day before meals  insulin lispro Injectable (ADMELOG) 5 Unit(s) SubCutaneous three times a day before meals  melatonin 3 milliGRAM(s) Oral at bedtime  metoprolol tartrate 25 milliGRAM(s) Oral every 8 hours  pantoprazole    Tablet 40 milliGRAM(s) Oral before breakfast  tamsulosin 0.4 milliGRAM(s) Oral at bedtime    MEDICATIONS  (PRN):        RADIOLOGY & ADDITIONAL TESTS:

## 2024-07-29 NOTE — PROGRESS NOTE ADULT - PROBLEM SELECTOR PLAN 1
s/p LM stent and TF Pily TAVR using a 23mm Medtronic Corevalve EVOLUT FX placed in a [facility].  DAPT   statin  Post op TTE reviewed  PT

## 2024-07-29 NOTE — PROGRESS NOTE ADULT - ASSESSMENT
84M pmHX HTN, HLD, DM2 , CAD with PCI CABG x3/ AVR( 2011, [facility], He does not have his valve card) and coronary stent x1 ( 5/2019, on Plavix ),  AFib  on Eliquis (last dose 7/21), Left Carotid Endarterectomy on 10/7/21.?  Patient underwent a valve in valve TAVR 7/24 along with a stent to the left main to avoid occlusion of the coronary by the valve. Intraoperatively patient required a right ileofemoral bypass. Subsequently, in the PACU, patient developed a cold left leg. He was found to have an occluded left SFA due to vessel closure device. He returned to the OR for a resection and vein graft at the SFA.     Assessment  DMT2: 84y Male with DM T2 with hyperglycemia, A1C 8.5% , was on oral meds  at home, now on basal bolus insulin with coverage, blood sugars running high postop.  s/p TAVR. CTU postop   CAD: on medications, stable, monitored.  HTN: on antihypertensive medications, monitored, asymptomatic.    Discussed plan and management wit Dr Oneil Angulo NP

## 2024-07-29 NOTE — PROGRESS NOTE ADULT - SUBJECTIVE AND OBJECTIVE BOX
Chief complaint  Patient is a 84y old  Male who presents with a chief complaint of Severe AS (27 Jul 2024 09:45)         Labs and Fingersticks  CAPILLARY BLOOD GLUCOSE      POCT Blood Glucose.: 197 mg/dL (29 Jul 2024 12:02)  POCT Blood Glucose.: 158 mg/dL (29 Jul 2024 07:58)  POCT Blood Glucose.: 213 mg/dL (28 Jul 2024 21:29)  POCT Blood Glucose.: 88 mg/dL (28 Jul 2024 16:43)      Anion Gap: 10 (07-29 @ 05:06)  Anion Gap: 12 (07-28 @ 07:17)      Calcium: 8.4 (07-29 @ 05:06)  Calcium: 8.6 (07-28 @ 07:17)  Albumin: 2.5 *L* (07-29 @ 05:06)    Alanine Aminotransferase (ALT/SGPT): 15 (07-29 @ 05:06)  Alkaline Phosphatase: 57 (07-29 @ 05:06)  Aspartate Aminotransferase (AST/SGOT): 49 *H* (07-29 @ 05:06)        07-29    138  |  103  |  13  ----------------------------<  131<H>  4.3   |  25  |  0.83    Ca    8.4      29 Jul 2024 05:06  Phos  2.0     07-28  Mg     2.1     07-28    TPro  5.2<L>  /  Alb  2.5<L>  /  TBili  1.0  /  DBili  x   /  AST  49<H>  /  ALT  15  /  AlkPhos  57  07-29                        7.9    6.33  )-----------( 136      ( 29 Jul 2024 10:40 )             23.8     Medications  MEDICATIONS  (STANDING):  apixaban 2.5 milliGRAM(s) Oral every 12 hours  apixaban      aspirin  chewable 81 milliGRAM(s) Oral daily  atorvastatin 80 milliGRAM(s) Oral at bedtime  chlorhexidine 0.12% Liquid 15 milliLiter(s) Oral Mucosa every 12 hours  clopidogrel Tablet 75 milliGRAM(s) Oral daily  insulin glargine Injectable (LANTUS) 14 Unit(s) SubCutaneous at bedtime  insulin lispro (ADMELOG) corrective regimen sliding scale   SubCutaneous at bedtime  insulin lispro (ADMELOG) corrective regimen sliding scale   SubCutaneous three times a day before meals  insulin lispro Injectable (ADMELOG) 5 Unit(s) SubCutaneous three times a day before meals  melatonin 3 milliGRAM(s) Oral at bedtime  metoprolol tartrate 25 milliGRAM(s) Oral every 8 hours  pantoprazole    Tablet 40 milliGRAM(s) Oral before breakfast  tamsulosin 0.4 milliGRAM(s) Oral at bedtime      Physical Exam  General: Patient comfortable in bed   Vital Signs Last 12 Hrs  T(F): 98.4 (07-29-24 @ 12:38), Max: 99.1 (07-29-24 @ 05:00)  HR: 101 (07-29-24 @ 12:38) (78 - 101)  BP: 130/65 (07-29-24 @ 12:38) (103/61 - 130/65)  BP(mean): 75 (07-29-24 @ 05:00) (75 - 75)  RR: 18 (07-29-24 @ 12:38) (18 - 18)  SpO2: 98% (07-29-24 @ 12:38) (96% - 98%)    CVS: S1S2   Respiratory: No wheezing, no crepitations  GI: Abdomen soft, bowel sounds positive  Musculoskeletal:  moves all extremities  : Voiding

## 2024-07-29 NOTE — PROGRESS NOTE ADULT - ASSESSMENT
85 yo male with PMH/PSHx significant for  HTN, HLD, DM2 , CAD with PCI CABG x3/ AVR ( 2011, [facility], He does not have his valve card) and coronary stent x1 ( 5/2019, on Plavix ), AFib on Eliquis (last dose 7/21), Left Carotid Endarterectomy on 10/7/21. Presents in PST prior to scheduled TAVR on 07/24/26.      Hospital Course:   7/24/24 s/p TF Pily TAVR using a 23mm Medtronic Corevalve EVOLUT FX placed in a 19mm Mitroflow surgical valve with a stent to the left main to avoid occlusion of the coronary by the valve. Right groin 18FrA sheath - closed with Perclose x 2. Left groin 6FrA sheath removed with Perclose x 1. Left groin 8FrV sheath - manual compression, TVP removed in the lab.   Intra op complicated by perclose failed in right femoral artery resulting in need for femoral cutdown and underwent peripheral bypass with Vascular.   7/24 Return to OR for s/p Cutdown on right femoral artery. Distal embolectomy with #3 Esperanza catheter. Femoral endarterectomy; however, vessel too thin to patch. Therefore, decision made to do interposition bypass. However, proximal anastomosis would not hold 2/2 to friable and thin walled nature of CFA. Incision extended and external iliac artery to SFA bypass (both end-to-end anastamosis) performed with 6mm PTFE. Profunda end-to-side anastamosis onto bypass graft. Completion angiogram demonstrating peroneal runoff. Peroneal signal present in RLE at end of case.   In PACU Left foot was noted to be dusky with no palpable pulses and no dopplerable signal. He had normal sensation but could not move his toes. Vascular surgery made the decision to return to the OR   7/25 s/p Left SFA interposition bypass w/ rGSV.   Post op Course:   Transferred to CTU for post op recovery. Post op TTE revealed: (07.25.24 @ 07:30) A 23 mm TAVR (valve-in-valve) is present in the aortic position. The prosthetic valve is well seated with normal function. No intravalvular regurgitation No paravalvular regurgitation. No pericardial effusion seen.  Stress Hyperglycemia / h/o DM2 --> Endocrine following   7/26 VSS; Greenberg D/C'd --> failed TOV (+) urinary retention.  Greenberg reinserted. Started on Flomax 0.4 mg PO HS.  Transferred to 2 [facility] Telemetry floor.  Structural Team following. Daily EKG.  Daily PT. Started on Eliquis 2.5 mg PO BID for chronic Afib.  7/27 VSS; Continue with current medication regimen. Structural Team and Vascular following.  Daily EKG. Increase ambulation.  Maintain Greenberg x 24 hours, plan for TOV in AM.    7/29 Drop in H/H to 7/22.  Gave 1 unit PRBC, CT A/P to r/o retroperitoneal bleed.  Awaiting scan.          Disposition: Home once medically cleared.

## 2024-07-29 NOTE — PROGRESS NOTE ADULT - PROBLEM SELECTOR PLAN 1
Will continue Lantus  14  u at bedtime.  Will continue  Admelog  5 u before each meal and continue Admelog correction scale coverage. Will continue monitoring FS and Follow up.   Will continue monitoring FS, log, and glucose trends, will Follow up.  Patient counseled for compliance with consistent low carb diet and exercise as tolerated outpatient.  May get discharged on his home DM meds/regimen  FU outpt

## 2024-07-29 NOTE — PROGRESS NOTE ADULT - SUBJECTIVE AND OBJECTIVE BOX
SURGERY DAILY PROGRESS NOTE    SUBJECTIVE: No acute events overnight. Patient seen and evaluated on AM rounds.    OBJECTIVE:  Vital Signs Last 24 Hrs  T(C): 37.3 (29 Jul 2024 05:00), Max: 37.3 (29 Jul 2024 05:00)  T(F): 99.1 (29 Jul 2024 05:00), Max: 99.1 (29 Jul 2024 05:00)  HR: 78 (29 Jul 2024 05:00) (78 - 105)  BP: 103/61 (29 Jul 2024 05:00) (103/61 - 127/62)  BP(mean): 75 (29 Jul 2024 05:00) (75 - 75)  RR: 18 (29 Jul 2024 05:00) (18 - 19)  SpO2: 96% (29 Jul 2024 05:00) (96% - 99%)    Parameters below as of 29 Jul 2024 05:00  Patient On (Oxygen Delivery Method): room air      I&O's Detail    28 Jul 2024 07:01  -  29 Jul 2024 07:00  --------------------------------------------------------  IN:    Oral Fluid: 150 mL  Total IN: 150 mL    OUT:    Indwelling Catheter - Urethral (mL): 400 mL    Voided (mL): 400 mL  Total OUT: 800 mL    Total NET: -650 mL    Daily   MEDICATIONS  (STANDING):  apixaban      apixaban 2.5 milliGRAM(s) Oral every 12 hours  aspirin  chewable 81 milliGRAM(s) Oral daily  atorvastatin 80 milliGRAM(s) Oral at bedtime  chlorhexidine 0.12% Liquid 15 milliLiter(s) Oral Mucosa every 12 hours  clopidogrel Tablet 75 milliGRAM(s) Oral daily  insulin glargine Injectable (LANTUS) 14 Unit(s) SubCutaneous at bedtime  insulin lispro (ADMELOG) corrective regimen sliding scale   SubCutaneous three times a day before meals  insulin lispro (ADMELOG) corrective regimen sliding scale   SubCutaneous at bedtime  insulin lispro Injectable (ADMELOG) 5 Unit(s) SubCutaneous three times a day before meals  melatonin 3 milliGRAM(s) Oral at bedtime  metoprolol tartrate 25 milliGRAM(s) Oral every 8 hours  pantoprazole    Tablet 40 milliGRAM(s) Oral before breakfast  tamsulosin 0.4 milliGRAM(s) Oral at bedtime    MEDICATIONS  (PRN):    LABS:                        7.9    6.33  )-----------( 136      ( 29 Jul 2024 10:40 )             23.8     07-29    138  |  103  |  13  ----------------------------<  131<H>  4.3   |  25  |  0.83    Ca    8.4      29 Jul 2024 05:06  Phos  2.0     07-28  Mg     2.1     07-28    TPro  5.2<L>  /  Alb  2.5<L>  /  TBili  1.0  /  DBili  x   /  AST  49<H>  /  ALT  15  /  AlkPhos  57  07-29      Urinalysis Basic - ( 29 Jul 2024 05:06 )    Color: x / Appearance: x / SG: x / pH: x  Gluc: 131 mg/dL / Ketone: x  / Bili: x / Urobili: x   Blood: x / Protein: x / Nitrite: x   Leuk Esterase: x / RBC: x / WBC x   Sq Epi: x / Non Sq Epi: x / Bacteria: x    PHYSICAL EXAM:  General: NAD  Neuro: AOx4  Extremities: B/L LE edema, no wounds, brisk capillary refill  LLE palpable femoral pulse, palpable DP pulse  RLE palpable femoral pulse, eduardo/DP/PT signal

## 2024-07-29 NOTE — PROGRESS NOTE ADULT - ASSESSMENT
Assessment: 83 yo M s/p TAVR access B/L femoral artery c/b failed closure device. Vascular consulted intra-op for RLE ALI s/p R iliofemoral bypass w/ PTFE and RLE angiogram. Post-op, patient w/ LLE ALI RTOR s/p L SFA interposition bypass w/ reversed ipsilateral GSV.     Plan:  - Continue gentle ACE wrap and B/L LE elevation   - Cover B/L groins   - Continue physical therapy OOB/ambulation   - Remainder of care per primary team    Vascular Surgery  [id number]

## 2024-07-29 NOTE — PROGRESS NOTE ADULT - SUBJECTIVE AND OBJECTIVE BOX
Subjective: Patient seen and examined. No new events except as noted.   feels weak   Drop in Hgb   no pain   family at bedside       REVIEW OF SYSTEMS:    CONSTITUTIONAL: No weakness, fevers or chills  EYES/ENT: No visual changes;  No vertigo or throat pain   NECK: No pain or stiffness  RESPIRATORY: No cough, wheezing, hemoptysis; No shortness of breath  CARDIOVASCULAR: No chest pain or palpitations  GASTROINTESTINAL: No abdominal or epigastric pain. No nausea, vomiting, or hematemesis; No diarrhea or constipation. No melena or hematochezia.  GENITOURINARY: No dysuria, frequency or hematuria  NEUROLOGICAL: No numbness or weakness  SKIN: No itching, burning, rashes, or lesions   All other review of systems is negative unless indicated above.    MEDICATIONS:  MEDICATIONS  (STANDING):  apixaban 2.5 milliGRAM(s) Oral every 12 hours  apixaban      aspirin  chewable 81 milliGRAM(s) Oral daily  atorvastatin 80 milliGRAM(s) Oral at bedtime  chlorhexidine 0.12% Liquid 15 milliLiter(s) Oral Mucosa every 12 hours  clopidogrel Tablet 75 milliGRAM(s) Oral daily  insulin glargine Injectable (LANTUS) 14 Unit(s) SubCutaneous at bedtime  insulin lispro (ADMELOG) corrective regimen sliding scale   SubCutaneous three times a day before meals  insulin lispro (ADMELOG) corrective regimen sliding scale   SubCutaneous at bedtime  insulin lispro Injectable (ADMELOG) 5 Unit(s) SubCutaneous three times a day before meals  melatonin 3 milliGRAM(s) Oral at bedtime  metoprolol tartrate 25 milliGRAM(s) Oral every 8 hours  pantoprazole    Tablet 40 milliGRAM(s) Oral before breakfast  tamsulosin 0.4 milliGRAM(s) Oral at bedtime      PHYSICAL EXAM:  T(C): 37.3 (07-29-24 @ 05:00), Max: 37.3 (07-29-24 @ 05:00)  HR: 78 (07-29-24 @ 05:00) (78 - 105)  BP: 103/61 (07-29-24 @ 05:00) (103/61 - 127/62)  RR: 18 (07-29-24 @ 05:00) (18 - 19)  SpO2: 96% (07-29-24 @ 05:00) (96% - 99%)  Wt(kg): --  I&O's Summary    28 Jul 2024 07:01  -  29 Jul 2024 07:00  --------------------------------------------------------  IN: 150 mL / OUT: 800 mL / NET: -650 mL        Weight (kg): 54.6 (07-29 @ 07:51)      Appearance: NAD  HEENT:   Dry  oral mucosa, PERRL, EOMI	  Lymphatic: No lymphadenopathy , 1+edema  Cardiovascular: Normal S1 S2, No JVD, No murmurs , Peripheral pulses palpable 2+ bilaterally  Respiratory: decreased bs   Gastrointestinal:  Soft, Non-tender, + BS	  Skin: No rashes, No ecchymoses, No cyanosis, warm to touch  Musculoskeletal: Normal range of motion, normal strength  Psychiatry:  Mood & affect appropriate  Ext: 1+ edema  Right heel wound   +niño        LABS:    CARDIAC MARKERS:                                7.4    6.01  )-----------( 126      ( 29 Jul 2024 05:06 )             22.5     07-29    138  |  103  |  13  ----------------------------<  131<H>  4.3   |  25  |  0.83    Ca    8.4      29 Jul 2024 05:06  Phos  2.0     07-28  Mg     2.1     07-28    TPro  5.2<L>  /  Alb  2.5<L>  /  TBili  1.0  /  DBili  x   /  AST  49<H>  /  ALT  15  /  AlkPhos  57  07-29      TELEMETRY: 	AF    ECG:  	  RADIOLOGY: < from: [facility] Duplex Lower Ext Vein Scan, Bilat (07.28.24 @ 11:30) >    ACC: [id number] EXAM:  DUPLEX SCAN EXT VEINS LOWER BI   ORDERED BY:   MADELYN HOGUE     PROCEDURE DATE:  07/28/2024          INTERPRETATION:  CLINICAL INFORMATION: Lower extremity edema and right   calf pain. Right femoral cut down. Right iliofemoral bypass. Status post   OHS.    COMPARISON: None available.    TECHNIQUE: Duplex sonography of the BILATERAL LOWER extremity veins with   color and spectral Doppler, with and without compression.    FINDINGS:    RIGHT:  Normal compressibility of the RIGHT common femoral, femoral and popliteal   veins.  Doppler examination shows normal spontaneous and phasic flow.  No RIGHT calf vein thrombosis is detected.    LEFT:  Normal compressibility of the LEFT common femoral, femoral and popliteal   veins. Limited evaluation of the left groin due to overlying dressing.  Doppler examination shows normal spontaneous and phasic flow.  No LEFT calf vein thrombosis is detected.    IMPRESSION:  No evidence of deep venous thrombosis in either lower extremity.            --- End of Report ---            BHAVESH MATHEWS MD; Attending Radiologist  This document has been electronically signed. Jul 28 2024 11:37AM    < end of copied text >    DIAGNOSTIC TESTING:  [ ] Echocardiogram:  [ ]  Catheterization:  [ ] Stress Test:    OTHER:

## 2024-07-29 NOTE — PROGRESS NOTE ADULT - PROBLEM SELECTOR PLAN 4
7/24 s/p Cutdown on right femoral artery.  7/25 s/p Left SFA interposition bypass w/ rGSV.  7/29 Low H/H.  Getting blood, and CT Abd/Pelvis w/ contrast to r/o retroperitoneal bleed  Vascular Surgery following  Daily PT   Vascular checks q4 hours

## 2024-07-30 PROBLEM — I48.20 CHRONIC ATRIAL FIBRILLATION, UNSPECIFIED: Chronic | Status: ACTIVE | Noted: 2024-07-18

## 2024-07-30 LAB
ALBUMIN SERPL ELPH-MCNC: 2.6 G/DL — LOW (ref 3.3–5)
ALP SERPL-CCNC: 66 U/L — SIGNIFICANT CHANGE UP (ref 40–120)
ALT FLD-CCNC: 18 U/L — SIGNIFICANT CHANGE UP (ref 10–45)
ANION GAP SERPL CALC-SCNC: 14 MMOL/L — SIGNIFICANT CHANGE UP (ref 5–17)
AST SERPL-CCNC: 46 U/L — HIGH (ref 10–40)
BILIRUB SERPL-MCNC: 1.1 MG/DL — SIGNIFICANT CHANGE UP (ref 0.2–1.2)
BUN SERPL-MCNC: 15 MG/DL — SIGNIFICANT CHANGE UP (ref 7–23)
CALCIUM SERPL-MCNC: 8.6 MG/DL — SIGNIFICANT CHANGE UP (ref 8.4–10.5)
CHLORIDE SERPL-SCNC: 100 MMOL/L — SIGNIFICANT CHANGE UP (ref 96–108)
CO2 SERPL-SCNC: 23 MMOL/L — SIGNIFICANT CHANGE UP (ref 22–31)
CREAT SERPL-MCNC: 0.89 MG/DL — SIGNIFICANT CHANGE UP (ref 0.5–1.3)
EGFR: 86 ML/MIN/1.73M2 — SIGNIFICANT CHANGE UP
GLUCOSE BLDC GLUCOMTR-MCNC: 144 MG/DL — HIGH (ref 70–99)
GLUCOSE BLDC GLUCOMTR-MCNC: 209 MG/DL — HIGH (ref 70–99)
GLUCOSE BLDC GLUCOMTR-MCNC: 232 MG/DL — HIGH (ref 70–99)
GLUCOSE BLDC GLUCOMTR-MCNC: 240 MG/DL — HIGH (ref 70–99)
GLUCOSE SERPL-MCNC: 102 MG/DL — HIGH (ref 70–99)
HCT VFR BLD CALC: 28 % — LOW (ref 39–50)
HGB BLD-MCNC: 9.1 G/DL — LOW (ref 13–17)
MAGNESIUM SERPL-MCNC: 1.8 MG/DL — SIGNIFICANT CHANGE UP (ref 1.6–2.6)
MCHC RBC-ENTMCNC: 30 PG — SIGNIFICANT CHANGE UP (ref 27–34)
MCHC RBC-ENTMCNC: 32.5 GM/DL — SIGNIFICANT CHANGE UP (ref 32–36)
MCV RBC AUTO: 92.4 FL — SIGNIFICANT CHANGE UP (ref 80–100)
NRBC # BLD: 0 /100 WBCS — SIGNIFICANT CHANGE UP (ref 0–0)
PLATELET # BLD AUTO: 137 K/UL — LOW (ref 150–400)
POTASSIUM SERPL-MCNC: 3.7 MMOL/L — SIGNIFICANT CHANGE UP (ref 3.5–5.3)
POTASSIUM SERPL-SCNC: 3.7 MMOL/L — SIGNIFICANT CHANGE UP (ref 3.5–5.3)
PROT SERPL-MCNC: 5.6 G/DL — LOW (ref 6–8.3)
RBC # BLD: 3.03 M/UL — LOW (ref 4.2–5.8)
RBC # FLD: 15.8 % — HIGH (ref 10.3–14.5)
SODIUM SERPL-SCNC: 137 MMOL/L — SIGNIFICANT CHANGE UP (ref 135–145)
WBC # BLD: 6.04 K/UL — SIGNIFICANT CHANGE UP (ref 3.8–10.5)
WBC # FLD AUTO: 6.04 K/UL — SIGNIFICANT CHANGE UP (ref 3.8–10.5)

## 2024-07-30 PROCEDURE — 99232 SBSQ HOSP IP/OBS MODERATE 35: CPT

## 2024-07-30 RX ORDER — MAGNESIUM SULFATE 500 MG/ML
2 VIAL (ML) INJECTION ONCE
Refills: 0 | Status: COMPLETED | OUTPATIENT
Start: 2024-07-30 | End: 2024-07-30

## 2024-07-30 RX ORDER — POTASSIUM CHLORIDE 1500 MG/1
20 TABLET, EXTENDED RELEASE ORAL
Refills: 0 | Status: COMPLETED | OUTPATIENT
Start: 2024-07-30 | End: 2024-07-30

## 2024-07-30 RX ORDER — FUROSEMIDE 10 MG/ML
20 INJECTION, SOLUTION INTRAVENOUS ONCE
Refills: 0 | Status: COMPLETED | OUTPATIENT
Start: 2024-07-30 | End: 2024-07-30

## 2024-07-30 RX ORDER — INSULIN LISPRO 100/ML
7 VIAL (ML) SUBCUTANEOUS
Refills: 0 | Status: DISCONTINUED | OUTPATIENT
Start: 2024-07-30 | End: 2024-07-31

## 2024-07-30 RX ORDER — INSULIN GLARGINE-YFGN 100 [IU]/ML
17 INJECTION, SOLUTION SUBCUTANEOUS AT BEDTIME
Refills: 0 | Status: DISCONTINUED | OUTPATIENT
Start: 2024-07-30 | End: 2024-07-31

## 2024-07-30 RX ADMIN — INSULIN GLARGINE-YFGN 17 UNIT(S): 100 INJECTION, SOLUTION SUBCUTANEOUS at 21:44

## 2024-07-30 RX ADMIN — Medication 81 MILLIGRAM(S): at 07:53

## 2024-07-30 RX ADMIN — Medication 3 MILLIGRAM(S): at 21:43

## 2024-07-30 RX ADMIN — APIXABAN 2.5 MILLIGRAM(S): 5 TABLET, FILM COATED ORAL at 06:29

## 2024-07-30 RX ADMIN — Medication 2: at 16:52

## 2024-07-30 RX ADMIN — FUROSEMIDE 40 MILLIGRAM(S): 10 INJECTION, SOLUTION INTRAVENOUS at 06:29

## 2024-07-30 RX ADMIN — ATORVASTATIN CALCIUM 80 MILLIGRAM(S): 40 TABLET, FILM COATED ORAL at 21:43

## 2024-07-30 RX ADMIN — Medication 50 MILLIGRAM(S): at 16:52

## 2024-07-30 RX ADMIN — Medication 5 UNIT(S): at 12:39

## 2024-07-30 RX ADMIN — APIXABAN 2.5 MILLIGRAM(S): 5 TABLET, FILM COATED ORAL at 16:52

## 2024-07-30 RX ADMIN — FUROSEMIDE 20 MILLIGRAM(S): 10 INJECTION, SOLUTION INTRAVENOUS at 13:05

## 2024-07-30 RX ADMIN — Medication 50 MILLIGRAM(S): at 06:29

## 2024-07-30 RX ADMIN — PANTOPRAZOLE SODIUM 40 MILLIGRAM(S): 20 TABLET, DELAYED RELEASE ORAL at 06:29

## 2024-07-30 RX ADMIN — CLOPIDOGREL BISULFATE 75 MILLIGRAM(S): 75 TABLET, FILM COATED ORAL at 07:53

## 2024-07-30 RX ADMIN — POTASSIUM CHLORIDE 20 MILLIEQUIVALENT(S): 1500 TABLET, EXTENDED RELEASE ORAL at 10:34

## 2024-07-30 RX ADMIN — Medication 25 GRAM(S): at 07:53

## 2024-07-30 RX ADMIN — Medication 5 UNIT(S): at 16:53

## 2024-07-30 RX ADMIN — Medication 5 UNIT(S): at 08:25

## 2024-07-30 RX ADMIN — Medication 0.4 MILLIGRAM(S): at 21:44

## 2024-07-30 RX ADMIN — Medication 2: at 12:38

## 2024-07-30 RX ADMIN — POTASSIUM CHLORIDE 20 MILLIEQUIVALENT(S): 1500 TABLET, EXTENDED RELEASE ORAL at 07:54

## 2024-07-30 NOTE — PROGRESS NOTE ADULT - PROBLEM SELECTOR PLAN 1
Structural Team following   Continue with ASA 81 mg PO Daily and Plavix 75 mg PO daily   Continue with Lopressor 50 mg PO BID  Continue with Lipitor 80 mg PO HS   Daily EKG    Increase activity as tolerated.   Encourage Chest PT / Pulmonary toileting and Incentive spirometry every 1 hour x 10 while awake.   Continue with Protonix 40 mg PO daily for PUD prophylaxis.   Daily Shower   D/C plan home with [facility] once medically cleared   Plan of care discussed with attending

## 2024-07-30 NOTE — PROGRESS NOTE ADULT - SUBJECTIVE AND OBJECTIVE BOX
SURGERY DAILY PROGRESS NOTE    SUBJECTIVE: No acute events overnight. Patient seen and evaluated on AM rounds.     OBJECTIVE:  Vital Signs Last 24 Hrs  T(C): 36.8 (30 Jul 2024 04:42), Max: 36.9 (29 Jul 2024 12:38)  T(F): 98.3 (30 Jul 2024 04:42), Max: 98.4 (29 Jul 2024 12:38)  HR: 96 (30 Jul 2024 04:42) (83 - 101)  BP: 125/66 (30 Jul 2024 04:42) (125/66 - 146/73)  BP(mean): 86 (30 Jul 2024 04:42) (86 - 86)  RR: 18 (30 Jul 2024 04:42) (18 - 18)  SpO2: 97% (30 Jul 2024 04:42) (97% - 98%)    Parameters below as of 30 Jul 2024 04:42  Patient On (Oxygen Delivery Method): room air      I&O's Detail    29 Jul 2024 07:01  -  30 Jul 2024 07:00  --------------------------------------------------------  IN:    Oral Fluid: 540 mL  Total IN: 540 mL    OUT:    Voided (mL): 3400 mL  Total OUT: 3400 mL    Total NET: -2860 mL      30 Jul 2024 07:01  -  30 Jul 2024 11:11  --------------------------------------------------------  IN:    IV PiggyBack: 50 mL  Total IN: 50 mL    OUT:  Total OUT: 0 mL    Total NET: 50 mL    Daily   MEDICATIONS  (STANDING):  apixaban 2.5 milliGRAM(s) Oral every 12 hours  apixaban      aspirin  chewable 81 milliGRAM(s) Oral daily  atorvastatin 80 milliGRAM(s) Oral at bedtime  chlorhexidine 0.12% Liquid 15 milliLiter(s) Oral Mucosa every 12 hours  clopidogrel Tablet 75 milliGRAM(s) Oral daily  insulin glargine Injectable (LANTUS) 14 Unit(s) SubCutaneous at bedtime  insulin lispro (ADMELOG) corrective regimen sliding scale   SubCutaneous at bedtime  insulin lispro (ADMELOG) corrective regimen sliding scale   SubCutaneous three times a day before meals  insulin lispro Injectable (ADMELOG) 5 Unit(s) SubCutaneous three times a day before meals  melatonin 3 milliGRAM(s) Oral at bedtime  metoprolol tartrate 50 milliGRAM(s) Oral <User Schedule>  pantoprazole    Tablet 40 milliGRAM(s) Oral before breakfast  tamsulosin 0.4 milliGRAM(s) Oral at bedtime    MEDICATIONS  (PRN):    LABS:                        9.1    6.04  )-----------( 137      ( 30 Jul 2024 05:47 )             28.0     07-30    137  |  100  |  15  ----------------------------<  102<H>  3.7   |  23  |  0.89    Ca    8.6      30 Jul 2024 05:48  Mg     1.8     07-30    TPro  5.6<L>  /  Alb  2.6<L>  /  TBili  1.1  /  DBili  x   /  AST  46<H>  /  ALT  18  /  AlkPhos  66  07-30    Urinalysis Basic - ( 30 Jul 2024 05:48 )    Color: x / Appearance: x / SG: x / pH: x  Gluc: 102 mg/dL / Ketone: x  / Bili: x / Urobili: x   Blood: x / Protein: x / Nitrite: x   Leuk Esterase: x / RBC: x / WBC x   Sq Epi: x / Non Sq Epi: x / Bacteria: x    PHYSICAL EXAM:  General: NAD  Neuro: AOx4  Extremities: Bilateral LE edema, small eschar of RLE  LLE palpable femoral pulse, palpable DP pulse  RLE palpable femoral pulse, eduardo/DP/PT signal

## 2024-07-30 NOTE — PROGRESS NOTE ADULT - PROBLEM SELECTOR PLAN 1
s/p LM stent and TF Pily TAVR using a 23mm Medtronic Corevalve EVOLUT FX [facility].  DAPT   statin  Post op TTE reviewed  PT recommend HELADIO   DC planning

## 2024-07-30 NOTE — PROGRESS NOTE ADULT - SUBJECTIVE AND OBJECTIVE BOX
Chief complaint  Patient is a 84y old  Male who presents with a chief complaint of Pily TAVR (30 Jul 2024 09:00)         Labs and Fingersticks  CAPILLARY BLOOD GLUCOSE      POCT Blood Glucose.: 209 mg/dL (30 Jul 2024 12:35)  POCT Blood Glucose.: 144 mg/dL (30 Jul 2024 08:19)  POCT Blood Glucose.: 173 mg/dL (29 Jul 2024 21:27)  POCT Blood Glucose.: 214 mg/dL (29 Jul 2024 16:23)      Anion Gap: 14 (07-30 @ 05:48)  Anion Gap: 10 (07-29 @ 05:06)      Calcium: 8.6 (07-30 @ 05:48)  Calcium: 8.4 (07-29 @ 05:06)  Albumin: 2.6 *L* (07-30 @ 05:48)  Albumin: 2.5 *L* (07-29 @ 05:06)    Alanine Aminotransferase (ALT/SGPT): 18 (07-30 @ 05:48)  Alanine Aminotransferase (ALT/SGPT): 15 (07-29 @ 05:06)  Alkaline Phosphatase: 66 (07-30 @ 05:48)  Alkaline Phosphatase: 57 (07-29 @ 05:06)  Aspartate Aminotransferase (AST/SGOT): 46 *H* (07-30 @ 05:48)  Aspartate Aminotransferase (AST/SGOT): 49 *H* (07-29 @ 05:06)        07-30    137  |  100  |  15  ----------------------------<  102<H>  3.7   |  23  |  0.89    Ca    8.6      30 Jul 2024 05:48  Mg     1.8     07-30    TPro  5.6<L>  /  Alb  2.6<L>  /  TBili  1.1  /  DBili  x   /  AST  46<H>  /  ALT  18  /  AlkPhos  66  07-30                        9.1    6.04  )-----------( 137      ( 30 Jul 2024 05:47 )             28.0     Medications  MEDICATIONS  (STANDING):  apixaban 2.5 milliGRAM(s) Oral every 12 hours  apixaban      aspirin  chewable 81 milliGRAM(s) Oral daily  atorvastatin 80 milliGRAM(s) Oral at bedtime  chlorhexidine 0.12% Liquid 15 milliLiter(s) Oral Mucosa every 12 hours  clopidogrel Tablet 75 milliGRAM(s) Oral daily  insulin glargine Injectable (LANTUS) 14 Unit(s) SubCutaneous at bedtime  insulin lispro (ADMELOG) corrective regimen sliding scale   SubCutaneous three times a day before meals  insulin lispro (ADMELOG) corrective regimen sliding scale   SubCutaneous at bedtime  insulin lispro Injectable (ADMELOG) 5 Unit(s) SubCutaneous three times a day before meals  melatonin 3 milliGRAM(s) Oral at bedtime  metoprolol tartrate 50 milliGRAM(s) Oral <User Schedule>  pantoprazole    Tablet 40 milliGRAM(s) Oral before breakfast  tamsulosin 0.4 milliGRAM(s) Oral at bedtime      Physical Exam  General: Patient comfortable in bed  Vital Signs Last 12 Hrs  T(F): 98 (07-30-24 @ 12:48), Max: 98.3 (07-30-24 @ 04:42)  HR: 90 (07-30-24 @ 12:48) (90 - 96)  BP: 135/68 (07-30-24 @ 12:48) (125/66 - 135/68)  BP(mean): 86 (07-30-24 @ 04:42) (86 - 86)  RR: 18 (07-30-24 @ 12:48) (18 - 18)  SpO2: 98% (07-30-24 @ 12:48) (97% - 98%)    CVS: S1S2   Respiratory: No wheezing, no crepitations  GI: Abdomen soft, bowel sounds positive  Musculoskeletal:  moves all extremities  : Voiding

## 2024-07-30 NOTE — PROGRESS NOTE ADULT - ASSESSMENT
85 yo male with PMH/PSHx significant for  HTN, HLD, DM2 , CAD with PCI CABG x3/ AVR ( 2011, [facility], He does not have his valve card) and coronary stent x1 ( 5/2019, on Plavix ), AFib on Eliquis (last dose 7/21), Left Carotid Endarterectomy on 10/7/21. Presents in PST prior to scheduled TAVR on 07/24/26.      Hospital Course:   7/24/24 s/p TF Pily TAVR using a 23mm Medtronic Corevalve EVOLUT FX placed in a 19mm Mitroflow surgical valve with a stent to the left main to avoid occlusion of the coronary by the valve. Right groin 18FrA sheath - closed with Perclose x 2. Left groin 6FrA sheath removed with Perclose x 1. Left groin 8FrV sheath - manual compression, TVP removed in the lab.   Intra op complicated by perclose failed in right femoral artery resulting in need for femoral cutdown and underwent peripheral bypass with Vascular.   7/24 Return to OR for s/p Cutdown on right femoral artery. Distal embolectomy with #3 Esperanza catheter. Femoral endarterectomy; however, vessel too thin to patch. Therefore, decision made to do interposition bypass. However, proximal anastomosis would not hold 2/2 to friable and thin walled nature of CFA. Incision extended and external iliac artery to SFA bypass (both end-to-end anastamosis) performed with 6mm PTFE. Profunda end-to-side anastamosis onto bypass graft. Completion angiogram demonstrating peroneal runoff. Peroneal signal present in RLE at end of case.   In PACU Left foot was noted to be dusky with no palpable pulses and no dopplerable signal. He had normal sensation but could not move his toes. Vascular surgery made the decision to return to the OR   7/25 s/p Left SFA interposition bypass w/ rGSV.   Post op Course:   Transferred to CTU for post op recovery. Post op TTE revealed: (07.25.24 @ 07:30) A 23 mm TAVR (valve-in-valve) is present in the aortic position. The prosthetic valve is well seated with normal function. No intravalvular regurgitation No paravalvular regurgitation. No pericardial effusion seen.  Stress Hyperglycemia / h/o DM2 --> Endocrine following   7/26 VSS; Greenberg D/C'd --> failed TOV (+) urinary retention.  Greenberg reinserted. Started on Flomax 0.4 mg PO HS.  Transferred to 2 [facility] Telemetry floor.  Structural Team following. Daily EKG.  Daily PT. Started on Eliquis 2.5 mg PO BID for chronic Afib.  7/27 VSS; Continue with current medication regimen. Structural Team and Vascular following.  Daily EKG. Increase ambulation.  Maintain Greenberg x 24 hours, plan for TOV in AM.    7/29 Drop in H/H to 7/22.  Gave 1 unit PRBC, CT A/P to r/o retroperitoneal bleed.  Awaiting scan.       7/20      Disposition: Home once medically cleared.     85 yo male with PMH/PSHx significant for  HTN, HLD, DM2 , CAD with PCI CABG x3/ AVR ( 2011, [facility], He does not have his valve card) and coronary stent x1 ( 5/2019, on Plavix ), AFib on Eliquis (last dose 7/21), Left Carotid Endarterectomy on 10/7/21. Presents in PST prior to scheduled TAVR on 07/24/26.      Hospital Course:   7/24/24 s/p TF Pily TAVR using a 23mm Medtronic Corevalve EVOLUT FX placed in a 19mm Mitroflow surgical valve with a stent to the left main to avoid occlusion of the coronary by the valve. Right groin 18FrA sheath - closed with Perclose x 2. Left groin 6FrA sheath removed with Perclose x 1. Left groin 8FrV sheath - manual compression, TVP removed in the lab.   Intra op complicated by perclose failed in right femoral artery resulting in need for femoral cutdown and underwent peripheral bypass with Vascular.   7/24 Return to OR for s/p Cutdown on right femoral artery. Distal embolectomy with #3 Esperanza catheter. Femoral endarterectomy; however, vessel too thin to patch. Therefore, decision made to do interposition bypass. However, proximal anastomosis would not hold 2/2 to friable and thin walled nature of CFA. Incision extended and external iliac artery to SFA bypass (both end-to-end anastamosis) performed with 6mm PTFE. Profunda end-to-side anastamosis onto bypass graft. Completion angiogram demonstrating peroneal runoff. Peroneal signal present in RLE at end of case.   In PACU Left foot was noted to be dusky with no palpable pulses and no dopplerable signal. He had normal sensation but could not move his toes. Vascular surgery made the decision to return to the OR   7/25 s/p Left SFA interposition bypass w/ rGSV.   Post op Course:   Transferred to CTU for post op recovery. Post op TTE revealed: (07.25.24 @ 07:30) A 23 mm TAVR (valve-in-valve) is present in the aortic position. The prosthetic valve is well seated with normal function. No intravalvular regurgitation No paravalvular regurgitation. No pericardial effusion seen.  Stress Hyperglycemia / h/o DM2 --> Endocrine following   7/26 VSS; Greenberg D/C'd --> failed TOV (+) urinary retention.  Greenberg reinserted. Started on Flomax 0.4 mg PO HS.  Transferred to 2 [facility] Telemetry floor.  Structural Team following. Daily EKG.  Daily PT. Started on Eliquis 2.5 mg PO BID for chronic Afib.  7/27 VSS; Continue with current medication regimen. Structural Team and Vascular following.  Daily EKG. Increase ambulation.  Maintain Greenberg x 24 hours, plan for TOV in AM.    7/29 Drop in H/H to 7/22.  Gave 1 unit PRBC, CT A/P to r/o retroperitoneal bleed.  Awaiting scan.       7/20 H/H 9.1/ 28.  CT A/P revealed no evidence of retroperitoneal hematoma. PT reevaulated  recommending rehab.   Disposition: Rehab

## 2024-07-30 NOTE — PROGRESS NOTE ADULT - ASSESSMENT
Assessment: 83 yo M s/p TAVR access B/L femoral artery c/b failed closure device. Vascular consulted intra-op for RLE ALI s/p R iliofemoral bypass w/ PTFE and RLE angiogram. Post-op, patient w/ LLE ALI RTOR s/p L SFA interposition bypass w/ reversed ipsilateral GSV.     Plan:  - Vascular examination remains stable  - Continue gentle ACE wrap and B/L LE elevation to assist with edema  - Continue physical therapy OOB/ambulation   - Remainder of care per primary team    Vascular Surgery  [id number]

## 2024-07-30 NOTE — PROGRESS NOTE ADULT - SUBJECTIVE AND OBJECTIVE BOX
VITAL SIGNS    Subjective: "I feel okay" Denies CP, palpitation, SOB, JACOBSEN, HA, dizziness, N/V/D, fever or chills.  No acute event noted overnight.    Telemetry:  Afib 90s    Vital Signs Last 24 Hrs  T(C): 36.8 (07-30-24 @ 04:42), Max: 36.9 (07-29-24 @ 12:38)  T(F): 98.3 (07-30-24 @ 04:42), Max: 98.4 (07-29-24 @ 12:38)  HR: 96 (07-30-24 @ 04:42) (83 - 101)  BP: 125/66 (07-30-24 @ 04:42) (125/66 - 146/73)  RR: 18 (07-30-24 @ 04:42) (18 - 18)  SpO2: 97% (07-30-24 @ 04:42) (97% - 98%)               07-29 @ 07:01  -  07-30 @ 07:00  --------------------------------------------------------  IN: 540 mL / OUT: 3400 mL / NET: -2860 mL        LABS  07-30    137  |  100  |  15  ----------------------------<  102<H>  3.7   |  23  |  0.89    Ca    8.6      30 Jul 2024 05:48  Mg     1.8     07-30    TPro  5.6<L>  /  Alb  2.6<L>  /  TBili  1.1  /  DBili  x   /  AST  46<H>  /  ALT  18  /  AlkPhos  66  07-30                                 9.1    6.04  )-----------( 137      ( 30 Jul 2024 05:47 )             28.0                  Daily     Daily       CAPILLARY BLOOD GLUCOSE      POCT Blood Glucose.: 144 mg/dL (30 Jul 2024 08:19)  POCT Blood Glucose.: 173 mg/dL (29 Jul 2024 21:27)  POCT Blood Glucose.: 214 mg/dL (29 Jul 2024 16:23)  POCT Blood Glucose.: 197 mg/dL (29 Jul 2024 12:02)          PHYSICAL EXAM      Neurology: alert and oriented x 3, nonfocal, no gross deficits    CV: +S1, S2. no heart murmurs heard.     Sternal Wound: MSI -->CDI, sternum stable    Lungs: CTA B/L     Abdomen: soft, nontender, nondistended, positive bowel sounds, (+) Flatus; (+) BM     :  Voiding               Extremities:  B/L LE (+) trace edema; (+) 2 DP palpable, warm b/l lower extremities        apixaban 2.5 milliGRAM(s) Oral every 12 hours  apixaban      aspirin  chewable 81 milliGRAM(s) Oral daily  atorvastatin 80 milliGRAM(s) Oral at bedtime  chlorhexidine 0.12% Liquid 15 milliLiter(s) Oral Mucosa every 12 hours  clopidogrel Tablet 75 milliGRAM(s) Oral daily  insulin glargine Injectable (LANTUS) 14 Unit(s) SubCutaneous at bedtime  insulin lispro (ADMELOG) corrective regimen sliding scale   SubCutaneous at bedtime  insulin lispro (ADMELOG) corrective regimen sliding scale   SubCutaneous three times a day before meals  insulin lispro Injectable (ADMELOG) 5 Unit(s) SubCutaneous three times a day before meals  melatonin 3 milliGRAM(s) Oral at bedtime  metoprolol tartrate 50 milliGRAM(s) Oral <User Schedule>  pantoprazole    Tablet 40 milliGRAM(s) Oral before breakfast  potassium chloride    Tablet ER 20 milliEquivalent(s) Oral every 2 hours  tamsulosin 0.4 milliGRAM(s) Oral at bedtime                 Physical Therapy Rec:   Home  [  ]   Home w/ PT  [  ]  Rehab  [ x ]    Discussed with Cardiothoracic Team at AM rounds.

## 2024-07-30 NOTE — PROGRESS NOTE ADULT - SUBJECTIVE AND OBJECTIVE BOX
Subjective: Patient seen and examined. No new events except as noted.   s/p PRBC transfusion   feels ok   family at bedside   R foot pain       REVIEW OF SYSTEMS:    CONSTITUTIONAL: + weakness, fevers or chills  EYES/ENT: No visual changes;  No vertigo or throat pain   NECK: No pain or stiffness  RESPIRATORY: No cough, wheezing, hemoptysis; No shortness of breath  CARDIOVASCULAR: No chest pain or palpitations  GASTROINTESTINAL: No abdominal or epigastric pain. No nausea, vomiting, or hematemesis; No diarrhea or constipation. No melena or hematochezia.  GENITOURINARY: No dysuria, frequency or hematuria  NEUROLOGICAL: No numbness or weakness  SKIN: No itching, burning, rashes, or lesions   All other review of systems is negative unless indicated above.    MEDICATIONS:  MEDICATIONS  (STANDING):  apixaban 2.5 milliGRAM(s) Oral every 12 hours  apixaban      aspirin  chewable 81 milliGRAM(s) Oral daily  atorvastatin 80 milliGRAM(s) Oral at bedtime  chlorhexidine 0.12% Liquid 15 milliLiter(s) Oral Mucosa every 12 hours  clopidogrel Tablet 75 milliGRAM(s) Oral daily  insulin glargine Injectable (LANTUS) 14 Unit(s) SubCutaneous at bedtime  insulin lispro (ADMELOG) corrective regimen sliding scale   SubCutaneous three times a day before meals  insulin lispro (ADMELOG) corrective regimen sliding scale   SubCutaneous at bedtime  insulin lispro Injectable (ADMELOG) 5 Unit(s) SubCutaneous three times a day before meals  melatonin 3 milliGRAM(s) Oral at bedtime  metoprolol tartrate 50 milliGRAM(s) Oral <User Schedule>  pantoprazole    Tablet 40 milliGRAM(s) Oral before breakfast  potassium chloride    Tablet ER 20 milliEquivalent(s) Oral every 2 hours  tamsulosin 0.4 milliGRAM(s) Oral at bedtime      PHYSICAL EXAM:  T(C): 36.8 (07-30-24 @ 04:42), Max: 36.9 (07-29-24 @ 12:38)  HR: 96 (07-30-24 @ 04:42) (83 - 101)  BP: 125/66 (07-30-24 @ 04:42) (125/66 - 146/73)  RR: 18 (07-30-24 @ 04:42) (18 - 18)  SpO2: 97% (07-30-24 @ 04:42) (97% - 98%)  Wt(kg): --  I&O's Summary    29 Jul 2024 07:01  -  30 Jul 2024 07:00  --------------------------------------------------------  IN: 540 mL / OUT: 3400 mL / NET: -2860 mL          Appearance: NAD  HEENT:   Dry  oral mucosa, PERRL, EOMI	  Lymphatic: No lymphadenopathy , 1+edema  Cardiovascular: Normal S1 S2, No JVD, No murmurs , Peripheral pulses palpable 2+ bilaterally  Respiratory: decreased bs   Gastrointestinal:  Soft, Non-tender, + BS	  Skin: No rashes, No ecchymoses, No cyanosis, warm to touch  Musculoskeletal: Normal range of motion, normal strength  Psychiatry:  Mood & affect appropriate  Ext: 1+ edema  Right heel wound   +niño        LABS:    CARDIAC MARKERS:                                9.1    6.04  )-----------( 137      ( 30 Jul 2024 05:47 )             28.0     07-30    137  |  100  |  15  ----------------------------<  102<H>  3.7   |  23  |  0.89    Ca    8.6      30 Jul 2024 05:48  Mg     1.8     07-30    TPro  5.6<L>  /  Alb  2.6<L>  /  TBili  1.1  /  DBili  x   /  AST  46<H>  /  ALT  18  /  AlkPhos  66  07-30    proBNP:   Lipid Profile:   HgA1c:   TSH:             TELEMETRY: 	 SR   ECG:  	  RADIOLOGY: < from: CT Abdomen and Pelvis w/ IV Cont (07.29.24 @ 14:09) >    ACC: [id number] EXAM:  CT ABDOMEN AND PELVIS IC   ORDERED BY: JEFF STATON     PROCEDURE DATE:  07/29/2024          INTERPRETATION:  CLINICAL INFORMATION: Evaluate for retroperitoneal   bleed, status post TAVR and bilateral lower extremity arterial   repair/grafting    COMPARISON: CT abdomen pelvis 7/12/2024    CONTRAST/COMPLICATIONS:  IV Contrast: Omnipaque 350  90 cc administered   10 cc discarded  Oral Contrast: NONE  Complications: None reported at time of study completion    PROCEDURE:  CT of the Abdomen and Pelvis was performed.  Sagittal and coronal reformats were performed.    FINDINGS:  LOWER CHEST: Status post TAVR. Trace pleural effusions. Bilateral linear   atelectasis versus scarring.    LIVER: Within normal limits.  BILE DUCTS: Vicarious excretion of contrast.  GALLBLADDER: Within normal limits.  SPLEEN: Within normal limits.  PANCREAS: Within normal limits.  ADRENALS: Within normal limits.  KIDNEYS/URETERS: Within normal limits.    BLADDER: Distended. Air, likely related to instrumentation.  REPRODUCTIVE ORGANS: Prostate within normal limits.    BOWEL: No bowel obstruction. Appendix is normal.  PERITONEUM/RETROPERITONEUM: Hot dissection of the  VESSELS: Hematoma superficial to the left SFA measuring up to 3.9 x 2.2   cm. (3:48). Scattered bubbles of air in the bilateral groins and along   the distal course of the right external iliac artery. Possible small   dissection flap in the proximal SFA.    LYMPH NODES: No lymphadenopathy.  ABDOMINAL WALL: Small postsurgical subcutaneous emphysema in the   bilateral anterior groin.  BONES: Median sternotomy.    IMPRESSION:    3.9 cm hematoma adjacent to the left SFA with short segment dissection.   No evidence of pseudoaneurysm or active bleed.    No evidence of retroperitoneal hematoma, as clinically questioned.            --- End of Report ---          CRISTINA KING MD; Resident Radiologist  This document has been electronically signed.  SKYLAR HARRIS MD; Attending Radiologist  This document has been electronically signed. Jul 29 2024  3:24PM    < end of copied text >    DIAGNOSTIC TESTING:  [ ] Echocardiogram:  [ ]  Catheterization:  [ ] Stress Test:    OTHER:

## 2024-07-30 NOTE — PROGRESS NOTE ADULT - ASSESSMENT
84M pmHX HTN, HLD, DM2 , CAD with PCI CABG x3/ AVR( 2011, [facility], He does not have his valve card) and coronary stent x1 ( 5/2019, on Plavix ),  AFib  on Eliquis (last dose 7/21), Left Carotid Endarterectomy on 10/7/21.?  Patient underwent a valve in valve TAVR 7/24 along with a stent to the left main to avoid occlusion of the coronary by the valve. Intraoperatively patient required a right ileofemoral bypass. Subsequently, in the PACU, patient developed a cold left leg. He was found to have an occluded left SFA due to vessel closure device. He returned to the OR for a resection and vein graft at the SFA.     Assessment  DMT2: 84y Male with DM T2 with hyperglycemia, A1C 8.5% , was on oral meds  at home, now on basal bolus insulin with coverage, blood sugars running high postop.  s/p TAVR. CTU postop   CAD: on medications, stable, monitored.  HTN: on antihypertensive medications, monitored, asymptomatic.    Discussed plan and management wit Dr Oneil Angulo NP-TEAMS

## 2024-07-31 ENCOUNTER — TRANSCRIPTION ENCOUNTER (OUTPATIENT)
Age: 84
End: 2024-07-31

## 2024-07-31 VITALS — TEMPERATURE: 99 F

## 2024-07-31 LAB
ANION GAP SERPL CALC-SCNC: 12 MMOL/L — SIGNIFICANT CHANGE UP (ref 5–17)
BUN SERPL-MCNC: 16 MG/DL — SIGNIFICANT CHANGE UP (ref 7–23)
CALCIUM SERPL-MCNC: 8.5 MG/DL — SIGNIFICANT CHANGE UP (ref 8.4–10.5)
CHLORIDE SERPL-SCNC: 99 MMOL/L — SIGNIFICANT CHANGE UP (ref 96–108)
CO2 SERPL-SCNC: 24 MMOL/L — SIGNIFICANT CHANGE UP (ref 22–31)
CREAT SERPL-MCNC: 0.77 MG/DL — SIGNIFICANT CHANGE UP (ref 0.5–1.3)
EGFR: 89 ML/MIN/1.73M2 — SIGNIFICANT CHANGE UP
GLUCOSE BLDC GLUCOMTR-MCNC: 177 MG/DL — HIGH (ref 70–99)
GLUCOSE BLDC GLUCOMTR-MCNC: 192 MG/DL — HIGH (ref 70–99)
GLUCOSE SERPL-MCNC: 183 MG/DL — HIGH (ref 70–99)
HCT VFR BLD CALC: 26.1 % — LOW (ref 39–50)
HGB BLD-MCNC: 8.7 G/DL — LOW (ref 13–17)
MAGNESIUM SERPL-MCNC: 2 MG/DL — SIGNIFICANT CHANGE UP (ref 1.6–2.6)
MCHC RBC-ENTMCNC: 30.6 PG — SIGNIFICANT CHANGE UP (ref 27–34)
MCHC RBC-ENTMCNC: 33.3 GM/DL — SIGNIFICANT CHANGE UP (ref 32–36)
MCV RBC AUTO: 91.9 FL — SIGNIFICANT CHANGE UP (ref 80–100)
NRBC # BLD: 0 /100 WBCS — SIGNIFICANT CHANGE UP (ref 0–0)
PHOSPHATE SERPL-MCNC: 2.5 MG/DL — SIGNIFICANT CHANGE UP (ref 2.5–4.5)
PLATELET # BLD AUTO: 158 K/UL — SIGNIFICANT CHANGE UP (ref 150–400)
POTASSIUM SERPL-MCNC: 3.9 MMOL/L — SIGNIFICANT CHANGE UP (ref 3.5–5.3)
POTASSIUM SERPL-SCNC: 3.9 MMOL/L — SIGNIFICANT CHANGE UP (ref 3.5–5.3)
RBC # BLD: 2.84 M/UL — LOW (ref 4.2–5.8)
RBC # FLD: 16 % — HIGH (ref 10.3–14.5)
SODIUM SERPL-SCNC: 135 MMOL/L — SIGNIFICANT CHANGE UP (ref 135–145)
SURGICAL PATHOLOGY STUDY: SIGNIFICANT CHANGE UP
WBC # BLD: 6.4 K/UL — SIGNIFICANT CHANGE UP (ref 3.8–10.5)
WBC # FLD AUTO: 6.4 K/UL — SIGNIFICANT CHANGE UP (ref 3.8–10.5)

## 2024-07-31 PROCEDURE — 71045 X-RAY EXAM CHEST 1 VIEW: CPT | Mod: 26

## 2024-07-31 PROCEDURE — 86901 BLOOD TYPING SEROLOGIC RH(D): CPT

## 2024-07-31 PROCEDURE — C1768: CPT

## 2024-07-31 PROCEDURE — C1889: CPT

## 2024-07-31 PROCEDURE — 82330 ASSAY OF CALCIUM: CPT

## 2024-07-31 PROCEDURE — 88304 TISSUE EXAM BY PATHOLOGIST: CPT

## 2024-07-31 PROCEDURE — 86900 BLOOD TYPING SEROLOGIC ABO: CPT

## 2024-07-31 PROCEDURE — 93306 TTE W/DOPPLER COMPLETE: CPT

## 2024-07-31 PROCEDURE — 80048 BASIC METABOLIC PNL TOTAL CA: CPT

## 2024-07-31 PROCEDURE — 82435 ASSAY OF BLOOD CHLORIDE: CPT

## 2024-07-31 PROCEDURE — 74177 CT ABD & PELVIS W/CONTRAST: CPT | Mod: MC

## 2024-07-31 PROCEDURE — 85025 COMPLETE CBC W/AUTO DIFF WBC: CPT

## 2024-07-31 PROCEDURE — 97161 PT EVAL LOW COMPLEX 20 MIN: CPT

## 2024-07-31 PROCEDURE — C1769: CPT

## 2024-07-31 PROCEDURE — 85610 PROTHROMBIN TIME: CPT

## 2024-07-31 PROCEDURE — C1874: CPT

## 2024-07-31 PROCEDURE — 86923 COMPATIBILITY TEST ELECTRIC: CPT

## 2024-07-31 PROCEDURE — C1725: CPT

## 2024-07-31 PROCEDURE — 80053 COMPREHEN METABOLIC PANEL: CPT

## 2024-07-31 PROCEDURE — 85730 THROMBOPLASTIN TIME PARTIAL: CPT

## 2024-07-31 PROCEDURE — P9016: CPT

## 2024-07-31 PROCEDURE — C1757: CPT

## 2024-07-31 PROCEDURE — 83735 ASSAY OF MAGNESIUM: CPT

## 2024-07-31 PROCEDURE — 85014 HEMATOCRIT: CPT

## 2024-07-31 PROCEDURE — 84132 ASSAY OF SERUM POTASSIUM: CPT

## 2024-07-31 PROCEDURE — C1894: CPT

## 2024-07-31 PROCEDURE — 99232 SBSQ HOSP IP/OBS MODERATE 35: CPT

## 2024-07-31 PROCEDURE — C1887: CPT

## 2024-07-31 PROCEDURE — 76000 FLUOROSCOPY <1 HR PHYS/QHP: CPT

## 2024-07-31 PROCEDURE — 82962 GLUCOSE BLOOD TEST: CPT

## 2024-07-31 PROCEDURE — 85520 HEPARIN ASSAY: CPT

## 2024-07-31 PROCEDURE — C1760: CPT

## 2024-07-31 PROCEDURE — 86850 RBC ANTIBODY SCREEN: CPT

## 2024-07-31 PROCEDURE — 82947 ASSAY GLUCOSE BLOOD QUANT: CPT

## 2024-07-31 PROCEDURE — 84100 ASSAY OF PHOSPHORUS: CPT

## 2024-07-31 PROCEDURE — 93355 ECHO TRANSESOPHAGEAL (TEE): CPT

## 2024-07-31 PROCEDURE — 36415 COLL VENOUS BLD VENIPUNCTURE: CPT

## 2024-07-31 PROCEDURE — 85384 FIBRINOGEN ACTIVITY: CPT

## 2024-07-31 PROCEDURE — 97116 GAIT TRAINING THERAPY: CPT

## 2024-07-31 PROCEDURE — 85018 HEMOGLOBIN: CPT

## 2024-07-31 PROCEDURE — 84295 ASSAY OF SERUM SODIUM: CPT

## 2024-07-31 PROCEDURE — 36430 TRANSFUSION BLD/BLD COMPNT: CPT

## 2024-07-31 PROCEDURE — 93005 ELECTROCARDIOGRAM TRACING: CPT

## 2024-07-31 PROCEDURE — 97530 THERAPEUTIC ACTIVITIES: CPT

## 2024-07-31 PROCEDURE — 83605 ASSAY OF LACTIC ACID: CPT

## 2024-07-31 PROCEDURE — 82803 BLOOD GASES ANY COMBINATION: CPT

## 2024-07-31 PROCEDURE — 71045 X-RAY EXAM CHEST 1 VIEW: CPT

## 2024-07-31 PROCEDURE — 88300 SURGICAL PATH GROSS: CPT

## 2024-07-31 PROCEDURE — C9399: CPT

## 2024-07-31 PROCEDURE — 93970 EXTREMITY STUDY: CPT

## 2024-07-31 PROCEDURE — 85027 COMPLETE CBC AUTOMATED: CPT

## 2024-07-31 RX ORDER — SENNOSIDES 8.6 MG/1
2 TABLET ORAL AT BEDTIME
Refills: 0 | Status: DISCONTINUED | OUTPATIENT
Start: 2024-07-31 | End: 2024-07-31

## 2024-07-31 RX ORDER — LORATADINE 10 MG
17 TABLET,DISINTEGRATING ORAL
Qty: 0 | Refills: 0 | DISCHARGE
Start: 2024-07-31

## 2024-07-31 RX ORDER — APIXABAN 5 MG/1
1 TABLET, FILM COATED ORAL
Qty: 0 | Refills: 0 | DISCHARGE

## 2024-07-31 RX ORDER — MELATONIN 3 MG
1 TABLET ORAL
Qty: 0 | Refills: 0 | DISCHARGE
Start: 2024-07-31

## 2024-07-31 RX ORDER — LORATADINE 10 MG
17 TABLET,DISINTEGRATING ORAL DAILY
Refills: 0 | Status: DISCONTINUED | OUTPATIENT
Start: 2024-07-31 | End: 2024-07-31

## 2024-07-31 RX ORDER — METOPROLOL TARTRATE 100 MG
1 TABLET ORAL
Qty: 0 | Refills: 0 | DISCHARGE

## 2024-07-31 RX ORDER — TAMSULOSIN HCL 0.4 MG
1 CAPSULE ORAL
Qty: 0 | Refills: 0 | DISCHARGE
Start: 2024-07-31

## 2024-07-31 RX ORDER — INSULIN LISPRO 100/ML
8 VIAL (ML) SUBCUTANEOUS
Refills: 0 | Status: DISCONTINUED | OUTPATIENT
Start: 2024-07-31 | End: 2024-07-31

## 2024-07-31 RX ORDER — ASPIRIN 500 MG
1 TABLET ORAL
Qty: 0 | Refills: 0 | DISCHARGE
Start: 2024-07-31

## 2024-07-31 RX ORDER — SENNOSIDES 8.6 MG/1
2 TABLET ORAL
Qty: 0 | Refills: 0 | DISCHARGE
Start: 2024-07-31

## 2024-07-31 RX ORDER — INSULIN GLARGINE-YFGN 100 [IU]/ML
18 INJECTION, SOLUTION SUBCUTANEOUS AT BEDTIME
Refills: 0 | Status: DISCONTINUED | OUTPATIENT
Start: 2024-07-31 | End: 2024-07-31

## 2024-07-31 RX ORDER — FUROSEMIDE 10 MG/ML
20 INJECTION, SOLUTION INTRAVENOUS ONCE
Refills: 0 | Status: COMPLETED | OUTPATIENT
Start: 2024-07-31 | End: 2024-07-31

## 2024-07-31 RX ORDER — METFORMIN HYDROCHLORIDE 850 MG/1
1 TABLET, FILM COATED ORAL
Qty: 0 | Refills: 0 | DISCHARGE

## 2024-07-31 RX ORDER — PANTOPRAZOLE SODIUM 20 MG/1
1 TABLET, DELAYED RELEASE ORAL
Qty: 0 | Refills: 0 | DISCHARGE
Start: 2024-07-31

## 2024-07-31 RX ADMIN — PANTOPRAZOLE SODIUM 40 MILLIGRAM(S): 20 TABLET, DELAYED RELEASE ORAL at 05:14

## 2024-07-31 RX ADMIN — CLOPIDOGREL BISULFATE 75 MILLIGRAM(S): 75 TABLET, FILM COATED ORAL at 10:30

## 2024-07-31 RX ADMIN — Medication 81 MILLIGRAM(S): at 10:30

## 2024-07-31 RX ADMIN — Medication 1: at 13:25

## 2024-07-31 RX ADMIN — Medication 17 GRAM(S): at 10:30

## 2024-07-31 RX ADMIN — Medication 1: at 08:10

## 2024-07-31 RX ADMIN — FUROSEMIDE 20 MILLIGRAM(S): 10 INJECTION, SOLUTION INTRAVENOUS at 10:30

## 2024-07-31 RX ADMIN — Medication 7 UNIT(S): at 08:10

## 2024-07-31 RX ADMIN — APIXABAN 2.5 MILLIGRAM(S): 5 TABLET, FILM COATED ORAL at 05:14

## 2024-07-31 RX ADMIN — Medication 50 MILLIGRAM(S): at 05:14

## 2024-07-31 RX ADMIN — Medication 8 UNIT(S): at 13:25

## 2024-07-31 NOTE — PROGRESS NOTE ADULT - PROBLEM SELECTOR PLAN 1
Will continue Lantus 18   u at bedtime.  Will increase Admelog 8 u before each meal and continue Admelog correction scale coverage. Will continue monitoring FS and Follow up.   Will continue monitoring FS, log, and glucose trends, will Follow up.  Patient counseled for compliance with consistent low carb diet and exercise as tolerated outpatient.  May get discharged on his home DM meds/regimen  FU outpt

## 2024-07-31 NOTE — DISCHARGE NOTE NURSING/CASE MANAGEMENT/SOCIAL WORK - NSDCPEFALRISK_GEN_ALL_CORE
For information on Fall & Injury Prevention, visit: [email or web address] OR  [email or web address] OR  https://www.cdc.gov/steadi/patient.html

## 2024-07-31 NOTE — DIETITIAN INITIAL EVALUATION ADULT - PHYSCIAL ASSESSMENT
Pt reports UBW of ~125 pounds and denies any recent wt changes PTA.    Current dosing wt in pounds: 120 (7/29)  Daily wt hx per chart in pounds: 124 (7/31), 117 (7/27)  Wt hx per [place] HIE in pounds: 125 (7/20), 121 (7/13), 125 (6/18)    Wt fairly stable, RD to continue to monitor wt as able   IBW: 106 pounds   Pt confirmed height of 60", used for IBW/BMI

## 2024-07-31 NOTE — PROGRESS NOTE ADULT - SUBJECTIVE AND OBJECTIVE BOX
Chief complaint  Patient is a 84y old  Male who presents with a chief complaint of Pily TAVR  (31 Jul 2024 15:20)         Labs and Fingersticks  CAPILLARY BLOOD GLUCOSE      POCT Blood Glucose.: 177 mg/dL (31 Jul 2024 11:27)  POCT Blood Glucose.: 192 mg/dL (31 Jul 2024 08:07)  POCT Blood Glucose.: 240 mg/dL (30 Jul 2024 21:32)  POCT Blood Glucose.: 232 mg/dL (30 Jul 2024 16:32)      Anion Gap: 12 (07-31 @ 05:55)  Anion Gap: 14 (07-30 @ 05:48)      Calcium: 8.5 (07-31 @ 05:55)  Calcium: 8.6 (07-30 @ 05:48)  Albumin: 2.6 *L* (07-30 @ 05:48)    Alanine Aminotransferase (ALT/SGPT): 18 (07-30 @ 05:48)  Alkaline Phosphatase: 66 (07-30 @ 05:48)  Aspartate Aminotransferase (AST/SGOT): 46 *H* (07-30 @ 05:48)        07-31    135  |  99  |  16  ----------------------------<  183<H>  3.9   |  24  |  0.77    Ca    8.5      31 Jul 2024 05:55  Phos  2.5     07-31  Mg     2.0     07-31    TPro  5.6<L>  /  Alb  2.6<L>  /  TBili  1.1  /  DBili  x   /  AST  46<H>  /  ALT  18  /  AlkPhos  66  07-30                        8.7    6.40  )-----------( 158      ( 31 Jul 2024 05:55 )             26.1     Medications  MEDICATIONS  (STANDING):  apixaban 2.5 milliGRAM(s) Oral every 12 hours  apixaban      aspirin  chewable 81 milliGRAM(s) Oral daily  atorvastatin 80 milliGRAM(s) Oral at bedtime  chlorhexidine 0.12% Liquid 15 milliLiter(s) Oral Mucosa every 12 hours  clopidogrel Tablet 75 milliGRAM(s) Oral daily  insulin glargine Injectable (LANTUS) 18 Unit(s) SubCutaneous at bedtime  insulin lispro (ADMELOG) corrective regimen sliding scale   SubCutaneous at bedtime  insulin lispro (ADMELOG) corrective regimen sliding scale   SubCutaneous three times a day before meals  insulin lispro Injectable (ADMELOG) 8 Unit(s) SubCutaneous three times a day before meals  melatonin 3 milliGRAM(s) Oral at bedtime  metoprolol tartrate 50 milliGRAM(s) Oral <User Schedule>  pantoprazole    Tablet 40 milliGRAM(s) Oral before breakfast  polyethylene glycol 3350 17 Gram(s) Oral daily  senna 2 Tablet(s) Oral at bedtime  tamsulosin 0.4 milliGRAM(s) Oral at bedtime      Physical Exam  General: Patient comfortable in bed   Vital Signs Last 12 Hrs  T(F): 98.6 (07-31-24 @ 13:55), Max: 98.9 (07-31-24 @ 05:04)  HR: 97 (07-31-24 @ 11:44) (97 - 103)  BP: 111/67 (07-31-24 @ 11:44) (106/72 - 111/67)  BP(mean): 84 (07-31-24 @ 05:04) (84 - 84)  RR: 18 (07-31-24 @ 11:44) (18 - 18)  SpO2: 96% (07-31-24 @ 11:44) (96% - 97%)    CVS: S1S2   Respiratory: No wheezing, no crepitations  GI: Abdomen soft, bowel sounds positive  Musculoskeletal:  moves all extremities  : Voiding

## 2024-07-31 NOTE — DIETITIAN INITIAL EVALUATION ADULT - ORAL INTAKE PTA/DIET HISTORY
PTA per pt  -Intake: good typical intake, consumes 2-3 meals/day, follows Kosher guidelines at home, also limits carbohydrates in setting of DM hx, denies any recent changes in appetite.    -Chewing/Swallowing: denies hx of difficulty - has dentures that fit well   -Allergies/Intolerances: confirms NKFA   -Vitamins/Supplements: vitamin D2 1x/week per pt

## 2024-07-31 NOTE — DIETITIAN INITIAL EVALUATION ADULT - NSFNSGIIOFT_GEN_A_CORE
I&O's Detail    30 Jul 2024 07:01  -  31 Jul 2024 07:00  --------------------------------------------------------  IN:    IV PiggyBack: 50 mL    Oral Fluid: 300 mL  Total IN: 350 mL    OUT:    Voided (mL): 1000 mL  Total OUT: 1000 mL    Total NET: -650 mL      31 Jul 2024 07:01  -  31 Jul 2024 11:37  --------------------------------------------------------  IN:    Oral Fluid: 240 mL  Total IN: 240 mL    OUT:    Voided (mL): 1100 mL  Total OUT: 1100 mL    Total NET: -860 mL

## 2024-07-31 NOTE — DISCHARGE NOTE PROVIDER - NSDCFUADDAPPT_GEN_ALL_CORE_FT
When discharged from rehab call cardiothoracic office [phone] for a follow up appointment  Please follow up with your cardiologist when discharged from rehab

## 2024-07-31 NOTE — PROGRESS NOTE ADULT - SUBJECTIVE AND OBJECTIVE BOX
Message was left staying that they need information for this pt's Cpap machine  It was very hard to understand the person leaving the message    Please call [phone] they did not leave any reference # just the pt's name/     VITAL SIGNS    Subjective:     Telemetry:      Vital Signs Last 24 Hrs  T(C): 37.2 (24 @ 05:04), Max: 37.4 (24 @ 20:33)  T(F): 98.9 (24 @ 05:04), Max: 99.4 (24 @ 20:33)  HR: 103 (24 @ 05:04) (79 - 103)  BP: 106/72 (24 @ 05:04) (106/72 - 135/68)  RR: 18 (24 @ 05:04) (18 - 18)  SpO2: 97% (24 @ 05:04) (97% - 98%)                @ 07:01  -   @ 07:00  --------------------------------------------------------  IN: 350 mL / OUT: 1000 mL / NET: -650 mL     @ 07:01  -   @ 08:32  --------------------------------------------------------  IN: 240 mL / OUT: 400 mL / NET: -160 mL        LABS      135  |  99  |  16  ----------------------------<  183<H>  3.9   |  24  |  0.77    Ca    8.5      2024 05:55  Phos  2.5       Mg     2.0         TPro  5.6<L>  /  Alb  2.6<L>  /  TBili  1.1  /  DBili  x   /  AST  46<H>  /  ALT  18  /  AlkPhos  66                                   8.7    6.40  )-----------( 158      ( 2024 05:55 )             26.1                  Daily     Daily Weight in k.4 (2024 07:55)      CAPILLARY BLOOD GLUCOSE      POCT Blood Glucose.: 192 mg/dL (2024 08:07)  POCT Blood Glucose.: 240 mg/dL (2024 21:32)  POCT Blood Glucose.: 232 mg/dL (2024 16:32)  POCT Blood Glucose.: 209 mg/dL (2024 12:35)          Drains:     MS         [  ] Drainage:                 L Pleural  [  ]  Drainage:                R Pleural  [  ]  Drainage:    Pacing Wires        [  ]   Settings:                                  Isolated  [  ]    Coumadin    [ ] YES          [  ]      NO         Reason:           PHYSICAL EXAM      Neurology: alert and oriented x 3, nonfocal, no gross deficits    CV:     Sternal Wound: MSI -->CDI, sternum stable    Lungs: CTA B/L     Abdomen: soft, nontender, nondistended, positive bowel sounds, (+) Flatus; (+) BM     :  Voiding               Extremities:  B/L LE (+) edema; negative calf tenderness; (+) 2 DP palpable          apixaban 2.5 milliGRAM(s) Oral every 12 hours  apixaban      aspirin  chewable 81 milliGRAM(s) Oral daily  atorvastatin 80 milliGRAM(s) Oral at bedtime  chlorhexidine 0.12% Liquid 15 milliLiter(s) Oral Mucosa every 12 hours  clopidogrel Tablet 75 milliGRAM(s) Oral daily  insulin glargine Injectable (LANTUS) 17 Unit(s) SubCutaneous at bedtime  insulin lispro (ADMELOG) corrective regimen sliding scale   SubCutaneous three times a day before meals  insulin lispro (ADMELOG) corrective regimen sliding scale   SubCutaneous at bedtime  insulin lispro Injectable (ADMELOG) 7 Unit(s) SubCutaneous three times a day before meals  melatonin 3 milliGRAM(s) Oral at bedtime  metoprolol tartrate 50 milliGRAM(s) Oral <User Schedule>  pantoprazole    Tablet 40 milliGRAM(s) Oral before breakfast  tamsulosin 0.4 milliGRAM(s) Oral at bedtime                 Physical Therapy Rec:   Home  [  ]   Home w/ PT  [  ]  Rehab  [  ]    Discussed with Cardiothoracic Team at AM rounds.     VITAL SIGNS    Subjective: "I feel good" Denies CP, palpitation, SOB, JACOBSEN, HA, dizziness, N/V/D, fever or chills.  No acute event noted overnight.    Telemetry:  Afib 80s    Vital Signs Last 24 Hrs  T(C): 37.2 (24 @ 05:04), Max: 37.4 (24 @ 20:33)  T(F): 98.9 (24 @ 05:04), Max: 99.4 (24 @ 20:33)  HR: 103 (24 @ 05:04) (79 - 103)  BP: 106/72 (24 @ 05:04) (106/72 - 135/68)  RR: 18 (24 @ 05:04) (18 - 18)  SpO2: 97% (24 @ 05:04) (97% - 98%)                @ 07:01  -   @ 07:00  --------------------------------------------------------  IN: 350 mL / OUT: 1000 mL / NET: -650 mL     @ 07:01  -   @ 08:32  --------------------------------------------------------  IN: 240 mL / OUT: 400 mL / NET: -160 mL        LABS      135  |  99  |  16  ----------------------------<  183<H>  3.9   |  24  |  0.77    Ca    8.5      2024 05:55  Phos  2.5       Mg     2.0         TPro  5.6<L>  /  Alb  2.6<L>  /  TBili  1.1  /  DBili  x   /  AST  46<H>  /  ALT  18  /  AlkPhos  66  07-30                                 8.7    6.40  )-----------( 158      ( 2024 05:55 )             26.1                  Daily     Daily Weight in k.4 (2024 07:55)      CAPILLARY BLOOD GLUCOSE      POCT Blood Glucose.: 192 mg/dL (2024 08:07)  POCT Blood Glucose.: 240 mg/dL (2024 21:32)  POCT Blood Glucose.: 232 mg/dL (2024 16:32)  POCT Blood Glucose.: 209 mg/dL (2024 12:35)        PHYSICAL EXAM      Neurology: alert and oriented x 3, nonfocal, no gross deficits    CV: +S1, S2. no heart murmurs heard.     Lungs: CTA B/L     Abdomen: soft, nontender, nondistended, positive bowel sounds, (+) Flatus; (-) BM     :  Voiding               Extremities:  B/L LE (+) 1 edema; (+) 2 DP palpable  and + dopplers.        apixaban 2.5 milliGRAM(s) Oral every 12 hours  apixaban      aspirin  chewable 81 milliGRAM(s) Oral daily  atorvastatin 80 milliGRAM(s) Oral at bedtime  chlorhexidine 0.12% Liquid 15 milliLiter(s) Oral Mucosa every 12 hours  clopidogrel Tablet 75 milliGRAM(s) Oral daily  insulin glargine Injectable (LANTUS) 17 Unit(s) SubCutaneous at bedtime  insulin lispro (ADMELOG) corrective regimen sliding scale   SubCutaneous three times a day before meals  insulin lispro (ADMELOG) corrective regimen sliding scale   SubCutaneous at bedtime  insulin lispro Injectable (ADMELOG) 7 Unit(s) SubCutaneous three times a day before meals  melatonin 3 milliGRAM(s) Oral at bedtime  metoprolol tartrate 50 milliGRAM(s) Oral <User Schedule>  pantoprazole    Tablet 40 milliGRAM(s) Oral before breakfast  tamsulosin 0.4 milliGRAM(s) Oral at bedtime                 Physical Therapy Rec:   Home  [  ]   Home w/ PT  [  ]  Rehab  [ x ]    Discussed with Cardiothoracic Team at AM rounds.

## 2024-07-31 NOTE — DIETITIAN INITIAL EVALUATION ADULT - PERSON TAUGHT/METHOD
Provided education on T2DM nutrition therapy. Provided education on foods containing carbohydrates, foods containing proteins, and portion sizes. Stressed the importance of a balanced meal to maintain blood glucose. Encouraged consumption of non-starchy vegetables and other fiber-rich foods. Provided education on heart healthy nutrition therapy. Recommended limited salt intake. Reviewed foods high in salt and amount of salt recommended per day. Discussed nutrition label reading. Stressed the importance of limited fried foods and saturated fat consumption./verbal instruction/written material/patient instructed

## 2024-07-31 NOTE — DISCHARGE NOTE PROVIDER - NSDCPNSUBOBJ_GEN_ALL_CORE
General: NAD  HEENT:  NC/AT  Neuro: A&Ox4, gait steady, speech clear, no focal deficits noted  Respiratory: B/L BS CTA, no wheeze, no rhonchi, no crackles noted  Cardiovascular: RRR, normal S1S2, no murmur noted  GI: Abd soft, NT/ND, +BSx4Q +BM  Peripheral Vascular:  B/L LE neg edema, 2+ peripheral pulses, no clubbing, cyanosis, varicosities/PVD noted  Musculoskeletal: B/L UE and LE 5/5 strength   Psychiatric: Normal mood, normal affect observed  Skin: Normal exam to inspection and palpation. no bleeding, no hematoma.  b/l groins c/d/i   b/l lower extremities chronic discoloration

## 2024-07-31 NOTE — PROGRESS NOTE ADULT - ASSESSMENT
Assessment: 85 yo M s/p TAVR access B/L femoral artery c/b failed closure device. Vascular consulted intra-op for RLE ALI s/p R iliofemoral bypass w/ PTFE and RLE angiogram. Post-op, patient w/ LLE ALI RTOR s/p L SFA interposition bypass w/ reversed ipsilateral GSV.     Plan:  - Vascular examination remains stable  - Continue physical therapy OOB/ambulation   - Will sign off at this time, please call back if any questions    Vascular Surgery  [id number]

## 2024-07-31 NOTE — DISCHARGE NOTE PROVIDER - NSDCCPCAREPLAN_GEN_ALL_CORE_FT
PRINCIPAL DISCHARGE DIAGNOSIS  Diagnosis: S/P TAVR (transcatheter aortic valve replacement)  Assessment and Plan of Treatment: Shower daily   ambulate TID   oob to chair for all meals   Labs Monday, Thursday  • Continue to wear your MCOT Patch for 30 days duration.   • Change the patch if instructed by the monitor or if the patch begins to loosen. Patches should last approximately 5 days.  • Keep the monitor and sensor within close range of each other throughout your monitoring period. To avoid getting alert messages on the monitor, keep the monitor within 30 feet of the sensor at all times.  • Record any symptoms as they occur. Promptly respond to any messages or alerts that you receive on the monitor  • When Showering with the MCOT Patch: components are water-resistant, not waterproof. You can shower normally; however, for optimal results, avoid spraying water directly onto the sensor.   • While showering, it is recommended to face away from the shower head.   • Do not swim, or take baths while wearing the MCOT Patch.

## 2024-07-31 NOTE — DIETITIAN INITIAL EVALUATION ADULT - PERTINENT LABORATORY DATA
07-31    135  |  99  |  16  ----------------------------<  183<H>  3.9   |  24  |  0.77    Ca    8.5      31 Jul 2024 05:55  Phos  2.5     07-31  Mg     2.0     07-31    TPro  5.6<L>  /  Alb  2.6<L>  /  TBili  1.1  /  DBili  x   /  AST  46<H>  /  ALT  18  /  AlkPhos  66  07-30  POCT Blood Glucose.: 192 mg/dL (07-31-24 @ 08:07)  A1C with Estimated Average Glucose Result: 8.5 % (07-12-24 @ 12:41)

## 2024-07-31 NOTE — PROGRESS NOTE ADULT - PROBLEM SELECTOR PROBLEM 2
Afib
Afib
Aortic stenosis
Afib
Peripheral vascular disease
Aortic stenosis
Peripheral vascular disease
Afib
Afib
Peripheral vascular disease
Afib
Peripheral vascular disease
Peripheral vascular disease

## 2024-07-31 NOTE — DISCHARGE NOTE NURSING/CASE MANAGEMENT/SOCIAL WORK - PATIENT PORTAL LINK FT
You can access the FollowMyHealth Patient Portal offered by [facility] by registering at the following website: [email or web address]. By joining [employer]’s FollowMyHealth portal, you will also be able to view your health information using other applications (apps) compatible with our system.

## 2024-07-31 NOTE — PROGRESS NOTE ADULT - SUBJECTIVE AND OBJECTIVE BOX
SURGERY DAILY PROGRESS NOTE    SUBJECTIVE: No acute events overnight. Patient seen and evaluated on AM rounds.  OBJECTIVE:  Vital Signs Last 24 Hrs  T(C): 37.2 (2024 05:04), Max: 37.4 (2024 20:33)  T(F): 98.9 (2024 05:04), Max: 99.4 (2024 20:33)  HR: 103 (2024 05:04) (79 - 103)  BP: 106/72 (2024 05:04) (106/72 - 135/68)  BP(mean): 84 (2024 05:04) (84 - 84)  RR: 18 (2024 05:04) (18 - 18)  SpO2: 97% (2024 05:04) (97% - 98%)    Parameters below as of 2024 05:04  Patient On (Oxygen Delivery Method): room air      I&O's Detail    2024 07:01  -  2024 07:00  --------------------------------------------------------  IN:    IV PiggyBack: 50 mL    Oral Fluid: 300 mL  Total IN: 350 mL    OUT:    Voided (mL): 1000 mL  Total OUT: 1000 mL    Total NET: -650 mL      2024 07:01  -  2024 09:51  --------------------------------------------------------  IN:    Oral Fluid: 240 mL  Total IN: 240 mL    OUT:    Voided (mL): 400 mL  Total OUT: 400 mL    Total NET: -160 mL    Daily     Daily Weight in k.4 (2024 07:55)  MEDICATIONS  (STANDING):  apixaban 2.5 milliGRAM(s) Oral every 12 hours  apixaban      aspirin  chewable 81 milliGRAM(s) Oral daily  atorvastatin 80 milliGRAM(s) Oral at bedtime  chlorhexidine 0.12% Liquid 15 milliLiter(s) Oral Mucosa every 12 hours  clopidogrel Tablet 75 milliGRAM(s) Oral daily  furosemide   Injectable 20 milliGRAM(s) IV Push once  insulin glargine Injectable (LANTUS) 18 Unit(s) SubCutaneous at bedtime  insulin lispro (ADMELOG) corrective regimen sliding scale   SubCutaneous at bedtime  insulin lispro (ADMELOG) corrective regimen sliding scale   SubCutaneous three times a day before meals  insulin lispro Injectable (ADMELOG) 8 Unit(s) SubCutaneous three times a day before meals  melatonin 3 milliGRAM(s) Oral at bedtime  metoprolol tartrate 50 milliGRAM(s) Oral <User Schedule>  pantoprazole    Tablet 40 milliGRAM(s) Oral before breakfast  polyethylene glycol 3350 17 Gram(s) Oral daily  tamsulosin 0.4 milliGRAM(s) Oral at bedtime    MEDICATIONS  (PRN):      LABS:                        8.7    6.40  )-----------( 158      ( 2024 05:55 )             26.1     07-    135  |  99  |  16  ----------------------------<  183<H>  3.9   |  24  |  0.77    Ca    8.5      2024 05:55  Phos  2.5     07-  Mg     2.0         TPro  5.6<L>  /  Alb  2.6<L>  /  TBili  1.1  /  DBili  x   /  AST  46<H>  /  ALT  18  /  AlkPhos  66  07-30    Urinalysis Basic - ( 2024 05:55 )    Color: x / Appearance: x / SG: x / pH: x  Gluc: 183 mg/dL / Ketone: x  / Bili: x / Urobili: x   Blood: x / Protein: x / Nitrite: x   Leuk Esterase: x / RBC: x / WBC x   Sq Epi: x / Non Sq Epi: x / Bacteria: x    PHYSICAL EXAM:  General: NAD  Neuro: AOx4  Extremities: Bilateral LE edema, small eschar of RLE  LLE palpable femoral pulse, palpable DP pulse  RLE palpable femoral pulse, eduardo/DP/PT signal

## 2024-07-31 NOTE — PROGRESS NOTE ADULT - ASSESSMENT
83 yo male with PMH/PSHx significant for  HTN, HLD, DM2 , CAD with PCI CABG x3/ AVR ( 2011, [facility], He does not have his valve card) and coronary stent x1 ( 5/2019, on Plavix ), AFib on Eliquis (last dose 7/21), Left Carotid Endarterectomy on 10/7/21. Presents in PST prior to scheduled TAVR on 07/24/26.      Hospital Course:   7/24/24 s/p TF Pily TAVR using a 23mm Medtronic Corevalve EVOLUT FX placed in a 19mm Mitroflow surgical valve with a stent to the left main to avoid occlusion of the coronary by the valve. Right groin 18FrA sheath - closed with Perclose x 2. Left groin 6FrA sheath removed with Perclose x 1. Left groin 8FrV sheath - manual compression, TVP removed in the lab.   Intra op complicated by perclose failed in right femoral artery resulting in need for femoral cutdown and underwent peripheral bypass with Vascular.   7/24 Return to OR for s/p Cutdown on right femoral artery. Distal embolectomy with #3 Esperanza catheter. Femoral endarterectomy; however, vessel too thin to patch. Therefore, decision made to do interposition bypass. However, proximal anastomosis would not hold 2/2 to friable and thin walled nature of CFA. Incision extended and external iliac artery to SFA bypass (both end-to-end anastamosis) performed with 6mm PTFE. Profunda end-to-side anastamosis onto bypass graft. Completion angiogram demonstrating peroneal runoff. Peroneal signal present in RLE at end of case.   In PACU Left foot was noted to be dusky with no palpable pulses and no dopplerable signal. He had normal sensation but could not move his toes. Vascular surgery made the decision to return to the OR   7/25 s/p Left SFA interposition bypass w/ rGSV.   Post op Course:   Transferred to CTU for post op recovery. Post op TTE revealed: (07.25.24 @ 07:30) A 23 mm TAVR (valve-in-valve) is present in the aortic position. The prosthetic valve is well seated with normal function. No intravalvular regurgitation No paravalvular regurgitation. No pericardial effusion seen.  Stress Hyperglycemia / h/o DM2 --> Endocrine following   7/26 VSS; Greenberg D/C'd --> failed TOV (+) urinary retention.  Greenberg reinserted. Started on Flomax 0.4 mg PO HS.  Transferred to 2 [facility] Telemetry floor.  Structural Team following. Daily EKG.  Daily PT. Started on Eliquis 2.5 mg PO BID for chronic Afib.  7/27 VSS; Continue with current medication regimen. Structural Team and Vascular following.  Daily EKG. Increase ambulation.  Maintain Greenberg x 24 hours, plan for TOV in AM.    7/29 Drop in H/H to 7/22.  Gave 1 unit PRBC, CT A/P to r/o retroperitoneal bleed.  Awaiting scan.       7/20 H/H 9.1/ 28.  CT A/P revealed no evidence of retroperitoneal hematoma. PT reevaulated  recommending rehab.   Disposition: Rehab    85 yo male with PMH/PSHx significant for  HTN, HLD, DM2 , CAD with PCI CABG x3/ AVR ( 2011, [facility], He does not have his valve card) and coronary stent x1 ( 5/2019, on Plavix ), AFib on Eliquis (last dose 7/21), Left Carotid Endarterectomy on 10/7/21. Presents in PST prior to scheduled TAVR on 07/24/26.      Hospital Course:   7/24/24 s/p TF Pily TAVR using a 23mm Medtronic Corevalve EVOLUT FX placed in a 19mm Mitroflow surgical valve with a stent to the left main to avoid occlusion of the coronary by the valve. Right groin 18FrA sheath - closed with Perclose x 2. Left groin 6FrA sheath removed with Perclose x 1. Left groin 8FrV sheath - manual compression, TVP removed in the lab.   Intra op complicated by perclose failed in right femoral artery resulting in need for femoral cutdown and underwent peripheral bypass with Vascular.   7/24 Return to OR for s/p Cutdown on right femoral artery. Distal embolectomy with #3 Esperanza catheter. Femoral endarterectomy; however, vessel too thin to patch. Therefore, decision made to do interposition bypass. However, proximal anastomosis would not hold 2/2 to friable and thin walled nature of CFA. Incision extended and external iliac artery to SFA bypass (both end-to-end anastamosis) performed with 6mm PTFE. Profunda end-to-side anastamosis onto bypass graft. Completion angiogram demonstrating peroneal runoff. Peroneal signal present in RLE at end of case.   In PACU Left foot was noted to be dusky with no palpable pulses and no dopplerable signal. He had normal sensation but could not move his toes. Vascular surgery made the decision to return to the OR   7/25 s/p Left SFA interposition bypass w/ rGSV.   Post op Course:   Transferred to CTU for post op recovery. Post op TTE revealed: (07.25.24 @ 07:30) A 23 mm TAVR (valve-in-valve) is present in the aortic position. The prosthetic valve is well seated with normal function. No intravalvular regurgitation No paravalvular regurgitation. No pericardial effusion seen.  Stress Hyperglycemia / h/o DM2 --> Endocrine following   7/26 VSS; Greenberg D/C'd --> failed TOV (+) urinary retention.  Greenberg reinserted. Started on Flomax 0.4 mg PO HS.  Transferred to 2 [facility] Telemetry floor.  Structural Team following. Daily EKG.  Daily PT. Started on Eliquis 2.5 mg PO BID for chronic Afib.  7/27 VSS; Continue with current medication regimen. Structural Team and Vascular following.  Daily EKG. Increase ambulation.  Maintain Greenberg x 24 hours, plan for TOV in AM.    7/29 Drop in H/H to 7/22.  Gave 1 unit PRBC, CT A/P to r/o retroperitoneal bleed.  Awaiting scan.       7/20 H/H 9.1/ 28.  CT A/P revealed no evidence of retroperitoneal hematoma. PT reevaulated  recommending rehab.   7/21 H/H remains stable 8.7/26. +DP and +doppler to b/l LE.  -650 in 24 hrs. +1 edema to LE, 20mg Lasix IVP ordered. Pt reports not having a BM, Miralax and senna ordered.  Pt pending auth to [facility] rehab.   Disposition: Rehab

## 2024-07-31 NOTE — DIETITIAN INITIAL EVALUATION ADULT - NSFNSADHERENCEPTAFT_GEN_A_CORE
Pt confirms hx of T2DM. Home medications include metformin, glimiperide, and Jardiance per pt, reports compliance. Takes fingersticks 2x/week and reports typical readings of 130-160mg/dL.   Latest A1C 8.5% (7/12) indicates suboptimal glycemic control. Currently ordered for 18U lantus 1x/day, 8U admelog preprandially, and sliding scale insulin for coverage per endo.

## 2024-07-31 NOTE — PROGRESS NOTE ADULT - PROBLEM SELECTOR PROBLEM 3
CAD (coronary artery disease)
DM2 (diabetes mellitus, type 2)
CAD (coronary artery disease)
CAD (coronary artery disease)
DM2 (diabetes mellitus, type 2)
CAD (coronary artery disease)
CAD (coronary artery disease)
DM2 (diabetes mellitus, type 2)
CAD (coronary artery disease)
DM2 (diabetes mellitus, type 2)
CAD (coronary artery disease)
DM2 (diabetes mellitus, type 2)
DM2 (diabetes mellitus, type 2)

## 2024-07-31 NOTE — PROGRESS NOTE ADULT - PROBLEM SELECTOR PROBLEM 4
Afib
Peripheral vascular disease
Peripheral vascular disease
Afib
Peripheral vascular disease
Peripheral vascular disease
Afib
Peripheral vascular disease
Afib
Peripheral vascular disease
Afib

## 2024-07-31 NOTE — DIETITIAN INITIAL EVALUATION ADULT - NSFNSGIASSESSMENTFT_GEN_A_CORE
Denies N/V and endorses constipation, RN in room at time of visit administering stool softener. Last BM x3 days ago per pt and RN. Bowel regimen includes: miralax  - Ordered for protonix

## 2024-07-31 NOTE — PROGRESS NOTE ADULT - ASSESSMENT
84M pmHX HTN, HLD, DM2 , CAD with PCI CABG x3/ AVR( 2011, [facility], He does not have his valve card) and coronary stent x1 ( 5/2019, on Plavix ),  AFib  on Eliquis (last dose 7/21), Left Carotid Endarterectomy on 10/7/21.?  Patient underwent a valve in valve TAVR 7/24 along with a stent to the left main to avoid occlusion of the coronary by the valve. Intraoperatively patient required a right ileofemoral bypass. Subsequently, in the PACU, patient developed a cold left leg. He was found to have an occluded left SFA due to vessel closure device. He returned to the OR for a resection and vein graft at the SFA.     Assessment  DMT2: 84y Male with DM T2 with hyperglycemia, A1C 8.5% , was on oral meds  at home, now on basal bolus insulin with coverage, blood sugars running high postop.  s/p TAVR. CTU postop   CAD: on medications, stable, monitored.  HTN: on antihypertensive medications, monitored, asymptomatic.    Discussed plan and management wit Dr Oneil Angulo NP-TEAMS

## 2024-07-31 NOTE — PROGRESS NOTE ADULT - PROBLEM SELECTOR PLAN 5
7/26 Greenberg cath re-inserted   Continue on Flomax 0.4 mg PO HS  Passed TOV 7/28/24.
7/26 Greenberg cath re-inserted   Continue on Flomax 0.4 mg PO HS  Passed TOV 7/28/24
7/26 Greenberg cath re-inserted   Continue on Flomax 0.4 mg PO HS  Passed TOV 7/28/24
RISS
RISS
7/26 Niño cath re-inserted   Continue on Flomax 0.4 mg PO HS  Maintain niño x 24 hours.    Plan for TOV in AM
RISS
RISS
7/26 Niño cath re-inserted   Continue on Flomax 0.4 mg PO HS  Maintain niño x 24 hours.    Plan for TOV in AM
RISS
7/26 Greenberg cath re-inserted   Started on Flomax 0.4 mg PO HS

## 2024-07-31 NOTE — PROGRESS NOTE ADULT - PROBLEM SELECTOR PROBLEM 1
Aortic stenosis
Aortic stenosis
DM2 (diabetes mellitus, type 2)
Aortic stenosis
DM2 (diabetes mellitus, type 2)
S/p TAVR (transcatheter aortic valve replacement), bioprosthetic
Aortic stenosis
Aortic stenosis
DM2 (diabetes mellitus, type 2)
S/p TAVR (transcatheter aortic valve replacement), bioprosthetic
DM2 (diabetes mellitus, type 2)
S/p TAVR (transcatheter aortic valve replacement), bioprosthetic
Aortic stenosis
Aortic stenosis
S/p TAVR (transcatheter aortic valve replacement), bioprosthetic

## 2024-07-31 NOTE — DISCHARGE NOTE PROVIDER - NSDCFUADDINST_GEN_ALL_CORE_FT
shower daily   oob to chair for all meals   ambulate TID   Labs Monday Thursday    • Continue to wear your MCOT Patch for 30 days duration.    • Change the patch if instructed by the monitor or if the patch begins to loosen. Patches should last approximately 5 days.    • Keep the monitor and sensor within close range of each other throughout your monitoring period. To avoid getting alert messages on the monitor, keep the monitor within 30 feet of the sensor at all times.    • Record any symptoms as they occur. Promptly respond to any messages or alerts that you receive on the monitor    • When Showering with the MCOT Patch: components are water-resistant, not waterproof. You can shower normally; however, for optimal results, avoid spraying water directly onto the sensor.    • While showering, it is recommended to face away from the shower head.    • Do not swim, or take baths while wearing the MCOT Patch.   Instructions: This plan will send the code FBSD to the PM system.  DO NOT or CHANGE the price. Detail Level: Simple Price (Do Not Change): 0.00

## 2024-07-31 NOTE — PROGRESS NOTE ADULT - PROBLEM SELECTOR PLAN 1
s/p LM stent and TF Pily TAVR using a 23mm Medtronic Corevalve EVOLUT FX [facility] in [facility].  DAPT   statin  Post op TTE reviewed  PT recommend HELADIO   DC planning

## 2024-07-31 NOTE — DIETITIAN INITIAL EVALUATION ADULT - PERTINENT MEDS FT
MEDICATIONS  (STANDING):  apixaban 2.5 milliGRAM(s) Oral every 12 hours  apixaban      aspirin  chewable 81 milliGRAM(s) Oral daily  atorvastatin 80 milliGRAM(s) Oral at bedtime  chlorhexidine 0.12% Liquid 15 milliLiter(s) Oral Mucosa every 12 hours  clopidogrel Tablet 75 milliGRAM(s) Oral daily  insulin glargine Injectable (LANTUS) 18 Unit(s) SubCutaneous at bedtime  insulin lispro (ADMELOG) corrective regimen sliding scale   SubCutaneous three times a day before meals  insulin lispro (ADMELOG) corrective regimen sliding scale   SubCutaneous at bedtime  insulin lispro Injectable (ADMELOG) 8 Unit(s) SubCutaneous three times a day before meals  melatonin 3 milliGRAM(s) Oral at bedtime  metoprolol tartrate 50 milliGRAM(s) Oral <User Schedule>  pantoprazole    Tablet 40 milliGRAM(s) Oral before breakfast  polyethylene glycol 3350 17 Gram(s) Oral daily  senna 2 Tablet(s) Oral at bedtime  tamsulosin 0.4 milliGRAM(s) Oral at bedtime    MEDICATIONS  (PRN):

## 2024-07-31 NOTE — DIETITIAN INITIAL EVALUATION ADULT - ADD RECOMMEND
1) Continue Kosher, consistent carbohydrate diet, defer diet texture/fluid consistency to team  2) Recommend adding a multivitamin and vitamin C daily to promote wound healing in setting of multiple pressure injuries pending no medical contraindications   3) Monitor nutritional intake, diet tolerance, labs, hydration, GI function, skin integrity and wt trends

## 2024-07-31 NOTE — PROGRESS NOTE ADULT - PROVIDER SPECIALTY LIST ADULT
CT Surgery
Critical Care
Endocrinology
Endocrinology
Vascular Surgery
CT Surgery
CTU
Cardiology
Critical Care
Endocrinology
Vascular Surgery
Endocrinology
Cardiology
Endocrinology
Cardiology
CT Surgery
Endocrinology
CT Surgery
CT Surgery
Cardiology
Cardiology
CT Surgery
Cardiology
Cardiology

## 2024-07-31 NOTE — DIETITIAN INITIAL EVALUATION ADULT - NSFNSPHYEXAMSKINFT_GEN_A_CORE
Pressure Injury 1: none, none  Pressure Injury 2: Right:, heel, Suspected deep tissue injury  Pressure Injury 3: none, none  Pressure Injury 4: none, none  Pressure Injury 5: none, none  Pressure Injury 6: none, none  Pressure Injury 7: none, none  Pressure Injury 8: none, none  Pressure Injury 9: none, none  Pressure Injury 10: none, none  Pressure Injury 11: none, none

## 2024-07-31 NOTE — PROGRESS NOTE ADULT - PROBLEM SELECTOR PLAN 3
On medications,  no chest pain, stable, monitored and followed up by primary cardiothoracic team/cardiology team.
Endo Following (Dr. Riggs)   Continue on current glycemic regimen of Lantus 14 units SQ at HS and pre meal Admelog 5 units SQ TID   Accuchecks 4 times a day AC and HS.  Cover with Admelog corrective scale   Continue with DASH/ Diabetic Diet
s/p CABG   LM stent   DAPT
On medications,  no chest pain, stable, monitored and followed up by primary cardiothoracic team/cardiology team.
Endo Following (Dr. Riggs)   Continue on current glycemic regimen of Lantus 14 units SQ at HS and pre meal Admelog 4 units SQ TID   Accuchecks 4 times a day AC and HS.  Cover with Admelog corrective scale   Continue with DASH/ Diabetic Diet
Endo Following (Dr. Riggs)   Continue on current glycemic regimen of Lantus 14 units SQ at HS and pre meal Admelog 5 units SQ TID   Accuchecks 4 times a day AC and HS.  Cover with Admelog corrective scale   Continue with DASH/ Diabetic Diet
On medications,  no chest pain, stable, monitored and followed up by primary cardiothoracic team/cardiology team.
On medications,  no chest pain, stable, monitored and followed up by primary cardiothoracic team/cardiology team.
s/p CABG   LM stent   DAPT
Endo Following (Dr. Riggs)   Continue on current glycemic regimen of Lantus 14 units SQ at HS and pre meal Admelog 4 units SQ TID   Accuchecks 4 times a day AC and HS.  Cover with Admelog corrective scale   Continue with DASH/ Diabetic Diet
s/p CABG   LM stent   DAPT
Endo Following (Dr. Riggs)   Continue on current glycemic regimen of Lantus 14 units SQ at HS and pre meal Admelog 5 units SQ TID   Accuchecks 4 times a day AC and HS.  Cover with Admelog corrective scale   Continue with DASH/ Diabetic Diet
Endo Following (Dr. Riggs)   Continue on current glycemic regimen of Lantus 14 units SQ at HS and pre meal Admelog 5 units SQ TID   Accuchecks 4 times a day AC and HS.  Cover with Admelog corrective scale   Continue with DASH/ Diabetic Diet
s/p CABG   LM stent   DAPT
s/p CABG   LM stent   DAPT

## 2024-07-31 NOTE — DISCHARGE NOTE PROVIDER - HOSPITAL COURSE
85 yo male with PMH/PSHx significant for  HTN, HLD, DM2 , CAD with PCI CABG x3/ AVR ( 2011, [facility], He does not have his valve card) and coronary stent x1 ( 5/2019, on Plavix ), AFib on Eliquis (last dose 7/21), Left Carotid Endarterectomy on 10/7/21. Presents in PST prior to scheduled TAVR on 07/24/26.      Hospital Course:   7/24/24 s/p TF Pily TAVR using a 23mm Medtronic Corevalve EVOLUT FX placed in a 19mm Mitroflow surgical valve with a stent to the left main to avoid occlusion of the coronary by the valve. Right groin 18FrA sheath - closed with Perclose x 2. Left groin 6FrA sheath removed with Perclose x 1. Left groin 8FrV sheath - manual compression, TVP removed in the lab.   Intra op complicated by perclose failed in right femoral artery resulting in need for femoral cutdown and underwent peripheral bypass with Vascular.   7/24 Return to OR for s/p Cutdown on right femoral artery. Distal embolectomy with #3 Esperanza catheter. Femoral endarterectomy; however, vessel too thin to patch. Therefore, decision made to do interposition bypass. However, proximal anastomosis would not hold 2/2 to friable and thin walled nature of CFA. Incision extended and external iliac artery to SFA bypass (both end-to-end anastamosis) performed with 6mm PTFE. Profunda end-to-side anastamosis onto bypass graft. Completion angiogram demonstrating peroneal runoff. Peroneal signal present in RLE at end of case.   In PACU Left foot was noted to be dusky with no palpable pulses and no dopplerable signal. He had normal sensation but could not move his toes. Vascular surgery made the decision to return to the OR   7/25 s/p Left SFA interposition bypass w/ rGSV.   Post op Course:   Transferred to CTU for post op recovery. Post op TTE revealed: (07.25.24 @ 07:30) A 23 mm TAVR (valve-in-valve) is present in the aortic position. The prosthetic valve is well seated with normal function. No intravalvular regurgitation No paravalvular regurgitation. No pericardial effusion seen.  Stress Hyperglycemia / h/o DM2 --> Endocrine following   7/26 VSS; Greenberg D/C'd --> failed TOV (+) urinary retention.  Greenberg reinserted. Started on Flomax 0.4 mg PO HS.  Transferred to 2 [facility] Telemetry floor.  Structural Team following. Daily EKG.  Daily PT. Started on Eliquis 2.5 mg PO BID for chronic Afib.  7/27 VSS; Continue with current medication regimen. Structural Team and Vascular following.  Daily EKG. Increase ambulation.  Maintain Greenberg x 24 hours, plan for TOV in AM.    7/29 Drop in H/H to 7/22.  Gave 1 unit PRBC, CT A/P to r/o retroperitoneal bleed.  Awaiting scan.       7/20 H/H 9.1/ 28.  CT A/P revealed no evidence of retroperitoneal hematoma. PT reevaulated  recommending rehab.   7/21 H/H remains stable 8.7/26. +DP and +doppler to b/l LE.  -650 in 24 hrs. +1 edema to LE, 20mg Lasix IVP ordered. Pt reports not having a BM, Miralax and senna ordered.  Pt pending auth to [facility] rehab.   Disposition: Rehab

## 2024-07-31 NOTE — PROGRESS NOTE ADULT - SUBJECTIVE AND OBJECTIVE BOX
Subjective: Patient seen and examined. No new events except as noted.   feels ok   awaiting auth for [facility]     REVIEW OF SYSTEMS:    CONSTITUTIONAL: + weakness, fevers or chills  EYES/ENT: No visual changes;  No vertigo or throat pain   NECK: No pain or stiffness  RESPIRATORY: No cough, wheezing, hemoptysis; No shortness of breath  CARDIOVASCULAR: No chest pain or palpitations  GASTROINTESTINAL: No abdominal or epigastric pain. No nausea, vomiting, or hematemesis; No diarrhea or constipation. No melena or hematochezia.  GENITOURINARY: No dysuria, frequency or hematuria  NEUROLOGICAL: No numbness or weakness  SKIN: No itching, burning, rashes, or lesions   All other review of systems is negative unless indicated above.    MEDICATIONS:  MEDICATIONS  (STANDING):  apixaban 2.5 milliGRAM(s) Oral every 12 hours  apixaban      aspirin  chewable 81 milliGRAM(s) Oral daily  atorvastatin 80 milliGRAM(s) Oral at bedtime  chlorhexidine 0.12% Liquid 15 milliLiter(s) Oral Mucosa every 12 hours  clopidogrel Tablet 75 milliGRAM(s) Oral daily  furosemide   Injectable 20 milliGRAM(s) IV Push once  insulin glargine Injectable (LANTUS) 18 Unit(s) SubCutaneous at bedtime  insulin lispro (ADMELOG) corrective regimen sliding scale   SubCutaneous at bedtime  insulin lispro (ADMELOG) corrective regimen sliding scale   SubCutaneous three times a day before meals  insulin lispro Injectable (ADMELOG) 8 Unit(s) SubCutaneous three times a day before meals  melatonin 3 milliGRAM(s) Oral at bedtime  metoprolol tartrate 50 milliGRAM(s) Oral <User Schedule>  pantoprazole    Tablet 40 milliGRAM(s) Oral before breakfast  polyethylene glycol 3350 17 Gram(s) Oral daily  tamsulosin 0.4 milliGRAM(s) Oral at bedtime      PHYSICAL EXAM:  T(C): 37.2 (07-31-24 @ 05:04), Max: 37.4 (07-30-24 @ 20:33)  HR: 103 (07-31-24 @ 05:04) (79 - 103)  BP: 106/72 (07-31-24 @ 05:04) (106/72 - 135/68)  RR: 18 (07-31-24 @ 05:04) (18 - 18)  SpO2: 97% (07-31-24 @ 05:04) (97% - 98%)  Wt(kg): --  I&O's Summary    30 Jul 2024 07:01  -  31 Jul 2024 07:00  --------------------------------------------------------  IN: 350 mL / OUT: 1000 mL / NET: -650 mL    31 Jul 2024 07:01  -  31 Jul 2024 09:52  --------------------------------------------------------  IN: 240 mL / OUT: 400 mL / NET: -160 mL      Appearance: NAD  HEENT:   Dry  oral mucosa, PERRL, EOMI	  Lymphatic: No lymphadenopathy , 1+edema  Cardiovascular: Normal S1 S2, No JVD, No murmurs , Peripheral pulses palpable 2+ bilaterally  Respiratory: decreased bs   Gastrointestinal:  Soft, Non-tender, + BS	  Skin: No rashes, No ecchymoses, No cyanosis, warm to touch  Musculoskeletal: Normal range of motion, normal strength  Psychiatry:  Mood & affect appropriate  Ext: 1+ edema  Right heel wound       LABS:    CARDIAC MARKERS:                                8.7    6.40  )-----------( 158      ( 31 Jul 2024 05:55 )             26.1     07-31    135  |  99  |  16  ----------------------------<  183<H>  3.9   |  24  |  0.77    Ca    8.5      31 Jul 2024 05:55  Phos  2.5     07-31  Mg     2.0     07-31    TPro  5.6<L>  /  Alb  2.6<L>  /  TBili  1.1  /  DBili  x   /  AST  46<H>  /  ALT  18  /  AlkPhos  66  07-30    proBNP:   Lipid Profile:   HgA1c:   TSH:             TELEMETRY: 	SR    ECG:  	  RADIOLOGY:   DIAGNOSTIC TESTING:  [ ] Echocardiogram:  [ ]  Catheterization:  [ ] Stress Test:    OTHER:

## 2024-07-31 NOTE — DISCHARGE NOTE PROVIDER - CARE PROVIDER_API CALL
Lazaro Cisse  Thoracic and Cardiac Surgery  [address] [place] [id number]  Phone: [phone]  Fax: [phone]  Follow Up Time:

## 2024-07-31 NOTE — DIETITIAN INITIAL EVALUATION ADULT - ENERGY INTAKE
Pt reports typical intake in-house, wife brings in food from home daily that pt likes but enjoys food in-house as well./Adequate (%)

## 2024-07-31 NOTE — DIETITIAN INITIAL EVALUATION ADULT - OTHER INFO
- Admitted for scheduled TAVR for severe degeneration/stenosis of bioprosthetic AVR on 7/24, "intraoperatively patient required a right ileofemoral bypass. Subsequently, in the PACU, patient developed a cold left leg. He was found to have an occluded left SFA due to vessel closure device. He returned to the OR for a resection and vein graft at the SFA" per CTU attending 7/25, transferred from CTU to telemetry 7/26  ---> Ordered for IV lasix

## 2024-07-31 NOTE — DISCHARGE NOTE PROVIDER - NSDCFUSCHEDAPPT_GEN_ALL_CORE_FT
Ese Ham  [facility] Physician [facility]  CTSURG [address]  Scheduled Appointment: 08/06/2024

## 2024-07-31 NOTE — PROGRESS NOTE ADULT - PROBLEM SELECTOR PROBLEM 5
Urinary retention
DM type 2, goal HbA1c < 8%
Urinary retention
DM type 2, goal HbA1c < 8%

## 2024-07-31 NOTE — DIETITIAN INITIAL EVALUATION ADULT - REASON INDICATOR FOR ASSESSMENT
Assessment indicated for length of stay   Sources: Medical Record, patient  Chart reviewed, events noted.

## 2024-07-31 NOTE — DISCHARGE NOTE PROVIDER - NSDCMRMEDTOKEN_GEN_ALL_CORE_FT
aspirin 81 mg oral tablet, chewable: 1 tab(s) orally once a day  atorvastatin 80 mg oral tablet: 1 tab(s) orally once a day  Eliquis 2.5 mg oral tablet: 1 tab(s) orally 2 times a day  glimepiride 2 mg oral tablet: 1 tab(s) orally once a day  Jardiance 10 mg oral tablet: 1 tab(s) orally once a day (in the morning)  Lopressor 50 mg oral tablet: 1 tab(s) orally 2 times a day 6am and 6 pm  melatonin 3 mg oral tablet: 1 tab(s) orally once a day (at bedtime)  metFORMIN 500 mg oral tablet: 1 tab(s) orally 2 times a day  pantoprazole 40 mg oral delayed release tablet: 1 tab(s) orally once a day (before a meal)  Plavix 75 mg oral tablet: 1 tab(s) orally once a day  polyethylene glycol 3350 oral powder for reconstitution: 17 gram(s) orally once a day  senna leaf extract oral tablet: 2 tab(s) orally once a day (at bedtime)  tamsulosin 0.4 mg oral capsule: 1 cap(s) orally once a day (at bedtime)

## 2024-07-31 NOTE — PROGRESS NOTE ADULT - PROBLEM SELECTOR PLAN 2
S/p surgery, on medications, monitoring, care per primary/surgical team.
femoral cutdown and peripheral bypass complicated by R CFA occlusion s/p Left SFA interposition bypass w/ rGSV  While in PACU, found to have no LLE pulses, taken back to OR s/p Bypass   DAPT  Vascular surgery follow up   Check LE venous duplex
femoral cutdown and peripheral bypass complicated by R CFA occlusion s/p Left SFA interposition bypass w/ rGSV  While in PACU, found to have no LLE pulses, taken back to OR s/p Bypass   DAPT  Vascular surgery follow up   Check LE venous duplex
S/p surgery, on medications, monitoring, care per primary/surgical team.
femoral cutdown and peripheral bypass complicated by R CFA occlusion s/p Left SFA interposition bypass w/ rGSV  While in PACU, found to have no LLE pulses, taken back to OR s/p Bypass   DAPT  Vascular surgery follow up   Monitor Groin
Continue on Eliquis 2.5 mg PO BID  Continue with Lopressor 25 mg PO every 8 hours
S/p surgery, on medications, monitoring, care per primary/surgical team.
Continue on Eliquis 2.5 mg PO BID  Continue with Lopressor 25 mg PO every 8 hours
Continue on Eliquis 2.5 mg PO BID  Continue with Lopressor 50 mg PO BID
S/p surgery, on medications, monitoring, care per primary/surgical team.
femoral cutdown and peripheral bypass complicated by R CFA occlusion s/p Left SFA interposition bypass w/ rGSV  While in PACU, found to have no LLE pulses, taken back to OR s/p Bypass   DAPT  Vascular surgery follow up   Monitor Groin
femoral cutdown and peripheral bypass complicated by R CFA occlusion s/p Left SFA interposition bypass w/ rGSV  While in PACU, found to have no LLE pulses, taken back to OR s/p Bypass   DAPT  Vascular surgery follow up   No DVT on LE venous duplex  s/p PRBC   s/p CT A/P ruled out RP bleed
Continue on Eliquis 2.5 mg PO BID  Continue with Lopressor 25 mg PO every 8 hours
Continue on Eliquis 2.5 mg PO BID  Continue with Lopressor 50 mg PO BID
S/p surgery, on medications, monitoring, care per primary/surgical team.
S/p surgery, on medications, monitoring, care per primary/surgical team.
Continue on Eliquis 2.5 mg PO BID  Continue with Lopressor 25 mg PO every 8 hours
femoral cutdown and peripheral bypass complicated by R CFA occlusion s/p Left SFA interposition bypass w/ rGSV  While in PACU, found to have no LLE pulses, taken back to OR s/p Bypass   DAPT  Vascular surgery follow up   No DVT on LE venous duplex  Transfuse PRBC   Check CT A/P rule out RP bleed
femoral cutdown and peripheral bypass complicated by R CFA occlusion s/p Left SFA interposition bypass w/ rGSV  While in PACU, found to have no LLE pulses, taken back to OR s/p Bypass   DAPT  Vascular surgery follow up   No DVT on LE venous duplex  s/p PRBC   s/p CT A/P ruled out RP bleed

## 2024-08-06 ENCOUNTER — APPOINTMENT (OUTPATIENT)
Dept: CARDIOTHORACIC SURGERY | Facility: CLINIC | Age: 84
End: 2024-08-06

## 2024-08-06 NOTE — ASSESSMENT
[FreeTextEntry1] : 85 yo male with PMH/PSHx significant for  HTN, HLD, DM2 , CAD with PCI CABG x3/ AVR ( 2011,  [facility], He does not have his valve card) and coronary stent x1 ( 5/2019, on Plavix ), AFib on Eliquis (last dose 7/21), Left Carotid Endarterectomy on 10/7/21.   Hospital Course:  7/24/24 s/p TF Pily TAVR using a 23mm Medtronic Corevalve EVOLUT FX placed in a 19mm Mitroflow surgical valve with a stent to the left main to avoid occlusion of the coronary by the valve. Right groin 18FrA sheath - closed with Perclose x2. Left groin 6FrA sheath removed with Perclose x 1. Left groin 8FrV sheath -manual compression, TVP removed in the lab. Intra op complicated by perclose failed in right femoral artery resulting in need for femoral cutdown and underwent peripheral bypass with Vascular. 7/24 Return to OR for s/p Cutdown on right femoral artery. Distal embolectomy with #3 Esperanza catheter. Femoral endarterectomy; however, vessel too thin to patch. Therefore, decision made to do interposition bypass. However, proximal anastomosis would not hold 2/2 to friable and thin walled nature of CFA. Incision extended and external iliac artery to SFA bypass (both end-to-end anastamosis) performed with 6mm PTFE. Profunda end-to-side anastamosis onto bypass graft. Completion angiogram demonstrating peroneal runoff. Peroneal signal present in RLE at end of case. In PACU Left foot was noted to be dusky with no palpable pulses and no dopplerable signal. He had normal sensation but could not move his toes. Vascular surgery made the decision to return to the OR  7/25 s/p Left SFA interposition bypass w/ rGSV.Post op Course:Transferred to CTU for post op recovery. Post op TTE revealed: (07.25.24 @07:30) A 23 mm TAVR (valve-in-valve) is present in the aortic position. The prosthetic valve is well seated with normal function. No intravalvular regurgitation No paravalvular regurgitation. No pericardial effusion seen.  Stress Hyperglycemia / h/o DM2 --> Endocrine following, Greenberg D/C'd --> failed TOV (+) urinary retention.  Greenberg reinserted. Started on Flomax 0.4 mg PO HS. Started on Eliquis 2.5 mg PO, BID for chronic Afib. Continue with current medication regimen. Structural Team and Vascular following. Maintain Greenberg x 24 hours, plan for TOV in AM. Drop in H/H to 7/22.  Gave 1 unit PRBC, CT A/P to r/o retroperitoneal bleed. 7/20 H/H 9.1/ 28.  CT A/P revealed no evidence of retroperitoneal hematoma. PT reevaulated  recommending rehab. +DP and +doppler to b/l LE.  -650 in 24 hrs. +1 edema to LE, 20mg Lasix IVP ordered. Pt reports not having a BM, Miralax and senna ordered.  Pt pending auth to [facility].   aspirin 81 mg oral tablet, chewable: 1 tab(s) orally once a day atorvastatin 80 mg oral tablet: 1 tab(s) orally once a day Eliquis 2.5 mg oral tablet: 1 tab(s) orally 2 times a day glimepiride 2 mg oral tablet: 1 tab(s) orally once a day Jardiance 10 mg oral tablet: 1 tab(s) orally once a day (in the morning) Lopressor 50 mg oral tablet: 1 tab(s) orally 2 times a day 6am and 6 pm melatonin 3 mg oral tablet: 1 tab(s) orally once a day (at bedtime) metFORMIN 500 mg oral tablet: 1 tab(s) orally 2 times a day pantoprazole 40 mg oral delayed release tablet: 1 tab(s) orally once a day(before a meal) Plavix 75 mg oral tablet: 1 tab(s) orally once a day polyethylene glycol 3350 oral powder for reconstitution: 17 gram(s) orally once a day senna leaf extract oral tablet: 2 tab(s) orally once a day (at bedtime) tamsulosin 0.4 mg oral capsule: 1 cap(s) orally once a day (at bedtime)

## 2024-08-06 NOTE — COUNSELING
[Hygeine (Including Daily Shower)] : hygeine (including daily shower) [No Heavy Lifting] : no heavy lifting (>15-20 lb. for 1 month or 25 lb. for 3 months from date of surgery) [Importance of Regular Medical Follow-Up] : the importance of regular medical follow-up [Blood Pressure Control] : blood pressure control [Weight Management] : weight management [S/S of infection] : signs and symptoms of infection (and to whom it should be reported) [Progressive Ambulation/Activity] : progressive ambulation/activity [Medication/Vitamin/Herb/Food Interaction] : medication/vitamin/herb/food interaction [SBE antibiotic prophylaxis] : SBE antibiotic prophylaxis was recommended [Low Fat/Low Cholesterol Diet] : low fat/low cholesterol diet [Stress Management] : stress management [Blood Sugar Control] : blood sugar control

## 2024-08-06 NOTE — REASON FOR VISIT
[de-identified] : TF Pily TAVR using a 23mm Medtronic Corevalve EVOLUT FX [facility]  [de-identified] : 7/24/24

## 2024-08-08 ENCOUNTER — INPATIENT (INPATIENT)
Facility: HOSPITAL | Age: 84
LOS: 11 days | Discharge: HOME HEALTH | DRG: 603 | End: 2024-08-20
Attending: SURGERY | Admitting: SURGERY
Payer: MEDICARE

## 2024-08-08 ENCOUNTER — APPOINTMENT (OUTPATIENT)
Dept: VASCULAR SURGERY | Facility: CLINIC | Age: 84
End: 2024-08-08

## 2024-08-08 VITALS
WEIGHT: 119.93 LBS | TEMPERATURE: 97 F | DIASTOLIC BLOOD PRESSURE: 73 MMHG | SYSTOLIC BLOOD PRESSURE: 117 MMHG | HEIGHT: 60 IN | HEART RATE: 84 BPM | OXYGEN SATURATION: 99 % | RESPIRATION RATE: 16 BRPM

## 2024-08-08 DIAGNOSIS — Z95.5 PRESENCE OF CORONARY ANGIOPLASTY IMPLANT AND GRAFT: Chronic | ICD-10-CM

## 2024-08-08 DIAGNOSIS — I73.9 PERIPHERAL VASCULAR DISEASE, UNSPECIFIED: ICD-10-CM

## 2024-08-08 DIAGNOSIS — I35.0 NONRHEUMATIC AORTIC (VALVE) STENOSIS: ICD-10-CM

## 2024-08-08 DIAGNOSIS — I48.19 OTHER PERSISTENT ATRIAL FIBRILLATION: ICD-10-CM

## 2024-08-08 DIAGNOSIS — L08.9 LOCAL INFECTION OF THE SKIN AND SUBCUTANEOUS TISSUE, UNSPECIFIED: ICD-10-CM

## 2024-08-08 DIAGNOSIS — Z95.1 PRESENCE OF AORTOCORONARY BYPASS GRAFT: Chronic | ICD-10-CM

## 2024-08-08 DIAGNOSIS — Z98.890 OTHER SPECIFIED POSTPROCEDURAL STATES: Chronic | ICD-10-CM

## 2024-08-08 DIAGNOSIS — Z95.2 PRESENCE OF PROSTHETIC HEART VALVE: Chronic | ICD-10-CM

## 2024-08-08 DIAGNOSIS — L02.214 CUTANEOUS ABSCESS OF GROIN: ICD-10-CM

## 2024-08-08 LAB
ALBUMIN SERPL ELPH-MCNC: 2.8 G/DL — LOW (ref 3.3–5)
ALP SERPL-CCNC: 104 U/L — SIGNIFICANT CHANGE UP (ref 40–120)
ALT FLD-CCNC: 26 U/L — SIGNIFICANT CHANGE UP (ref 10–45)
ANION GAP SERPL CALC-SCNC: 13 MMOL/L — SIGNIFICANT CHANGE UP (ref 5–17)
APPEARANCE UR: CLEAR — SIGNIFICANT CHANGE UP
APTT BLD: 31.6 SEC — SIGNIFICANT CHANGE UP (ref 24.5–35.6)
AST SERPL-CCNC: 29 U/L — SIGNIFICANT CHANGE UP (ref 10–40)
BACTERIA # UR AUTO: NEGATIVE /HPF — SIGNIFICANT CHANGE UP
BASE EXCESS BLDV CALC-SCNC: 0.6 MMOL/L — SIGNIFICANT CHANGE UP (ref -2–3)
BASOPHILS # BLD AUTO: 0.02 K/UL — SIGNIFICANT CHANGE UP (ref 0–0.2)
BASOPHILS NFR BLD AUTO: 0.3 % — SIGNIFICANT CHANGE UP (ref 0–2)
BILIRUB SERPL-MCNC: 0.6 MG/DL — SIGNIFICANT CHANGE UP (ref 0.2–1.2)
BILIRUB UR-MCNC: NEGATIVE — SIGNIFICANT CHANGE UP
BUN SERPL-MCNC: 18 MG/DL — SIGNIFICANT CHANGE UP (ref 7–23)
CA-I SERPL-SCNC: 1.28 MMOL/L — SIGNIFICANT CHANGE UP (ref 1.15–1.33)
CALCIUM SERPL-MCNC: 9.2 MG/DL — SIGNIFICANT CHANGE UP (ref 8.4–10.5)
CAST: 6 /LPF — HIGH (ref 0–4)
CHLORIDE BLDV-SCNC: 101 MMOL/L — SIGNIFICANT CHANGE UP (ref 96–108)
CHLORIDE SERPL-SCNC: 100 MMOL/L — SIGNIFICANT CHANGE UP (ref 96–108)
CO2 BLDV-SCNC: 28 MMOL/L — HIGH (ref 22–26)
CO2 SERPL-SCNC: 23 MMOL/L — SIGNIFICANT CHANGE UP (ref 22–31)
COLOR SPEC: YELLOW — SIGNIFICANT CHANGE UP
CREAT SERPL-MCNC: 0.79 MG/DL — SIGNIFICANT CHANGE UP (ref 0.5–1.3)
DIFF PNL FLD: NEGATIVE — SIGNIFICANT CHANGE UP
EGFR: 89 ML/MIN/1.73M2 — SIGNIFICANT CHANGE UP
EOSINOPHIL # BLD AUTO: 0.02 K/UL — SIGNIFICANT CHANGE UP (ref 0–0.5)
EOSINOPHIL NFR BLD AUTO: 0.3 % — SIGNIFICANT CHANGE UP (ref 0–6)
GAS PNL BLDV: 134 MMOL/L — LOW (ref 136–145)
GAS PNL BLDV: SIGNIFICANT CHANGE UP
GAS PNL BLDV: SIGNIFICANT CHANGE UP
GLUCOSE BLDV-MCNC: 215 MG/DL — HIGH (ref 70–99)
GLUCOSE SERPL-MCNC: 223 MG/DL — HIGH (ref 70–99)
GLUCOSE UR QL: 250 MG/DL
HCO3 BLDV-SCNC: 26 MMOL/L — SIGNIFICANT CHANGE UP (ref 22–29)
HCT VFR BLD CALC: 29.1 % — LOW (ref 39–50)
HCT VFR BLDA CALC: 28 % — LOW (ref 39–51)
HGB BLD CALC-MCNC: 9.3 G/DL — LOW (ref 12.6–17.4)
HGB BLD-MCNC: 9 G/DL — LOW (ref 13–17)
IMM GRANULOCYTES NFR BLD AUTO: 0.5 % — SIGNIFICANT CHANGE UP (ref 0–0.9)
INR BLD: 1.87 RATIO — HIGH (ref 0.85–1.18)
KETONES UR-MCNC: NEGATIVE MG/DL — SIGNIFICANT CHANGE UP
LACTATE BLDV-MCNC: 3.2 MMOL/L — HIGH (ref 0.5–2)
LEUKOCYTE ESTERASE UR-ACNC: NEGATIVE — SIGNIFICANT CHANGE UP
LYMPHOCYTES # BLD AUTO: 0.96 K/UL — LOW (ref 1–3.3)
LYMPHOCYTES # BLD AUTO: 12.9 % — LOW (ref 13–44)
MCHC RBC-ENTMCNC: 29.7 PG — SIGNIFICANT CHANGE UP (ref 27–34)
MCHC RBC-ENTMCNC: 30.9 GM/DL — LOW (ref 32–36)
MCV RBC AUTO: 96 FL — SIGNIFICANT CHANGE UP (ref 80–100)
MONOCYTES # BLD AUTO: 0.45 K/UL — SIGNIFICANT CHANGE UP (ref 0–0.9)
MONOCYTES NFR BLD AUTO: 6 % — SIGNIFICANT CHANGE UP (ref 2–14)
NEUTROPHILS # BLD AUTO: 5.96 K/UL — SIGNIFICANT CHANGE UP (ref 1.8–7.4)
NEUTROPHILS NFR BLD AUTO: 80 % — HIGH (ref 43–77)
NITRITE UR-MCNC: NEGATIVE — SIGNIFICANT CHANGE UP
NRBC # BLD: 0 /100 WBCS — SIGNIFICANT CHANGE UP (ref 0–0)
PCO2 BLDV: 48 MMHG — SIGNIFICANT CHANGE UP (ref 42–55)
PH BLDV: 7.35 — SIGNIFICANT CHANGE UP (ref 7.32–7.43)
PH UR: 5 — SIGNIFICANT CHANGE UP (ref 5–8)
PLATELET # BLD AUTO: 301 K/UL — SIGNIFICANT CHANGE UP (ref 150–400)
PO2 BLDV: 17 MMHG — LOW (ref 25–45)
POTASSIUM BLDV-SCNC: 4.3 MMOL/L — SIGNIFICANT CHANGE UP (ref 3.5–5.1)
POTASSIUM SERPL-MCNC: 4.4 MMOL/L — SIGNIFICANT CHANGE UP (ref 3.5–5.3)
POTASSIUM SERPL-SCNC: 4.4 MMOL/L — SIGNIFICANT CHANGE UP (ref 3.5–5.3)
PROT SERPL-MCNC: 6.7 G/DL — SIGNIFICANT CHANGE UP (ref 6–8.3)
PROT UR-MCNC: SIGNIFICANT CHANGE UP MG/DL
PROTHROM AB SERPL-ACNC: 19.3 SEC — HIGH (ref 9.5–13)
RBC # BLD: 3.03 M/UL — LOW (ref 4.2–5.8)
RBC # FLD: 16 % — HIGH (ref 10.3–14.5)
RBC CASTS # UR COMP ASSIST: 1 /HPF — SIGNIFICANT CHANGE UP (ref 0–4)
REVIEW: SIGNIFICANT CHANGE UP
SAO2 % BLDV: 19.7 % — LOW (ref 67–88)
SODIUM SERPL-SCNC: 136 MMOL/L — SIGNIFICANT CHANGE UP (ref 135–145)
SP GR SPEC: 1.01 — SIGNIFICANT CHANGE UP (ref 1–1.03)
SQUAMOUS # UR AUTO: 1 /HPF — SIGNIFICANT CHANGE UP (ref 0–5)
UROBILINOGEN FLD QL: 1 MG/DL — SIGNIFICANT CHANGE UP (ref 0.2–1)
WBC # BLD: 7.45 K/UL — SIGNIFICANT CHANGE UP (ref 3.8–10.5)
WBC # FLD AUTO: 7.45 K/UL — SIGNIFICANT CHANGE UP (ref 3.8–10.5)
WBC UR QL: 0 /HPF — SIGNIFICANT CHANGE UP (ref 0–5)

## 2024-08-08 PROCEDURE — 93010 ELECTROCARDIOGRAM REPORT: CPT

## 2024-08-08 PROCEDURE — 75635 CT ANGIO ABDOMINAL ARTERIES: CPT | Mod: 26,MC

## 2024-08-08 PROCEDURE — 99024 POSTOP FOLLOW-UP VISIT: CPT

## 2024-08-08 PROCEDURE — 99285 EMERGENCY DEPT VISIT HI MDM: CPT

## 2024-08-08 PROCEDURE — 99223 1ST HOSP IP/OBS HIGH 75: CPT

## 2024-08-08 RX ORDER — GLUCAGON INJECTION, SOLUTION 1 MG/.2ML
1 INJECTION, SOLUTION SUBCUTANEOUS ONCE
Refills: 0 | Status: DISCONTINUED | OUTPATIENT
Start: 2024-08-08 | End: 2024-08-10

## 2024-08-08 RX ORDER — TAMSULOSIN HYDROCHLORIDE 0.4 MG/1
0.4 CAPSULE ORAL AT BEDTIME
Refills: 0 | Status: DISCONTINUED | OUTPATIENT
Start: 2024-08-08 | End: 2024-08-10

## 2024-08-08 RX ORDER — SENNA 187 MG
2 TABLET ORAL AT BEDTIME
Refills: 0 | Status: DISCONTINUED | OUTPATIENT
Start: 2024-08-08 | End: 2024-08-10

## 2024-08-08 RX ORDER — DEXTROSE 15 G/33 G
25 GEL IN PACKET (GRAM) ORAL ONCE
Refills: 0 | Status: DISCONTINUED | OUTPATIENT
Start: 2024-08-08 | End: 2024-08-10

## 2024-08-08 RX ORDER — CEFEPIME 2 G/1
2000 INJECTION, POWDER, FOR SOLUTION INTRAVENOUS EVERY 24 HOURS
Refills: 0 | Status: DISCONTINUED | OUTPATIENT
Start: 2024-08-08 | End: 2024-08-10

## 2024-08-08 RX ORDER — HEPARIN SODIUM,BOVINE 1000/ML
800 VIAL (ML) INJECTION
Qty: 25000 | Refills: 0 | Status: DISCONTINUED | OUTPATIENT
Start: 2024-08-08 | End: 2024-08-10

## 2024-08-08 RX ORDER — APIXABAN 5 MG/1
2.5 TABLET, FILM COATED ORAL EVERY 12 HOURS
Refills: 0 | Status: DISCONTINUED | OUTPATIENT
Start: 2024-08-08 | End: 2024-08-08

## 2024-08-08 RX ORDER — PIPERACILLIN SODIUM AND TAZOBACTAM SODIUM 3; .375 G/15ML; G/15ML
3.38 INJECTION, POWDER, FOR SOLUTION INTRAVENOUS ONCE
Refills: 0 | Status: COMPLETED | OUTPATIENT
Start: 2024-08-08 | End: 2024-08-08

## 2024-08-08 RX ORDER — VANCOMYCIN/0.9 % SOD CHLORIDE 1.75G/25
1000 PLASTIC BAG, INJECTION (ML) INTRAVENOUS EVERY 24 HOURS
Refills: 0 | Status: DISCONTINUED | OUTPATIENT
Start: 2024-08-08 | End: 2024-08-10

## 2024-08-08 RX ORDER — METOPROLOL TARTRATE 100 MG/1
50 TABLET ORAL EVERY 12 HOURS
Refills: 0 | Status: DISCONTINUED | OUTPATIENT
Start: 2024-08-08 | End: 2024-08-10

## 2024-08-08 RX ORDER — POLYETHYLENE GLYCOL 3350 17 G/17G
17 POWDER, FOR SOLUTION ORAL DAILY
Refills: 0 | Status: DISCONTINUED | OUTPATIENT
Start: 2024-08-08 | End: 2024-08-10

## 2024-08-08 RX ORDER — DEXTROSE 15 G/33 G
12.5 GEL IN PACKET (GRAM) ORAL ONCE
Refills: 0 | Status: DISCONTINUED | OUTPATIENT
Start: 2024-08-08 | End: 2024-08-10

## 2024-08-08 RX ORDER — ASPIRIN 81 MG
81 TABLET, DELAYED RELEASE (ENTERIC COATED) ORAL DAILY
Refills: 0 | Status: DISCONTINUED | OUTPATIENT
Start: 2024-08-08 | End: 2024-08-10

## 2024-08-08 RX ORDER — VANCOMYCIN/0.9 % SOD CHLORIDE 1.75G/25
1000 PLASTIC BAG, INJECTION (ML) INTRAVENOUS ONCE
Refills: 0 | Status: COMPLETED | OUTPATIENT
Start: 2024-08-08 | End: 2024-08-08

## 2024-08-08 RX ORDER — PANTOPRAZOLE SODIUM 40 MG
40 TABLET, DELAYED RELEASE (ENTERIC COATED) ORAL
Refills: 0 | Status: DISCONTINUED | OUTPATIENT
Start: 2024-08-08 | End: 2024-08-10

## 2024-08-08 RX ORDER — DEXTROSE 15 G/33 G
15 GEL IN PACKET (GRAM) ORAL ONCE
Refills: 0 | Status: DISCONTINUED | OUTPATIENT
Start: 2024-08-08 | End: 2024-08-10

## 2024-08-08 RX ADMIN — TAMSULOSIN HYDROCHLORIDE 0.4 MILLIGRAM(S): 0.4 CAPSULE ORAL at 21:19

## 2024-08-08 RX ADMIN — Medication 75 MILLILITER(S): at 22:05

## 2024-08-08 RX ADMIN — CEFEPIME 100 MILLIGRAM(S): 2 INJECTION, POWDER, FOR SOLUTION INTRAVENOUS at 21:16

## 2024-08-08 RX ADMIN — PIPERACILLIN SODIUM AND TAZOBACTAM SODIUM 200 GRAM(S): 3; .375 INJECTION, POWDER, FOR SOLUTION INTRAVENOUS at 13:23

## 2024-08-08 RX ADMIN — Medication 8 UNIT(S)/HR: at 22:02

## 2024-08-08 RX ADMIN — Medication 3 MILLIGRAM(S): at 21:20

## 2024-08-08 RX ADMIN — Medication 250 MILLIGRAM(S): at 13:23

## 2024-08-08 RX ADMIN — Medication 2 TABLET(S): at 21:18

## 2024-08-08 RX ADMIN — Medication 80 MILLIGRAM(S): at 21:18

## 2024-08-08 NOTE — CONSULT NOTE ADULT - SUBJECTIVE AND OBJECTIVE BOX
CHIEF COMPLAINT:    HISTORY OF PRESENT ILLNESS:  85 y/o male w/ PMHx of HTN, HLD, Type 2 Diabetes, CAD s/p CABG x3/bioprosthetic AVR 2011 at [facility] and subsequent PCI 5/2019, s/p TAVR 7/24, on Eliquis, presents to the ER with son for right groin infection. states TAVR was done through right groin. states has been at rehab facility and noticed groin became swollen. states then developed drainage and erythema to the area. states had complication during procedure and entered left groin as well. states noticed left side also swollen but not as bad. patient denies f/n/v/d, CP, SOB, HA, dizziness, abdominal pain.    PAST MEDICAL & SURGICAL HISTORY:  3-vessel CAD  s/p 3v CABG 2016, s/p stent 2019      HTN (hypertension)      DM2 (diabetes mellitus, type 2)      Occlusion and stenosis of unspecified carotid artery      HLD (hyperlipidemia)      Chronic atrial fibrillation      S/P CABG x 3  2016      S/P AVR (aortic valve replacement)  bovine 2016      S/P coronary artery stent placement  x1 2019      History of CEA (carotid endarterectomy)              MEDICATIONS:  apixaban 2.5 milliGRAM(s) Oral every 12 hours  aspirin  chewable 81 milliGRAM(s) Oral daily  clopidogrel Tablet 75 milliGRAM(s) Oral daily  metoprolol tartrate 50 milliGRAM(s) Oral every 12 hours        melatonin 3 milliGRAM(s) Oral at bedtime    pantoprazole    Tablet 40 milliGRAM(s) Oral before breakfast  polyethylene glycol 3350 17 Gram(s) Oral daily  senna 2 Tablet(s) Oral at bedtime    atorvastatin 80 milliGRAM(s) Oral at bedtime    tamsulosin 0.4 milliGRAM(s) Oral at bedtime      FAMILY HISTORY:  FH: diabetes mellitus  mother, siblings    FH: heart disease  father        SOCIAL HISTORY:    [ ] Non-smoker  [ ] Smoker  [ ] Alcohol    Allergies    No Known Allergies    Intolerances    	    REVIEW OF SYSTEMS:  CONSTITUTIONAL: No fever, weight loss, or fatigue  EYES: No eye pain, visual disturbances, or discharge  ENMT:  No difficulty hearing, tinnitus, vertigo; No sinus or throat pain  NECK: No pain or stiffness  RESPIRATORY: No cough, wheezing, chills or hemoptysis; No Shortness of Breath  CARDIOVASCULAR: No chest pain, palpitations, passing out, dizziness, or leg swelling  GASTROINTESTINAL: No abdominal or epigastric pain. No nausea, vomiting, or hematemesis; No diarrhea or constipation. No melena or hematochezia.  GENITOURINARY: No dysuria, frequency, hematuria, or incontinence  NEUROLOGICAL: No headaches, memory loss, loss of strength, numbness, or tremors  SKIN: No itching, burning, rashes, or lesions   LYMPH Nodes: No enlarged glands  ENDOCRINE: No heat or cold intolerance; No hair loss  MUSCULOSKELETAL: No joint pain or swelling; No muscle, back, or extremity pain  PSYCHIATRIC: No depression, anxiety, mood swings, or difficulty sleeping  HEME/LYMPH: No easy bruising, or bleeding gums  ALLERY AND IMMUNOLOGIC: No hives or eczema	    [ ] All others negative	  [ ] Unable to obtain    PHYSICAL EXAM:  T(C): 36.9 (08-08-24 @ 17:42), Max: 36.9 (08-08-24 @ 17:42)  HR: 98 (08-08-24 @ 17:42) (78 - 98)  BP: 128/53 (08-08-24 @ 17:42) (95/53 - 128/53)  RR: 18 (08-08-24 @ 17:42) (16 - 18)  SpO2: 97% (08-08-24 @ 17:42) (95% - 100%)  Wt(kg): --  I&O's Summary      Appearance: NAD  HEENT: Dry  oral mucosa, PERRL, EOMI	  Lymphatic: No lymphadenopathy  Cardiovascular: Irregular  S1 S2, No JVD, No murmurs, No edema  Respiratory: Lungs clear to auscultation	  Psychiatry: A & O x 3, Mood & affect appropriate  Gastrointestinal:  Soft, Non-tender, + BS	  Skin: No rashes, No ecchymoses, No cyanosis	  Neurologic: Non-focal  Extremities: Normal range of motion, No clubbing, cyanosis or edema  Vascular: Peripheral pulses palpable 2+ bilaterally   right groin: area or erythema, warmth and swelling with drainage and open wound.   left groin: sutures in place. mild erythema and swelling. no wounds presentTELEMETRY: 	    ECG:  	  RADIOLOGY:  c< from: CT Abdomen and Pelvis w/ IV Cont (07.29.24 @ 14:09) >    ACC: [id number] EXAM:  CT ABDOMEN AND PELVIS IC   ORDERED BY: JEFF STATON     PROCEDURE DATE:  07/29/2024          INTERPRETATION:  CLINICAL INFORMATION: Evaluate for retroperitoneal   bleed, status post TAVR and bilateral lower extremity arterial   repair/grafting    COMPARISON: CT abdomen pelvis 7/12/2024    CONTRAST/COMPLICATIONS:  IV Contrast: Omnipaque 350  90 cc administered   10 cc discarded  Oral Contrast: NONE  Complications: None reported at time of study completion    PROCEDURE:  CT of the Abdomen and Pelvis was performed.  Sagittal and coronal reformats were performed.    FINDINGS:  LOWER CHEST: Status post TAVR. Trace pleural effusions. Bilateral linear   atelectasis versus scarring.    LIVER: Within normal limits.  BILE DUCTS: Vicarious excretion of contrast.  GALLBLADDER: Within normal limits.  SPLEEN: Within normal limits.  PANCREAS: Within normal limits.  ADRENALS: Within normal limits.  KIDNEYS/URETERS: Within normal limits.    BLADDER: Distended. Air, likely related to instrumentation.  REPRODUCTIVE ORGANS: Prostate within normal limits.    BOWEL: No bowel obstruction. Appendix is normal.  PERITONEUM/RETROPERITONEUM: Hot dissection of the  VESSELS: Hematoma superficial to the left SFA measuring up to 3.9 x 2.2   cm. (3:48). Scattered bubbles of air in the bilateral groins and along   the distal course of the right external iliac artery. Possible small   dissection flap in the proximal SFA.    LYMPH NODES: No lymphadenopathy.  ABDOMINAL WALL: Small postsurgical subcutaneous emphysema in the   bilateral anterior groin.  BONES: Median sternotomy.    IMPRESSION:    3.9 cm hematoma adjacent to the left SFA with short segment dissection.   No evidence of pseudoaneurysm or active bleed.    No evidence of retroperitoneal hematoma, as clinically questioned.            --- End of Report ---          CRISTINA KING MD; Resident Radiologist  This document has been electronically signed.  SKYLAR HARRIS MD; Attending Radiologist  This document has been electronically signed. Jul 29 2024  3:24PM    < end of copied text >    OTHER: 	  	  LABS:	 	    CARDIAC MARKERS:                                  9.0    7.45  )-----------( 301      ( 08 Aug 2024 13:15 )             29.1     08-08    136  |  100  |  18  ----------------------------<  223<H>  4.4   |  23  |  0.79    Ca    9.2      08 Aug 2024 13:15    TPro  6.7  /  Alb  2.8<L>  /  TBili  0.6  /  DBili  x   /  AST  29  /  ALT  26  /  AlkPhos  104  08-08    proBNP:   Lipid Profile:   HgA1c:   TSH:

## 2024-08-08 NOTE — H&P ADULT - NSHPPHYSICALEXAM_GEN_ALL_CORE
T(C): 36.9 (08-08-24 @ 17:42), Max: 36.9 (08-08-24 @ 17:42)  HR: 98 (08-08-24 @ 17:42) (78 - 98)  BP: 128/53 (08-08-24 @ 17:42) (95/53 - 128/53)  RR: 18 (08-08-24 @ 17:42) (16 - 18)  SpO2: 97% (08-08-24 @ 17:42) (95% - 100%)    CONSTITUTIONAL: Well groomed, no apparent distress  ENMT: Oral mucosa with moist membranes.  RESP: No respiratory distress, no use of accessory muscles; saturating appropriately on RA  CV: RRR,VSS  Vascular: R groin with fibrinopurulent exudate overlying previous groin incision. Incision has superficial dehiscence. Fluctuant and full below staple line, erythema.   GI: Soft, NT, ND, no rebound, no guarding; no palpable masses; no hepatosplenomegaly; no hernia palpated  SKIN: No rashes or ulcers noted; no subcutaneous nodules or induration palpable  NEURO: CN II-XII intact  PSYCH: A&Ox3

## 2024-08-08 NOTE — ED PROVIDER NOTE - OBJECTIVE STATEMENT
85 y/o male w/ PMHx of HTN, HLD, Type 2 Diabetes, CAD s/p CABG x3/bioprosthetic AVR 2011 at [facility] and subsequent PCI 5/2019, s/p TAVR 7/24, on Eliquis, presents to the ER with son for right groin infection. states TAVR was done through right groin. states has been at rehab facility and noticed groin became swollen. states then developed drainage and erythema to the area. states had complication during procedure and entered left groin as well. states noticed left side also swollen but not as bad. patient denies f/n/v/d, CP, SOB, HA, dizziness, abdominal pain.

## 2024-08-08 NOTE — ED PROVIDER NOTE - PROGRESS NOTE DETAILS
Shar Treviño PA-C: spoke with vascular. recommend CT aorta with run off to b/l MAX. at patient bedside. Attending MD Blanco.  Pt signed out to me in stable condition pending CT, TBA vascular, 83 yo male s/p TAVR with bilateral LE vascular complications with R>L bilateral infection concern, CT pelvis with aortic eval, TBA.

## 2024-08-08 NOTE — H&P ADULT - NSHPLABSRESULTS_GEN_ALL_CORE
9.0    7.45  )-----------( 301      ( 08 Aug 2024 13:15 )             29.1   08-08    136  |  100  |  18  ----------------------------<  223<H>  4.4   |  23  |  0.79    Ca    9.2      08 Aug 2024 13:15    TPro  6.7  /  Alb  2.8<L>  /  TBili  0.6  /  DBili  x   /  AST  29  /  ALT  26  /  AlkPhos  104  08-08 9.0    7.45  )-----------( 301      ( 08 Aug 2024 13:15 )             29.1   08-08    136  |  100  |  18  ----------------------------<  223<H>  4.4   |  23  |  0.79    Ca    9.2      08 Aug 2024 13:15    TPro  6.7  /  Alb  2.8<L>  /  TBili  0.6  /  DBili  x   /  AST  29  /  ALT  26  /  AlkPhos  104  08-08    CTA read pending

## 2024-08-08 NOTE — H&P ADULT - HISTORY OF PRESENT ILLNESS
84M PMH HTN, HLD, L carotid stenosis s/p CEA, T2DM, CAD S/p CABG AVR with subsequent valve in valve TAVR via b/l femoral artery access c/b failed closure device 7/24 C/b RLE acute limb ischemia requiring R ileofemoral bypass w/ PTFE and RLE angiogram followed by LLE ALI RTOR for  L SFA interposition bypass with reversed ipsilateral GSV 7/24, who went home and subsequently developed bilateral groin incisional infections with R incisional dehiscence. Pt is not having fevers, chills, chest pain, or SOB. Pt is not having R groin pain. Just feels some "rope like" feeling in his R calf.  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 Pt is not having fevers, chills, chest pain, or SOB. Pt is not having R groin pain. Just feels some "rope like" feeling in his R calf.

## 2024-08-08 NOTE — PATIENT PROFILE ADULT - SAFE PLACE TO LIVE
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    P:  - Lovenox DVT ppx  - f/u CTA final read  - PCA for pain control  - Clear liquid diet  - Patient seen and examined with Dr. Fenton no

## 2024-08-08 NOTE — ED PROVIDER NOTE - PHYSICAL EXAMINATION
right groin: area or erythema, warmth and swelling with drainage and open wound.   left groin: sutures in place. mild erythema and swelling. no wounds present

## 2024-08-08 NOTE — ED PROVIDER NOTE - ATTENDING CONTRIBUTION TO CARE
I performed a history and physical exam of the patient and discussed their management with the resident and /or advanced care provider. I reviewed the resident and /or ACP's note and agree with the documented findings and plan of care. My medical decision making and observations are found above.  Lungs clear, rt groin swollen and red and painful and warm. rt sided looks well.

## 2024-08-08 NOTE — CONSULT NOTE ADULT - SUBJECTIVE AND OBJECTIVE BOX
Patient is a 84y old  Male who presents with a chief complaint of   HPI:      PAST MEDICAL & SURGICAL HISTORY:  3-vessel CAD  s/p 3v CABG 2016, s/p stent 2019      HTN (hypertension)      DM2 (diabetes mellitus, type 2)      Occlusion and stenosis of unspecified carotid artery      HLD (hyperlipidemia)      Chronic atrial fibrillation      S/P CABG x 3  2016      S/P AVR (aortic valve replacement)  bovine 2016      S/P coronary artery stent placement  x1 2019      History of CEA (carotid endarterectomy)          Social history:    FAMILY HISTORY:  FH: diabetes mellitus  mother, siblings    FH: heart disease  father         Endocrine:	No recent weight gain or loss.No abnormal heat/cold intolerance    Allergic/Immunologic:	No hives or rash   Allergies    No Known Allergies    Intolerances        Antimicrobials:          Vital Signs Last 24 Hrs  T(C): 36.3 (08 Aug 2024 12:28), Max: 36.3 (08 Aug 2024 12:28)  T(F): 97.3 (08 Aug 2024 12:28), Max: 97.3 (08 Aug 2024 12:28)  HR: 90 (08 Aug 2024 13:00) (84 - 90)  BP: 127/85 (08 Aug 2024 13:00) (117/73 - 127/85)  BP(mean): --  RR: 18 (08 Aug 2024 13:00) (16 - 18)  SpO2: 100% (08 Aug 2024 13:00) (99% - 100%)    Parameters below as of 08 Aug 2024 13:00  Patient On (Oxygen Delivery Method): room air           ENMT:No sinus tenderness.No thrush.No pharyngeal exudate or erythema.Fair dental hygiene    Neck:Supple,No LN,no JVD      Respiratory:Good air entry bilaterally,CTA    Cardiovascular:S1 S2 wnl, No murmurs,rub or gallops    Gastrointestinal:Soft BS(+) no tenderness no masses ,No rebound or guarding    Genitourinary:No CVA tendereness     Rectal:    Extremities:No cyanosis,clubbing or edema.    Vascular:peripheral pulses felt    Neurological:AAO X 3,No grossly focal deficits    Skin:No rash                                    9.0    7.45  )-----------( 301      ( 08 Aug 2024 13:15 )             29.1         08-08    136  |  100  |  18  ----------------------------<  223<H>  4.4   |  23  |  0.79    Ca    9.2      08 Aug 2024 13:15    TPro  6.7  /  Alb  2.8<L>  /  TBili  0.6  /  DBili  x   /  AST  29  /  ALT  26  /  AlkPhos  104  08-08      RECENT CULTURES:      MICROBIOLOGY:          Radiology:      Assessment:        Recommendations and Plan:    Pager [phone]  After 5 pm/weekends or if no response :[phone]

## 2024-08-08 NOTE — H&P ADULT - ASSESSMENT
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 Pt is not having fevers, chills, chest pain, or SOB. Pt is not having R groin pain. Just feels some "rope like" feeling in his R calf.     - Admit to Dr. Wade  - NPO  - IVF  - Vancomycin and cefepime per ID consult  - Heparin gtt for anticoagulation (patient specific - starting at 800, no bolus, no pausing heparin gtt)  - Home meds  - ISS  - plastics consult for eventual wash out and closure  - Pending final CTA read      Pt discussed with Dr. Martinez vascular fellow on behalf of Dr. Wade  [facility]   [id number] / [phone]

## 2024-08-08 NOTE — PATIENT PROFILE ADULT - FALL HARM RISK - HARM RISK INTERVENTIONS

## 2024-08-08 NOTE — ED PROVIDER NOTE - CLINICAL SUMMARY MEDICAL DECISION MAKING FREE TEXT BOX
Jamal: Patient is a 84-year-old who presents to the emergency department after having bilateral vascular operations.  Patient seen today by vascular surgery and thought to have swelling and infection.  Patient on oral antibiotics.  Will get CT of aorta with runoffs looking for infection and will start IV antibiotics and discussed care with vascular surgery as this patient will definitely need to be admitted because of the extent of what is obvious infection in his right groin. Lab studies ordered, independently reviewed and acted on as appropriate.

## 2024-08-08 NOTE — ED ADULT TRIAGE NOTE - NS ED TRIAGE AVPU SCALE
Alert-The patient is alert, awake and responds to voice. The patient is oriented to time, place, and person. The triage nurse is able to obtain subjective information.
[FreeTextEntry1] : reluctant to take injectables asymptomatic repeat bloods aug. some improvement maintain zeta asa plavix. meme Merlos.   update 10/26/22 clinically stable no new cardiac recommendations  Update 3/15/2023 No new complaints from Consuelo we note some systolic hypertension although she claims that at home her blood pressures in the 120 range we discussed the proper means of checking blood pressure with close attention to anatomy  Update 7/21/2023 No new cardiac complaints accompanied by Jesse her son today reviewed results of CAT scan calcium noted currently on aspirin and Plavix for advanced coronary disease now stabilized  Update 1/19/2024 No new signs or symptoms stable reviewed cardiac and blood pressure meds with patient and her son

## 2024-08-08 NOTE — ED ADULT NURSE NOTE - ED STAT RN HANDOFF DETAILS
1900 Report given to receiving change of shift KYRA MATHIS patient is in no acute distress. Patient vital signs stable, plan of care explained, in purple

## 2024-08-08 NOTE — PATIENT PROFILE ADULT - TRANSPORTATION
Called Dr. Lyle Chacon office at [phone] to schedule ER f/u appt post discharge from ER. Patient's insurance [employer] is not accepted by this specialist.    Pedro Luis Jameson at [phone] and spoke with Senthil Patel who scheduled appt:    Tuesday, 7/19/22 at 10:00am    Dr. Carmen Lo. 20 [address], [address] with patient and she was driving so asked to call her back in 30 mins. 430pm    Spoke with patient and updated her on above information.   Patient v/u
no

## 2024-08-08 NOTE — ED ADULT NURSE NOTE - NSFALLUNIVINTERV_ED_ALL_ED
Bed/Stretcher in lowest position, wheels locked, appropriate side rails in place/Call bell, personal items and telephone in reach/Instruct patient to call for assistance before getting out of bed/chair/stretcher/Non-slip footwear applied when patient is off stretcher/Bob White to call system/Physically safe environment - no spills, clutter or unnecessary equipment/Purposeful proactive rounding/Room/bathroom lighting operational, light cord in reach

## 2024-08-08 NOTE — ED ADULT NURSE NOTE - OBJECTIVE STATEMENT
85yo M aaox4 h/o afib on Eliquis, recent TAVR ( 7/24/24) , presents to ED from MDs office, as per pt today had a f/u with vascular surgery, then sent in to Ed for possible infection on the R groin ( TAVR surgery site) and IV ABx and Ct Scan  , pt reports redness/pain/swelling  on the R groin, on examination Pt denies CP, SOB, HA, vision changes, n/v/d, fevers chills, abdominal pain, weakness, dizziness Safety and comfort measures initiated- bed placed in lowest position and side rails raised. Pt oriented to call bell system. 85yo M aaox4 h/o afib on Eliquis, recent TAVR ( 7/24/24) , presents to ED from MDs office, as per pt today had a f/u with vascular surgery, then sent in to Ed for possible infection on the R groin ( TAVR surgery site) and IV ABx and Ct Scan  , pt reports redness/pain/swelling  on the R groin, and pain on the R leg /calf on examination  Pt denies CP, SOB, HA, vision changes, n/v/d, fevers chills, abdominal pain, weakness, dizziness Safety and comfort measures initiated- bed placed in lowest position and side rails raised. Pt oriented to call bell system.

## 2024-08-09 LAB
ANION GAP SERPL CALC-SCNC: 13 MMOL/L — SIGNIFICANT CHANGE UP (ref 5–17)
APTT BLD: 38.8 SEC — HIGH (ref 24.5–35.6)
APTT BLD: 44.7 SEC — HIGH (ref 24.5–35.6)
APTT BLD: 76.9 SEC — HIGH (ref 24.5–35.6)
BLD GP AB SCN SERPL QL: NEGATIVE — SIGNIFICANT CHANGE UP
BUN SERPL-MCNC: 12 MG/DL — SIGNIFICANT CHANGE UP (ref 7–23)
CALCIUM SERPL-MCNC: 9 MG/DL — SIGNIFICANT CHANGE UP (ref 8.4–10.5)
CHLORIDE SERPL-SCNC: 100 MMOL/L — SIGNIFICANT CHANGE UP (ref 96–108)
CO2 SERPL-SCNC: 23 MMOL/L — SIGNIFICANT CHANGE UP (ref 22–31)
CREAT SERPL-MCNC: 0.75 MG/DL — SIGNIFICANT CHANGE UP (ref 0.5–1.3)
CULTURE RESULTS: SIGNIFICANT CHANGE UP
EGFR: 90 ML/MIN/1.73M2 — SIGNIFICANT CHANGE UP
GLUCOSE BLDC GLUCOMTR-MCNC: 168 MG/DL — HIGH (ref 70–99)
GLUCOSE BLDC GLUCOMTR-MCNC: 197 MG/DL — HIGH (ref 70–99)
GLUCOSE BLDC GLUCOMTR-MCNC: 204 MG/DL — HIGH (ref 70–99)
GLUCOSE BLDC GLUCOMTR-MCNC: 285 MG/DL — HIGH (ref 70–99)
GLUCOSE BLDC GLUCOMTR-MCNC: 325 MG/DL — HIGH (ref 70–99)
GLUCOSE SERPL-MCNC: 169 MG/DL — HIGH (ref 70–99)
HCT VFR BLD CALC: 26.3 % — LOW (ref 39–50)
HGB BLD-MCNC: 8.4 G/DL — LOW (ref 13–17)
INR BLD: 1.6 RATIO — HIGH (ref 0.85–1.18)
MAGNESIUM SERPL-MCNC: 1.5 MG/DL — LOW (ref 1.6–2.6)
MCHC RBC-ENTMCNC: 30.1 PG — SIGNIFICANT CHANGE UP (ref 27–34)
MCHC RBC-ENTMCNC: 31.9 GM/DL — LOW (ref 32–36)
MCV RBC AUTO: 94.3 FL — SIGNIFICANT CHANGE UP (ref 80–100)
MRSA PCR RESULT.: SIGNIFICANT CHANGE UP
NRBC # BLD: 0 /100 WBCS — SIGNIFICANT CHANGE UP (ref 0–0)
PHOSPHATE SERPL-MCNC: 2.8 MG/DL — SIGNIFICANT CHANGE UP (ref 2.5–4.5)
PLATELET # BLD AUTO: 272 K/UL — SIGNIFICANT CHANGE UP (ref 150–400)
POTASSIUM SERPL-MCNC: 4.2 MMOL/L — SIGNIFICANT CHANGE UP (ref 3.5–5.3)
POTASSIUM SERPL-SCNC: 4.2 MMOL/L — SIGNIFICANT CHANGE UP (ref 3.5–5.3)
PROTHROM AB SERPL-ACNC: 16.6 SEC — HIGH (ref 9.5–13)
RBC # BLD: 2.79 M/UL — LOW (ref 4.2–5.8)
RBC # FLD: 15.9 % — HIGH (ref 10.3–14.5)
RH IG SCN BLD-IMP: POSITIVE — SIGNIFICANT CHANGE UP
S AUREUS DNA NOSE QL NAA+PROBE: SIGNIFICANT CHANGE UP
SODIUM SERPL-SCNC: 136 MMOL/L — SIGNIFICANT CHANGE UP (ref 135–145)
SPECIMEN SOURCE: SIGNIFICANT CHANGE UP
WBC # BLD: 5.01 K/UL — SIGNIFICANT CHANGE UP (ref 3.8–10.5)
WBC # FLD AUTO: 5.01 K/UL — SIGNIFICANT CHANGE UP (ref 3.8–10.5)

## 2024-08-09 PROCEDURE — 99232 SBSQ HOSP IP/OBS MODERATE 35: CPT

## 2024-08-09 RX ORDER — CHLORHEXIDINE GLUCONATE 40 MG/ML
1 SOLUTION TOPICAL DAILY
Refills: 0 | Status: DISCONTINUED | OUTPATIENT
Start: 2024-08-09 | End: 2024-08-10

## 2024-08-09 RX ADMIN — Medication 10 UNIT(S)/HR: at 07:38

## 2024-08-09 RX ADMIN — TAMSULOSIN HYDROCHLORIDE 0.4 MILLIGRAM(S): 0.4 CAPSULE ORAL at 22:29

## 2024-08-09 RX ADMIN — Medication 3 MILLIGRAM(S): at 22:28

## 2024-08-09 RX ADMIN — Medication 250 MILLIGRAM(S): at 12:58

## 2024-08-09 RX ADMIN — Medication 100 GRAM(S): at 14:19

## 2024-08-09 RX ADMIN — POLYETHYLENE GLYCOL 3350 17 GRAM(S): 17 POWDER, FOR SOLUTION ORAL at 12:58

## 2024-08-09 RX ADMIN — CHLORHEXIDINE GLUCONATE 1 APPLICATION(S): 40 SOLUTION TOPICAL at 17:51

## 2024-08-09 RX ADMIN — Medication 80 MILLIGRAM(S): at 22:29

## 2024-08-09 RX ADMIN — Medication 10 UNIT(S)/HR: at 05:44

## 2024-08-09 RX ADMIN — Medication 12 UNIT(S)/HR: at 22:31

## 2024-08-09 RX ADMIN — Medication 12 UNIT(S)/HR: at 19:34

## 2024-08-09 RX ADMIN — CEFEPIME 100 MILLIGRAM(S): 2 INJECTION, POWDER, FOR SOLUTION INTRAVENOUS at 22:28

## 2024-08-09 RX ADMIN — Medication 4: at 12:59

## 2024-08-09 RX ADMIN — Medication 3: at 17:55

## 2024-08-09 RX ADMIN — METOPROLOL TARTRATE 50 MILLIGRAM(S): 100 TABLET ORAL at 17:54

## 2024-08-09 RX ADMIN — Medication 40 MILLIGRAM(S): at 05:45

## 2024-08-09 RX ADMIN — Medication 1: at 00:31

## 2024-08-09 RX ADMIN — Medication 81 MILLIGRAM(S): at 12:58

## 2024-08-09 RX ADMIN — Medication 12 UNIT(S)/HR: at 14:17

## 2024-08-09 RX ADMIN — Medication 50 MILLILITER(S): at 10:12

## 2024-08-09 RX ADMIN — Medication 100 GRAM(S): at 17:51

## 2024-08-09 RX ADMIN — Medication 2 TABLET(S): at 22:29

## 2024-08-09 RX ADMIN — METOPROLOL TARTRATE 50 MILLIGRAM(S): 100 TABLET ORAL at 05:45

## 2024-08-09 RX ADMIN — Medication 2: at 06:26

## 2024-08-09 NOTE — PROGRESS NOTE ADULT - SUBJECTIVE AND OBJECTIVE BOX
SUBJECTIVE:    Patient was seen and examined at bedside. He reports feeling great, with no complaints at the moment. Denies having any symptoms of fevers, chills, nausea, vomiting. Denies any pain anywhere.       Vitals:  ============  T(F): 98 (09 Aug 2024 01:03), Max: 98.7 (08 Aug 2024 20:17)  HR: 91 (09 Aug 2024 01:03)  BP: 123/67 (09 Aug 2024 01:03)  RR: 18 (09 Aug 2024 01:03)  SpO2: 94% (09 Aug 2024 01:03) (93% - 100%)  temp max in last 48H T(F): , Max: 98.7 (08-08-24 @ 20:17)    Neural: A&O x4  Respiratory: non-labored breathing  Extremities: R groin with fibrinopurulent exudate overlying previous groin incision. Incision has superficial dehiscence. L groin with intact staple line, erythema and induated.   GI: Soft, NT, ND, no rebound, no guarding; no palpable masses; no hepatosplenomegaly; no hernia palpated    LABS:                         8.4    5.01  )-----------( 272      ( 09 Aug 2024 04:26 )             26.3     08-09    136  |  100  |  12  ----------------------------<  169<H>  4.2   |  23  |  0.75    Ca    9.0      09 Aug 2024 04:26  Phos  2.8     08-09  Mg     1.5     08-09    TPro  6.7  /  Alb  2.8<L>  /  TBili  0.6  /  DBili  x   /  AST  29  /  ALT  26  /  AlkPhos  104  08-08    PT/INR - ( 09 Aug 2024 04:26 )   PT: 16.6 sec;   INR: 1.60 ratio         PTT - ( 09 Aug 2024 04:26 )  PTT:38.8 sec  Urinalysis Basic - ( 09 Aug 2024 04:26 )    Color: x / Appearance: x / SG: x / pH: x  Gluc: 169 mg/dL / Ketone: x  / Bili: x / Urobili: x   Blood: x / Protein: x / Nitrite: x   Leuk Esterase: x / RBC: x / WBC x   Sq Epi: x / Non Sq Epi: x / Bacteria: x      I&O's Detail    08 Aug 2024 07:01  -  09 Aug 2024 05:38  --------------------------------------------------------  IN:    Heparin: 48 mL    IV PiggyBack: 50 mL    Lactated Ringers: 450 mL  Total IN: 548 mL    OUT:    Oral Fluid: 0 mL    Voided (mL): 1525 mL  Total OUT: 1525 mL    Total NET: -977 mL    RADIOLOGY, EKG & ADDITIONAL TESTS: Reviewed. - 8/8/2024  IMPRESSION:  Left groin hematoma surrounding the left superficial femoral artery   measuring up to 7.6 cm craniocaudally as described above. No evidence of   active bleed. No right or left groin abscess. Soft tissue gas in the   right and left groin and surrounding the right common femoral artery is   likely postprocedural in etiology. Continued clinical follow-up is   advised.    Severe stenosis of the proximal to mid right anterior tibial artery and   posterior tibial artery.  Severe stenosis of the left posterior tibial artery.      Assessment and Plan:   · Assessment	  84M PMH HTN, HLD, L carotid stenosis s/p CEA, T2DM, CAD S/p CABG AVR with subsequent valve in valve TAVR via b/l femoral artery access c/b failed closure device 7/24 C/b RLE acute limb ischemia requiring R ileofemoral bypass w/ PTFE and RLE angiogram followed by LLE ALI RTOR for  L SFA interposition bypass with reversed ipsilateral GSV 7/24, who went to rehab facility and subsequently developed bilateral groin incisional infections with R incisional dehiscence. Currently afebrile and HDS, normal WBC count. CTA showed a left groin hematoma unchanged from prior imaging, non-expanding in size. R groin with dehiscence and purulence, L groin staple line intact, however, indurated.     Plan:  - Diet: regular diet today, NPO at midnight.   - Consulted plastic surgery: add on tomorrow for b/l groin exploration, possible washout, possible muscle flap  - Pre-op for tomorrow  - Pain and anti-emetic PRN  - C/t vanc and cefepime per ID consult    Vascular surgery [id number]

## 2024-08-09 NOTE — PROGRESS NOTE ADULT - SUBJECTIVE AND OBJECTIVE BOX
infectious diseases progress note:    Patient is a 84y old  Male who presents with a chief complaint of RLE wound infection (09 Aug 2024 05:35)        Local infection of skin and subcutaneous tissue          R      Allergies    No Known Allergies    Intolerances        ANTIBIOTICS/RELEVANT:  antimicrobials  cefepime   IVPB 2000 milliGRAM(s) IV Intermittent every 24 hours  vancomycin  IVPB 1000 milliGRAM(s) IV Intermittent every 24 hours    immunologic:    OTHER:  aspirin  chewable 81 milliGRAM(s) Oral daily  atorvastatin 80 milliGRAM(s) Oral at bedtime  clopidogrel Tablet 75 milliGRAM(s) Oral every 24 hours  dextrose 5%. 1000 milliLiter(s) IV Continuous <Continuous>  dextrose 5%. 1000 milliLiter(s) IV Continuous <Continuous>  dextrose 50% Injectable 25 Gram(s) IV Push once  dextrose 50% Injectable 12.5 Gram(s) IV Push once  dextrose 50% Injectable 25 Gram(s) IV Push once  dextrose Oral Gel 15 Gram(s) Oral once PRN  glucagon  Injectable 1 milliGRAM(s) IntraMuscular once  heparin  Infusion 800 Unit(s)/Hr IV Continuous <Continuous>  insulin lispro (ADMELOG) corrective regimen sliding scale   SubCutaneous at bedtime  insulin lispro (ADMELOG) corrective regimen sliding scale   SubCutaneous three times a day before meals  insulin lispro (ADMELOG) corrective regimen sliding scale   SubCutaneous every 6 hours  lactated ringers. 1000 milliLiter(s) IV Continuous <Continuous>  magnesium sulfate  IVPB 1 Gram(s) IV Intermittent every 4 hours  melatonin 3 milliGRAM(s) Oral at bedtime  metoprolol tartrate 50 milliGRAM(s) Oral every 12 hours  pantoprazole    Tablet 40 milliGRAM(s) Oral before breakfast  polyethylene glycol 3350 17 Gram(s) Oral daily  senna 2 Tablet(s) Oral at bedtime  tamsulosin 0.4 milliGRAM(s) Oral at bedtime      Objective:  Vital Signs Last 24 Hrs  T(C): 36.7 (09 Aug 2024 05:44), Max: 37.1 (08 Aug 2024 20:17)  T(F): 98 (09 Aug 2024 05:44), Max: 98.7 (08 Aug 2024 20:17)  HR: 99 (09 Aug 2024 05:44) (78 - 100)  BP: 126/68 (09 Aug 2024 05:44) (95/53 - 133/64)  BP(mean): 75 (08 Aug 2024 17:42) (65 - 75)  RR: 18 (09 Aug 2024 05:44) (16 - 18)  SpO2: 96% (09 Aug 2024 05:44) (93% - 100%)    Parameters below as of 09 Aug 2024 05:44  Patient On (Oxygen Delivery Method): room air           Eyes:SHIRA, EOMI  Ear/Nose/Throat: no oral lesion, no sinus tenderness on percussion	  Neck:no JVD, no lymphadenopathy, supple  Respiratory: CTA saurabh  Cardiovascular: S1S2 RRR, no murmurs  Gastrointestinal:soft, (+) BS, no HSM  Extremities:no e/e/c        LABS:                        8.4    5.01  )-----------( 272      ( 09 Aug 2024 04:26 )             26.3     08-09    136  |  100  |  12  ----------------------------<  169<H>  4.2   |  23  |  0.75    Ca    9.0      09 Aug 2024 04:26  Phos  2.8     08-09  Mg     1.5     08-09    TPro  6.7  /  Alb  2.8<L>  /  TBili  0.6  /  DBili  x   /  AST  29  /  ALT  26  /  AlkPhos  104  08-08    PT/INR - ( 09 Aug 2024 04:26 )   PT: 16.6 sec;   INR: 1.60 ratio         PTT - ( 09 Aug 2024 04:26 )  PTT:38.8 sec  Urinalysis Basic - ( 09 Aug 2024 04:26 )    Color: x / Appearance: x / SG: x / pH: x  Gluc: 169 mg/dL / Ketone: x  / Bili: x / Urobili: x   Blood: x / Protein: x / Nitrite: x   Leuk Esterase: x / RBC: x / WBC x   Sq Epi: x / Non Sq Epi: x / Bacteria: x          MICROBIOLOGY:    RECENT CULTURES:        RESPIRATORY CULTURES:              RADIOLOGY & ADDITIONAL STUDIES:        Pager [phone]  After 5 pm/weekends or if no response :[phone]

## 2024-08-09 NOTE — PROGRESS NOTE ADULT - ASSESSMENT
83 y/o male w/ PMHx of HTN, HLD, Type 2 Diabetes, CAD s/p CABG x3/bioprosthetic AVR 2011 at [facility] and subsequent PCI 5/2019, s/p TAVR 7/24, on Eliquis, presents to the ER with son for right groin infection. states TAVR was done through right groin. states has been at rehab facility and noticed groin became swollen. states then developed drainage and erythema to the area. states had complication during procedure and entered left groin as well. states noticed left side also swollen but not as bad. patient denies f/n/v/d, CP, SOB, HA, dizziness, abdominal pain.

## 2024-08-09 NOTE — PROGRESS NOTE ADULT - ASSESSMENT
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 Currently denies having any fevers, chills, nausea, no pain. Found to have no leukocytosis on labs. CTA showed a left groin hematoma.      Plan:  -NPO, IVF  -Consult plastic surgery for possible washout and closure with muscle flap  -c/f dehiscence; no pain or tenderness currently  -CTA showed left groin hematoma  -C/t vanc and cefepime per ID consult

## 2024-08-09 NOTE — CONSULT NOTE ADULT - SUBJECTIVE AND OBJECTIVE BOX
Plastic Surgery Consult Note (pg [facility]: [id number], [facility]: [phone])    HPI:  84M PMH HTN, HLD, L carotid stenosis s/p CEA, T2DM, CAD S/p CABG AVR with subsequent valve in valve TAVR via b/l femoral artery access c/b failed closure device 7/24 C/b RLE acute limb ischemia requiring R ileofemoral bypass w/ PTFE and RLE angiogram followed by LLE ALI RTOR for  L SFA interposition bypass with reversed ipsilateral GSV 7/24, readmitted with bilateral groin incisional infections, PRS consulted for possible muscle flap during groin washout.     Patient denies pain in bilateral groins, fevers, chills.     OBJECTIVE:  Vital Signs Last 24 Hrs  T(C): 36.5 (09 Aug 2024 13:08), Max: 37.1 (08 Aug 2024 20:17)  T(F): 97.7 (09 Aug 2024 13:08), Max: 98.7 (08 Aug 2024 20:17)  HR: 100 (09 Aug 2024 13:08) (81 - 100)  BP: 127/65 (09 Aug 2024 13:08) (123/67 - 133/64)  BP(mean): 75 (08 Aug 2024 17:42) (75 - 75)  RR: 18 (09 Aug 2024 13:08) (18 - 18)  SpO2: 99% (09 Aug 2024 13:08) (93% - 99%)    Parameters below as of 09 Aug 2024 13:08  Patient On (Oxygen Delivery Method): room air      Physical Examination:  GEN: NAD, resting quietly  GROIN: R groin with fibrinopurulent exudate overlying previous groin incision. Incision has superficial dehiscence. Fluctuant and full below staple line, erythema.    Plastic Surgery Consult Note (pg [facility]: [id number], [facility]: [phone])    HPI:  84M PMH HTN, HLD, L carotid stenosis s/p CEA, T2DM, CAD S/p CABG AVR with subsequent valve in valve TAVR via b/l femoral artery access c/b failed closure device 7/24 C/b RLE acute limb ischemia requiring R ileofemoral bypass w/ PTFE and RLE angiogram followed by LLE ALI RTOR for  L SFA interposition bypass with reversed ipsilateral GSV 7/24, readmitted with bilateral groin incisional infections, PRS consulted for possible muscle flap during groin washout.     Patient denies pain in bilateral groins, fevers, chills.     OBJECTIVE:  Vital Signs Last 24 Hrs  T(C): 36.5 (09 Aug 2024 13:08), Max: 37.1 (08 Aug 2024 20:17)  T(F): 97.7 (09 Aug 2024 13:08), Max: 98.7 (08 Aug 2024 20:17)  HR: 100 (09 Aug 2024 13:08) (81 - 100)  BP: 127/65 (09 Aug 2024 13:08) (123/67 - 133/64)  BP(mean): 75 (08 Aug 2024 17:42) (75 - 75)  RR: 18 (09 Aug 2024 13:08) (18 - 18)  SpO2: 99% (09 Aug 2024 13:08) (93% - 99%)    Parameters below as of 09 Aug 2024 13:08  Patient On (Oxygen Delivery Method): room air      Physical Examination:  GEN: NAD  GROIN: R groin with fibrinopurulent exudate overlying previous groin incision. Incision has superficial dehiscence. induration surrounding incision.   L groin: staples in place. induration surrounding incision. incision c/d/i. no dehiscence present

## 2024-08-09 NOTE — PROGRESS NOTE ADULT - SUBJECTIVE AND OBJECTIVE BOX
Subjective: Patient seen and examined. No new events except as noted.   feels ok   family at bedside   Seen by plastics , for OR tomorrow with Hilary Walsh/Mauro     REVIEW OF SYSTEMS:    CONSTITUTIONAL: + weakness, fevers or chills  EYES/ENT: No visual changes;  No vertigo or throat pain   NECK: No pain or stiffness  RESPIRATORY: No cough, wheezing, hemoptysis; No shortness of breath  CARDIOVASCULAR: No chest pain or palpitations  GASTROINTESTINAL: No abdominal or epigastric pain. No nausea, vomiting, or hematemesis; No diarrhea or constipation. No melena or hematochezia.  GENITOURINARY: No dysuria, frequency or hematuria  NEUROLOGICAL: No numbness or weakness  SKIN: No itching, burning, rashes, or lesions   All other review of systems is negative unless indicated above.    MEDICATIONS:  MEDICATIONS  (STANDING):  aspirin  chewable 81 milliGRAM(s) Oral daily  atorvastatin 80 milliGRAM(s) Oral at bedtime  cefepime   IVPB 2000 milliGRAM(s) IV Intermittent every 24 hours  chlorhexidine 2% Cloths 1 Application(s) Topical daily  clopidogrel Tablet 75 milliGRAM(s) Oral every 24 hours  dextrose 5%. 1000 milliLiter(s) (100 mL/Hr) IV Continuous <Continuous>  dextrose 5%. 1000 milliLiter(s) (50 mL/Hr) IV Continuous <Continuous>  dextrose 50% Injectable 25 Gram(s) IV Push once  dextrose 50% Injectable 12.5 Gram(s) IV Push once  dextrose 50% Injectable 25 Gram(s) IV Push once  glucagon  Injectable 1 milliGRAM(s) IntraMuscular once  heparin  Infusion 800 Unit(s)/Hr (10 mL/Hr) IV Continuous <Continuous>  insulin lispro (ADMELOG) corrective regimen sliding scale   SubCutaneous at bedtime  insulin lispro (ADMELOG) corrective regimen sliding scale   SubCutaneous three times a day before meals  insulin lispro (ADMELOG) corrective regimen sliding scale   SubCutaneous every 6 hours  lactated ringers. 1000 milliLiter(s) (50 mL/Hr) IV Continuous <Continuous>  magnesium sulfate  IVPB 1 Gram(s) IV Intermittent every 4 hours  melatonin 3 milliGRAM(s) Oral at bedtime  metoprolol tartrate 50 milliGRAM(s) Oral every 12 hours  pantoprazole    Tablet 40 milliGRAM(s) Oral before breakfast  polyethylene glycol 3350 17 Gram(s) Oral daily  senna 2 Tablet(s) Oral at bedtime  tamsulosin 0.4 milliGRAM(s) Oral at bedtime  vancomycin  IVPB 1000 milliGRAM(s) IV Intermittent every 24 hours      PHYSICAL EXAM:  T(C): 36.7 (08-09-24 @ 09:57), Max: 37.1 (08-08-24 @ 20:17)  HR: 98 (08-09-24 @ 09:57) (78 - 100)  BP: 132/62 (08-09-24 @ 09:57) (95/53 - 133/64)  RR: 18 (08-09-24 @ 09:57) (16 - 18)  SpO2: 97% (08-09-24 @ 09:57) (93% - 100%)  Wt(kg): --  I&O's Summary    08 Aug 2024 07:01  -  09 Aug 2024 07:00  --------------------------------------------------------  IN: 886 mL / OUT: 1525 mL / NET: -639 mL      Height (cm): 152.4 (08-08 @ 12:28)  Weight (kg): 54.4 (08-08 @ 12:28)  BMI (kg/m2): 23.4 (08-08 @ 12:28)  BSA (m2): 1.5 (08-08 @ 12:28)    Appearance: NAD  HEENT:   Dry oral mucosa, PERRL, EOMI	  Lymphatic: No lymphadenopathy , no edema  Cardiovascular: Normal S1 S2, No JVD, No murmurs , Peripheral pulses palpable 2+ bilaterally  Respiratory: Lungs clear to auscultation, normal effort 	  Gastrointestinal:  Soft, Non-tender, + BS	  Skin: No rashes, No ecchymoses, No cyanosis, warm to touch  Musculoskeletal: Normal range of motion, normal strength  Psychiatry:  Mood & affect appropriate  Ext: No edema   R groin with fibrinopurulent exudate overlying previous groin incision. Incision has superficial dehiscence. Fluctuant and full below staple line, erythema.     LABS:    CARDIAC MARKERS:                                8.4    5.01  )-----------( 272      ( 09 Aug 2024 04:26 )             26.3     08-09    136  |  100  |  12  ----------------------------<  169<H>  4.2   |  23  |  0.75    Ca    9.0      09 Aug 2024 04:26  Phos  2.8     08-09  Mg     1.5     08-09    TPro  6.7  /  Alb  2.8<L>  /  TBili  0.6  /  DBili  x   /  AST  29  /  ALT  26  /  AlkPhos  104  08-08      TELEMETRY: 	    ECG:  	  RADIOLOGY: < from: CT Angio Abd Aorta w/run-off w/ IV Cont (08.08.24 @ 17:25) >    ACC: [id number] EXAM:  CT ANGIO ABD AOR W RUN(W)AW IC   ORDERED BY: TARA MEDINA     PROCEDURE DATE:  08/08/2024          INTERPRETATION:  CLINICAL INFORMATION:    COMPARISON: None.    CONTRAST/COMPLICATIONS:  IV Contrast: Omnipaque 350  100 ccadministered   0 cc discarded  Oral Contrast: NONE  Complications: None reported at time of study completion    PROCEDURE:  Initially, nonenhanced CT was obtained through the calves. Then,   following the rapid administration of intravenous contrast,CT   angiography was performed through the abdomen, pelvis, and lower   extremities down to the toes.  Delayed images were also obtained.   Sagittal and coronal reformats as well as 3D reconstructions were   performed.    FINDINGS:  LOWER CHEST: Bilateral lower lobe predominant groundglass opacities with   interlobular septal thickening consistent with pulmonary edema. Partially   visualized TAVR.    LIVER: Within normal limits.  BILE DUCTS: Normal caliber.  GALLBLADDER: Layering sludge and likely small stones in the gallbladder   lumen.  SPLEEN: Within normal limits.  PANCREAS: Within normal limits.  ADRENALS: Within normal limits.  KIDNEYS/URETERS: Within normal limits.    BLADDER: Gas in the bladder lumen is likely related to recent   instrumentation.  REPRODUCTIVE ORGANS: Prostate within normal limits.    BOWEL: No bowel obstruction. Appendix is normal.  PERITONEUM/RETROPERITONEUM: Within normal limits.  LYMPH NODES: No lymphadenopathy.  ABDOMINAL WALL: Loculated gas in the bilateral groin subjacent to recent   incisions likely postsurgical in etiology. Small locules of gas are also   noted along the course of the right common femoral artery also likely   postprocedural. 4.1 x 4.5 x 7.6 cm (AP x TR x CC) hematoma surrounding   theleft superficial femoral artery near the distal anastomosis. No   evidence of active hemorrhage. Subcutaneous edema throughout the   bilateral lower extremities.  BONES: Degenerative changes.    CTA:    Atherosclerotic changes. Patent abdominal aortawithout dissection or   aneurysm. Patent celiac artery. Mild to moderate stenosis of the proximal   superior mesenteric artery. Patent inferior mesenteric artery with   moderate to severe stenosis at its origin. The renal arteries are patent.   Moderate to severe stenosis in the proximal right and proximal left renal   arteries.    Patent right common iliac artery. Patent right external iliac artery.   Mild to moderate stenosis of the right internal iliac artery. Patent   right common femoral artery with mild stenosis. Postsurgical changes   surrounding the right common femoral artery with severely stenotic or   occluded origin of the right deep inferior epigastric artery, which is   otherwise grossly patent. Patent right deep femoral artery with moderate   stenosis. Patent right superficial femoral artery with multifocal sites   of mild to moderate stenosis.. Moderate stenosis of the popliteal artery.   Severe stenosis of the proximal to mid anterior tibial artery with distal   reconstitution. Proximal right posterior tibial artery occlusion with   distal reconstitution at the level of ankle. Patent right peroneal artery.    Patent left common iliac artery with mild stenosis. Patent left external   iliac artery. Mild to moderate stenosis of the left internal iliac   artery. Diffuse mild stenosis predominantly at the proximal portion of   the left femoral artery. Patent left deep femoral artery with moderate   stenosis . The left superficial femoral artery is patent with multifocal   sites of mild to moderate stenoses. Left superficial femoral artery is   patent with multifocal areas of mild to moderate stenosis. Patent left   popliteal artery with moderate to severe stenosis. Severe stenosis of the   left posterior tibial artery. Patent left anterior tibial artery. Patent   peroneal artery. Patent left dorsalis pedis artery. Patent left posterior   tibial artery. Patent left peroneal artery.    IMPRESSION:  Left groin hematoma surrounding the left superficial femoral artery   measuring up to 7.6 cm craniocaudally as described above. No evidence of   active bleed. No right or left groin abscess. Soft tissue gas in the   right and left groin and surrounding the right common femoral artery is   likely postprocedural in etiology. Continued clinical follow-up is   advised.    Severe stenosis of the proximal to mid right anterior tibial artery and   posterior tibial artery.  Severe stenosis of the left posterior tibial artery.    Other findings as above.          VINCENT DELEON DO; Resident Radiologist  This document has been electronically signed.  SAM HANCOCK DO; Attending Radiologist  This document has been electronically signed. Aug  8 2024  6:55PM    < end of copied text >    DIAGNOSTIC TESTING:  [ ] Echocardiogram:  [ ]  Catheterization:  [ ] Stress Test:    OTHER:

## 2024-08-09 NOTE — CHART NOTE - NSCHARTNOTEFT_GEN_A_CORE
PRE-OP NOTE:    Pre-op diagnosis: Left groin hematoma   Planned procedure: R possible L groin exploration and washout, possible muscle flap  Surgeon:     HPI:  84M PMH HTN, HLD, L carotid stenosis s/p CEA, T2DM, CAD S/p CABG AVR with subsequent valve in valve TAVR via b/l femoral artery access c/b failed closure device 7/24 C/b RLE acute limb ischemia requiring R ileofemoral bypass w/ PTFE and RLE angiogram followed by LLE ALI RTOR for  L SFA interposition bypass with reversed ipsilateral GSV 7/24, who went to rehab facility and subsequently developed bilateral groin incisional infections with R incisional dehiscence. Pt is not having fevers, chills, chest pain, or SOB. Pt is not having R groin pain. Just feels some "rope like" feeling in his R calf.  (08 Aug 2024 17:48)      Labs:                         8.4    5.01  )-----------( 272      ( 09 Aug 2024 04:26 )             26.3     08-09    136  |  100  |  12  ----------------------------<  169<H>  4.2   |  23  |  0.75    Ca    9.0      09 Aug 2024 04:26  Phos  2.8     08-09  Mg     1.5     08-09    TPro  6.7  /  Alb  2.8<L>  /  TBili  0.6  /  DBili  x   /  AST  29  /  ALT  26  /  AlkPhos  104  08-08    LIVER FUNCTIONS - ( 08 Aug 2024 13:15 )  Alb: 2.8 g/dL / Pro: 6.7 g/dL / ALK PHOS: 104 U/L / ALT: 26 U/L / AST: 29 U/L / GGT: x           PT/INR - ( 09 Aug 2024 04:26 )   PT: 16.6 sec;   INR: 1.60 ratio         PTT - ( 09 Aug 2024 04:26 )  PTT:38.8 sec    Urinalysis:   Urinalysis Basic - ( 09 Aug 2024 04:26 )    Color: x / Appearance: x / SG: x / pH: x  Gluc: 169 mg/dL / Ketone: x  / Bili: x / Urobili: x   Blood: x / Protein: x / Nitrite: x   Leuk Esterase: x / RBC: x / WBC x   Sq Epi: x / Non Sq Epi: x / Bacteria: x        T&S x 2: Type + Screen (08.09.24 @ 04:36)   ABO Interpretation: O  Rh Interpretation: Positive  Antibody Screen: Negative  Type + Screen (08.08.24 @ 13:30)   ABO Interpretation: O  Rh Interpretation: Positive  Antibody Screen: Negative    EKG:   < from: 12 Lead ECG (07.28.24 @ 08:59) >    Ventricular Rate 83 BPM    Atrial Rate 61 BPM    QRS Duration 134 ms    Q-T Interval 410 ms    QTC Calculation(Bazett) 481 ms    R Axis -31 degrees    T Axis 143 degrees    Diagnosis Line ATRIAL FIBRILLATION WITH PREMATURE VENTRICULAR OR ABERRANTLYCONDUCTED COMPLEXES  LEFT AXIS DEVIATION  LEFT BUNDLE BRANCH BLOCK  ABNORMAL ECG  WHEN COMPARED WITH ECG OF 27-JUL-2024 08:23,  NO SIGNIFICANT CHANGE WAS FOUND  Confirmed by MARTHA CONRAD MD ([id number]) on 8/2/2024 8:22:01 PM    < end of copied text >      CXR:   < from: Xray Chest 1 View- PORTABLE-Urgent (Xray Chest 1 View- PORTABLE-Urgent .) (07.31.24 @ 09:42) >    IMPRESSION:  Unchanged linear atelectasis at the lung bases. No new focal   consolidation.    --- End of Report ---    < end of copied text >      Urine BhCG: N/A    A/P: 84yMale to OR   -NPO after midnight for OR, IVF  -Operative consent   -Pain/nausea control  -Repeat EKG

## 2024-08-09 NOTE — CONSULT NOTE ADULT - SUBJECTIVE AND OBJECTIVE BOX
Wound SURGERY CONSULT NOTE    HPI:  84M PMH HTN, HLD, L carotid stenosis s/p CEA, T2DM, CAD S/p CABG AVR with subsequent valve in valve TAVR via b/l femoral artery access c/b failed closure device 7/24 C/b RLE acute limb ischemia requiring R ileofemoral bypass w/ PTFE and RLE angiogram followed by LLE ALI RTOR for  L SFA interposition bypass with reversed ipsilateral GSV 7/24, who went to rehab facility and subsequently developed bilateral groin incisional infections with R incisional dehiscence. Pt is not having fevers, chills, chest pain, or SOB. Pt is not having R groin pain. Just feels some "rope like" feeling in his R calf.  (08 Aug 2024 17:48)      Wound consult requested by team to assist w/ management of skin injury.   Pt w/o c/o pain, drainage, odor, color change,  or worsening swelling. Offloading and pericare initiated upon admission as pt Increasingly sedentary 2/2 to illness. Pt is Incontinent of urine & stool. Daughter at bedside  All questions asked and answered to pt's and family's expressed understanding and satisfaction.    Current Diet: Diet, NPO after Midnight:      NPO Start Date: 09-Aug-2024,   NPO Start Time: 23:59 (08-09-24 @ 08:00)  Diet, Consistent Carbohydrate w/Evening Snack (08-09-24 @ 08:00)      PAST MEDICAL & SURGICAL HISTORY:  3-vessel CAD  s/p 3v CABG 2016, s/p stent 2019      HTN (hypertension)      DM2 (diabetes mellitus, type 2)      Occlusion and stenosis of unspecified carotid artery      HLD (hyperlipidemia)      Chronic atrial fibrillation      S/P CABG x 3  2016      S/P AVR (aortic valve replacement)  bovine 2016      S/P coronary artery stent placement  x1 2019      History of CEA (carotid endarterectomy)          REVIEW OF SYSTEMS:   General/ Breast/ Skin/Vasc/ Neuro/ MSK: see HPI  All other systems negative    MEDICATIONS  (STANDING):  aspirin  chewable 81 milliGRAM(s) Oral daily  atorvastatin 80 milliGRAM(s) Oral at bedtime  cefepime   IVPB 2000 milliGRAM(s) IV Intermittent every 24 hours  chlorhexidine 2% Cloths 1 Application(s) Topical daily  dextrose 5%. 1000 milliLiter(s) (100 mL/Hr) IV Continuous <Continuous>  dextrose 5%. 1000 milliLiter(s) (50 mL/Hr) IV Continuous <Continuous>  dextrose 50% Injectable 25 Gram(s) IV Push once  dextrose 50% Injectable 12.5 Gram(s) IV Push once  dextrose 50% Injectable 25 Gram(s) IV Push once  glucagon  Injectable 1 milliGRAM(s) IntraMuscular once  heparin  Infusion 800 Unit(s)/Hr (12 mL/Hr) IV Continuous <Continuous>  insulin lispro (ADMELOG) corrective regimen sliding scale   SubCutaneous at bedtime  insulin lispro (ADMELOG) corrective regimen sliding scale   SubCutaneous three times a day before meals  insulin lispro (ADMELOG) corrective regimen sliding scale   SubCutaneous every 6 hours  lactated ringers. 1000 milliLiter(s) (50 mL/Hr) IV Continuous <Continuous>  magnesium sulfate  IVPB 1 Gram(s) IV Intermittent every 4 hours  melatonin 3 milliGRAM(s) Oral at bedtime  metoprolol tartrate 50 milliGRAM(s) Oral every 12 hours  pantoprazole    Tablet 40 milliGRAM(s) Oral before breakfast  polyethylene glycol 3350 17 Gram(s) Oral daily  senna 2 Tablet(s) Oral at bedtime  tamsulosin 0.4 milliGRAM(s) Oral at bedtime  vancomycin  IVPB 1000 milliGRAM(s) IV Intermittent every 24 hours    MEDICATIONS  (PRN):  dextrose Oral Gel 15 Gram(s) Oral once PRN Blood Glucose LESS THAN 70 milliGRAM(s)/deciliter      Allergies    No Known Allergies    Intolerances        SOCIAL HISTORY:       No current  smoking, ETOH, drugs    FAMILY HISTORY:    FH: diabetes mellitus  mother, siblings    FH: heart disease  father      PHYSICAL EXAM:  Vital Signs Last 24 Hrs  T(C): 36.5 (09 Aug 2024 13:08), Max: 37.1 (08 Aug 2024 20:17)  T(F): 97.7 (09 Aug 2024 13:08), Max: 98.7 (08 Aug 2024 20:17)  HR: 100 (09 Aug 2024 13:08) (81 - 100)  BP: 127/65 (09 Aug 2024 13:08) (123/67 - 133/64)  BP(mean): 75 (08 Aug 2024 17:42) (75 - 75)  RR: 18 (09 Aug 2024 13:08) (18 - 18)  SpO2: 99% (09 Aug 2024 13:08) (93% - 99%)    Parameters below as of 09 Aug 2024 13:08  Patient On (Oxygen Delivery Method): room air        NAD,  A&Ox3/ thin/ frail,  Versa Care P500  HEENT: sclera clear, mucosa moist, throat clear, trachea midline, neck supple  Respiratory: nonlabored w/ equal chest rise  Gastrointestinal: soft NT/ND   Neurology:  verbal,  follows commands  Psych: calm/ appropriate  Musculoskeletal: no deformities/ contractures  Vascular: BLE equally warm, no cyanosis, clubbing,  nor acute ischemia                BLE edema equal                BLE hemosiderin staining  BLE multiple scattered stable eschar   Rt posterior lower leg soft eschar        No odor, erythema, increased warmth          tenderness, induration, fluctuance, nor crepitus  Rt heel vascular wound 2.0cm x 2.0cm x 0.0cm        No odor, erythema, increased warmth          tenderness, induration, fluctuance, nor crepitus  BL groin wounds being followed by vascular team please         refer to their note  Skin: thin, dry, pale, frail      LABS/ CULTURES/ RADIOLOGY:                        8.4    5.01  )-----------( 272      ( 09 Aug 2024 04:26 )             26.3       136  |  100  |  12  ----------------------------<  169      [08-09-24 @ 04:26]  4.2   |  23  |  0.75        Ca     9.0     [08-09-24 @ 04:26]      Mg     1.5     [08-09-24 @ 04:26]      Phos  2.8     [08-09-24 @ 04:26]    TPro  6.7  /  Alb  2.8  /  TBili  0.6  /  DBili  x   /  AST  29  /  ALT  26  /  AlkPhos  104  [08-08-24 @ 13:15]    PT/INR: PT 16.6 , INR 1.60       [08-09-24 @ 04:26]  PTT: 44.7       [08-09-24 @ 12:24]        A1C with Estimated Average Glucose Result: 8.5 % (07-12-24 @ 12:41)

## 2024-08-09 NOTE — PROGRESS NOTE ADULT - PROBLEM SELECTOR PLAN 1
Right groin infected femoral site with underlying graft. Follow up cultures and BC   Lt groin wound  site also has eschar but no pus  plan for washout and possible muscle flap tomorrow   Acceptable cardiac risk to proceed  IV abx

## 2024-08-09 NOTE — PROGRESS NOTE ADULT - SUBJECTIVE AND OBJECTIVE BOX
SUBJECTIVE:    Patient was seen and examined at bedside. He reports feeling great, with no complaints at the moment. Denies having any symptoms of fevers, chills, nausea, vomiting. Denies any pain anywhere.       Vitals:  ============  T(F): 98 (09 Aug 2024 01:03), Max: 98.7 (08 Aug 2024 20:17)  HR: 91 (09 Aug 2024 01:03)  BP: 123/67 (09 Aug 2024 01:03)  RR: 18 (09 Aug 2024 01:03)  SpO2: 94% (09 Aug 2024 01:03) (93% - 100%)  temp max in last 48H T(F): , Max: 98.7 (08-08-24 @ 20:17)      CV: RRR,VSS  Vascular: R groin with fibrinopurulent exudate overlying previous groin incision. Incision has superficial dehiscence. Fluctuant and full below staple line, erythema.   GI: Soft, NT, ND, no rebound, no guarding; no palpable masses; no hepatosplenomegaly; no hernia palpated      LABS:                         8.4    5.01  )-----------( 272      ( 09 Aug 2024 04:26 )             26.3     08-09    136  |  100  |  12  ----------------------------<  169<H>  4.2   |  23  |  0.75    Ca    9.0      09 Aug 2024 04:26  Phos  2.8     08-09  Mg     1.5     08-09    TPro  6.7  /  Alb  2.8<L>  /  TBili  0.6  /  DBili  x   /  AST  29  /  ALT  26  /  AlkPhos  104  08-08    PT/INR - ( 09 Aug 2024 04:26 )   PT: 16.6 sec;   INR: 1.60 ratio         PTT - ( 09 Aug 2024 04:26 )  PTT:38.8 sec  Urinalysis Basic - ( 09 Aug 2024 04:26 )    Color: x / Appearance: x / SG: x / pH: x  Gluc: 169 mg/dL / Ketone: x  / Bili: x / Urobili: x   Blood: x / Protein: x / Nitrite: x   Leuk Esterase: x / RBC: x / WBC x   Sq Epi: x / Non Sq Epi: x / Bacteria: x      I&O's Detail    08 Aug 2024 07:01  -  09 Aug 2024 05:38  --------------------------------------------------------  IN:    Heparin: 48 mL    IV PiggyBack: 50 mL    Lactated Ringers: 450 mL  Total IN: 548 mL    OUT:    Oral Fluid: 0 mL    Voided (mL): 1525 mL  Total OUT: 1525 mL    Total NET: -977 mL            RADIOLOGY, EKG & ADDITIONAL TESTS: Reviewed.

## 2024-08-09 NOTE — PROGRESS NOTE ADULT - ASSESSMENT
84 year old with hs of CAD, DM, AVR 2016, CEA.   Recently admitted for T AVR.    access B/L femoral artery c/b failed closure device. Vascular consulted intra-op for RLE ALI s/p R iliofemoral bypass w/ PTFE and RLE angiogram. Post-op, patient w/ LLE ALI RTOR s/p L SFA interposition bypass w/ reversed ipsilateral GSV.   pt was discharged to rehab but noted to have open right groin  wound with drainage No fever ro chills       Right groin infected femoral site with underlying graft.  Culture and BC sent  Lt groin wound  site also has eschar but no pus    distal ischemic rash on both legs ( better jorge yesterday).    Underlying avr  At risk for PVE  CT reviewed   large hematoma in right groin/  Severe stenosis of fem artery     Vanco 1 gram q day  Cefepime 2 q 12 hr.  discussed in detail with family     for plastics debridement and plastic coverage of right groin.

## 2024-08-10 LAB
-  AMPICILLIN: SIGNIFICANT CHANGE UP
-  VANCOMYCIN: SIGNIFICANT CHANGE UP
ALBUMIN SERPL ELPH-MCNC: 2.5 G/DL — LOW (ref 3.3–5)
ALP SERPL-CCNC: 85 U/L — SIGNIFICANT CHANGE UP (ref 40–120)
ALT FLD-CCNC: 29 U/L — SIGNIFICANT CHANGE UP (ref 10–45)
ANION GAP SERPL CALC-SCNC: 16 MMOL/L — SIGNIFICANT CHANGE UP (ref 5–17)
APTT BLD: 77.9 SEC — HIGH (ref 24.5–35.6)
AST SERPL-CCNC: 30 U/L — SIGNIFICANT CHANGE UP (ref 10–40)
BILIRUB SERPL-MCNC: 0.6 MG/DL — SIGNIFICANT CHANGE UP (ref 0.2–1.2)
BUN SERPL-MCNC: 13 MG/DL — SIGNIFICANT CHANGE UP (ref 7–23)
CALCIUM SERPL-MCNC: 8.8 MG/DL — SIGNIFICANT CHANGE UP (ref 8.4–10.5)
CHLORIDE SERPL-SCNC: 98 MMOL/L — SIGNIFICANT CHANGE UP (ref 96–108)
CO2 SERPL-SCNC: 21 MMOL/L — LOW (ref 22–31)
CREAT SERPL-MCNC: 0.85 MG/DL — SIGNIFICANT CHANGE UP (ref 0.5–1.3)
CULTURE RESULTS: ABNORMAL
EGFR: 87 ML/MIN/1.73M2 — SIGNIFICANT CHANGE UP
GLUCOSE BLDC GLUCOMTR-MCNC: 141 MG/DL — HIGH (ref 70–99)
GLUCOSE BLDC GLUCOMTR-MCNC: 181 MG/DL — HIGH (ref 70–99)
GLUCOSE BLDC GLUCOMTR-MCNC: 202 MG/DL — HIGH (ref 70–99)
GLUCOSE BLDC GLUCOMTR-MCNC: 230 MG/DL — HIGH (ref 70–99)
GLUCOSE BLDC GLUCOMTR-MCNC: 266 MG/DL — HIGH (ref 70–99)
GLUCOSE SERPL-MCNC: 212 MG/DL — HIGH (ref 70–99)
HCT VFR BLD CALC: 27.8 % — LOW (ref 39–50)
HGB BLD-MCNC: 8.5 G/DL — LOW (ref 13–17)
INR BLD: 1.46 RATIO — HIGH (ref 0.85–1.18)
MAGNESIUM SERPL-MCNC: 1.7 MG/DL — SIGNIFICANT CHANGE UP (ref 1.6–2.6)
MCHC RBC-ENTMCNC: 28.6 PG — SIGNIFICANT CHANGE UP (ref 27–34)
MCHC RBC-ENTMCNC: 30.6 GM/DL — LOW (ref 32–36)
MCV RBC AUTO: 93.6 FL — SIGNIFICANT CHANGE UP (ref 80–100)
METHOD TYPE: SIGNIFICANT CHANGE UP
NRBC # BLD: 0 /100 WBCS — SIGNIFICANT CHANGE UP (ref 0–0)
ORGANISM # SPEC MICROSCOPIC CNT: ABNORMAL
ORGANISM # SPEC MICROSCOPIC CNT: ABNORMAL
PHOSPHATE SERPL-MCNC: 2.5 MG/DL — SIGNIFICANT CHANGE UP (ref 2.5–4.5)
PLATELET # BLD AUTO: 280 K/UL — SIGNIFICANT CHANGE UP (ref 150–400)
POTASSIUM SERPL-MCNC: 4.2 MMOL/L — SIGNIFICANT CHANGE UP (ref 3.5–5.3)
POTASSIUM SERPL-SCNC: 4.2 MMOL/L — SIGNIFICANT CHANGE UP (ref 3.5–5.3)
PROT SERPL-MCNC: 6 G/DL — SIGNIFICANT CHANGE UP (ref 6–8.3)
PROTHROM AB SERPL-ACNC: 15.9 SEC — HIGH (ref 9.5–13)
RBC # BLD: 2.97 M/UL — LOW (ref 4.2–5.8)
RBC # FLD: 15.8 % — HIGH (ref 10.3–14.5)
SODIUM SERPL-SCNC: 135 MMOL/L — SIGNIFICANT CHANGE UP (ref 135–145)
SPECIMEN SOURCE: SIGNIFICANT CHANGE UP
VANCOMYCIN TROUGH SERPL-MCNC: 4 UG/ML — LOW (ref 10–20)
WBC # BLD: 5.67 K/UL — SIGNIFICANT CHANGE UP (ref 3.8–10.5)
WBC # FLD AUTO: 5.67 K/UL — SIGNIFICANT CHANGE UP (ref 3.8–10.5)

## 2024-08-10 PROCEDURE — 97605 NEG PRS WND THER DME<=50SQCM: CPT

## 2024-08-10 PROCEDURE — 15738 MUSCLE-SKIN GRAFT LEG: CPT

## 2024-08-10 PROCEDURE — 99232 SBSQ HOSP IP/OBS MODERATE 35: CPT

## 2024-08-10 PROCEDURE — 15002 WOUND PREP TRK/ARM/LEG: CPT

## 2024-08-10 DEVICE — KIT A-LINE 1LUM 20G X 12CM SAFE KIT: Type: IMPLANTABLE DEVICE | Site: RIGHT | Status: FUNCTIONAL

## 2024-08-10 RX ORDER — DEXTROSE 15 G/33 G
12.5 GEL IN PACKET (GRAM) ORAL ONCE
Refills: 0 | Status: DISCONTINUED | OUTPATIENT
Start: 2024-08-10 | End: 2024-08-10

## 2024-08-10 RX ORDER — DEXTROSE 15 G/33 G
15 GEL IN PACKET (GRAM) ORAL ONCE
Refills: 0 | Status: DISCONTINUED | OUTPATIENT
Start: 2024-08-10 | End: 2024-08-14

## 2024-08-10 RX ORDER — CEFEPIME 2 G/1
2000 INJECTION, POWDER, FOR SOLUTION INTRAVENOUS EVERY 12 HOURS
Refills: 0 | Status: DISCONTINUED | OUTPATIENT
Start: 2024-08-10 | End: 2024-08-12

## 2024-08-10 RX ORDER — CEFEPIME 2 G/1
2000 INJECTION, POWDER, FOR SOLUTION INTRAVENOUS EVERY 12 HOURS
Refills: 0 | Status: DISCONTINUED | OUTPATIENT
Start: 2024-08-10 | End: 2024-08-10

## 2024-08-10 RX ORDER — GLUCAGON INJECTION, SOLUTION 1 MG/.2ML
1 INJECTION, SOLUTION SUBCUTANEOUS ONCE
Refills: 0 | Status: DISCONTINUED | OUTPATIENT
Start: 2024-08-10 | End: 2024-08-14

## 2024-08-10 RX ORDER — ONDANSETRON 2 MG/ML
4 INJECTION, SOLUTION INTRAMUSCULAR; INTRAVENOUS ONCE
Refills: 0 | Status: DISCONTINUED | OUTPATIENT
Start: 2024-08-10 | End: 2024-08-11

## 2024-08-10 RX ORDER — DEXTROSE 15 G/33 G
25 GEL IN PACKET (GRAM) ORAL ONCE
Refills: 0 | Status: DISCONTINUED | OUTPATIENT
Start: 2024-08-10 | End: 2024-08-10

## 2024-08-10 RX ORDER — CEFEPIME 2 G/1
INJECTION, POWDER, FOR SOLUTION INTRAVENOUS
Refills: 0 | Status: DISCONTINUED | OUTPATIENT
Start: 2024-08-10 | End: 2024-08-10

## 2024-08-10 RX ORDER — GLUCAGON INJECTION, SOLUTION 1 MG/.2ML
1 INJECTION, SOLUTION SUBCUTANEOUS ONCE
Refills: 0 | Status: DISCONTINUED | OUTPATIENT
Start: 2024-08-10 | End: 2024-08-10

## 2024-08-10 RX ORDER — ASPIRIN 81 MG
81 TABLET, DELAYED RELEASE (ENTERIC COATED) ORAL DAILY
Refills: 0 | Status: DISCONTINUED | OUTPATIENT
Start: 2024-08-11 | End: 2024-08-14

## 2024-08-10 RX ORDER — VANCOMYCIN/0.9 % SOD CHLORIDE 1.75G/25
1000 PLASTIC BAG, INJECTION (ML) INTRAVENOUS EVERY 24 HOURS
Refills: 0 | Status: DISCONTINUED | OUTPATIENT
Start: 2024-08-10 | End: 2024-08-12

## 2024-08-10 RX ORDER — DEXTROSE 15 G/33 G
25 GEL IN PACKET (GRAM) ORAL ONCE
Refills: 0 | Status: DISCONTINUED | OUTPATIENT
Start: 2024-08-10 | End: 2024-08-14

## 2024-08-10 RX ORDER — ASPIRIN 81 MG
81 TABLET, DELAYED RELEASE (ENTERIC COATED) ORAL DAILY
Refills: 0 | Status: DISCONTINUED | OUTPATIENT
Start: 2024-08-10 | End: 2024-08-10

## 2024-08-10 RX ORDER — FENTANYL CITRATE 50 UG/ML
12.5 INJECTION INTRAMUSCULAR; INTRAVENOUS
Refills: 0 | Status: DISCONTINUED | OUTPATIENT
Start: 2024-08-10 | End: 2024-08-11

## 2024-08-10 RX ORDER — HYDROMORPHONE HYDROCHLORIDE 2 MG/1
0.25 TABLET ORAL
Refills: 0 | Status: DISCONTINUED | OUTPATIENT
Start: 2024-08-10 | End: 2024-08-11

## 2024-08-10 RX ORDER — DEXTROSE 15 G/33 G
15 GEL IN PACKET (GRAM) ORAL ONCE
Refills: 0 | Status: DISCONTINUED | OUTPATIENT
Start: 2024-08-10 | End: 2024-08-10

## 2024-08-10 RX ADMIN — CEFEPIME 100 MILLIGRAM(S): 2 INJECTION, POWDER, FOR SOLUTION INTRAVENOUS at 17:01

## 2024-08-10 RX ADMIN — Medication 75 MILLILITER(S): at 23:17

## 2024-08-10 RX ADMIN — METOPROLOL TARTRATE 50 MILLIGRAM(S): 100 TABLET ORAL at 07:29

## 2024-08-10 RX ADMIN — Medication 81 MILLIGRAM(S): at 12:31

## 2024-08-10 RX ADMIN — Medication 75 MILLILITER(S): at 00:57

## 2024-08-10 RX ADMIN — Medication 250 MILLIGRAM(S): at 23:44

## 2024-08-10 RX ADMIN — Medication 2: at 18:58

## 2024-08-10 RX ADMIN — METOPROLOL TARTRATE 50 MILLIGRAM(S): 100 TABLET ORAL at 17:03

## 2024-08-10 RX ADMIN — Medication 4: at 12:31

## 2024-08-10 RX ADMIN — Medication 4: at 08:21

## 2024-08-10 RX ADMIN — CHLORHEXIDINE GLUCONATE 1 APPLICATION(S): 40 SOLUTION TOPICAL at 10:59

## 2024-08-10 RX ADMIN — Medication 3: at 00:56

## 2024-08-10 NOTE — PROGRESS NOTE ADULT - SUBJECTIVE AND OBJECTIVE BOX
Patient is a 84y old  Male who presents with a chief complaint of RLE wound infection (09 Aug 2024 05:35)    f/u b/l groin infection    Interval History/ROS:  no fever. NPO for groin exploration and debridement today.  No pain.  Remainder of ROS otherwise negative.    Local infection of skin and subcutaneous tissue      PAST MEDICAL & SURGICAL HISTORY:  CAD s/p 3v CABG 2016, s/p stent 2019  HTN (hypertension)  DM2 (diabetes mellitus, type 2)  Occlusion and stenosis of unspecified carotid artery  HLD (hyperlipidemia)  Chronic atrial fibrillation  S/P CABG x 3 2016  S/P AVR (aortic valve replacement) bovine 2016  S/P coronary artery stent placement x1 2019  History of CEA (carotid endarterectomy)     Allergies  No Known Allergies    ANTIBIOTICS/RELEVANT:  piperacillin/tazobactam IVPB (8/8 x1)     active:   cefepime   IVPB 2000 every 24 hours (8/8-)  vancomycin  IVPB 1000 every 24 hours (8/8-)    MEDICATIONS  (STANDING):  aspirin  chewable 81 daily  atorvastatin 80 at bedtime  insulin lispro (ADMELOG) corrective regimen sliding scale  at bedtime  insulin lispro (ADMELOG) corrective regimen sliding scale  three times a day before meals  melatonin 3 at bedtime  metoprolol tartrate 50 every 12 hours  pantoprazole    Tablet 40 before breakfast  polyethylene glycol 3350 17 daily  senna 2 at bedtime  tamsulosin 0.4 at bedtime    Vital Signs Last 24 Hrs  T(F): 99.1 (08-10-24 @ 10:05), Max: 99.1 (08-10-24 @ 10:05)  HR: 84 (08-10-24 @ 10:05)  BP: 132/74 (08-10-24 @ 10:05)  RR: 18 (08-10-24 @ 10:05)  SpO2: 93% (08-10-24 @ 10:05) (92% - 99%)    PHYSICAL EXAM:  Constitutional: non-toxic, wife at bedside  HEAD/EYES: anicteric  ENT:  supple  Cardiovascular:   normal S1, S2  Respiratory:  clear BS bilaterally  GI:  soft, non-tender, normal bowel sounds  :  no niño  Musculoskeletal: b/l groin; R wound with purulence drainage; L with swelling, no drainage  Neurologic: awake and alert, normal strength, no focal findings  Skin:  no phlebitis  Psychiatric:  awake, alert, appropriate mood                        8.5    5.67  )-----------( 280      ( 10 Aug 2024 07:34 )             27.8 08-10    135  |  98  |  13  ----------------------------<  212  4.2   |  21  |  0.85  Ca    8.8      10 Aug 2024 07:34Phos  2.5     08-10Mg     1.7     08-10  TPro  6.0  /  Alb  2.5  /  TBili  0.6  /  DBili  x   /  AST  30  /  ALT  29  /  AlkPhos  85  08-10    WBC Count: 5.67 (08-10-24 @ 07:34)  WBC Count: 5.01 (08-09-24 @ 04:26)  WBC Count: 7.45 (08-08-24 @ 13:15)    MICROBIOLOGY:  Culture - Urine (collected 08-08-24 @ 15:37)  Source: Clean Catch Clean Catch (Midstream)  Final Report (08-09-24 @ 16:33):    <10,000 CFU/mL Normal Urogenital Lashell    Culture - Other (collected 08-08-24 @ 14:34)  Source: .Other Wound right groin  Preliminary Report (08-09-24 @ 18:22):    Moderate Enterococcus faecalis    Moderate Staphylococcus epidermidis "Susceptibilities not performed"    Culture - Blood (collected 08-08-24 @ 13:00)  Source: .Blood Blood  Preliminary Report (08-09-24 @ 18:01):    No growth at 24 hours    Culture - Blood (collected 08-08-24 @ 12:00)  Source: .Blood Blood  Preliminary Report (08-09-24 @ 18:01):    No growth at 24 hours    RADIOLOGY & ADDITIONAL STUDIES:  CT Angio Abd Aorta w/run-off w/ IV Cont (08.08.24 @ 17:25) >  IMPRESSION:  Left groin hematoma surrounding the left superficial femoral artery measuring up to 7.6 cm craniocaudally as described above. No evidence of active bleed. No right or left groin abscess. Soft tissue gas in the   right and left groin and surrounding the right common femoral artery is likely postprocedural in etiology. Continued clinical follow-up is advised.  Severe stenosis of the proximal to mid right anterior tibial artery and   posterior tibial artery.  Severe stenosis of the left posterior tibial artery.    Xray Chest 1 View- PORTABLE-Urgent (Xray Chest 1 View- PORTABLE-Urgent .) (07.31.24 @ 09:42) >  IMPRESSION:  Unchanged linear atelectasis at the lung bases. No new focal consolidation.    CT Abdomen and Pelvis w/ IV Cont (07.29.24 @ 14:09) >  IMPRESSION:  3.9 cm hematoma adjacent to the left SFA with short segment dissection. No evidence of pseudoaneurysm or active bleed.  No evidence of retroperitoneal hematoma, as clinically questioned.

## 2024-08-10 NOTE — PRE-ANESTHESIA EVALUATION ADULT - NSANTHPMHFT_GEN_ALL_CORE
85 yo M w/ PMHx of HTN, HLD, L carotid stenosis s/p CEA, T2DM, AF, CAD S/p CABG AVR with subsequent valve in valve TAVR via b/l femoral artery access c/b failed closure device 7/24 C/b RLE acute limb ischemia requiring R ileofemoral bypass w/ PTFE and RLE angiogram followed by LLE ALI RTOR for  L SFA interposition bypass with reversed ipsilateral GSV 7/24, who went to rehab facility and subsequently developed bilateral groin incisional infections with R incisional dehiscence. Currently afebrile and HDS, normal WBC count. CTA showed a left groin hematoma unchanged from prior imaging, non-expanding in size. R groin with dehiscence and purulence, L groin staple line intact, however, indurated. Presents now for R groin exploration, poss L groin exploration w/ washout and possible flap.    Denies active CP/SOB/Orthopnea

## 2024-08-10 NOTE — PROGRESS NOTE ADULT - ASSESSMENT
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 Currently afebrile and HDS, normal WBC count. CTA showed a left groin hematoma unchanged from prior imaging, non-expanding in size. R groin with dehiscence and purulence, L groin staple line intact, however, indurated.     Plan:  - NPO at midnight for the procedure today   - OR today  - Pain and anti-emetic PRN  - C/t vanc and cefepime per ID consult - increased the cefepime dose to 2g.    Vascular surgery [id number]

## 2024-08-10 NOTE — PROGRESS NOTE ADULT - SUBJECTIVE AND OBJECTIVE BOX
Plastic Surgery Progress Note (pg [facility]: [id number], [facility]: [phone])    SUBJECTIVE:   No acute events overnight, VSS. Patient resting comfortably. Pain well controlled, denies concerns    OBJECTIVE:  Vital Signs Last 24 Hrs  T(C): 36.7 (10 Aug 2024 05:59), Max: 37.1 (10 Aug 2024 01:04)  T(F): 98 (10 Aug 2024 05:59), Max: 98.7 (10 Aug 2024 01:04)  HR: 99 (10 Aug 2024 05:59) (88 - 100)  BP: 134/72 (10 Aug 2024 05:59) (122/67 - 146/77)  BP(mean): --  RR: 18 (10 Aug 2024 05:59) (18 - 18)  SpO2: 92% (10 Aug 2024 05:59) (92% - 99%)    Parameters below as of 10 Aug 2024 05:59  Patient On (Oxygen Delivery Method): room air        Physical Examination:  GEN: NAD, resting quietly  GROIN: R groin with fibrinopurulent exudate overlying previous groin incision. Incision has superficial dehiscence. induration surrounding incision.   L groin: staples in place. induration surrounding incision. incision c/d/i. no dehiscence present

## 2024-08-10 NOTE — PROGRESS NOTE ADULT - SUBJECTIVE AND OBJECTIVE BOX
Surgery Progress Note    SUBJECTIVE: Patient seen and examined at bedside. Patient report doing well this morning, looking forward to the procedure today.  --------------------------------------------------------------------------------------------------  OBJECTIVE:   Physical Exam:  General: AAOx3, NAD, lying comfortably in bed  HEENT: NC/AT  Respiratory: nonlabored breathing  Cardiovascular: RRR, normal S1 and S2, no murmurs or gallops  Abdomen: non-distended, soft, non-tender  Extremities: Indurated b/l groin. With the Left groin staple lines intact, however, erythematous. R groin with wounds fibrinopurulent exudate overlying previous groin incision. Incision has superficial dehiscence.  --------------------------------------------------------------------------------------------------  V/S:  Vital Signs Last 24 Hrs  T(C): 37.3 (10 Aug 2024 13:55), Max: 37.3 (10 Aug 2024 10:05)  T(F): 99.1 (10 Aug 2024 13:55), Max: 99.1 (10 Aug 2024 10:05)  HR: 97 (10 Aug 2024 13:55) (84 - 99)  BP: 134/65 (10 Aug 2024 13:55) (122/67 - 146/77)  BP(mean): --  RR: 18 (10 Aug 2024 13:55) (18 - 18)  SpO2: 95% (10 Aug 2024 13:55) (92% - 98%)    Parameters below as of 10 Aug 2024 13:55  Patient On (Oxygen Delivery Method): room air        --------------------------------------------------------------------------------------------------  I/Os:    09 Aug 2024 07:01  -  10 Aug 2024 07:00  --------------------------------------------------------  IN:    Oral Fluid: 480 mL  Total IN: 480 mL    OUT:    Voided (mL): 300 mL  Total OUT: 300 mL    Total NET: 180 mL        --------------------------------------------------------------------------------------------------  LABS:                        8.5    5.67  )-----------( 280      ( 10 Aug 2024 07:34 )             27.8     10 Aug 2024 07:34    135    |  98     |  13     ----------------------------<  212    4.2     |  21     |  0.85     Ca    8.8        10 Aug 2024 07:34  Phos  2.5       10 Aug 2024 07:34  Mg     1.7       10 Aug 2024 07:34    TPro  6.0    /  Alb  2.5    /  TBili  0.6    /  DBili  x      /  AST  30     /  ALT  29     /  AlkPhos  85     10 Aug 2024 07:34    PT/INR - ( 10 Aug 2024 07:34 )   PT: 15.9 sec;   INR: 1.46 ratio         PTT - ( 10 Aug 2024 07:34 )  PTT:77.9 sec  CAPILLARY BLOOD GLUCOSE      POCT Blood Glucose.: 202 mg/dL (10 Aug 2024 11:56)  POCT Blood Glucose.: 230 mg/dL (10 Aug 2024 07:38)  POCT Blood Glucose.: 266 mg/dL (10 Aug 2024 00:10)  POCT Blood Glucose.: 197 mg/dL (09 Aug 2024 21:04)  POCT Blood Glucose.: 285 mg/dL (09 Aug 2024 17:23)        LIVER FUNCTIONS - ( 10 Aug 2024 07:34 )  Alb: 2.5 g/dL / Pro: 6.0 g/dL / ALK PHOS: 85 U/L / ALT: 29 U/L / AST: 30 U/L / GGT: x             Culture - Urine (collected 08 Aug 2024 15:37)  Source: Clean Catch Clean Catch (Midstream)  Final Report (09 Aug 2024 16:33):    <10,000 CFU/mL Normal Urogenital Lashell    Culture - Other (collected 08 Aug 2024 14:34)  Source: .Other Wound right groin  Preliminary Report (09 Aug 2024 18:22):    Moderate Enterococcus faecalis    Moderate Staphylococcus epidermidis "Susceptibilities not performed"    Culture - Blood (collected 08 Aug 2024 13:00)  Source: .Blood Blood  Preliminary Report (09 Aug 2024 18:01):    No growth at 24 hours    Culture - Blood (collected 08 Aug 2024 12:00)  Source: .Blood Blood  Preliminary Report (09 Aug 2024 18:01):    No growth at 24 hours      Urinalysis Basic - ( 10 Aug 2024 07:34 )    Color: x / Appearance: x / SG: x / pH: x  Gluc: 212 mg/dL / Ketone: x  / Bili: x / Urobili: x   Blood: x / Protein: x / Nitrite: x   Leuk Esterase: x / RBC: x / WBC x   Sq Epi: x / Non Sq Epi: x / Bacteria: x      --------------------------------------------------------------------------------------------------  MEDICATIONS  (STANDING):  aspirin  chewable 81 milliGRAM(s) Oral daily  atorvastatin 80 milliGRAM(s) Oral at bedtime  cefepime   IVPB 2000 milliGRAM(s) IV Intermittent every 12 hours  chlorhexidine 2% Cloths 1 Application(s) Topical daily  dextrose 5%. 1000 milliLiter(s) (100 mL/Hr) IV Continuous <Continuous>  dextrose 5%. 1000 milliLiter(s) (50 mL/Hr) IV Continuous <Continuous>  dextrose 50% Injectable 25 Gram(s) IV Push once  dextrose 50% Injectable 12.5 Gram(s) IV Push once  dextrose 50% Injectable 25 Gram(s) IV Push once  dextrose 50% Injectable 12.5 Gram(s) IV Push once  dextrose 50% Injectable 25 Gram(s) IV Push once  dextrose 50% Injectable 25 Gram(s) IV Push once  glucagon  Injectable 1 milliGRAM(s) IntraMuscular once  glucagon  Injectable 1 milliGRAM(s) IntraMuscular once  insulin lispro (ADMELOG) corrective regimen sliding scale   SubCutaneous at bedtime  insulin lispro (ADMELOG) corrective regimen sliding scale   SubCutaneous three times a day before meals  lactated ringers. 1000 milliLiter(s) (75 mL/Hr) IV Continuous <Continuous>  melatonin 3 milliGRAM(s) Oral at bedtime  metoprolol tartrate 50 milliGRAM(s) Oral every 12 hours  pantoprazole    Tablet 40 milliGRAM(s) Oral before breakfast  polyethylene glycol 3350 17 Gram(s) Oral daily  senna 2 Tablet(s) Oral at bedtime  tamsulosin 0.4 milliGRAM(s) Oral at bedtime  vancomycin  IVPB 1000 milliGRAM(s) IV Intermittent every 24 hours    MEDICATIONS  (PRN):  dextrose Oral Gel 15 Gram(s) Oral once PRN Blood Glucose LESS THAN 70 milliGRAM(s)/deciliter  dextrose Oral Gel 15 Gram(s) Oral once PRN Blood Glucose LESS THAN 70 milliGRAM(s)/deciliter    --------------------------------------------------------------------------------------------------

## 2024-08-10 NOTE — PROGRESS NOTE ADULT - ASSESSMENT
84M with hx CAD s/p CABG, DM, AVR 2016, CEA.   Recently admitted for TAVR 7/24/2024.  Course complicated by acute limb ischemia on the same day - s/p R iliofemoral bypass w/ PTFE and RLE angiogram. Post-op, patient w/ LLE ALI RTOR 7/25 s/p L SFA interposition bypass w/ reversed ipsilateral GSV.   Pt was discharged to rehab but noted to have open right groin wound with drainage. No fever ro chills     Right groin infected femoral site with underlying graft.  BC so far negative.  Wound culture so far E feacalis and Staph epi.  Plan is for OR today    Underlying AVR  At risk for PVE  CT reviewed large hematoma in left groin / severe stenosis of fem artery     Suggest:  - continue Vanco 1 gram q day  - please increase Cefepime to 2 q 12 hr.     Please call the ID service [phone] with questions or concerns over the weekend

## 2024-08-10 NOTE — PROGRESS NOTE ADULT - SUBJECTIVE AND OBJECTIVE BOX
Subjective: Patient seen and examined. No new events except as noted.   seen in PACU 19   feels ok   s/p washout and fat necrosis removal, sartorius flap  VAC in place right groin       REVIEW OF SYSTEMS:    CONSTITUTIONAL:+ weakness, fevers or chills  EYES/ENT: No visual changes;  No vertigo or throat pain   NECK: No pain or stiffness  RESPIRATORY: No cough, wheezing, hemoptysis; No shortness of breath  CARDIOVASCULAR: No chest pain or palpitations  GASTROINTESTINAL: No abdominal or epigastric pain. No nausea, vomiting, or hematemesis; No diarrhea or constipation. No melena or hematochezia.  GENITOURINARY: No dysuria, frequency or hematuria  NEUROLOGICAL: No numbness or weakness  SKIN: No itching, burning, rashes, or lesions   All other review of systems is negative unless indicated above.    MEDICATIONS:  MEDICATIONS  (STANDING):      PHYSICAL EXAM:  T(C): 36.9 (08-10-24 @ 20:25), Max: 37.3 (08-10-24 @ 10:05)  HR: 98 (08-10-24 @ 20:25) (84 - 99)  BP: 120/70 (08-10-24 @ 20:25) (120/70 - 134/72)  RR: 18 (08-10-24 @ 20:25) (18 - 18)  SpO2: 97% (08-10-24 @ 20:25) (92% - 97%)  Wt(kg): --  I&O's Summary    09 Aug 2024 07:01  -  10 Aug 2024 07:00  --------------------------------------------------------  IN: 480 mL / OUT: 300 mL / NET: 180 mL    10 Aug 2024 07:01  -  10 Aug 2024 22:19  --------------------------------------------------------  IN: 950 mL / OUT: 0 mL / NET: 950 mL      Height (cm): 152.4 (08-10 @ 20:25)  Weight (kg): 54.4 (08-10 @ 20:25)  BMI (kg/m2): 23.4 (08-10 @ 20:25)  BSA (m2): 1.5 (08-10 @ 20:25)    Appearance: NAD 3 liters NC	  HEENT:   Normal oral mucosa, PERRL, EOMI	  Lymphatic: No lymphadenopathy , no edema  Cardiovascular: Irregular S1 S2, No JVD, No murmurs , Peripheral pulses palpable 2+ bilaterally  Respiratory: Lungs clear to auscultation, normal effort 	  Gastrointestinal:  Soft, Non-tender, + BS	  Skin: No rashes, No ecchymoses, No cyanosis, warm to touch  Musculoskeletal: Normal range of motion, normal strength  Psychiatry: sleepy    Ext: No edema  Right groin VAC     LABS:    CARDIAC MARKERS:                                8.5    5.67  )-----------( 280      ( 10 Aug 2024 07:34 )             27.8     08-10    135  |  98  |  13  ----------------------------<  212<H>  4.2   |  21<L>  |  0.85    Ca    8.8      10 Aug 2024 07:34  Phos  2.5     08-10  Mg     1.7     08-10    TPro  6.0  /  Alb  2.5<L>  /  TBili  0.6  /  DBili  x   /  AST  30  /  ALT  29  /  AlkPhos  85  08-10    proBNP:   Lipid Profile:   HgA1c:   TSH:     Negative          TELEMETRY: 	  AF  ECG:  	  RADIOLOGY:   DIAGNOSTIC TESTING:  [ ] Echocardiogram:  [ ]  Catheterization:  [ ] Stress Test:    OTHER:

## 2024-08-10 NOTE — PRE-ANESTHESIA EVALUATION ADULT - NSRADCARDRESULTSFT_GEN_ALL_CORE
< from: TTE W or WO Ultrasound Enhancing Agent (07.25.24 @ 07:30) >    CONCLUSIONS:      1. Left ventricular cavity is small. Left ventricular wall thickness is normal. Left ventricular systolic function is hyperdynamic with an ejection fraction of 69 %by De Oliveira's method of disks. There are no regional wall motion abnormalities seen.   2. Normal left ventricular diastolic function, with indeterminate left ventricular filling pressure.   3. Normal right ventricular cavity size, with normal wall thickness, and normal right ventricular systolic function.   4. 23 mm TAVR (valve-in-valve) is present in the aortic position.The prosthetic valve is well seated with normal function. No intravalvular regurgitation No paravalvular regurgitation.   5. Normal left and right atrial size.   6. No pericardial effusion seen.   7. Compared to the transesophageal echocardiogram performed on 7/24/2024, there have been no significant interval changes when compared to the post procedure images.    < end of copied text >

## 2024-08-10 NOTE — PROGRESS NOTE ADULT - ASSESSMENT
84M PMH HTN, HLD, L carotid stenosis s/p CEA, T2DM, CAD S/p CABG AVR with subsequent valve in valve TAVR via b/l femoral artery access c/b failed closure device 7/24 C/b RLE acute limb ischemia requiring R ileofemoral bypass w/ PTFE and RLE angiogram followed by LLE ALI RTOR for  L SFA interposition bypass with reversed ipsilateral GSV 7/24, readmitted with bilateral groin incisional infections, PRS consulted for possible muscle flap during groin washout.     - PRS available for muscle flap during b/l groin exploration and possible washout today  - remainder care per primary

## 2024-08-10 NOTE — PROGRESS NOTE ADULT - ASSESSMENT
83 y/o male w/ PMHx of HTN, HLD, Type 2 Diabetes, CAD s/p CABG x3/bioprosthetic AVR 2011 at [facility] and subsequent PCI 5/2019, s/p TAVR 7/24, on Eliquis, presents to the ER with son for right groin infection. states TAVR was done through right groin. states has been at rehab facility and noticed groin became swollen. states then developed drainage and erythema to the area. states had complication during procedure and entered left groin as well. states noticed left side also swollen but not as bad. patient denies f/n/v/d, CP, SOB, HA, dizziness, abdominal pain.

## 2024-08-10 NOTE — PROGRESS NOTE ADULT - PROBLEM SELECTOR PLAN 1
Right groin infected femoral site with underlying graft. Follow up cultures and BC   s/p washout and fat necrosis removal, sartorius flap  VAC in place right groin   IV abx   possible RTOR next week   Plastics follow up

## 2024-08-11 LAB
ANION GAP SERPL CALC-SCNC: 14 MMOL/L — SIGNIFICANT CHANGE UP (ref 5–17)
APTT BLD: 28 SEC — SIGNIFICANT CHANGE UP (ref 24.5–35.6)
BUN SERPL-MCNC: 10 MG/DL — SIGNIFICANT CHANGE UP (ref 7–23)
CALCIUM SERPL-MCNC: 8.6 MG/DL — SIGNIFICANT CHANGE UP (ref 8.4–10.5)
CHLORIDE SERPL-SCNC: 98 MMOL/L — SIGNIFICANT CHANGE UP (ref 96–108)
CO2 SERPL-SCNC: 23 MMOL/L — SIGNIFICANT CHANGE UP (ref 22–31)
CREAT SERPL-MCNC: 0.7 MG/DL — SIGNIFICANT CHANGE UP (ref 0.5–1.3)
EGFR: 92 ML/MIN/1.73M2 — SIGNIFICANT CHANGE UP
GLUCOSE BLDC GLUCOMTR-MCNC: 193 MG/DL — HIGH (ref 70–99)
GLUCOSE BLDC GLUCOMTR-MCNC: 230 MG/DL — HIGH (ref 70–99)
GLUCOSE BLDC GLUCOMTR-MCNC: 359 MG/DL — HIGH (ref 70–99)
GLUCOSE SERPL-MCNC: 206 MG/DL — HIGH (ref 70–99)
HCT VFR BLD CALC: 26.9 % — LOW (ref 39–50)
HGB BLD-MCNC: 8.3 G/DL — LOW (ref 13–17)
INR BLD: 1.45 RATIO — HIGH (ref 0.85–1.18)
MAGNESIUM SERPL-MCNC: 1.6 MG/DL — SIGNIFICANT CHANGE UP (ref 1.6–2.6)
MCHC RBC-ENTMCNC: 29.3 PG — SIGNIFICANT CHANGE UP (ref 27–34)
MCHC RBC-ENTMCNC: 30.9 GM/DL — LOW (ref 32–36)
MCV RBC AUTO: 95.1 FL — SIGNIFICANT CHANGE UP (ref 80–100)
NRBC # BLD: 0 /100 WBCS — SIGNIFICANT CHANGE UP (ref 0–0)
PHOSPHATE SERPL-MCNC: 2.3 MG/DL — LOW (ref 2.5–4.5)
PLATELET # BLD AUTO: 263 K/UL — SIGNIFICANT CHANGE UP (ref 150–400)
POTASSIUM SERPL-MCNC: 4.3 MMOL/L — SIGNIFICANT CHANGE UP (ref 3.5–5.3)
POTASSIUM SERPL-SCNC: 4.3 MMOL/L — SIGNIFICANT CHANGE UP (ref 3.5–5.3)
PROTHROM AB SERPL-ACNC: 15.1 SEC — HIGH (ref 9.5–13)
RBC # BLD: 2.83 M/UL — LOW (ref 4.2–5.8)
RBC # FLD: 15.9 % — HIGH (ref 10.3–14.5)
SODIUM SERPL-SCNC: 135 MMOL/L — SIGNIFICANT CHANGE UP (ref 135–145)
WBC # BLD: 6.39 K/UL — SIGNIFICANT CHANGE UP (ref 3.8–10.5)
WBC # FLD AUTO: 6.39 K/UL — SIGNIFICANT CHANGE UP (ref 3.8–10.5)

## 2024-08-11 RX ORDER — SILVER NITRATE 38.21; 12.74 MG/1; MG/1
1 STICK TOPICAL ONCE
Refills: 0 | Status: DISCONTINUED | OUTPATIENT
Start: 2024-08-11 | End: 2024-08-14

## 2024-08-11 RX ORDER — SODIUM PHOSPHATE, DIBASIC, ANHYDROUS, POTASSIUM PHOSPHATE, MONOBASIC, AND SODIUM PHOSPHATE, MONOBASIC, MONOHYDRATE 852; 155; 130 MG/1; MG/1; MG/1
2 TABLET, COATED ORAL ONCE
Refills: 0 | Status: COMPLETED | OUTPATIENT
Start: 2024-08-11 | End: 2024-08-11

## 2024-08-11 RX ORDER — TAMSULOSIN HYDROCHLORIDE 0.4 MG/1
0.4 CAPSULE ORAL AT BEDTIME
Refills: 0 | Status: DISCONTINUED | OUTPATIENT
Start: 2024-08-11 | End: 2024-08-14

## 2024-08-11 RX ORDER — ACETAMINOPHEN 325 MG/1
650 TABLET ORAL EVERY 6 HOURS
Refills: 0 | Status: DISCONTINUED | OUTPATIENT
Start: 2024-08-11 | End: 2024-08-14

## 2024-08-11 RX ORDER — PANTOPRAZOLE SODIUM 40 MG
40 TABLET, DELAYED RELEASE (ENTERIC COATED) ORAL
Refills: 0 | Status: DISCONTINUED | OUTPATIENT
Start: 2024-08-11 | End: 2024-08-14

## 2024-08-11 RX ORDER — METOPROLOL TARTRATE 100 MG/1
50 TABLET ORAL EVERY 12 HOURS
Refills: 0 | Status: DISCONTINUED | OUTPATIENT
Start: 2024-08-11 | End: 2024-08-14

## 2024-08-11 RX ADMIN — TAMSULOSIN HYDROCHLORIDE 0.4 MILLIGRAM(S): 0.4 CAPSULE ORAL at 21:21

## 2024-08-11 RX ADMIN — Medication 80 MILLIGRAM(S): at 21:21

## 2024-08-11 RX ADMIN — Medication 3 MILLIGRAM(S): at 21:21

## 2024-08-11 RX ADMIN — Medication 81 MILLIGRAM(S): at 12:21

## 2024-08-11 RX ADMIN — Medication 10: at 12:21

## 2024-08-11 RX ADMIN — Medication 40 MILLIGRAM(S): at 09:25

## 2024-08-11 RX ADMIN — ACETAMINOPHEN 650 MILLIGRAM(S): 325 TABLET ORAL at 12:00

## 2024-08-11 RX ADMIN — Medication 4: at 09:24

## 2024-08-11 RX ADMIN — Medication 250 MILLIGRAM(S): at 23:33

## 2024-08-11 RX ADMIN — METOPROLOL TARTRATE 50 MILLIGRAM(S): 100 TABLET ORAL at 17:44

## 2024-08-11 RX ADMIN — ACETAMINOPHEN 650 MILLIGRAM(S): 325 TABLET ORAL at 17:44

## 2024-08-11 RX ADMIN — ACETAMINOPHEN 650 MILLIGRAM(S): 325 TABLET ORAL at 23:33

## 2024-08-11 RX ADMIN — CEFEPIME 100 MILLIGRAM(S): 2 INJECTION, POWDER, FOR SOLUTION INTRAVENOUS at 05:53

## 2024-08-11 RX ADMIN — Medication 2: at 17:44

## 2024-08-11 RX ADMIN — Medication 25 GRAM(S): at 10:11

## 2024-08-11 RX ADMIN — SODIUM PHOSPHATE, DIBASIC, ANHYDROUS, POTASSIUM PHOSPHATE, MONOBASIC, AND SODIUM PHOSPHATE, MONOBASIC, MONOHYDRATE 2 PACKET(S): 852; 155; 130 TABLET, COATED ORAL at 10:11

## 2024-08-11 RX ADMIN — METOPROLOL TARTRATE 50 MILLIGRAM(S): 100 TABLET ORAL at 09:25

## 2024-08-11 NOTE — CHART NOTE - NSCHARTNOTEFT_GEN_A_CORE
SURGERY POST OP CHECK    STATUS POST PROCEDURE: R groin exploration, debridement. Sartorius muscle flap.     SUBJECTIVE: Pt seen and examined at bedside. Patient reports appropriate surgical incisional pain; pain is controlled. Denies fevers/chills, chest pain, dyspnea, nausea.  --------------------------------------------------------------------------------------------------------------------------------------------------------------------------------------------------------------  OBJECTIVE:  Vital Signs Last 24 Hrs  T(C): 36.5 (10 Aug 2024 22:15), Max: 37.3 (10 Aug 2024 10:05)  T(F): 97.7 (10 Aug 2024 22:15), Max: 99.1 (10 Aug 2024 10:05)  HR: 96 (11 Aug 2024 01:00) (84 - 104)  BP: 111/71 (11 Aug 2024 01:00) (111/71 - 204/86)  BP(mean): 84 (11 Aug 2024 01:00) (75 - 123)  RR: 17 (11 Aug 2024 01:00) (16 - 18)  SpO2: 99% (11 Aug 2024 01:00) (92% - 100%)    Parameters below as of 11 Aug 2024 01:00  Patient On (Oxygen Delivery Method): room air      I&O's Summary    09 Aug 2024 07:01  -  10 Aug 2024 07:00  --------------------------------------------------------  IN: 480 mL / OUT: 300 mL / NET: 180 mL    10 Aug 2024 07:01  -  11 Aug 2024 01:40  --------------------------------------------------------  IN: 1425 mL / OUT: 255 mL / NET: 1170 mL        PHYSICAL EXAM:  Constitutional: Well developed, well nourished, NAD  Respiration: Non-labored breathing, on nasal cannula   Abdomen: Soft, nontender, nondistended  Groin: R groin with wound vac in place, holding suction, soft and warm to touch. L groin indurated, erythematous, staple line intact and in place.   Extremities: + DP and PT signal of the LLE, dopplerable areas marked with whiteout; + DP signal of the RLE marked with whiteout, very faint PT signal. B/l LE +1 pitting edema up to the ankle. Asked to be elevated with blankets underneath above the heel.   ----------------------------------------------------------------------------------------------------------------------------------------------------------------------------------------------------------------  ASSESSMENT: HPI:  84M PMH HTN, HLD, L carotid stenosis s/p CEA, T2DM, CAD S/p CABG AVR with subsequent valve in valve TAVR via b/l femoral artery access c/b failed closure device 7/24 C/b RLE acute limb ischemia requiring R ileofemoral bypass w/ PTFE and RLE angiogram followed by LLE ALI RTOR for  L SFA interposition bypass with reversed ipsilateral GSV 7/24, who went to rehab facility and subsequently developed bilateral groin incisional infections with R incisional dehiscence. Currently afebrile and HDS, normal WBC count. CTA showed a left groin hematoma unchanged from prior imaging, non-expanding in size. R groin with dehiscence and purulence, L groin staple line intact, however, indurated. Patient today taken to the operating room for a R groin exploration, debridement. Sartorius muscle flap. Patient doing well during poc.     PLAN:  - Regular diet  - Restart ASA  - Continue antibiotics: Cefepime and vancomycin  - Resume regular diet  - Will d/c niño in the am  - Will resume hep gtt in the am  - f/u OR culture results  - Pain control PRN  - Encourage OOB ambulation    Vascular [id number]

## 2024-08-11 NOTE — PROGRESS NOTE ADULT - SUBJECTIVE AND OBJECTIVE BOX
Plastic Surgery Daily Progress Note    84yMale    SUBJECTIVE:  - Patient seen and examined at bedside during morning rounds  - Patient states pain well controlled since OR trip last night  - Overnight pt with some oozing/bleeding around wound vac.  Vac re-enforced by vascular team overnight.  Labs sent given concern for bleeding w/ minimal change in H/H (8.3/26.9 from 8.5/27.8 pre op)   - Vac found to have small blood clot in suction tubing causing minimal suction.  Changed by PRS team bedside with satisfactory suction and seal    --------------------------------------------------------------------------------------------------  OBJECTIVE:   Physical Exam:  General: AAOx3, NAD, lying comfortably in bed  Groin: R groin with wound vac in place. Holding suction.    HEENT: NC/AT  Respiratory: nonlabored breathing  Cardiovascular: RRR, normal S1 and S2, no murmurs or gallops    --------------------------------------------------------------------------------------------------  Vital Signs:  Vital Signs Last 24 Hrs  T(C): 36.7 (11 Aug 2024 06:05), Max: 37.3 (10 Aug 2024 10:05)  T(F): 98.1 (11 Aug 2024 06:05), Max: 99.1 (10 Aug 2024 10:05)  HR: 107 (11 Aug 2024 06:05) (84 - 107)  BP: 128/70 (11 Aug 2024 06:05) (111/71 - 204/86)  BP(mean): 89 (11 Aug 2024 02:00) (75 - 123)  RR: 18 (11 Aug 2024 06:05) (16 - 18)  SpO2: 97% (11 Aug 2024 06:05) (93% - 100%)    Parameters below as of 11 Aug 2024 06:05  Patient On (Oxygen Delivery Method): room air        --------------------------------------------------------------------------------------------------  I/Os:    09 Aug 2024 07:01  -  10 Aug 2024 07:00  --------------------------------------------------------  IN:    Oral Fluid: 480 mL  Total IN: 480 mL    OUT:    Voided (mL): 300 mL  Total OUT: 300 mL    Total NET: 180 mL      10 Aug 2024 07:01  -  11 Aug 2024 06:41  --------------------------------------------------------  IN:    IV PiggyBack: 300 mL    Lactated Ringers: 900 mL    Lactated Ringers: 300 mL  Total IN: 1500 mL    OUT:    Indwelling Catheter - Urethral (mL): 330 mL    VAC (Vacuum Assisted Closure) System (mL): 350 mL  Total OUT: 680 mL    Total NET: 820 mL        --------------------------------------------------------------------------------------------------  LABS:                        8.3    6.39  )-----------( 263      ( 11 Aug 2024 04:05 )             26.9     11 Aug 2024 04:55    135    |  98     |  10     ----------------------------<  206    4.3     |  23     |  0.70     Ca    8.6        11 Aug 2024 04:55  Phos  2.3       11 Aug 2024 04:55  Mg     1.6       11 Aug 2024 04:55    TPro  6.0    /  Alb  2.5    /  TBili  0.6    /  DBili  x      /  AST  30     /  ALT  29     /  AlkPhos  85     10 Aug 2024 07:34    PT/INR - ( 11 Aug 2024 05:40 )   PT: 15.1 sec;   INR: 1.45 ratio         PTT - ( 11 Aug 2024 05:40 )  PTT:28.0 sec  CAPILLARY BLOOD GLUCOSE      POCT Blood Glucose.: 141 mg/dL (10 Aug 2024 22:40)  POCT Blood Glucose.: 181 mg/dL (10 Aug 2024 18:42)  POCT Blood Glucose.: 202 mg/dL (10 Aug 2024 11:56)  POCT Blood Glucose.: 230 mg/dL (10 Aug 2024 07:38)        LIVER FUNCTIONS - ( 10 Aug 2024 07:34 )  Alb: 2.5 g/dL / Pro: 6.0 g/dL / ALK PHOS: 85 U/L / ALT: 29 U/L / AST: 30 U/L / GGT: x             Culture - Urine (collected 08 Aug 2024 15:37)  Source: Clean Catch Clean Catch (Midstream)  Final Report (09 Aug 2024 16:33):    <10,000 CFU/mL Normal Urogenital Lashell    Culture - Other (collected 08 Aug 2024 14:34)  Source: .Other Wound right groin  Final Report (10 Aug 2024 19:08):    Moderate Enterococcus faecalis    Moderate Staphylococcus epidermidis "Susceptibilities not performed"  Organism: Enterococcus faecalis (10 Aug 2024 19:08)  Organism: Enterococcus faecalis (10 Aug 2024 19:08)    Culture - Blood (collected 08 Aug 2024 13:00)  Source: .Blood Blood  Preliminary Report (10 Aug 2024 18:01):    No growth at 48 Hours    Culture - Blood (collected 08 Aug 2024 12:00)  Source: .Blood Blood  Preliminary Report (10 Aug 2024 18:01):    No growth at 48 Hours      Urinalysis Basic - ( 11 Aug 2024 04:55 )    Color: x / Appearance: x / SG: x / pH: x  Gluc: 206 mg/dL / Ketone: x  / Bili: x / Urobili: x   Blood: x / Protein: x / Nitrite: x   Leuk Esterase: x / RBC: x / WBC x   Sq Epi: x / Non Sq Epi: x / Bacteria: x      --------------------------------------------------------------------------------------------------  MEDICATIONS  (STANDING):  aspirin  chewable 81 milliGRAM(s) Oral daily  cefepime   IVPB 2000 milliGRAM(s) IV Intermittent every 12 hours  dextrose 5%. 1000 milliLiter(s) (50 mL/Hr) IV Continuous <Continuous>  dextrose 50% Injectable 25 Gram(s) IV Push once  glucagon  Injectable 1 milliGRAM(s) IntraMuscular once  insulin lispro (ADMELOG) corrective regimen sliding scale   SubCutaneous three times a day before meals  vancomycin  IVPB 1000 milliGRAM(s) IV Intermittent every 24 hours    MEDICATIONS  (PRN):  dextrose Oral Gel 15 Gram(s) Oral once PRN Blood Glucose LESS THAN 70 milliGRAM(s)/deciliter

## 2024-08-11 NOTE — PROGRESS NOTE ADULT - PROBLEM SELECTOR PLAN 1
Right groin infected femoral site with underlying graft. Follow up cultures and BC   s/p washout and fat necrosis removal, sartorius flap  VAC in place right groin, now with oozing of blood. To be addressed today by Vascular. Monitor Hgb   IV abx   possible RTOR next week   Plastics follow up

## 2024-08-11 NOTE — PROGRESS NOTE ADULT - SUBJECTIVE AND OBJECTIVE BOX
Subjective: Patient seen and examined. No new events except as noted.   oozing from right groin VAC , Vac re-enforced by vascular team  Vac found to have small blood clot in suction tubing causing minimal suction.  Changed by PRS team bedside with satisfactory suction and seal  seen with vascular team at bedside this am  no pain   wife at bedside     REVIEW OF SYSTEMS:    CONSTITUTIONAL:+ weakness, fevers or chills  EYES/ENT: No visual changes;  No vertigo or throat pain   NECK: No pain or stiffness  RESPIRATORY: No cough, wheezing, hemoptysis; No shortness of breath  CARDIOVASCULAR: No chest pain or palpitations  GASTROINTESTINAL: No abdominal or epigastric pain. No nausea, vomiting, or hematemesis; No diarrhea or constipation. No melena or hematochezia.  GENITOURINARY: No dysuria, frequency or hematuria  NEUROLOGICAL: No numbness or weakness  SKIN: No itching, burning, rashes, or lesions   All other review of systems is negative unless indicated above.    MEDICATIONS:  MEDICATIONS  (STANDING):  aspirin  chewable 81 milliGRAM(s) Oral daily  cefepime   IVPB 2000 milliGRAM(s) IV Intermittent every 12 hours  dextrose 5%. 1000 milliLiter(s) (50 mL/Hr) IV Continuous <Continuous>  dextrose 50% Injectable 25 Gram(s) IV Push once  glucagon  Injectable 1 milliGRAM(s) IntraMuscular once  insulin lispro (ADMELOG) corrective regimen sliding scale   SubCutaneous three times a day before meals  metoprolol tartrate 50 milliGRAM(s) Oral every 12 hours  pantoprazole    Tablet 40 milliGRAM(s) Oral before breakfast  vancomycin  IVPB 1000 milliGRAM(s) IV Intermittent every 24 hours      PHYSICAL EXAM:  T(C): 36.7 (08-11-24 @ 06:05), Max: 37.3 (08-10-24 @ 10:05)  HR: 107 (08-11-24 @ 06:05) (84 - 107)  BP: 128/70 (08-11-24 @ 06:05) (111/71 - 204/86)  RR: 18 (08-11-24 @ 06:05) (16 - 18)  SpO2: 97% (08-11-24 @ 06:05) (93% - 100%)  Wt(kg): --  I&O's Summary    10 Aug 2024 07:01  -  11 Aug 2024 07:00  --------------------------------------------------------  IN: 1550 mL / OUT: 830 mL / NET: 720 mL      Height (cm): 152.4 (08-10 @ 20:25)  Weight (kg): 54.4 (08-10 @ 20:25)  BMI (kg/m2): 23.4 (08-10 @ 20:25)  BSA (m2): 1.5 (08-10 @ 20:25)    Appearance: NAD  HEENT: Dry  oral mucosa, PERRL, EOMI	  Lymphatic: No lymphadenopathy , no edema  Cardiovascular: Irregular S1 S2, No JVD, +murmurs , Peripheral pulses palpable 2+ bilaterally  Respiratory: Lungs clear to auscultation, normal effort 	  Gastrointestinal:  Soft, Non-tender, + BS	  Skin: No rashes, No ecchymoses, No cyanosis, warm to touch  Musculoskeletal: Normal range of motion, normal strength  Psychiatry:  Mood & affect appropriate  Ext: No edema  R groin with wound vac in place. Holding suction.    + oozing of blood     LABS:    CARDIAC MARKERS:        Culture - Urine (collected 08 Aug 2024 15:37)  Source: Clean Catch Clean Catch (Midstream)  Final Report (09 Aug 2024 16:33):    <10,000 CFU/mL Normal Urogenital Lashell    Culture - Other (collected 08 Aug 2024 14:34)  Source: .Other Wound right groin  Final Report (10 Aug 2024 19:08):    Moderate Enterococcus faecalis    Moderate Staphylococcus epidermidis "Susceptibilities not performed"  Organism: Enterococcus faecalis (10 Aug 2024 19:08)  Organism: Enterococcus faecalis (10 Aug 2024 19:08)    Culture - Blood (collected 08 Aug 2024 13:00)  Source: .Blood Blood  Preliminary Report (10 Aug 2024 18:01):    No growth at 48 Hours    Culture - Blood (collected 08 Aug 2024 12:00)  Source: .Blood Blood  Preliminary Report (10 Aug 2024 18:01):    No growth at 48 Hours                            8.3    6.39  )-----------( 263      ( 11 Aug 2024 04:05 )             26.9     08-11    135  |  98  |  10  ----------------------------<  206<H>  4.3   |  23  |  0.70    Ca    8.6      11 Aug 2024 04:55  Phos  2.3     08-11  Mg     1.6     08-11    TPro  6.0  /  Alb  2.5<L>  /  TBili  0.6  /  DBili  x   /  AST  30  /  ALT  29  /  AlkPhos  85  08-10    TELEMETRY: 	    ECG:  	  RADIOLOGY:   DIAGNOSTIC TESTING:  [ ] Echocardiogram:  [ ]  Catheterization:  [ ] Stress Test:    OTHER:

## 2024-08-11 NOTE — PROGRESS NOTE ADULT - ASSESSMENT
85 y/o male w/ PMHx of HTN, HLD, Type 2 Diabetes, CAD s/p CABG x3/bioprosthetic AVR 2011 at [facility] and subsequent PCI 5/2019, s/p TAVR 7/24, on Eliquis, presents to the ER with son for right groin infection. states TAVR was done through right groin. states has been at rehab facility and noticed groin became swollen. states then developed drainage and erythema to the area. states had complication during procedure and entered left groin as well. states noticed left side also swollen but not as bad. patient denies f/n/v/d, CP, SOB, HA, dizziness, abdominal pain.

## 2024-08-11 NOTE — PROGRESS NOTE ADULT - ASSESSMENT
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 Pt was discharged to rehab and then sent back to [facility] for concerns of groin infection and was admitted on  8/8.  Pt now s/p R groin washout, debridement of fat necrosis and coevrage with sartorius flap on 8/10 and placement of wound vac.  In OR no obvious purulence/ since of infection surrounding PTFE grft but with diffuse fat necrosis and non-viable tissue.      - Vac suction changed at bedside now with good seal and suction.  Plan to keep vac at 75mmHg.  - Will cont to monitor vac output.  Graft with good coverage prior to placing vac but if any concerns for expanding groin hematoma or profuse bleeding from groin should remove vac immediately to evaluate wound  - Plan to change vac at bedside Monday and then RTOR Wednesday for possible coverage vs additional debridement    PRS  [id number]

## 2024-08-11 NOTE — PROGRESS NOTE ADULT - SUBJECTIVE AND OBJECTIVE BOX
SURGERY DAILY PROGRESS NOTE:     Overnight Events:  No acute events overnight.    SUBJECTIVE: Patient seen and evaluated on AM rounds. Pt is resting comfortably in bed with no complaints. Denies fever, chills, N/V, chest pain, or shortness of breath. Patient is passing gas and having bowel movements. Tolerating diet. Ambulating well. Pain is adequately controlled on current regimen.    OBJECTIVE:  Vital Signs Last 24 Hrs  T(C): 36.7 (11 Aug 2024 06:05), Max: 37.3 (10 Aug 2024 10:05)  T(F): 98.1 (11 Aug 2024 06:05), Max: 99.1 (10 Aug 2024 10:05)  HR: 107 (11 Aug 2024 06:05) (84 - 107)  BP: 128/70 (11 Aug 2024 06:05) (111/71 - 204/86)  BP(mean): 89 (11 Aug 2024 02:00) (75 - 123)  RR: 18 (11 Aug 2024 06:05) (16 - 18)  SpO2: 97% (11 Aug 2024 06:05) (93% - 100%)    Parameters below as of 11 Aug 2024 06:05  Patient On (Oxygen Delivery Method): room air      I&O's Detail    09 Aug 2024 07:01  -  10 Aug 2024 07:00  --------------------------------------------------------  IN:    Oral Fluid: 480 mL  Total IN: 480 mL    OUT:    Voided (mL): 300 mL  Total OUT: 300 mL    Total NET: 180 mL      10 Aug 2024 07:01  -  11 Aug 2024 06:53  --------------------------------------------------------  IN:    IV PiggyBack: 300 mL    Lactated Ringers: 900 mL    Lactated Ringers: 300 mL  Total IN: 1500 mL    OUT:    Indwelling Catheter - Urethral (mL): 330 mL    VAC (Vacuum Assisted Closure) System (mL): 350 mL  Total OUT: 680 mL    Total NET: 820 mL        Daily Height in cm: 152.4 (10 Aug 2024 20:25)    Daily     LABS:                        8.3    6.39  )-----------( 263      ( 11 Aug 2024 04:05 )             26.9     08-11    135  |  98  |  10  ----------------------------<  206<H>  4.3   |  23  |  0.70    Ca    8.6      11 Aug 2024 04:55  Phos  2.3     08-11  Mg     1.6     08-11    TPro  6.0  /  Alb  2.5<L>  /  TBili  0.6  /  DBili  x   /  AST  30  /  ALT  29  /  AlkPhos  85  08-10    PT/INR - ( 11 Aug 2024 05:40 )   PT: 15.1 sec;   INR: 1.45 ratio         PTT - ( 11 Aug 2024 05:40 )  PTT:28.0 sec  Urinalysis Basic - ( 11 Aug 2024 04:55 )    Color: x / Appearance: x / SG: x / pH: x  Gluc: 206 mg/dL / Ketone: x  / Bili: x / Urobili: x   Blood: x / Protein: x / Nitrite: x   Leuk Esterase: x / RBC: x / WBC x   Sq Epi: x / Non Sq Epi: x / Bacteria: x                PHYSICAL EXAM:  Constitutional: Well developed, well nourished, NAD  Pulmonary: Symmetric chest rise bilaterally, no increased WOB  Gastrointestinal: Abdomen soft, nontender, nondistended. No rebound or guarding. No obvious masses. Midline incisions with staples C/D/I. Wound incisions C/D/I. NGT with output. JACQUI drain in w/ serosanguineous output. Ostomy pink with solid stool in ostomy bag. Gravity bag with minimal bilious drainage. Midline wound vac holding suction.   Groin: Soft, nontender, no ecchymosis/hematoma, no erythema, no edema.  Extremities: (+) DP/PT pulses. FROM, normal strength, warm to touch, no clubbing/cyanosis/erythema/edema    PLAN:   - Greenberg removed; TOV at  - Diet:  - Monitor bowel function  - Serial abd exams   - Antibiotics:   - Pain medications PRN  - Monitor NGT  - Monitor ostomy output - ostomy teaching  - Monitor JACQUI drain output- JACQUI drain teaching  - Keep Greenberg  - OOB as tolerated  - VTE ppx:  - PT recs:   - Discharge planning today        SURGERY DAILY PROGRESS NOTE:     Overnight Events:  s/p sartorius flap and groin washout  wound vac leaking ss overnight, night team dried area, placed more tegaderm, vac holding suction but found to have clot in tube, plastic surgery consulted, vac at 75mmhg    SUBJECTIVE: Patient seen and evaluated on AM rounds. Pt is resting comfortably in bed with worries regarding vac drainage. Pain is adequately controlled on current regimen.    OBJECTIVE:  Vital Signs Last 24 Hrs  T(C): 36.7 (11 Aug 2024 06:05), Max: 37.3 (10 Aug 2024 10:05)  T(F): 98.1 (11 Aug 2024 06:05), Max: 99.1 (10 Aug 2024 10:05)  HR: 107 (11 Aug 2024 06:05) (84 - 107)  BP: 128/70 (11 Aug 2024 06:05) (111/71 - 204/86)  BP(mean): 89 (11 Aug 2024 02:00) (75 - 123)  RR: 18 (11 Aug 2024 06:05) (16 - 18)  SpO2: 97% (11 Aug 2024 06:05) (93% - 100%)    Parameters below as of 11 Aug 2024 06:05  Patient On (Oxygen Delivery Method): room air      I&O's Detail    09 Aug 2024 07:01  -  10 Aug 2024 07:00  --------------------------------------------------------  IN:    Oral Fluid: 480 mL  Total IN: 480 mL    OUT:    Voided (mL): 300 mL  Total OUT: 300 mL    Total NET: 180 mL      10 Aug 2024 07:01  -  11 Aug 2024 06:53  --------------------------------------------------------  IN:    IV PiggyBack: 300 mL    Lactated Ringers: 900 mL    Lactated Ringers: 300 mL  Total IN: 1500 mL    OUT:    Indwelling Catheter - Urethral (mL): 330 mL    VAC (Vacuum Assisted Closure) System (mL): 350 mL  Total OUT: 680 mL    Total NET: 820 mL        Daily Height in cm: 152.4 (10 Aug 2024 20:25)    Daily     LABS:                        8.3    6.39  )-----------( 263      ( 11 Aug 2024 04:05 )             26.9     08-11    135  |  98  |  10  ----------------------------<  206<H>  4.3   |  23  |  0.70    Ca    8.6      11 Aug 2024 04:55  Phos  2.3     08-11  Mg     1.6     08-11    TPro  6.0  /  Alb  2.5<L>  /  TBili  0.6  /  DBili  x   /  AST  30  /  ALT  29  /  AlkPhos  85  08-10    PT/INR - ( 11 Aug 2024 05:40 )   PT: 15.1 sec;   INR: 1.45 ratio         PTT - ( 11 Aug 2024 05:40 )  PTT:28.0 sec  Urinalysis Basic - ( 11 Aug 2024 04:55 )    Color: x / Appearance: x / SG: x / pH: x  Gluc: 206 mg/dL / Ketone: x  / Bili: x / Urobili: x   Blood: x / Protein: x / Nitrite: x   Leuk Esterase: x / RBC: x / WBC x   Sq Epi: x / Non Sq Epi: x / Bacteria: x            OBJECTIVE:   Physical Exam:  General: AAOx3, NAD, lying comfortably in bed  HEENT: NC/AT  Respiratory: nonlabored breathing  Cardiovascular: RRR  Abdomen: non-distended, soft, non-tender  Extremities: Left groin staple lines c/d/i, however, erythematous. R groin with wound vac, intermittedly holding suction at 75mmhg, leaking small amount of ss fluid.

## 2024-08-11 NOTE — PROGRESS NOTE ADULT - ASSESSMENT
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 CTA showed a left groin hematoma unchanged from prior imaging, non-expanding in size.   Now s/p exploration of right groin, fat and skin debridement, sartorius muscle flap, wound vac placement 8/10. Patient HDS overnight. Wound vac issues will be addressed.     Plan:  - dc niño  - f/u plastic surgery for wound vac mgt  - restart home meds  -c/w asa  - possible heparin gtt after wound vac change  - Pain and anti-emetic PRN  - C/t vanc and cefepime per ID consult - increased the cefepime dose to 2g.    Vascular surgery [id number]

## 2024-08-12 DIAGNOSIS — E11.9 TYPE 2 DIABETES MELLITUS WITHOUT COMPLICATIONS: ICD-10-CM

## 2024-08-12 LAB
ANION GAP SERPL CALC-SCNC: 13 MMOL/L — SIGNIFICANT CHANGE UP (ref 5–17)
APTT BLD: 21.1 SEC — LOW (ref 24.5–35.6)
BUN SERPL-MCNC: 18 MG/DL — SIGNIFICANT CHANGE UP (ref 7–23)
CALCIUM SERPL-MCNC: 8.2 MG/DL — LOW (ref 8.4–10.5)
CHLORIDE SERPL-SCNC: 101 MMOL/L — SIGNIFICANT CHANGE UP (ref 96–108)
CO2 SERPL-SCNC: 23 MMOL/L — SIGNIFICANT CHANGE UP (ref 22–31)
CREAT SERPL-MCNC: 0.84 MG/DL — SIGNIFICANT CHANGE UP (ref 0.5–1.3)
EGFR: 87 ML/MIN/1.73M2 — SIGNIFICANT CHANGE UP
GLUCOSE BLDC GLUCOMTR-MCNC: 178 MG/DL — HIGH (ref 70–99)
GLUCOSE BLDC GLUCOMTR-MCNC: 217 MG/DL — HIGH (ref 70–99)
GLUCOSE BLDC GLUCOMTR-MCNC: 255 MG/DL — HIGH (ref 70–99)
GLUCOSE BLDC GLUCOMTR-MCNC: 306 MG/DL — HIGH (ref 70–99)
GLUCOSE SERPL-MCNC: 219 MG/DL — HIGH (ref 70–99)
HCT VFR BLD CALC: 23.5 % — LOW (ref 39–50)
HCT VFR BLD CALC: 26.3 % — LOW (ref 39–50)
HCT VFR BLD CALC: 26.6 % — LOW (ref 39–50)
HGB BLD-MCNC: 7.2 G/DL — LOW (ref 13–17)
HGB BLD-MCNC: 8.1 G/DL — LOW (ref 13–17)
HGB BLD-MCNC: 8.2 G/DL — LOW (ref 13–17)
MAGNESIUM SERPL-MCNC: 2 MG/DL — SIGNIFICANT CHANGE UP (ref 1.6–2.6)
MCHC RBC-ENTMCNC: 29.1 PG — SIGNIFICANT CHANGE UP (ref 27–34)
MCHC RBC-ENTMCNC: 29.1 PG — SIGNIFICANT CHANGE UP (ref 27–34)
MCHC RBC-ENTMCNC: 29.2 PG — SIGNIFICANT CHANGE UP (ref 27–34)
MCHC RBC-ENTMCNC: 30.5 GM/DL — LOW (ref 32–36)
MCHC RBC-ENTMCNC: 30.6 GM/DL — LOW (ref 32–36)
MCHC RBC-ENTMCNC: 31.2 GM/DL — LOW (ref 32–36)
MCV RBC AUTO: 93.6 FL — SIGNIFICANT CHANGE UP (ref 80–100)
MCV RBC AUTO: 95.1 FL — SIGNIFICANT CHANGE UP (ref 80–100)
MCV RBC AUTO: 95.7 FL — SIGNIFICANT CHANGE UP (ref 80–100)
NRBC # BLD: 0 /100 WBCS — SIGNIFICANT CHANGE UP (ref 0–0)
PHOSPHATE SERPL-MCNC: 2.6 MG/DL — SIGNIFICANT CHANGE UP (ref 2.5–4.5)
PLATELET # BLD AUTO: 245 K/UL — SIGNIFICANT CHANGE UP (ref 150–400)
PLATELET # BLD AUTO: 312 K/UL — SIGNIFICANT CHANGE UP (ref 150–400)
PLATELET # BLD AUTO: 313 K/UL — SIGNIFICANT CHANGE UP (ref 150–400)
POTASSIUM SERPL-MCNC: 4.2 MMOL/L — SIGNIFICANT CHANGE UP (ref 3.5–5.3)
POTASSIUM SERPL-SCNC: 4.2 MMOL/L — SIGNIFICANT CHANGE UP (ref 3.5–5.3)
RBC # BLD: 2.47 M/UL — LOW (ref 4.2–5.8)
RBC # BLD: 2.78 M/UL — LOW (ref 4.2–5.8)
RBC # BLD: 2.81 M/UL — LOW (ref 4.2–5.8)
RBC # FLD: 16.1 % — HIGH (ref 10.3–14.5)
RBC # FLD: 16.3 % — HIGH (ref 10.3–14.5)
RBC # FLD: 16.3 % — HIGH (ref 10.3–14.5)
SODIUM SERPL-SCNC: 137 MMOL/L — SIGNIFICANT CHANGE UP (ref 135–145)
VANCOMYCIN TROUGH SERPL-MCNC: 8.9 UG/ML — LOW (ref 10–20)
WBC # BLD: 4.4 K/UL — SIGNIFICANT CHANGE UP (ref 3.8–10.5)
WBC # BLD: 6.11 K/UL — SIGNIFICANT CHANGE UP (ref 3.8–10.5)
WBC # BLD: 8.04 K/UL — SIGNIFICANT CHANGE UP (ref 3.8–10.5)
WBC # FLD AUTO: 4.4 K/UL — SIGNIFICANT CHANGE UP (ref 3.8–10.5)
WBC # FLD AUTO: 6.11 K/UL — SIGNIFICANT CHANGE UP (ref 3.8–10.5)
WBC # FLD AUTO: 8.04 K/UL — SIGNIFICANT CHANGE UP (ref 3.8–10.5)

## 2024-08-12 PROCEDURE — 99232 SBSQ HOSP IP/OBS MODERATE 35: CPT

## 2024-08-12 RX ORDER — INSULIN GLARGINE 100 [IU]/ML
20 INJECTION, SOLUTION SUBCUTANEOUS AT BEDTIME
Refills: 0 | Status: DISCONTINUED | OUTPATIENT
Start: 2024-08-12 | End: 2024-08-13

## 2024-08-12 RX ORDER — VANCOMYCIN/0.9 % SOD CHLORIDE 1.75G/25
750 PLASTIC BAG, INJECTION (ML) INTRAVENOUS EVERY 12 HOURS
Refills: 0 | Status: DISCONTINUED | OUTPATIENT
Start: 2024-08-12 | End: 2024-08-13

## 2024-08-12 RX ORDER — HEPARIN SODIUM,BOVINE 1000/ML
500 VIAL (ML) INJECTION
Qty: 25000 | Refills: 0 | Status: DISCONTINUED | OUTPATIENT
Start: 2024-08-12 | End: 2024-08-13

## 2024-08-12 RX ADMIN — Medication 6: at 08:10

## 2024-08-12 RX ADMIN — Medication 4: at 12:48

## 2024-08-12 RX ADMIN — ACETAMINOPHEN 650 MILLIGRAM(S): 325 TABLET ORAL at 12:41

## 2024-08-12 RX ADMIN — Medication 81 MILLIGRAM(S): at 11:47

## 2024-08-12 RX ADMIN — ACETAMINOPHEN 650 MILLIGRAM(S): 325 TABLET ORAL at 11:46

## 2024-08-12 RX ADMIN — ACETAMINOPHEN 650 MILLIGRAM(S): 325 TABLET ORAL at 06:24

## 2024-08-12 RX ADMIN — METOPROLOL TARTRATE 50 MILLIGRAM(S): 100 TABLET ORAL at 18:19

## 2024-08-12 RX ADMIN — INSULIN GLARGINE 20 UNIT(S): 100 INJECTION, SOLUTION SUBCUTANEOUS at 22:49

## 2024-08-12 RX ADMIN — Medication 8 UNIT(S): at 12:47

## 2024-08-12 RX ADMIN — Medication 40 MILLIGRAM(S): at 06:24

## 2024-08-12 RX ADMIN — CEFEPIME 100 MILLIGRAM(S): 2 INJECTION, POWDER, FOR SOLUTION INTRAVENOUS at 06:31

## 2024-08-12 RX ADMIN — Medication 5 UNIT(S)/HR: at 13:09

## 2024-08-12 RX ADMIN — ACETAMINOPHEN 650 MILLIGRAM(S): 325 TABLET ORAL at 19:15

## 2024-08-12 RX ADMIN — ACETAMINOPHEN 650 MILLIGRAM(S): 325 TABLET ORAL at 00:33

## 2024-08-12 RX ADMIN — METOPROLOL TARTRATE 50 MILLIGRAM(S): 100 TABLET ORAL at 06:24

## 2024-08-12 RX ADMIN — ACETAMINOPHEN 650 MILLIGRAM(S): 325 TABLET ORAL at 18:19

## 2024-08-12 RX ADMIN — Medication 7 UNIT(S)/HR: at 20:14

## 2024-08-12 RX ADMIN — Medication 4: at 18:20

## 2024-08-12 RX ADMIN — Medication 250 MILLIGRAM(S): at 18:21

## 2024-08-12 RX ADMIN — Medication 3 MILLIGRAM(S): at 22:48

## 2024-08-12 RX ADMIN — Medication 8 UNIT(S): at 18:20

## 2024-08-12 RX ADMIN — Medication 80 MILLIGRAM(S): at 22:48

## 2024-08-12 RX ADMIN — TAMSULOSIN HYDROCHLORIDE 0.4 MILLIGRAM(S): 0.4 CAPSULE ORAL at 22:48

## 2024-08-12 NOTE — PROGRESS NOTE ADULT - ASSESSMENT
83 y/o male w/ PMHx of HTN, HLD, Type 2 Diabetes, CAD s/p CABG x3/bioprosthetic AVR 2011 at [facility] and subsequent PCI 5/2019, s/p TAVR 7/24, on Eliquis, presents to the ER with son for right groin infection. states TAVR was done through right groin. states has been at rehab facility and noticed groin became swollen. states then developed drainage and erythema to the area. states had complication during procedure and entered left groin as well. states noticed left side also swollen but not as bad. patient denies f/n/v/d, CP, SOB, HA, dizziness, abdominal pain.

## 2024-08-12 NOTE — CONSULT NOTE ADULT - ASSESSMENT
84 year old with hs of CAD, DM, AVR 2016, CEA.   Recently admitted for T AVR.    access B/L femoral artery c/b failed closure device. Vascular consulted intra-op for RLE ALI s/p R iliofemoral bypass w/ PTFE and RLE angiogram. Post-op, patient w/ LLE ALI RTOR s/p L SFA interposition bypass w/ reversed ipsilateral GSV.   pt was discharged to rehab but noted to have open right groin  wound with drainage No fever ro chills       Right groin infected femoral site with underlying graft.  Culture and BC sent  Lt groin wound  site also has escahr but no pus    distal ischemic rash on both legs    Underlying avr  At risk for PVE    Vanco 1 gram q day  Cefepime 2 q 12 hr.  discussed in detail with family  CT scan pending   vascular will need to debride        
84M PMH HTN, HLD, L carotid stenosis s/p CEA, T2DM, CAD S/p CABG AVR with subsequent valve in valve TAVR via b/l femoral artery access c/b failed closure device 7/24 C/b RLE acute limb ischemia requiring R ileofemoral bypass w/ PTFE and RLE angiogram followed by LLE ALI RTOR for  L SFA interposition bypass with reversed ipsilateral GSV 7/24, readmitted with bilateral groin incisional infections, PRS consulted for possible muscle flap during groin washout.     - PRS available for muscle flap during b/l groin exploration and possible washout tomorrow  - remainder care per primary
A/P:    84M PMH HTN, HLD, L carotid stenosis s/p CEA, T2DM, CAD S/p CABG AVR with subsequent valve in valve TAVR via b/l femoral artery access c/b failed closure device 7/24 C/b RLE acute limb ischemia requiring R ileofemoral bypass w/ PTFE and RLE angiogram followed by LLE ALI RTOR for  L SFA interposition bypass with reversed ipsilateral GSV 7/24, who went to rehab facility and subsequently developed bilateral groin incisional infections with R incisional dehiscence. Pt is not having fevers, chills, chest pain, or SOB. Pt is not having R groin pain. Just feels some "rope like" feeling in his R calf.    Wound Consult requested to assist w/ management of  BLE stable eschar    recommendation:   Moisturize intact skin w/ SWEEN cream BID  Off load with CAIR boots    Abx per Medicine/ ID    Nutrition Consult for optimization in pt w/ Increased nutritional needs            encourage high quality protein, leslie/ prosource, MVI & Vit C to promote wound healing    Continue turning and positioning w/ offloading assistive devices as per protocol  Buttocks/ Sacrum Carlo BID and prn soiling for prophylactic treatment       Continue w/ attends under pads and Pericare as per protocol  Waffle Cushion to chair when oob to chair  Continue w/ low air loss pressure redistribution bed surface   Care as per medicine, will  remain available as requested  Upon discharge f/u as outpatient at [facility] [address] [place] [address] [phone]  Seen  and D/w team & RN  Thank you for this consult,  Henny Craig, MICHELLE-BC, [facility]  [phone]  Nights/ Weekends/ Holidays please call:  General Surgery Consult pager [phone]) for emergencies  Wound PT for multilayer leg wrapping or VAC issues (x [phone]) 
85 y/o male w/ PMHx of HTN, HLD, Type 2 Diabetes, CAD s/p CABG x3/bioprosthetic AVR 2011 at [facility] and subsequent PCI 5/2019, s/p TAVR 7/24, on Eliquis, presents to the ER with son for right groin infection. states TAVR was done through right groin. states has been at rehab facility and noticed groin became swollen. states then developed drainage and erythema to the area. states had complication during procedure and entered left groin as well. states noticed left side also swollen but not as bad. patient denies f/n/v/d, CP, SOB, HA, dizziness, abdominal pain.  
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     Assessment  DMT2: 84y Male with DM T2 with hyperglycemia, A1C 8.5% , was on oral meds  at home, now on basal bolus insulin with coverage, blood sugars running high, eating full meals.   AS: on medications, stable, monitored. recent TAVR  HTN: on antihypertensive medications, monitored, asymptomatic.    Discussed plan and management wit Dr Sheldon Angulo NP-TEAMS

## 2024-08-12 NOTE — PROGRESS NOTE ADULT - SUBJECTIVE AND OBJECTIVE BOX
SUBJECTIVE:    Patient seen and examined at bedside. Denies fevers, chills, nausea, vomiting, diarrhea.      Vitals:  ============  T(F): 97.9 (12 Aug 2024 01:46), Max: 99 (11 Aug 2024 04:57)  HR: 69 (12 Aug 2024 01:46)  BP: 100/60 (12 Aug 2024 01:46)  RR: 18 (12 Aug 2024 01:46)  SpO2: 96% (12 Aug 2024 01:46) (96% - 100%)  temp max in last 48H T(F): , Max: 99.1 (08-10-24 @ 10:05)      I&O's Detail    10 Aug 2024 07:01  -  11 Aug 2024 07:00  --------------------------------------------------------  IN:    IV PiggyBack: 350 mL    Lactated Ringers: 900 mL    Lactated Ringers: 300 mL  Total IN: 1550 mL    OUT:    Indwelling Catheter - Urethral (mL): 630 mL    VAC (Vacuum Assisted Closure) System (mL): 200 mL  Total OUT: 830 mL    Total NET: 720 mL      11 Aug 2024 07:01  -  12 Aug 2024 04:16  --------------------------------------------------------  IN:    IV PiggyBack: 100 mL    Oral Fluid: 980 mL  Total IN: 1080 mL    OUT:    VAC (Vacuum Assisted Closure) System (mL): 125 mL  Total OUT: 125 mL    Total NET: 955 mL      LABS:                         8.3    6.39  )-----------( 263      ( 11 Aug 2024 04:05 )             26.9     08-11    135  |  98  |  10  ----------------------------<  206<H>  4.3   |  23  |  0.70    Ca    8.6      11 Aug 2024 04:55  Phos  2.3     08-11  Mg     1.6     08-11    TPro  6.0  /  Alb  2.5<L>  /  TBili  0.6  /  DBili  x   /  AST  30  /  ALT  29  /  AlkPhos  85  08-10    PT/INR - ( 11 Aug 2024 05:40 )   PT: 15.1 sec;   INR: 1.45 ratio         PTT - ( 11 Aug 2024 05:40 )  PTT:28.0 sec  Urinalysis Basic - ( 11 Aug 2024 04:55 )    Color: x / Appearance: x / SG: x / pH: x  Gluc: 206 mg/dL / Ketone: x  / Bili: x / Urobili: x   Blood: x / Protein: x / Nitrite: x   Leuk Esterase: x / RBC: x / WBC x   Sq Epi: x / Non Sq Epi: x / Bacteria: x            RADIOLOGY, EKG & ADDITIONAL TESTS: Reviewed.  SUBJECTIVE:    Patient seen and examined at bedside. Denies fevers, chills, nausea, vomiting, diarrhea. Pain is adequately controlled on current regimen. Denies any complaints.       Vitals:  ============  T(F): 97.9 (12 Aug 2024 01:46), Max: 99 (11 Aug 2024 04:57)  HR: 69 (12 Aug 2024 01:46)  BP: 100/60 (12 Aug 2024 01:46)  RR: 18 (12 Aug 2024 01:46)  SpO2: 96% (12 Aug 2024 01:46) (96% - 100%)  temp max in last 48H T(F): , Max: 99.1 (08-10-24 @ 10:05)      I&O's Detail    10 Aug 2024 07:01  -  11 Aug 2024 07:00  --------------------------------------------------------  IN:    IV PiggyBack: 350 mL    Lactated Ringers: 900 mL    Lactated Ringers: 300 mL  Total IN: 1550 mL    OUT:    Indwelling Catheter - Urethral (mL): 630 mL    VAC (Vacuum Assisted Closure) System (mL): 200 mL  Total OUT: 830 mL    Total NET: 720 mL      11 Aug 2024 07:01  -  12 Aug 2024 04:16  --------------------------------------------------------  IN:    IV PiggyBack: 100 mL    Oral Fluid: 980 mL  Total IN: 1080 mL    OUT:    VAC (Vacuum Assisted Closure) System (mL): 125 mL  Total OUT: 125 mL    Total NET: 955 mL      LABS:                         8.3    6.39  )-----------( 263      ( 11 Aug 2024 04:05 )             26.9     08-11    135  |  98  |  10  ----------------------------<  206<H>  4.3   |  23  |  0.70    Ca    8.6      11 Aug 2024 04:55  Phos  2.3     08-11  Mg     1.6     08-11    TPro  6.0  /  Alb  2.5<L>  /  TBili  0.6  /  DBili  x   /  AST  30  /  ALT  29  /  AlkPhos  85  08-10    PT/INR - ( 11 Aug 2024 05:40 )   PT: 15.1 sec;   INR: 1.45 ratio         PTT - ( 11 Aug 2024 05:40 )  PTT:28.0 sec  Urinalysis Basic - ( 11 Aug 2024 04:55 )    Color: x / Appearance: x / SG: x / pH: x  Gluc: 206 mg/dL / Ketone: x  / Bili: x / Urobili: x   Blood: x / Protein: x / Nitrite: x   Leuk Esterase: x / RBC: x / WBC x   Sq Epi: x / Non Sq Epi: x / Bacteria: x            RADIOLOGY, EKG & ADDITIONAL TESTS: Reviewed.

## 2024-08-12 NOTE — PHYSICAL THERAPY INITIAL EVALUATION ADULT - GENERAL OBSERVATIONS, REHAB EVAL
pt received sitting upright in chair, A&0x4, following 100% multi-step commands, admitted to [facility] from rehab for concerns of groin infection; s/p R groin washout, debridement of fat necrosis and coevrage with sartorius flap on 8/10 and placement of wound vac.

## 2024-08-12 NOTE — PROGRESS NOTE ADULT - SUBJECTIVE AND OBJECTIVE BOX
Plastic Surgery Progress Note (pg [facility]: [id number], [facility]: [phone])    SUBJECTIVE:   No acute events overnight, VSS. Patient resting comfortably. Pain well controlled, denies concerns    OBJECTIVE:  Vital Signs Last 24 Hrs  T(C): 36.7 (12 Aug 2024 05:16), Max: 36.7 (11 Aug 2024 09:15)  T(F): 98 (12 Aug 2024 05:16), Max: 98 (11 Aug 2024 09:15)  HR: 107 (12 Aug 2024 05:16) (69 - 107)  BP: 117/60 (12 Aug 2024 05:16) (100/60 - 117/60)  BP(mean): --  RR: 18 (12 Aug 2024 05:16) (18 - 18)  SpO2: 96% (12 Aug 2024 05:16) (96% - 100%)    Parameters below as of 12 Aug 2024 05:16  Patient On (Oxygen Delivery Method): room air        Physical Examination:  GEN: NAD, resting quietly  GROIN: R groin with wound vac in place, holding suction, L groin dressing cdi with mild surrounding induration

## 2024-08-12 NOTE — CONSULT NOTE ADULT - PROBLEM SELECTOR RECOMMENDATION 9
Will start Lantus to 20 units at bed time.  Will start Admelog to 8 units before each meal in addition to Admelog correction scale coverage.  Will continue monitoring FS, log, and glucose trends, will Follow up.  Patient counseled for compliance with consistent low carb diet and exercise as tolerated outpatient.
IV abx   Vascular surgery follow up   Plastics consult for washout and possible flap   follow up official CT a/p results  HOLD ELIQUIS IN PREPARATION FOR OR

## 2024-08-12 NOTE — PROGRESS NOTE ADULT - ASSESSMENT
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 Pt was discharged to rehab and then sent back to [facility] for concerns of groin infection and was admitted on  8/8.  Pt now s/p R groin washout, debridement of fat necrosis and coevrage with sartorius flap on 8/10 and placement of wound vac.  In OR no obvious purulence/ since of infection surrounding PTFE grft but with diffuse fat necrosis and non-viable tissue.    Plan:  - plan to change vac at bedside today, c/w 75mmHg  - If any concerns for expanding groin hematoma or profuse bleeding from groin should remove vac immediately to evaluate wound  - Plan for RTOR Wednesday for possible coverage vs additional debridement    PRS  [id number]

## 2024-08-12 NOTE — PROGRESS NOTE ADULT - SUBJECTIVE AND OBJECTIVE BOX
Subjective: Patient seen and examined. No new events except as noted.   feels well   [place] out     REVIEW OF SYSTEMS:    CONSTITUTIONAL: + weakness, fevers or chills  EYES/ENT: No visual changes;  No vertigo or throat pain   NECK: No pain or stiffness  RESPIRATORY: No cough, wheezing, hemoptysis; No shortness of breath  CARDIOVASCULAR: No chest pain or palpitations  GASTROINTESTINAL: No abdominal or epigastric pain. No nausea, vomiting, or hematemesis; No diarrhea or constipation. No melena or hematochezia.  GENITOURINARY: No dysuria, frequency or hematuria  NEUROLOGICAL: No numbness or weakness  SKIN: No itching, burning, rashes, or lesions   All other review of systems is negative unless indicated above.    MEDICATIONS:  MEDICATIONS  (STANDING):  acetaminophen     Tablet .. 650 milliGRAM(s) Oral every 6 hours  aspirin  chewable 81 milliGRAM(s) Oral daily  atorvastatin 80 milliGRAM(s) Oral at bedtime  cefepime   IVPB 2000 milliGRAM(s) IV Intermittent every 12 hours  dextrose 5%. 1000 milliLiter(s) (50 mL/Hr) IV Continuous <Continuous>  dextrose 50% Injectable 25 Gram(s) IV Push once  glucagon  Injectable 1 milliGRAM(s) IntraMuscular once  insulin lispro (ADMELOG) corrective regimen sliding scale   SubCutaneous three times a day before meals  melatonin 3 milliGRAM(s) Oral at bedtime  metoprolol tartrate 50 milliGRAM(s) Oral every 12 hours  pantoprazole    Tablet 40 milliGRAM(s) Oral before breakfast  silver nitrate Applicator 1 Application(s) Topical once  tamsulosin 0.4 milliGRAM(s) Oral at bedtime  vancomycin  IVPB 1000 milliGRAM(s) IV Intermittent every 24 hours      PHYSICAL EXAM:  T(C): 36.7 (08-12-24 @ 05:16), Max: 36.7 (08-11-24 @ 09:15)  HR: 107 (08-12-24 @ 05:16) (69 - 107)  BP: 117/60 (08-12-24 @ 05:16) (100/60 - 117/60)  RR: 18 (08-12-24 @ 05:16) (18 - 18)  SpO2: 96% (08-12-24 @ 05:16) (96% - 100%)  Wt(kg): --  I&O's Summary    11 Aug 2024 07:01  -  12 Aug 2024 07:00  --------------------------------------------------------  IN: 1080 mL / OUT: 525 mL / NET: 555 mL        Appearance: NAD  HEENT: Dry  oral mucosa, PERRL, EOMI	  Lymphatic: No lymphadenopathy , no edema  Cardiovascular: Irregular S1 S2, No JVD, +murmurs , Peripheral pulses palpable 2+ bilaterally  Respiratory: Lungs clear to auscultation, normal effort 	  Gastrointestinal:  Soft, Non-tender, + BS	  Skin: No rashes, No ecchymoses, No cyanosis, warm to touch  Musculoskeletal: Normal range of motion, normal strength  Psychiatry:  Mood & affect appropriate  Ext: No edema  R groin with wound vac in place. Holding suction.    no blood     LABS:    CARDIAC MARKERS:                                8.3    6.39  )-----------( 263      ( 11 Aug 2024 04:05 )             26.9     08-11    135  |  98  |  10  ----------------------------<  206<H>  4.3   |  23  |  0.70    Ca    8.6      11 Aug 2024 04:55  Phos  2.3     08-11  Mg     1.6     08-11      proBNP:   Lipid Profile:   HgA1c:   TSH:             TELEMETRY: 	    ECG:  	  RADIOLOGY:   DIAGNOSTIC TESTING:  [ ] Echocardiogram:  [ ]  Catheterization:  [ ] Stress Test:    OTHER:

## 2024-08-12 NOTE — PROGRESS NOTE ADULT - ASSESSMENT
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 CTA showed a left groin hematoma unchanged from prior imaging, non-expanding in size.   Currently POD2 s/p exploration of right groin, fat and skin debridement, sartorius muscle flap, wound vac placement 8/10. Patient HDS overnight. Wound vac issues addressed by PRS team yesterday.    Plan:  - [place] was dced and patient passed POV.  - f/u plastic surgery for wound vac mgt  - restart home meds  -c/w asa  - currently holding starting heparin drip today, consider restarting today  - Pain and anti-emetic PRN  - C/t vanc and cefepime per ID consult - increased the cefepime dose to 2g.  - Dispo: PT eval pending

## 2024-08-12 NOTE — PHYSICAL THERAPY INITIAL EVALUATION ADULT - PERTINENT HX OF CURRENT PROBLEM, REHAB EVAL
Pt is a 84M Hx of HTN, HLD, L carotid stenosis s/p CEA, T2DM, CAD S/p CABG AVR with subsequent valve in valve TAVR via b/l femoral artery access c/b failed closure device 7/24 C/b RLE acute limb ischemia requiring R ileofemoral bypass w/ PTFE and RLE angiogram followed by LLE ALI RTOR for L SFA interposition bypass with reversed ipsilateral GSV 7/24. Pt was discharged to rehab and then sent back to [facility] for concerns of groin infection and was admitted on 8/8.

## 2024-08-12 NOTE — PHYSICAL THERAPY INITIAL EVALUATION ADULT - ADDITIONAL COMMENTS
Pt most recently at rehab. Prior to rehab, pt resides in an apartment with wife. pt has elevator access. Pt ambulated with no AD prior to rehab and independent with all ADLs. Pt's wife and children can assist with all ADLs. Pt owns no DME.

## 2024-08-12 NOTE — CONSULT NOTE ADULT - SUBJECTIVE AND OBJECTIVE BOX
HPI:  84M PMH HTN, HLD, L carotid stenosis s/p CEA, T2DM, CAD S/p CABG AVR with subsequent valve in valve TAVR via b/l femoral artery access c/b failed closure device 7/24 C/b RLE acute limb ischemia requiring R ileofemoral bypass w/ PTFE and RLE angiogram followed by LLE ALI RTOR for  L SFA interposition bypass with reversed ipsilateral GSV 7/24, who went to rehab facility and subsequently developed bilateral groin incisional infections with R incisional dehiscence. Pt is not having fevers, chills, chest pain, or SOB. Pt is not having R groin pain. Just feels some "rope like" feeling in his R calf.  (08 Aug 2024 17:48)      Patient has history of diabetes, A1C 8.5%  Endo was consulted for glycemic control.      PAST MEDICAL & SURGICAL HISTORY:  3-vessel CAD  s/p 3v CABG 2016, s/p stent 2019      HTN (hypertension)      DM2 (diabetes mellitus, type 2)      Occlusion and stenosis of unspecified carotid artery      HLD (hyperlipidemia)      Chronic atrial fibrillation      S/P CABG x 3  2016      S/P AVR (aortic valve replacement)  bovine 2016      S/P coronary artery stent placement  x1 2019      History of CEA (carotid endarterectomy)          FAMILY HISTORY:  FH: diabetes mellitus  mother, siblings    FH: heart disease  father        Social History:            HOME MEDICATIONS:  Home Medications:  aspirin 81 mg oral tablet, chewable: 1 tab(s) orally once a day (31 Jul 2024 15:45)  atorvastatin 80 mg oral tablet: 1 tab(s) orally once a day (24 Jul 2024 08:24)  Eliquis 2.5 mg oral tablet: 1 tab(s) orally 2 times a day (31 Jul 2024 15:45)  glimepiride 2 mg oral tablet: 1 tab(s) orally once a day (24 Jul 2024 08:24)  Jardiance 10 mg oral tablet: 1 tab(s) orally once a day (in the morning) (24 Jul 2024 08:24)  Lopressor 50 mg oral tablet: 1 tab(s) orally 2 times a day 6am and 6 pm (31 Jul 2024 15:45)  melatonin 3 mg oral tablet: 1 tab(s) orally once a day (at bedtime) (31 Jul 2024 15:45)  metFORMIN 500 mg oral tablet: 1 tab(s) orally 2 times a day (31 Jul 2024 15:45)  pantoprazole 40 mg oral delayed release tablet: 1 tab(s) orally once a day (before a meal) (31 Jul 2024 15:45)  Plavix 75 mg oral tablet: 1 tab(s) orally once a day (24 Jul 2024 08:24)  polyethylene glycol 3350 oral powder for reconstitution: 17 gram(s) orally once a day (31 Jul 2024 15:45)  senna leaf extract oral tablet: 2 tab(s) orally once a day (at bedtime) (31 Jul 2024 15:45)  tamsulosin 0.4 mg oral capsule: 1 cap(s) orally once a day (at bedtime) (31 Jul 2024 15:45)            MEDICATIONS  (STANDING):  acetaminophen     Tablet .. 650 milliGRAM(s) Oral every 6 hours  aspirin  chewable 81 milliGRAM(s) Oral daily  atorvastatin 80 milliGRAM(s) Oral at bedtime  dextrose 5%. 1000 milliLiter(s) (50 mL/Hr) IV Continuous <Continuous>  dextrose 50% Injectable 25 Gram(s) IV Push once  glucagon  Injectable 1 milliGRAM(s) IntraMuscular once  heparin  Infusion 500 Unit(s)/Hr (5 mL/Hr) IV Continuous <Continuous>  insulin glargine Injectable (LANTUS) 20 Unit(s) SubCutaneous at bedtime  insulin lispro (ADMELOG) corrective regimen sliding scale   SubCutaneous three times a day before meals  insulin lispro (ADMELOG) corrective regimen sliding scale   SubCutaneous at bedtime  insulin lispro Injectable (ADMELOG) 8 Unit(s) SubCutaneous three times a day before meals  melatonin 3 milliGRAM(s) Oral at bedtime  metoprolol tartrate 50 milliGRAM(s) Oral every 12 hours  pantoprazole    Tablet 40 milliGRAM(s) Oral before breakfast  silver nitrate Applicator 1 Application(s) Topical once  tamsulosin 0.4 milliGRAM(s) Oral at bedtime  vancomycin  IVPB 750 milliGRAM(s) IV Intermittent every 12 hours    MEDICATIONS  (PRN):  dextrose Oral Gel 15 Gram(s) Oral once PRN Blood Glucose LESS THAN 70 milliGRAM(s)/deciliter      Allergies    No Known Allergies    Intolerances        Review of Systems:  Neuro: No HA, no dizziness  Cardiovascular: No chest pain, no palpitations  Respiratory: no SOB, no cough  GI: No nausea, vomiting, abdominal pain  MSK: Denies joint/muscle pain      ALL OTHER SYSTEMS REVIEWED AND NEGATIVE      PHYSICAL EXAM:  VITALS: T(C): 36.2 (08-12-24 @ 13:18)  T(F): 97.2 (08-12-24 @ 13:18), Max: 98.1 (08-12-24 @ 08:54)  HR: 84 (08-12-24 @ 13:18) (68 - 107)  BP: 131/93 (08-12-24 @ 13:18) (100/60 - 131/93)  RR:  (18 - 18)  SpO2:  (96% - 100%)  Wt(kg): --  GENERAL: NAD, well-groomed, well-developed  NEURO:  alert and oriented  RESPIRATORY: Clear to auscultation bilaterally; No rales, rhonchi, wheezing  CARDIOVASCULAR: Si S2  GI: Soft, non distended, normal bowel sounds  MUSCULOSKELETAL: Moves all extremities equally       POCT Blood Glucose.: 306 mg/dL (08-12-24 @ 12:34)  POCT Blood Glucose.: 255 mg/dL (08-12-24 @ 08:04)  POCT Blood Glucose.: 193 mg/dL (08-11-24 @ 17:23)  POCT Blood Glucose.: 359 mg/dL (08-11-24 @ 11:59)  POCT Blood Glucose.: 230 mg/dL (08-11-24 @ 09:23)  POCT Blood Glucose.: 141 mg/dL (08-10-24 @ 22:40)  POCT Blood Glucose.: 181 mg/dL (08-10-24 @ 18:42)  POCT Blood Glucose.: 202 mg/dL (08-10-24 @ 11:56)  POCT Blood Glucose.: 230 mg/dL (08-10-24 @ 07:38)  POCT Blood Glucose.: 266 mg/dL (08-10-24 @ 00:10)  POCT Blood Glucose.: 197 mg/dL (08-09-24 @ 21:04)  POCT Blood Glucose.: 285 mg/dL (08-09-24 @ 17:23)                            8.1    6.11  )-----------( 313      ( 12 Aug 2024 12:09 )             26.6       08-12    137  |  101  |  18  ----------------------------<  219<H>  4.2   |  23  |  0.84    eGFR: 87    Ca    8.2<L>      08-12  Mg     2.0     08-12  Phos  2.6     08-12    TPro  6.0  /  Alb  2.5<L>  /  TBili  0.6  /  DBili  x   /  AST  30  /  ALT  29  /  AlkPhos  85  08-10      Thyroid Function Tests:    Diet, Consistent Carbohydrate w/Evening Snack:   Kosher (08-11-24 @ 09:39) [Active]          A1C with Estimated Average Glucose Result: 8.5 % (07-12-24 @ 12:41)

## 2024-08-12 NOTE — PROGRESS NOTE ADULT - ASSESSMENT
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 CTA showed a left groin hematoma unchanged from prior imaging, non-expanding in size.   Now s/p exploration of right groin, fat and skin debridement, sartorius muscle flap, wound vac placement 8/10. Patient HDS overnight. Wound vac issues addressed by plastics in the am, now holding suction, kept suction at 75 mm Hg. Plastics planning to take patient back on Tuesday (8/13) for coverage vs debridement. Vac will be changed today.     Plan:  - f/u plastic surgery for wound vac mgt and RTOR plans  - Home meds restarted  - c/w asa  - Restart heparin gtt today  - Pain and anti-emetic PRN  - C/t vanc and cefepime per ID consult - increased the cefepime dose to 2g. Planning to adjust Vanc dosing f/u ID.     Vascular surgery [id number] 84M PMH HTN, HLD, L carotid stenosis s/p CEA, T2DM, CAD S/p CABG AVR with subsequent valve in valve TAVR via b/l femoral artery access c/b failed closure device 7/24 C/b RLE acute limb ischemia requiring R ileofemoral bypass w/ PTFE and RLE angiogram followed by LLE ALI RTOR for  L SFA interposition bypass with reversed ipsilateral GSV 7/24, who went to rehab facility and subsequently developed bilateral groin incisional infections with R incisional dehiscence. CTA showed a left groin hematoma unchanged from prior imaging, non-expanding in size.   Now s/p exploration of right groin, fat and skin debridement, sartorius muscle flap, wound vac placement 8/10. Patient HDS overnight. Wound vac issues addressed by plastics in the am, now holding suction, kept suction at 75 mm Hg. Plastics planning to take patient back on Tuesday (8/13) for coverage vs debridement. Vac will be changed today. Poor glucose control overnight. Otherwise, patient recovering well    Plan:  - f/u plastic surgery for wound vac mgt and RTOR plans  - f/u endocrine for glucose control  - Home meds restarted  - c/w asa  - Restart heparin gtt today  - Pain and anti-emetic PRN  - C/t vanc and cefepime per ID consult - increased the cefepime dose to 2g. Planning to adjust Vanc dosing f/u ID.     Vascular surgery [id number]

## 2024-08-12 NOTE — PROGRESS NOTE ADULT - SUBJECTIVE AND OBJECTIVE BOX
Surgery Progress Note    SUBJECTIVE: Patient seen and examined at bedside. Patient has no complaints in the morning.   --------------------------------------------------------------------------------------------------  OBJECTIVE:   Physical Exam:  General: AAOx3, NAD, lying comfortably in bed  HEENT: NC/AT  Respiratory: nonlabored breathing  Cardiovascular: RRR, normal S1 and S2, no murmurs or gallops  Abdomen: non-distended, soft, non-tender  Extremities:   --------------------------------------------------------------------------------------------------  V/S:  Vital Signs Last 24 Hrs  T(C): 36.7 (12 Aug 2024 05:16), Max: 36.7 (11 Aug 2024 09:15)  T(F): 98 (12 Aug 2024 05:16), Max: 98 (11 Aug 2024 09:15)  HR: 74 (12 Aug 2024 05:20) (69 - 107)  BP: 117/60 (12 Aug 2024 05:16) (100/60 - 117/60)  BP(mean): --  RR: 18 (12 Aug 2024 05:16) (18 - 18)  SpO2: 96% (12 Aug 2024 05:16) (96% - 100%)    Parameters below as of 12 Aug 2024 05:16  Patient On (Oxygen Delivery Method): room air        --------------------------------------------------------------------------------------------------  I/Os:    11 Aug 2024 07:01  -  12 Aug 2024 07:00  --------------------------------------------------------  IN:    IV PiggyBack: 100 mL    Oral Fluid: 1220 mL  Total IN: 1320 mL    OUT:    VAC (Vacuum Assisted Closure) System (mL): 125 mL    Voided (mL): 850 mL  Total OUT: 975 mL    Total NET: 345 mL        --------------------------------------------------------------------------------------------------  LABS:                        7.2    4.40  )-----------( 245      ( 12 Aug 2024 07:32 )             23.5     12 Aug 2024 07:34    137    |  101    |  18     ----------------------------<  219    4.2     |  23     |  0.84     Ca    8.2        12 Aug 2024 07:34  Phos  2.6       12 Aug 2024 07:34  Mg     2.0       12 Aug 2024 07:34      PT/INR - ( 11 Aug 2024 05:40 )   PT: 15.1 sec;   INR: 1.45 ratio         PTT - ( 11 Aug 2024 05:40 )  PTT:28.0 sec  CAPILLARY BLOOD GLUCOSE      POCT Blood Glucose.: 255 mg/dL (12 Aug 2024 08:04)  POCT Blood Glucose.: 193 mg/dL (11 Aug 2024 17:23)  POCT Blood Glucose.: 359 mg/dL (11 Aug 2024 11:59)  POCT Blood Glucose.: 230 mg/dL (11 Aug 2024 09:23)            Urinalysis Basic - ( 12 Aug 2024 07:34 )    Color: x / Appearance: x / SG: x / pH: x  Gluc: 219 mg/dL / Ketone: x  / Bili: x / Urobili: x   Blood: x / Protein: x / Nitrite: x   Leuk Esterase: x / RBC: x / WBC x   Sq Epi: x / Non Sq Epi: x / Bacteria: x      --------------------------------------------------------------------------------------------------  MEDICATIONS  (STANDING):  acetaminophen     Tablet .. 650 milliGRAM(s) Oral every 6 hours  aspirin  chewable 81 milliGRAM(s) Oral daily  atorvastatin 80 milliGRAM(s) Oral at bedtime  dextrose 5%. 1000 milliLiter(s) (50 mL/Hr) IV Continuous <Continuous>  dextrose 50% Injectable 25 Gram(s) IV Push once  glucagon  Injectable 1 milliGRAM(s) IntraMuscular once  insulin lispro (ADMELOG) corrective regimen sliding scale   SubCutaneous three times a day before meals  melatonin 3 milliGRAM(s) Oral at bedtime  metoprolol tartrate 50 milliGRAM(s) Oral every 12 hours  pantoprazole    Tablet 40 milliGRAM(s) Oral before breakfast  silver nitrate Applicator 1 Application(s) Topical once  tamsulosin 0.4 milliGRAM(s) Oral at bedtime  vancomycin  IVPB 1000 milliGRAM(s) IV Intermittent every 24 hours    MEDICATIONS  (PRN):  dextrose Oral Gel 15 Gram(s) Oral once PRN Blood Glucose LESS THAN 70 milliGRAM(s)/deciliter    --------------------------------------------------------------------------------------------------     Surgery Progress Note    SUBJECTIVE: Patient seen and examined at bedside. Patient has no complaints in the morning.   --------------------------------------------------------------------------------------------------  OBJECTIVE:   Physical Exam:  General: AAOx3, NAD, lying comfortably in bed  Respiratory: nonlabored breathing  Abdomen: non-distended, soft, non-tender  Extremities: Left groin staple lines c/d/i, however, erythematous. R groin with wound vac, intermittedly holding suction at 75mmhg, leaking small amount of ss fluid.   --------------------------------------------------------------------------------------------------  V/S:  Vital Signs Last 24 Hrs  T(C): 36.7 (12 Aug 2024 05:16), Max: 36.7 (11 Aug 2024 09:15)  T(F): 98 (12 Aug 2024 05:16), Max: 98 (11 Aug 2024 09:15)  HR: 74 (12 Aug 2024 05:20) (69 - 107)  BP: 117/60 (12 Aug 2024 05:16) (100/60 - 117/60)  BP(mean): --  RR: 18 (12 Aug 2024 05:16) (18 - 18)  SpO2: 96% (12 Aug 2024 05:16) (96% - 100%)    Parameters below as of 12 Aug 2024 05:16  Patient On (Oxygen Delivery Method): room air        --------------------------------------------------------------------------------------------------  I/Os:    11 Aug 2024 07:01  -  12 Aug 2024 07:00  --------------------------------------------------------  IN:    IV PiggyBack: 100 mL    Oral Fluid: 1220 mL  Total IN: 1320 mL    OUT:    VAC (Vacuum Assisted Closure) System (mL): 125 mL    Voided (mL): 850 mL  Total OUT: 975 mL    Total NET: 345 mL        --------------------------------------------------------------------------------------------------  LABS:                        7.2    4.40  )-----------( 245      ( 12 Aug 2024 07:32 )             23.5     12 Aug 2024 07:34    137    |  101    |  18     ----------------------------<  219    4.2     |  23     |  0.84     Ca    8.2        12 Aug 2024 07:34  Phos  2.6       12 Aug 2024 07:34  Mg     2.0       12 Aug 2024 07:34      PT/INR - ( 11 Aug 2024 05:40 )   PT: 15.1 sec;   INR: 1.45 ratio         PTT - ( 11 Aug 2024 05:40 )  PTT:28.0 sec  CAPILLARY BLOOD GLUCOSE      POCT Blood Glucose.: 255 mg/dL (12 Aug 2024 08:04)  POCT Blood Glucose.: 193 mg/dL (11 Aug 2024 17:23)  POCT Blood Glucose.: 359 mg/dL (11 Aug 2024 11:59)  POCT Blood Glucose.: 230 mg/dL (11 Aug 2024 09:23)            Urinalysis Basic - ( 12 Aug 2024 07:34 )    Color: x / Appearance: x / SG: x / pH: x  Gluc: 219 mg/dL / Ketone: x  / Bili: x / Urobili: x   Blood: x / Protein: x / Nitrite: x   Leuk Esterase: x / RBC: x / WBC x   Sq Epi: x / Non Sq Epi: x / Bacteria: x      --------------------------------------------------------------------------------------------------  MEDICATIONS  (STANDING):  acetaminophen     Tablet .. 650 milliGRAM(s) Oral every 6 hours  aspirin  chewable 81 milliGRAM(s) Oral daily  atorvastatin 80 milliGRAM(s) Oral at bedtime  dextrose 5%. 1000 milliLiter(s) (50 mL/Hr) IV Continuous <Continuous>  dextrose 50% Injectable 25 Gram(s) IV Push once  glucagon  Injectable 1 milliGRAM(s) IntraMuscular once  insulin lispro (ADMELOG) corrective regimen sliding scale   SubCutaneous three times a day before meals  melatonin 3 milliGRAM(s) Oral at bedtime  metoprolol tartrate 50 milliGRAM(s) Oral every 12 hours  pantoprazole    Tablet 40 milliGRAM(s) Oral before breakfast  silver nitrate Applicator 1 Application(s) Topical once  tamsulosin 0.4 milliGRAM(s) Oral at bedtime  vancomycin  IVPB 1000 milliGRAM(s) IV Intermittent every 24 hours    MEDICATIONS  (PRN):  dextrose Oral Gel 15 Gram(s) Oral once PRN Blood Glucose LESS THAN 70 milliGRAM(s)/deciliter    --------------------------------------------------------------------------------------------------

## 2024-08-12 NOTE — PHYSICAL THERAPY INITIAL EVALUATION ADULT - ACTIVE RANGE OF MOTION EXAMINATION, REHAB EVAL
saurabh. upper extremity Active ROM was WNL (within normal limits)/bilateral lower extremity Active ROM was WNL (within normal limits)

## 2024-08-12 NOTE — PROGRESS NOTE ADULT - PROBLEM SELECTOR PLAN 1
Right groin infected femoral site with underlying graft. Follow up cultures and BC   s/p washout and fat necrosis removal, sartorius flap  VAC in place right groin, now with oozing of blood. To be addressed today by Vascular. Monitor Hgb   IV abx   plan RTOR Wednesday   Plastics follow up  Follow up am cbc , if stable Restart Heparin gtt ptt goal 60-80

## 2024-08-12 NOTE — CONSULT NOTE ADULT - NS ATTEND AMEND GEN_ALL_CORE FT
I have made amendments to the documentation where necessary. Additional comments: Chart, labs, vitals, radiology reviewed. Above H&P reviewed and edited where appropriate. Agree with history and physical exam. Agree with assessment and plan. I reviewed the overnight course of events and discussed the care with the patient/ family.  All the decisions in assessment and plan are solely made by me.

## 2024-08-12 NOTE — PROGRESS NOTE ADULT - ASSESSMENT
84M with hx CAD s/p CABG, DM, AVR 2016, CEA.   Recently admitted for TAVR 7/24/2024.  Course complicated by acute limb ischemia on the same day - s/p R iliofemoral bypass w/ PTFE and RLE angiogram. Post-op, patient w/ LLE ALI RTOR 7/25 s/p L SFA interposition bypass w/ reversed ipsilateral GSV.   Pt was discharged to rehab but noted to have open right groin wound with drainage. No fever ro chills     Right groin infected femoral site with underlying graft.  BC so far negative.  Wound culture so far E feacalis and Staph epi.  Plan is for OR today    Underlying AVR  At risk for PVE  CT reviewed large hematoma in left groin / severe stenosis of fem artery   s/p debridement and flap on the right  left wd without drainage   culture positive  for sensitive  Enterococcus and CNS  significance of these cultures is unclear but we do not need the cefepime  length of ab therapy to be determined.     Suggest:  - continue Vanco 1 gram q day

## 2024-08-12 NOTE — PROGRESS NOTE ADULT - SUBJECTIVE AND OBJECTIVE BOX
infectious diseases progress note:    Patient is a 84y old  Male who presents with a chief complaint of RLE wound infection (12 Aug 2024 08:05)        Local infection of skin and subcutaneous tissue               Allergies    No Known Allergies    Intolerances        ANTIBIOTICS/RELEVANT:  antimicrobials  cefepime   IVPB 2000 milliGRAM(s) IV Intermittent every 12 hours  vancomycin  IVPB 1000 milliGRAM(s) IV Intermittent every 24 hours    immunologic:    OTHER:  acetaminophen     Tablet .. 650 milliGRAM(s) Oral every 6 hours  aspirin  chewable 81 milliGRAM(s) Oral daily  atorvastatin 80 milliGRAM(s) Oral at bedtime  dextrose 5%. 1000 milliLiter(s) IV Continuous <Continuous>  dextrose 50% Injectable 25 Gram(s) IV Push once  dextrose Oral Gel 15 Gram(s) Oral once PRN  glucagon  Injectable 1 milliGRAM(s) IntraMuscular once  insulin lispro (ADMELOG) corrective regimen sliding scale   SubCutaneous three times a day before meals  melatonin 3 milliGRAM(s) Oral at bedtime  metoprolol tartrate 50 milliGRAM(s) Oral every 12 hours  pantoprazole    Tablet 40 milliGRAM(s) Oral before breakfast  silver nitrate Applicator 1 Application(s) Topical once  tamsulosin 0.4 milliGRAM(s) Oral at bedtime      Objective:  Vital Signs Last 24 Hrs  T(C): 36.7 (12 Aug 2024 05:16), Max: 36.7 (11 Aug 2024 09:15)  T(F): 98 (12 Aug 2024 05:16), Max: 98 (11 Aug 2024 09:15)  HR: 74 (12 Aug 2024 05:20) (69 - 107)  BP: 117/60 (12 Aug 2024 05:16) (100/60 - 117/60)  BP(mean): --  RR: 18 (12 Aug 2024 05:16) (18 - 18)  SpO2: 96% (12 Aug 2024 05:16) (96% - 100%)    Parameters below as of 12 Aug 2024 05:16  Patient On (Oxygen Delivery Method): room air           Eyes:SHIRA, EOMI  Ear/Nose/Throat: no oral lesion, no sinus tenderness on percussion	  Neck:no JVD, no lymphadenopathy, supple  Respiratory: CTA saurabh  Cardiovascular: S1S2 RRR, no murmurs  Gastrointestinal:soft, (+) BS, no HSM  Extremities:no e/e/c        LABS:                        8.3    6.39  )-----------( 263      ( 11 Aug 2024 04:05 )             26.9     08-11    135  |  98  |  10  ----------------------------<  206<H>  4.3   |  23  |  0.70    Ca    8.6      11 Aug 2024 04:55  Phos  2.3     08-11  Mg     1.6     08-11      PT/INR - ( 11 Aug 2024 05:40 )   PT: 15.1 sec;   INR: 1.45 ratio         PTT - ( 11 Aug 2024 05:40 )  PTT:28.0 sec  Urinalysis Basic - ( 11 Aug 2024 04:55 )    Color: x / Appearance: x / SG: x / pH: x  Gluc: 206 mg/dL / Ketone: x  / Bili: x / Urobili: x   Blood: x / Protein: x / Nitrite: x   Leuk Esterase: x / RBC: x / WBC x   Sq Epi: x / Non Sq Epi: x / Bacteria: x          MICROBIOLOGY:    RECENT CULTURES:  08-08 @ 15:37 Clean Catch Clean Catch (Midstream)                <10,000 CFU/mL Normal Urogenital Lashell    08-08 @ 14:34 .Other Wound right groin   ALLYSSA      Enterococcus faecalis  Enterococcus faecalis     Moderate Enterococcus faecalis  Moderate Staphylococcus epidermidis "Susceptibilities not performed"    08-08 @ 13:00 .Blood Blood                No growth at 72 Hours    08-08 @ 12:00 .Blood Blood                No growth at 72 Hours          RESPIRATORY CULTURES:              RADIOLOGY & ADDITIONAL STUDIES:        Pager [phone]  After 5 pm/weekends or if no response :[phone]

## 2024-08-12 NOTE — CONSULT NOTE ADULT - PROBLEM SELECTOR RECOMMENDATION 2
Suggest to continue medications, monitoring, FU primary team recommendations. vasc eval
s/p bilateral LE bypass   as above

## 2024-08-12 NOTE — PHYSICAL THERAPY INITIAL EVALUATION ADULT - DID THE PATIENT HAVE SURGERY?
s/p R groin washout, debridement of fat necrosis and coevrage with sartorius flap on 8/10 and placement of wound vac./yes

## 2024-08-13 LAB
-  CLINDAMYCIN: SIGNIFICANT CHANGE UP
-  ERYTHROMYCIN: SIGNIFICANT CHANGE UP
-  GENTAMICIN: SIGNIFICANT CHANGE UP
-  OXACILLIN: SIGNIFICANT CHANGE UP
-  PENICILLIN: SIGNIFICANT CHANGE UP
-  RIFAMPIN: SIGNIFICANT CHANGE UP
-  TETRACYCLINE: SIGNIFICANT CHANGE UP
-  TRIMETHOPRIM/SULFAMETHOXAZOLE: SIGNIFICANT CHANGE UP
-  VANCOMYCIN: SIGNIFICANT CHANGE UP
ALBUMIN SERPL ELPH-MCNC: 2.4 G/DL — LOW (ref 3.3–5)
ALP SERPL-CCNC: 73 U/L — SIGNIFICANT CHANGE UP (ref 40–120)
ALT FLD-CCNC: 15 U/L — SIGNIFICANT CHANGE UP (ref 10–45)
ANION GAP SERPL CALC-SCNC: 11 MMOL/L — SIGNIFICANT CHANGE UP (ref 5–17)
APTT BLD: 38.4 SEC — HIGH (ref 24.5–35.6)
APTT BLD: 39.7 SEC — HIGH (ref 24.5–35.6)
APTT BLD: 59.8 SEC — HIGH (ref 24.5–35.6)
AST SERPL-CCNC: 15 U/L — SIGNIFICANT CHANGE UP (ref 10–40)
BILIRUB SERPL-MCNC: 0.3 MG/DL — SIGNIFICANT CHANGE UP (ref 0.2–1.2)
BUN SERPL-MCNC: 16 MG/DL — SIGNIFICANT CHANGE UP (ref 7–23)
CALCIUM SERPL-MCNC: 8.2 MG/DL — LOW (ref 8.4–10.5)
CHLORIDE SERPL-SCNC: 102 MMOL/L — SIGNIFICANT CHANGE UP (ref 96–108)
CO2 SERPL-SCNC: 24 MMOL/L — SIGNIFICANT CHANGE UP (ref 22–31)
CREAT SERPL-MCNC: 0.76 MG/DL — SIGNIFICANT CHANGE UP (ref 0.5–1.3)
CULTURE RESULTS: SIGNIFICANT CHANGE UP
CULTURE RESULTS: SIGNIFICANT CHANGE UP
EGFR: 90 ML/MIN/1.73M2 — SIGNIFICANT CHANGE UP
GLUCOSE BLDC GLUCOMTR-MCNC: 110 MG/DL — HIGH (ref 70–99)
GLUCOSE BLDC GLUCOMTR-MCNC: 127 MG/DL — HIGH (ref 70–99)
GLUCOSE BLDC GLUCOMTR-MCNC: 249 MG/DL — HIGH (ref 70–99)
GLUCOSE BLDC GLUCOMTR-MCNC: 256 MG/DL — HIGH (ref 70–99)
GLUCOSE SERPL-MCNC: 210 MG/DL — HIGH (ref 70–99)
HCT VFR BLD CALC: 27.9 % — LOW (ref 39–50)
HGB BLD-MCNC: 8 G/DL — LOW (ref 13–17)
MAGNESIUM SERPL-MCNC: 1.9 MG/DL — SIGNIFICANT CHANGE UP (ref 1.6–2.6)
MCHC RBC-ENTMCNC: 28.7 GM/DL — LOW (ref 32–36)
MCHC RBC-ENTMCNC: 29.4 PG — SIGNIFICANT CHANGE UP (ref 27–34)
MCV RBC AUTO: 102.6 FL — HIGH (ref 80–100)
METHOD TYPE: SIGNIFICANT CHANGE UP
NRBC # BLD: 0 /100 WBCS — SIGNIFICANT CHANGE UP (ref 0–0)
PHOSPHATE SERPL-MCNC: 2.2 MG/DL — LOW (ref 2.5–4.5)
PLATELET # BLD AUTO: 352 K/UL — SIGNIFICANT CHANGE UP (ref 150–400)
POTASSIUM SERPL-MCNC: 4.2 MMOL/L — SIGNIFICANT CHANGE UP (ref 3.5–5.3)
POTASSIUM SERPL-SCNC: 4.2 MMOL/L — SIGNIFICANT CHANGE UP (ref 3.5–5.3)
PROT SERPL-MCNC: 5.6 G/DL — LOW (ref 6–8.3)
RBC # BLD: 2.72 M/UL — LOW (ref 4.2–5.8)
RBC # FLD: 16.6 % — HIGH (ref 10.3–14.5)
SODIUM SERPL-SCNC: 137 MMOL/L — SIGNIFICANT CHANGE UP (ref 135–145)
SPECIMEN SOURCE: SIGNIFICANT CHANGE UP
SPECIMEN SOURCE: SIGNIFICANT CHANGE UP
WBC # BLD: 7.55 K/UL — SIGNIFICANT CHANGE UP (ref 3.8–10.5)
WBC # FLD AUTO: 7.55 K/UL — SIGNIFICANT CHANGE UP (ref 3.8–10.5)

## 2024-08-13 PROCEDURE — 99232 SBSQ HOSP IP/OBS MODERATE 35: CPT

## 2024-08-13 RX ORDER — INSULIN GLARGINE 100 [IU]/ML
24 INJECTION, SOLUTION SUBCUTANEOUS AT BEDTIME
Refills: 0 | Status: DISCONTINUED | OUTPATIENT
Start: 2024-08-13 | End: 2024-08-13

## 2024-08-13 RX ORDER — HEPARIN SODIUM,BOVINE 1000/ML
900 VIAL (ML) INJECTION
Qty: 25000 | Refills: 0 | Status: DISCONTINUED | OUTPATIENT
Start: 2024-08-13 | End: 2024-08-13

## 2024-08-13 RX ORDER — VANCOMYCIN/0.9 % SOD CHLORIDE 1.75G/25
750 PLASTIC BAG, INJECTION (ML) INTRAVENOUS EVERY 12 HOURS
Refills: 0 | Status: DISCONTINUED | OUTPATIENT
Start: 2024-08-13 | End: 2024-08-14

## 2024-08-13 RX ORDER — SODIUM PHOSPHATE, DIBASIC, ANHYDROUS, POTASSIUM PHOSPHATE, MONOBASIC, AND SODIUM PHOSPHATE, MONOBASIC, MONOHYDRATE 852; 155; 130 MG/1; MG/1; MG/1
2 TABLET, COATED ORAL ONCE
Refills: 0 | Status: COMPLETED | OUTPATIENT
Start: 2024-08-13 | End: 2024-08-13

## 2024-08-13 RX ORDER — HEPARIN SODIUM,BOVINE 1000/ML
1100 VIAL (ML) INJECTION
Qty: 25000 | Refills: 0 | Status: DISCONTINUED | OUTPATIENT
Start: 2024-08-13 | End: 2024-08-14

## 2024-08-13 RX ORDER — INSULIN GLARGINE 100 [IU]/ML
18 INJECTION, SOLUTION SUBCUTANEOUS AT BEDTIME
Refills: 0 | Status: DISCONTINUED | OUTPATIENT
Start: 2024-08-13 | End: 2024-08-14

## 2024-08-13 RX ADMIN — Medication 10 UNIT(S): at 17:45

## 2024-08-13 RX ADMIN — Medication 11 UNIT(S)/HR: at 21:40

## 2024-08-13 RX ADMIN — ACETAMINOPHEN 650 MILLIGRAM(S): 325 TABLET ORAL at 11:41

## 2024-08-13 RX ADMIN — ACETAMINOPHEN 650 MILLIGRAM(S): 325 TABLET ORAL at 17:41

## 2024-08-13 RX ADMIN — ACETAMINOPHEN 650 MILLIGRAM(S): 325 TABLET ORAL at 18:41

## 2024-08-13 RX ADMIN — Medication 9 UNIT(S)/HR: at 04:42

## 2024-08-13 RX ADMIN — Medication 6: at 09:06

## 2024-08-13 RX ADMIN — Medication 3 MILLIGRAM(S): at 21:35

## 2024-08-13 RX ADMIN — INSULIN GLARGINE 18 UNIT(S): 100 INJECTION, SOLUTION SUBCUTANEOUS at 21:35

## 2024-08-13 RX ADMIN — ACETAMINOPHEN 650 MILLIGRAM(S): 325 TABLET ORAL at 06:09

## 2024-08-13 RX ADMIN — Medication 2: at 12:37

## 2024-08-13 RX ADMIN — Medication 11 UNIT(S)/HR: at 12:53

## 2024-08-13 RX ADMIN — Medication 10 UNIT(S): at 12:36

## 2024-08-13 RX ADMIN — SODIUM PHOSPHATE, DIBASIC, ANHYDROUS, POTASSIUM PHOSPHATE, MONOBASIC, AND SODIUM PHOSPHATE, MONOBASIC, MONOHYDRATE 2 PACKET(S): 852; 155; 130 TABLET, COATED ORAL at 11:42

## 2024-08-13 RX ADMIN — ACETAMINOPHEN 650 MILLIGRAM(S): 325 TABLET ORAL at 12:41

## 2024-08-13 RX ADMIN — Medication 80 MILLIGRAM(S): at 21:34

## 2024-08-13 RX ADMIN — METOPROLOL TARTRATE 50 MILLIGRAM(S): 100 TABLET ORAL at 17:39

## 2024-08-13 RX ADMIN — METOPROLOL TARTRATE 50 MILLIGRAM(S): 100 TABLET ORAL at 06:09

## 2024-08-13 RX ADMIN — Medication 250 MILLIGRAM(S): at 06:14

## 2024-08-13 RX ADMIN — Medication 81 MILLIGRAM(S): at 11:42

## 2024-08-13 RX ADMIN — TAMSULOSIN HYDROCHLORIDE 0.4 MILLIGRAM(S): 0.4 CAPSULE ORAL at 21:34

## 2024-08-13 RX ADMIN — Medication 250 MILLIGRAM(S): at 17:39

## 2024-08-13 RX ADMIN — Medication 8 UNIT(S): at 09:06

## 2024-08-13 RX ADMIN — Medication 40 MILLIGRAM(S): at 09:10

## 2024-08-13 NOTE — PROGRESS NOTE ADULT - ASSESSMENT
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     Assessment  DMT2: 84y Male with DM T2 with hyperglycemia, A1C 8.5% , was on oral meds  at home, now on basal bolus insulin with coverage, blood sugars running high, eating full meals.   NPO after mn , lantus adjusted  AS: on medications, stable, monitored. recent TAVR  HTN: on antihypertensive medications, monitored, asymptomatic.    Discussed plan and management wit Dr Sheldon Angulo NP-TEAMS

## 2024-08-13 NOTE — PROGRESS NOTE ADULT - PROBLEM SELECTOR PLAN 1
Right groin infected femoral site with underlying graft. Follow up cultures and BC   s/p washout and fat necrosis removal, sartorius flap  VAC in place right groin, now with oozing of blood. To be addressed today by Vascular. Monitor Hgb   IV abx   plan RTOR Wednesday : acceptable cardiac risk to proceed  Plastics follow up   Heparin gtt ptt goal 60-80

## 2024-08-13 NOTE — PROGRESS NOTE ADULT - SUBJECTIVE AND OBJECTIVE BOX
infectious diseases progress note:    Patient is a 84y old  Male who presents with a chief complaint of RLE wound infection (13 Aug 2024 04:36)        Local infection of skin and subcutaneous tissue               Allergies    No Known Allergies    Intolerances        ANTIBIOTICS/RELEVANT:  antimicrobials  vancomycin  IVPB 750 milliGRAM(s) IV Intermittent every 12 hours    immunologic:    OTHER:  acetaminophen     Tablet .. 650 milliGRAM(s) Oral every 6 hours  aspirin  chewable 81 milliGRAM(s) Oral daily  atorvastatin 80 milliGRAM(s) Oral at bedtime  dextrose 5%. 1000 milliLiter(s) IV Continuous <Continuous>  dextrose 50% Injectable 25 Gram(s) IV Push once  dextrose Oral Gel 15 Gram(s) Oral once PRN  glucagon  Injectable 1 milliGRAM(s) IntraMuscular once  heparin  Infusion 500 Unit(s)/Hr IV Continuous <Continuous>  insulin glargine Injectable (LANTUS) 20 Unit(s) SubCutaneous at bedtime  insulin lispro (ADMELOG) corrective regimen sliding scale   SubCutaneous three times a day before meals  insulin lispro (ADMELOG) corrective regimen sliding scale   SubCutaneous at bedtime  insulin lispro Injectable (ADMELOG) 8 Unit(s) SubCutaneous three times a day before meals  melatonin 3 milliGRAM(s) Oral at bedtime  metoprolol tartrate 50 milliGRAM(s) Oral every 12 hours  pantoprazole    Tablet 40 milliGRAM(s) Oral before breakfast  silver nitrate Applicator 1 Application(s) Topical once  tamsulosin 0.4 milliGRAM(s) Oral at bedtime      Objective:  Vital Signs Last 24 Hrs  T(C): 36.7 (13 Aug 2024 06:19), Max: 36.8 (13 Aug 2024 01:04)  T(F): 98.1 (13 Aug 2024 06:19), Max: 98.3 (13 Aug 2024 01:04)  HR: 97 (13 Aug 2024 06:19) (82 - 99)  BP: 118/73 (13 Aug 2024 06:19) (104/60 - 131/93)  BP(mean): --  RR: 17 (13 Aug 2024 06:19) (17 - 18)  SpO2: 99% (13 Aug 2024 06:19) (95% - 100%)    Parameters below as of 13 Aug 2024 06:19  Patient On (Oxygen Delivery Method): room air           Eyes:SHIRA, EOMI  Ear/Nose/Throat: no oral lesion, no sinus tenderness on percussion	  Neck:no JVD, no lymphadenopathy, supple  Respiratory: CTA saurabh  Cardiovascular: S1S2 RRR, no murmurs  Gastrointestinal:soft, (+) BS, no HSM  Extremities:no e/e/c        LABS:                        8.2    8.04  )-----------( 312      ( 12 Aug 2024 17:26 )             26.3     08-12    137  |  101  |  18  ----------------------------<  219<H>  4.2   |  23  |  0.84    Ca    8.2<L>      12 Aug 2024 07:34  Phos  2.6     08-12  Mg     2.0     08-12      PTT - ( 13 Aug 2024 02:53 )  PTT:38.4 sec  Urinalysis Basic - ( 12 Aug 2024 07:34 )    Color: x / Appearance: x / SG: x / pH: x  Gluc: 219 mg/dL / Ketone: x  / Bili: x / Urobili: x   Blood: x / Protein: x / Nitrite: x   Leuk Esterase: x / RBC: x / WBC x   Sq Epi: x / Non Sq Epi: x / Bacteria: x          MICROBIOLOGY:    RECENT CULTURES:  08-11 @ 15:00 .Surgical Swab right groin deep wound                Moderate Staphylococcus epidermidis    08-08 @ 15:37 Clean Catch Clean Catch (Midstream)                <10,000 CFU/mL Normal Urogenital Lashell    08-08 @ 14:34 .Other Wound right groin   ALLYSSA      Enterococcus faecalis  Enterococcus faecalis     Moderate Enterococcus faecalis  Moderate Staphylococcus epidermidis "Susceptibilities not performed"    08-08 @ 13:00 .Blood Blood                No growth at 4 days    08-08 @ 12:00 .Blood Blood                No growth at 4 days          RESPIRATORY CULTURES:              RADIOLOGY & ADDITIONAL STUDIES:        Pager [phone]  After 5 pm/weekends or if no response :[phone]

## 2024-08-13 NOTE — PROGRESS NOTE ADULT - ASSESSMENT
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 CTA showed a left groin hematoma unchanged from prior imaging, non-expanding in size.   Now s/p exploration of right groin, fat and skin debridement, sartorius muscle flap, wound vac placement 8/10. Patient HDS overnight. Wound vac issues addressed by plastics in the am, now holding suction, kept suction at 75 mm Hg. Plastics planning to take patient back on Tuesday (8/13) for coverage vs debridement. Vac changed yesterday. Poor glucose control yesterday, endo consulted.       Plan:  - f/u plastic surgery for wound vac mgt and RTOR plans  - Vanc trough yesterday 8.9 before dose; adjusted to 750mg yesterday  - f/u endocrine for glucose control  - Home meds restarted  - c/w asa  - Restarted heparin drip yesterday per cardiology  - Pain and anti-emetic PRN  - C/t vanc and cefepime per ID consult - increased the cefepime dose to 2g. Planning to adjust Vanc dosing f/u ID.

## 2024-08-13 NOTE — PROGRESS NOTE ADULT - SUBJECTIVE AND OBJECTIVE BOX
H and P Update    The history obtained by Dr. Lauren Aggarwal, and plan outlined on dated 10/23/19, has no changes.     GENERAL: well developed, well nourished, in no apparent distress  HEENT: atraumatic, normocephalic, ears and throat Subjective: Patient seen and examined. No new events except as noted.   feels ok   no cp or sob     REVIEW OF SYSTEMS:    CONSTITUTIONAL: No weakness, fevers or chills  EYES/ENT: No visual changes;  No vertigo or throat pain   NECK: No pain or stiffness  RESPIRATORY: No cough, wheezing, hemoptysis; No shortness of breath  CARDIOVASCULAR: No chest pain or palpitations  GASTROINTESTINAL: No abdominal or epigastric pain. No nausea, vomiting, or hematemesis; No diarrhea or constipation. No melena or hematochezia.  GENITOURINARY: No dysuria, frequency or hematuria  NEUROLOGICAL: No numbness or weakness  SKIN: No itching, burning, rashes, or lesions   All other review of systems is negative unless indicated above.    MEDICATIONS:  MEDICATIONS  (STANDING):  acetaminophen     Tablet .. 650 milliGRAM(s) Oral every 6 hours  aspirin  chewable 81 milliGRAM(s) Oral daily  atorvastatin 80 milliGRAM(s) Oral at bedtime  dextrose 5%. 1000 milliLiter(s) (50 mL/Hr) IV Continuous <Continuous>  dextrose 50% Injectable 25 Gram(s) IV Push once  glucagon  Injectable 1 milliGRAM(s) IntraMuscular once  heparin  Infusion 500 Unit(s)/Hr (9 mL/Hr) IV Continuous <Continuous>  insulin glargine Injectable (LANTUS) 20 Unit(s) SubCutaneous at bedtime  insulin lispro (ADMELOG) corrective regimen sliding scale   SubCutaneous at bedtime  insulin lispro (ADMELOG) corrective regimen sliding scale   SubCutaneous three times a day before meals  insulin lispro Injectable (ADMELOG) 8 Unit(s) SubCutaneous three times a day before meals  melatonin 3 milliGRAM(s) Oral at bedtime  metoprolol tartrate 50 milliGRAM(s) Oral every 12 hours  pantoprazole    Tablet 40 milliGRAM(s) Oral before breakfast  silver nitrate Applicator 1 Application(s) Topical once  tamsulosin 0.4 milliGRAM(s) Oral at bedtime  vancomycin  IVPB 750 milliGRAM(s) IV Intermittent every 12 hours      PHYSICAL EXAM:  T(C): 36.7 (08-13-24 @ 06:19), Max: 36.8 (08-13-24 @ 01:04)  HR: 97 (08-13-24 @ 06:19) (82 - 99)  BP: 118/73 (08-13-24 @ 06:19) (104/60 - 131/93)  RR: 17 (08-13-24 @ 06:19) (17 - 18)  SpO2: 99% (08-13-24 @ 06:19) (95% - 100%)  Wt(kg): --  I&O's Summary    12 Aug 2024 07:01  -  13 Aug 2024 07:00  --------------------------------------------------------  IN: 1175 mL / OUT: 551 mL / NET: 624 mL          Appearance: NAD  HEENT:   Dry  oral mucosa, PERRL, EOMI	  Lymphatic: No lymphadenopathy , no edema  Cardiovascular: Irregular S1 S2, No JVD, + murmurs , Peripheral pulses palpable 2+ bilaterally  Respiratory: Lungs clear to auscultation, normal effort 	  Gastrointestinal:  Soft, Non-tender, + BS	  Skin: No rashes, No ecchymoses, No cyanosis, warm to touch  Musculoskeletal: Normal range of motion, normal strength  Psychiatry:  Mood & affect appropriate  Ext: No edema  Left groin staple lines c/d/i, however, erythematous. R groin with wound vac, intermittedly holding suction at 75mmhg, leaking small amount of ss fluid.       LABS:    CARDIAC MARKERS:                                8.2    8.04  )-----------( 312      ( 12 Aug 2024 17:26 )             26.3     08-13    137  |  102  |  16  ----------------------------<  210<H>  4.2   |  24  |  0.76    Ca    8.2<L>      13 Aug 2024 08:52  Phos  2.2     08-13  Mg     1.9     08-13    TPro  5.6<L>  /  Alb  2.4<L>  /  TBili  0.3  /  DBili  x   /  AST  15  /  ALT  15  /  AlkPhos  73  08-13      TELEMETRY: 	    ECG:  	  RADIOLOGY:   DIAGNOSTIC TESTING:  [ ] Echocardiogram:  [ ]  Catheterization:  [ ] Stress Test:    OTHER:

## 2024-08-13 NOTE — PROGRESS NOTE ADULT - SUBJECTIVE AND OBJECTIVE BOX
Chief complaint  Patient is a 84y old  Male who presents with a chief complaint of RLE wound infection (13 Aug 2024 09:43)         Labs and Fingersticks  CAPILLARY BLOOD GLUCOSE      POCT Blood Glucose.: 249 mg/dL (13 Aug 2024 12:34)  POCT Blood Glucose.: 256 mg/dL (13 Aug 2024 09:03)  POCT Blood Glucose.: 178 mg/dL (12 Aug 2024 22:25)  POCT Blood Glucose.: 217 mg/dL (12 Aug 2024 17:40)      Anion Gap: 11 (08-13 @ 08:52)  Anion Gap: 13 (08-12 @ 07:34)      Calcium: 8.2 *L* (08-13 @ 08:52)  Calcium: 8.2 *L* (08-12 @ 07:34)  Albumin: 2.4 *L* (08-13 @ 08:52)    Alanine Aminotransferase (ALT/SGPT): 15 (08-13 @ 08:52)  Alkaline Phosphatase: 73 (08-13 @ 08:52)  Aspartate Aminotransferase (AST/SGOT): 15 (08-13 @ 08:52)        08-13    137  |  102  |  16  ----------------------------<  210<H>  4.2   |  24  |  0.76    Ca    8.2<L>      13 Aug 2024 08:52  Phos  2.2     08-13  Mg     1.9     08-13    TPro  5.6<L>  /  Alb  2.4<L>  /  TBili  0.3  /  DBili  x   /  AST  15  /  ALT  15  /  AlkPhos  73  08-13                        8.0    7.55  )-----------( 352      ( 13 Aug 2024 11:18 )             27.9     Medications  MEDICATIONS  (STANDING):  acetaminophen     Tablet .. 650 milliGRAM(s) Oral every 6 hours  aspirin  chewable 81 milliGRAM(s) Oral daily  atorvastatin 80 milliGRAM(s) Oral at bedtime  dextrose 5%. 1000 milliLiter(s) (50 mL/Hr) IV Continuous <Continuous>  dextrose 50% Injectable 25 Gram(s) IV Push once  glucagon  Injectable 1 milliGRAM(s) IntraMuscular once  heparin  Infusion 1100 Unit(s)/Hr (11 mL/Hr) IV Continuous <Continuous>  insulin glargine Injectable (LANTUS) 18 Unit(s) SubCutaneous at bedtime  insulin lispro (ADMELOG) corrective regimen sliding scale   SubCutaneous at bedtime  insulin lispro (ADMELOG) corrective regimen sliding scale   SubCutaneous three times a day before meals  insulin lispro Injectable (ADMELOG) 10 Unit(s) SubCutaneous three times a day before meals  melatonin 3 milliGRAM(s) Oral at bedtime  metoprolol tartrate 50 milliGRAM(s) Oral every 12 hours  pantoprazole    Tablet 40 milliGRAM(s) Oral before breakfast  silver nitrate Applicator 1 Application(s) Topical once  tamsulosin 0.4 milliGRAM(s) Oral at bedtime  vancomycin  IVPB 750 milliGRAM(s) IV Intermittent every 12 hours      Physical Exam  General: Patient comfortable in bed   Vital Signs Last 12 Hrs  T(F): 97.5 (08-13-24 @ 10:26), Max: 98.1 (08-13-24 @ 06:19)  HR: 100 (08-13-24 @ 10:26) (97 - 100)  BP: 109/57 (08-13-24 @ 10:26) (109/57 - 118/73)  BP(mean): --  RR: 18 (08-13-24 @ 10:26) (17 - 18)  SpO2: 97% (08-13-24 @ 10:26) (97% - 99%)    CVS: S1S2   Respiratory: No wheezing, no crepitations  GI: Abdomen soft, bowel sounds positive  Musculoskeletal:  moves all extremities  : Voiding

## 2024-08-13 NOTE — PROGRESS NOTE ADULT - ASSESSMENT
83 y/o Male with PMHx of HTN, HLD, L carotid stenosis s/p CEA, T2DM, CAD S/p CABG, AVR with subsequent valve in valve TAVR via b/l femoral artery access c/b failed closure device 7/24 C/b RLE acute limb ischemia requiring R ileofemoral bypass w/ PTFE and RLE angiogram followed by LLE ALI RTOR for L SFA interposition bypass with reversed ipsilateral GSV 7/24, who went to rehab facility and subsequently developed bilateral groin incisional infections with R incisional dehiscence. CTA showed a left groin hematoma unchanged from prior imaging, non-expanding in size.   Now s/p exploration of right groin, fat and skin debridement, sartorius muscle flap, wound vac placement 8/10.    PLAN:  - Appreciate plastic sx recs: RTOR Wednesday (8/14) for additional debridement; please stop hep gtt at 0930 8/14  - Appreciate ID recs: IV vanco increased to 750 q12h  - Appreciate endo recs for glucose control  - Pain and anti-emetic PRN  - VTE ppx: Hep gtt      Vascular Surgery  [id number]

## 2024-08-13 NOTE — PROGRESS NOTE ADULT - SUBJECTIVE AND OBJECTIVE BOX
VASCULAR SURGERY DAILY PROGRESS NOTE    24 Hour/Overnight Events: No acute events overnight    SUBJECTIVE: Patient seen and evaluated on AM rounds. Pt reports minimal surgical incisional Right groin pain, which is adequately controlled on current regimen. Denies fevers/chills, chest pain, dyspnea, abdominal pain, nausea, vomiting, and diarrhea    ------------------------------------------------------------------------------------------------------------  OBJECTIVE:  Vital Signs Last 24 Hrs  T(C): 36.7 (13 Aug 2024 06:19), Max: 36.8 (13 Aug 2024 01:04)  T(F): 98.1 (13 Aug 2024 06:19), Max: 98.3 (13 Aug 2024 01:04)  HR: 97 (13 Aug 2024 06:19) (82 - 99)  BP: 118/73 (13 Aug 2024 06:19) (104/60 - 131/93)  BP(mean): --  RR: 17 (13 Aug 2024 06:19) (17 - 18)  SpO2: 99% (13 Aug 2024 06:19) (95% - 100%)    Parameters below as of 13 Aug 2024 06:19  Patient On (Oxygen Delivery Method): room air      I&O's Detail    12 Aug 2024 07:01  -  13 Aug 2024 07:00  --------------------------------------------------------  IN:    Heparin: 125 mL    IV PiggyBack: 250 mL    Oral Fluid: 800 mL  Total IN: 1175 mL    OUT:    VAC (Vacuum Assisted Closure) System (mL): 100 mL    Voided (mL): 451 mL  Total OUT: 551 mL    Total NET: 624 mL      LABS:                        8.2    8.04  )-----------( 312      ( 12 Aug 2024 17:26 )             26.3     08-13    137  |  102  |  16  ----------------------------<  210<H>  4.2   |  24  |  0.76    Ca    8.2<L>      13 Aug 2024 08:52  Phos  2.2     08-13  Mg     1.9     08-13    TPro  5.6<L>  /  Alb  2.4<L>  /  TBili  0.3  /  DBili  x   /  AST  15  /  ALT  15  /  AlkPhos  73  08-13    LIVER FUNCTIONS - ( 13 Aug 2024 08:52 )  Alb: 2.4 g/dL / Pro: 5.6 g/dL / ALK PHOS: 73 U/L / ALT: 15 U/L / AST: 15 U/L / GGT: x           PTT - ( 13 Aug 2024 02:53 )  PTT:38.4 sec    Urinalysis Basic - ( 13 Aug 2024 08:52 )  Color: x / Appearance: x / SG: x / pH: x  Gluc: 210 mg/dL / Ketone: x  / Bili: x / Urobili: x   Blood: x / Protein: x / Nitrite: x   Leuk Esterase: x / RBC: x / WBC x   Sq Epi: x / Non Sq Epi: x / Bacteria: x      PHYSICAL EXAM:  Constitutional: Well developed, well nourished, NAD  Chest: Symmetric chest rise bilaterally, no increased WOB  Abdomen: non-distended, soft, non-tender  Extremities: Left groin staple lines c/d/i, however, erythematous. R groin with wound vac, intermittedly holding suction at 75mmhg, leaking small amount of ss fluid

## 2024-08-13 NOTE — PROGRESS NOTE ADULT - SUBJECTIVE AND OBJECTIVE BOX
SUBJECTIVE:    Patient seen and examined at bedside. Reports no complaints in the morning. Denies fevers, chills, nausea, vomiting, diarrhea.    Vitals:  ============  T(F): 98.3 (13 Aug 2024 01:04), Max: 98.3 (13 Aug 2024 01:04)  HR: 99 (13 Aug 2024 01:04)  BP: 104/60 (13 Aug 2024 01:04)  RR: 18 (13 Aug 2024 01:04)  SpO2: 100% (13 Aug 2024 01:04) (95% - 100%)  temp max in last 48H T(F): , Max: 99 (08-11-24 @ 04:57)    LABS:                         8.2    8.04  )-----------( 312      ( 12 Aug 2024 17:26 )             26.3     08-12    137  |  101  |  18  ----------------------------<  219<H>  4.2   |  23  |  0.84    Ca    8.2<L>      12 Aug 2024 07:34  Phos  2.6     08-12  Mg     2.0     08-12      PT/INR - ( 11 Aug 2024 05:40 )   PT: 15.1 sec;   INR: 1.45 ratio         PTT - ( 13 Aug 2024 02:53 )  PTT:38.4 sec  Urinalysis Basic - ( 12 Aug 2024 07:34 )    Color: x / Appearance: x / SG: x / pH: x  Gluc: 219 mg/dL / Ketone: x  / Bili: x / Urobili: x   Blood: x / Protein: x / Nitrite: x   Leuk Esterase: x / RBC: x / WBC x   Sq Epi: x / Non Sq Epi: x / Bacteria: x            RADIOLOGY, EKG & ADDITIONAL TESTS: Reviewed.  SUBJECTIVE:    Patient seen and examined at bedside. Reports no complaints in the morning. Denies fevers, chills, nausea, vomiting, diarrhea.    OBJECTIVE:   Physical Exam:  General: AAOx3, NAD, lying comfortably in bed  Respiratory: nonlabored breathing  Abdomen: non-distended, soft, non-tender  Extremities: Left groin staple lines c/d/i, however, erythematous. R groin with wound vac, intermittedly holding suction at 75mmhg, leaking small amount of ss fluid.     Vitals:  ============  T(F): 98.3 (13 Aug 2024 01:04), Max: 98.3 (13 Aug 2024 01:04)  HR: 99 (13 Aug 2024 01:04)  BP: 104/60 (13 Aug 2024 01:04)  RR: 18 (13 Aug 2024 01:04)  SpO2: 100% (13 Aug 2024 01:04) (95% - 100%)  temp max in last 48H T(F): , Max: 99 (08-11-24 @ 04:57)    LABS:                         8.2    8.04  )-----------( 312      ( 12 Aug 2024 17:26 )             26.3     08-12    137  |  101  |  18  ----------------------------<  219<H>  4.2   |  23  |  0.84    Ca    8.2<L>      12 Aug 2024 07:34  Phos  2.6     08-12  Mg     2.0     08-12      PT/INR - ( 11 Aug 2024 05:40 )   PT: 15.1 sec;   INR: 1.45 ratio         PTT - ( 13 Aug 2024 02:53 )  PTT:38.4 sec  Urinalysis Basic - ( 12 Aug 2024 07:34 )    Color: x / Appearance: x / SG: x / pH: x  Gluc: 219 mg/dL / Ketone: x  / Bili: x / Urobili: x   Blood: x / Protein: x / Nitrite: x   Leuk Esterase: x / RBC: x / WBC x   Sq Epi: x / Non Sq Epi: x / Bacteria: x            RADIOLOGY, EKG & ADDITIONAL TESTS: Reviewed.

## 2024-08-13 NOTE — PROGRESS NOTE ADULT - ASSESSMENT
"Anesthesia Transfer of Care Note    Patient: Cruz Nieto    Procedure(s) Performed: Procedure(s) (LRB):  CYSTOSCOPY, WITH URETERAL STENT INSERTION (Right)    Patient location: PACU    Anesthesia Type: general    Transport from OR: Transported from OR on 6-10 L/min O2 by face mask with adequate spontaneous ventilation    Post pain: adequate analgesia    Post assessment: no apparent anesthetic complications and tolerated procedure well    Post vital signs: stable    Level of consciousness: awake    Nausea/Vomiting: no nausea/vomiting    Complications: none    Transfer of care protocol was followed      Last vitals:   Visit Vitals  BP (!) 159/95 (BP Location: Right arm, Patient Position: Lying)   Pulse 77   Temp 37 °C (98.6 °F) (Temporal)   Resp 17   Ht 5' 10" (1.778 m)   Wt 81.2 kg (179 lb)   SpO2 99%   BMI 25.68 kg/m²     " 85 y/o male w/ PMHx of HTN, HLD, Type 2 Diabetes, CAD s/p CABG x3/bioprosthetic AVR 2011 at [facility] and subsequent PCI 5/2019, s/p TAVR 7/24, on Eliquis, presents to the ER with son for right groin infection. states TAVR was done through right groin. states has been at rehab facility and noticed groin became swollen. states then developed drainage and erythema to the area. states had complication during procedure and entered left groin as well. states noticed left side also swollen but not as bad. patient denies f/n/v/d, CP, SOB, HA, dizziness, abdominal pain.

## 2024-08-13 NOTE — PROGRESS NOTE ADULT - PROBLEM SELECTOR PLAN 1
Will adjust Lantus to 18 units at bed time. - dose adjusted for NPO  Will increase Admelog to 10 units before each meal in addition to Admelog correction scale coverage.  Will continue monitoring FS, log, and glucose trends, will Follow up.  Patient counseled for compliance with consistent low carb diet and exercise as tolerated outpatient.

## 2024-08-13 NOTE — CHART NOTE - NSCHARTNOTEFT_GEN_A_CORE
Surgeon: Jillian   Procedure: R groin washout, debridement, possible left groin washout, possible vac, possible muscle flap    Vital Signs Last 24 Hrs  T(C): 36.4 (13 Aug 2024 10:26), Max: 36.8 (13 Aug 2024 01:04)  T(F): 97.5 (13 Aug 2024 10:26), Max: 98.3 (13 Aug 2024 01:04)  HR: 100 (13 Aug 2024 10:26) (82 - 100)  BP: 109/57 (13 Aug 2024 10:26) (104/60 - 131/93)  BP(mean): --  RR: 18 (13 Aug 2024 10:26) (17 - 18)  SpO2: 97% (13 Aug 2024 10:26) (95% - 100%)    Parameters below as of 13 Aug 2024 10:26  Patient On (Oxygen Delivery Method): room air                            8.0    7.55  )-----------( 352      ( 13 Aug 2024 11:18 )             27.9     08-13    137  |  102  |  16  ----------------------------<  210<H>  4.2   |  24  |  0.76    Ca    8.2<L>      13 Aug 2024 08:52  Phos  2.2     08-13  Mg     1.9     08-13    TPro  5.6<L>  /  Alb  2.4<L>  /  TBili  0.3  /  DBili  x   /  AST  15  /  ALT  15  /  AlkPhos  73  08-13    PTT - ( 13 Aug 2024 02:53 )  PTT:38.4 sec  Daily     Daily     EKG: last done 08/08/24, no significant change from prior  CXR: last 07/31/24  Type and Screen: will obtain pre op        A/P: 84M Hx of HTN, HLD, L carotid stenosis s/p CEA, T2DM, CAD S/p CABG AVR with subsequent valve in valve TAVR via b/l femoral artery access c/b failed closure device 7/24 C/b RLE acute limb ischemia requiring R ileofemoral bypass w/ PTFE and RLE angiogram followed by LLE ALI RTOR for L SFA interposition bypass with reversed ipsilateral GSV 7/24. Pt was discharged to rehab and then sent back to [facility] for concerns of groin infection and was admitted on  8/8.  Pt now s/p R groin washout, debridement of fat necrosis and coverage with sartorius flap on 8/10 and placement of wound vac.  In OR no obvious purulence/ since of infection surrounding PTFE grft but with diffuse fat necrosis and non-viable tissue.      Plan:  - OR 08/13/24 for R groin washout and debridement with Dr. Walsh   - NPO past midnight, except medications  - hold hep gtt at 8/14 0930  - IVF while NPO  - Consent signed and in chart  - Medical clearance for OR

## 2024-08-13 NOTE — PROGRESS NOTE ADULT - ASSESSMENT
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 Pt was discharged to rehab and then sent back to [facility] for concerns of groin infection and was admitted on  8/8.  Pt now s/p R groin washout, debridement of fat necrosis and coevrage with sartorius flap on 8/10 and placement of wound vac.  In OR no obvious purulence/ since of infection surrounding PTFE grft but with diffuse fat necrosis and non-viable tissue.    Plan:  - vac changed at bedside 8/12  - If any concerns for expanding groin hematoma or profuse bleeding from groin should remove vac immediately to evaluate wound  - Plan for RTOR Wednesday for possible coverage vs additional debridement, please stop hep gtt at 0930 8/14    PRS  [id number]   84M Hx of HTN, HLD, L carotid stenosis s/p CEA, T2DM, CAD S/p CABG AVR with subsequent valve in valve TAVR via b/l femoral artery access c/b failed closure device 7/24 C/b RLE acute limb ischemia requiring R ileofemoral bypass w/ PTFE and RLE angiogram followed by LLE ALI RTOR for L SFA interposition bypass with reversed ipsilateral GSV 7/24. Pt was discharged to rehab and then sent back to [facility] for concerns of groin infection and was admitted on  8/8.  Pt now s/p R groin washout, debridement of fat necrosis and coevrage with sartorius flap on 8/10 and placement of wound vac.  In OR no obvious purulence/ since of infection surrounding PTFE grft but with diffuse fat necrosis and non-viable tissue.    Plan:  - vac changed at bedside 8/12  - If any concerns for expanding groin hematoma or profuse bleeding from groin should remove vac immediately to evaluate wound  - Plan for RTOR Wednesday for additional debridement, please stop hep gtt at 0930 8/14    PRS  [id number]

## 2024-08-13 NOTE — PROGRESS NOTE ADULT - SUBJECTIVE AND OBJECTIVE BOX
Plastic Surgery Progress Note (pg [facility]: [id number], [facility]: [phone])    SUBJECTIVE  The patient was seen and examined.     OBJECTIVE  ___________________________________________________  VITAL SIGNS / I&O's   Vital Signs Last 24 Hrs  T(C): 36.7 (13 Aug 2024 06:19), Max: 36.8 (13 Aug 2024 01:04)  T(F): 98.1 (13 Aug 2024 06:19), Max: 98.3 (13 Aug 2024 01:04)  HR: 97 (13 Aug 2024 06:19) (82 - 99)  BP: 118/73 (13 Aug 2024 06:19) (104/60 - 131/93)  BP(mean): --  RR: 17 (13 Aug 2024 06:19) (17 - 18)  SpO2: 99% (13 Aug 2024 06:19) (95% - 100%)    Parameters below as of 13 Aug 2024 06:19  Patient On (Oxygen Delivery Method): room air          12 Aug 2024 07:01  -  13 Aug 2024 07:00  --------------------------------------------------------  IN:    Heparin: 125 mL    IV PiggyBack: 250 mL    Oral Fluid: 800 mL  Total IN: 1175 mL    OUT:    VAC (Vacuum Assisted Closure) System (mL): 100 mL    Voided (mL): 451 mL  Total OUT: 551 mL    Total NET: 624 mL        ___________________________________________________  PHYSICAL EXAM    GEN: NAD, resting quietly  GROIN: R groin with wound vac in place, holding suction, L groin dressing cdi with mild surrounding induration     ___________________________________________________  LABS                        8.2    8.04  )-----------( 312      ( 12 Aug 2024 17:26 )             26.3     13 Aug 2024 08:52    137    |  102    |  16     ----------------------------<  210    4.2     |  24     |  0.76     Ca    8.2        13 Aug 2024 08:52  Phos  2.2       13 Aug 2024 08:52  Mg     1.9       13 Aug 2024 08:52    TPro  5.6    /  Alb  2.4    /  TBili  0.3    /  DBili  x      /  AST  15     /  ALT  15     /  AlkPhos  73     13 Aug 2024 08:52    PTT - ( 13 Aug 2024 02:53 )  PTT:38.4 sec  CAPILLARY BLOOD GLUCOSE      POCT Blood Glucose.: 256 mg/dL (13 Aug 2024 09:03)  POCT Blood Glucose.: 178 mg/dL (12 Aug 2024 22:25)  POCT Blood Glucose.: 217 mg/dL (12 Aug 2024 17:40)  POCT Blood Glucose.: 306 mg/dL (12 Aug 2024 12:34)        Urinalysis Basic - ( 13 Aug 2024 08:52 )    Color: x / Appearance: x / SG: x / pH: x  Gluc: 210 mg/dL / Ketone: x  / Bili: x / Urobili: x   Blood: x / Protein: x / Nitrite: x   Leuk Esterase: x / RBC: x / WBC x   Sq Epi: x / Non Sq Epi: x / Bacteria: x      ___________________________________________________  MICRO  Recent Cultures:  Specimen Source: .Surgical Swab right groin deep wound, 08-11 @ 15:00; Results   Moderate Staphylococcus epidermidis<!>; Gram Stain: --; Organism: --  Specimen Source: Clean Catch Clean Catch (Midstream), 08-08 @ 15:37; Results   <10,000 CFU/mL Normal Urogenital Lashell; Gram Stain: --; Organism: --  Specimen Source: .Other Wound right groin, 08-08 @ 14:34; Results   Moderate Enterococcus faecalis  Moderate Staphylococcus epidermidis "Susceptibilities not performed"<!>; Gram Stain: --; Organism: Enterococcus faecalis<!>  Specimen Source: .Blood Blood, 08-08 @ 13:00; Results   No growth at 4 days; Gram Stain: --; Organism: --  Specimen Source: .Blood Blood, 08-08 @ 12:00; Results   No growth at 4 days; Gram Stain: --; Organism: --    ___________________________________________________  MEDICATIONS  (STANDING):  acetaminophen     Tablet .. 650 milliGRAM(s) Oral every 6 hours  aspirin  chewable 81 milliGRAM(s) Oral daily  atorvastatin 80 milliGRAM(s) Oral at bedtime  dextrose 5%. 1000 milliLiter(s) (50 mL/Hr) IV Continuous <Continuous>  dextrose 50% Injectable 25 Gram(s) IV Push once  glucagon  Injectable 1 milliGRAM(s) IntraMuscular once  heparin  Infusion 500 Unit(s)/Hr (9 mL/Hr) IV Continuous <Continuous>  insulin glargine Injectable (LANTUS) 24 Unit(s) SubCutaneous at bedtime  insulin lispro (ADMELOG) corrective regimen sliding scale   SubCutaneous at bedtime  insulin lispro (ADMELOG) corrective regimen sliding scale   SubCutaneous three times a day before meals  insulin lispro Injectable (ADMELOG) 10 Unit(s) SubCutaneous three times a day before meals  melatonin 3 milliGRAM(s) Oral at bedtime  metoprolol tartrate 50 milliGRAM(s) Oral every 12 hours  pantoprazole    Tablet 40 milliGRAM(s) Oral before breakfast  silver nitrate Applicator 1 Application(s) Topical once  tamsulosin 0.4 milliGRAM(s) Oral at bedtime  vancomycin  IVPB 750 milliGRAM(s) IV Intermittent every 12 hours    MEDICATIONS  (PRN):  dextrose Oral Gel 15 Gram(s) Oral once PRN Blood Glucose LESS THAN 70 milliGRAM(s)/deciliter

## 2024-08-13 NOTE — PROGRESS NOTE ADULT - ASSESSMENT
84M with hx CAD s/p CABG, DM, AVR 2016, CEA.   Recently admitted for TAVR 7/24/2024.  Course complicated by acute limb ischemia on the same day - s/p R iliofemoral bypass w/ PTFE and RLE angiogram. Post-op, patient w/ LLE ALI RTOR 7/25 s/p L SFA interposition bypass w/ reversed ipsilateral GSV.   Pt was discharged to rehab but noted to have open right groin wound with drainage. No fever ro chills     Right groin infected femoral site with underlying graft.  BC so far negative.  Wound culture so far E feacalis and Staph epi.  Plan is for OR today    Underlying AVR  At risk for PVE  CT reviewed large hematoma in left groin / severe stenosis of fem artery   s/p debridement and flap on the right  left wd without drainage         Suggest:  vanco increased to 750 q 12  all cultures  are positive for CNS  for additional surgery needs picc

## 2024-08-14 ENCOUNTER — APPOINTMENT (OUTPATIENT)
Dept: PLASTIC SURGERY | Facility: HOSPITAL | Age: 84
End: 2024-08-14

## 2024-08-14 LAB
ANION GAP SERPL CALC-SCNC: 11 MMOL/L — SIGNIFICANT CHANGE UP (ref 5–17)
APTT BLD: 77.7 SEC — HIGH (ref 24.5–35.6)
APTT BLD: 78 SEC — HIGH (ref 24.5–35.6)
BLD GP AB SCN SERPL QL: NEGATIVE — SIGNIFICANT CHANGE UP
BUN SERPL-MCNC: 16 MG/DL — SIGNIFICANT CHANGE UP (ref 7–23)
CALCIUM SERPL-MCNC: 8.3 MG/DL — LOW (ref 8.4–10.5)
CHLORIDE SERPL-SCNC: 104 MMOL/L — SIGNIFICANT CHANGE UP (ref 96–108)
CO2 SERPL-SCNC: 24 MMOL/L — SIGNIFICANT CHANGE UP (ref 22–31)
CREAT SERPL-MCNC: 0.81 MG/DL — SIGNIFICANT CHANGE UP (ref 0.5–1.3)
EGFR: 88 ML/MIN/1.73M2 — SIGNIFICANT CHANGE UP
GLUCOSE BLDC GLUCOMTR-MCNC: 117 MG/DL — HIGH (ref 70–99)
GLUCOSE BLDC GLUCOMTR-MCNC: 119 MG/DL — HIGH (ref 70–99)
GLUCOSE BLDC GLUCOMTR-MCNC: 147 MG/DL — HIGH (ref 70–99)
GLUCOSE SERPL-MCNC: 104 MG/DL — HIGH (ref 70–99)
HCT VFR BLD CALC: 23 % — LOW (ref 39–50)
HCT VFR BLD CALC: 33.1 % — LOW (ref 39–50)
HGB BLD-MCNC: 10.1 G/DL — LOW (ref 13–17)
HGB BLD-MCNC: 7 G/DL — CRITICAL LOW (ref 13–17)
INR BLD: 1.15 RATIO — SIGNIFICANT CHANGE UP (ref 0.85–1.18)
MAGNESIUM SERPL-MCNC: 1.9 MG/DL — SIGNIFICANT CHANGE UP (ref 1.6–2.6)
MCHC RBC-ENTMCNC: 27.8 PG — SIGNIFICANT CHANGE UP (ref 27–34)
MCHC RBC-ENTMCNC: 29 PG — SIGNIFICANT CHANGE UP (ref 27–34)
MCHC RBC-ENTMCNC: 30.4 GM/DL — LOW (ref 32–36)
MCHC RBC-ENTMCNC: 30.5 GM/DL — LOW (ref 32–36)
MCV RBC AUTO: 91.2 FL — SIGNIFICANT CHANGE UP (ref 80–100)
MCV RBC AUTO: 95.4 FL — SIGNIFICANT CHANGE UP (ref 80–100)
NRBC # BLD: 0 /100 WBCS — SIGNIFICANT CHANGE UP (ref 0–0)
NRBC # BLD: 0 /100 WBCS — SIGNIFICANT CHANGE UP (ref 0–0)
PHOSPHATE SERPL-MCNC: 2.8 MG/DL — SIGNIFICANT CHANGE UP (ref 2.5–4.5)
PLATELET # BLD AUTO: 276 K/UL — SIGNIFICANT CHANGE UP (ref 150–400)
PLATELET # BLD AUTO: 288 K/UL — SIGNIFICANT CHANGE UP (ref 150–400)
POTASSIUM SERPL-MCNC: 3.9 MMOL/L — SIGNIFICANT CHANGE UP (ref 3.5–5.3)
POTASSIUM SERPL-SCNC: 3.9 MMOL/L — SIGNIFICANT CHANGE UP (ref 3.5–5.3)
PROTHROM AB SERPL-ACNC: 12.6 SEC — SIGNIFICANT CHANGE UP (ref 9.5–13)
RBC # BLD: 2.41 M/UL — LOW (ref 4.2–5.8)
RBC # BLD: 3.63 M/UL — LOW (ref 4.2–5.8)
RBC # FLD: 16.6 % — HIGH (ref 10.3–14.5)
RBC # FLD: 19.9 % — HIGH (ref 10.3–14.5)
RH IG SCN BLD-IMP: POSITIVE — SIGNIFICANT CHANGE UP
SODIUM SERPL-SCNC: 139 MMOL/L — SIGNIFICANT CHANGE UP (ref 135–145)
VANCOMYCIN TROUGH SERPL-MCNC: 8.4 UG/ML — LOW (ref 10–20)
WBC # BLD: 4.51 K/UL — SIGNIFICANT CHANGE UP (ref 3.8–10.5)
WBC # BLD: 6.04 K/UL — SIGNIFICANT CHANGE UP (ref 3.8–10.5)
WBC # FLD AUTO: 4.51 K/UL — SIGNIFICANT CHANGE UP (ref 3.8–10.5)
WBC # FLD AUTO: 6.04 K/UL — SIGNIFICANT CHANGE UP (ref 3.8–10.5)

## 2024-08-14 PROCEDURE — 99232 SBSQ HOSP IP/OBS MODERATE 35: CPT

## 2024-08-14 PROCEDURE — 97605 NEG PRS WND THER DME<=50SQCM: CPT

## 2024-08-14 PROCEDURE — 11043 DBRDMT MUSC&/FSCA 1ST 20/<: CPT

## 2024-08-14 RX ORDER — DEXTROSE 15 G/33 G
12.5 GEL IN PACKET (GRAM) ORAL ONCE
Refills: 0 | Status: DISCONTINUED | OUTPATIENT
Start: 2024-08-14 | End: 2024-08-20

## 2024-08-14 RX ORDER — ASPIRIN 81 MG
81 TABLET, DELAYED RELEASE (ENTERIC COATED) ORAL DAILY
Refills: 0 | Status: DISCONTINUED | OUTPATIENT
Start: 2024-08-14 | End: 2024-08-20

## 2024-08-14 RX ORDER — DEXTROSE 15 G/33 G
25 GEL IN PACKET (GRAM) ORAL ONCE
Refills: 0 | Status: DISCONTINUED | OUTPATIENT
Start: 2024-08-14 | End: 2024-08-20

## 2024-08-14 RX ORDER — VANCOMYCIN/0.9 % SOD CHLORIDE 1.75G/25
1000 PLASTIC BAG, INJECTION (ML) INTRAVENOUS EVERY 12 HOURS
Refills: 0 | Status: DISCONTINUED | OUTPATIENT
Start: 2024-08-14 | End: 2024-08-20

## 2024-08-14 RX ORDER — DEXTROSE 15 G/33 G
15 GEL IN PACKET (GRAM) ORAL ONCE
Refills: 0 | Status: DISCONTINUED | OUTPATIENT
Start: 2024-08-14 | End: 2024-08-20

## 2024-08-14 RX ORDER — FENTANYL CITRATE 50 UG/ML
50 INJECTION INTRAMUSCULAR; INTRAVENOUS
Refills: 0 | Status: DISCONTINUED | OUTPATIENT
Start: 2024-08-14 | End: 2024-08-14

## 2024-08-14 RX ORDER — INSULIN GLARGINE 100 [IU]/ML
18 INJECTION, SOLUTION SUBCUTANEOUS AT BEDTIME
Refills: 0 | Status: DISCONTINUED | OUTPATIENT
Start: 2024-08-14 | End: 2024-08-16

## 2024-08-14 RX ORDER — ONDANSETRON 2 MG/ML
4 INJECTION, SOLUTION INTRAMUSCULAR; INTRAVENOUS ONCE
Refills: 0 | Status: DISCONTINUED | OUTPATIENT
Start: 2024-08-14 | End: 2024-08-14

## 2024-08-14 RX ORDER — SILVER NITRATE 38.21; 12.74 MG/1; MG/1
1 STICK TOPICAL ONCE
Refills: 0 | Status: DISCONTINUED | OUTPATIENT
Start: 2024-08-14 | End: 2024-08-20

## 2024-08-14 RX ORDER — VANCOMYCIN/0.9 % SOD CHLORIDE 1.75G/25
1000 PLASTIC BAG, INJECTION (ML) INTRAVENOUS EVERY 12 HOURS
Refills: 0 | Status: DISCONTINUED | OUTPATIENT
Start: 2024-08-14 | End: 2024-08-14

## 2024-08-14 RX ORDER — OXYCODONE HYDROCHLORIDE 5 MG/1
5 TABLET ORAL ONCE
Refills: 0 | Status: DISCONTINUED | OUTPATIENT
Start: 2024-08-14 | End: 2024-08-14

## 2024-08-14 RX ORDER — HYDROMORPHONE HYDROCHLORIDE 2 MG/1
0.5 TABLET ORAL
Refills: 0 | Status: DISCONTINUED | OUTPATIENT
Start: 2024-08-14 | End: 2024-08-14

## 2024-08-14 RX ORDER — HEPARIN SODIUM,BOVINE 1000/ML
500 VIAL (ML) INJECTION
Qty: 25000 | Refills: 0 | Status: DISCONTINUED | OUTPATIENT
Start: 2024-08-14 | End: 2024-08-19

## 2024-08-14 RX ADMIN — ACETAMINOPHEN 650 MILLIGRAM(S): 325 TABLET ORAL at 05:13

## 2024-08-14 RX ADMIN — INSULIN GLARGINE 18 UNIT(S): 100 INJECTION, SOLUTION SUBCUTANEOUS at 23:01

## 2024-08-14 RX ADMIN — Medication 81 MILLIGRAM(S): at 18:04

## 2024-08-14 RX ADMIN — ACETAMINOPHEN 650 MILLIGRAM(S): 325 TABLET ORAL at 00:43

## 2024-08-14 RX ADMIN — METOPROLOL TARTRATE 50 MILLIGRAM(S): 100 TABLET ORAL at 05:15

## 2024-08-14 RX ADMIN — Medication 40 MILLIGRAM(S): at 05:13

## 2024-08-14 RX ADMIN — Medication 250 MILLIGRAM(S): at 18:02

## 2024-08-14 NOTE — PROGRESS NOTE ADULT - ASSESSMENT
85 y/o Male with PMHx of HTN, HLD, L carotid stenosis s/p CEA, T2DM, CAD S/p CABG, AVR with subsequent valve in valve TAVR via b/l femoral artery access c/b failed closure device 7/24 C/b RLE acute limb ischemia requiring R ileofemoral bypass w/ PTFE and RLE angiogram followed by LLE ALI RTOR for L SFA interposition bypass with reversed ipsilateral GSV 7/24, who went to rehab facility and subsequently developed bilateral groin incisional infections with R incisional dehiscence. CTA showed a left groin hematoma unchanged from prior imaging, non-expanding in size.   Now s/p exploration of right groin, fat and skin debridement, sartorius muscle flap, wound vac placement 8/10.    PLAN:  - Appreciate plastic sx recs: RTOR Wednesday (8/14) for additional debridement; please stop hep gtt at 0930 8/14  - Appreciate ID recs: IV vanco increased to 750 q12h  - Appreciate endo recs for glucose control  - Pain and anti-emetic PRN  - VTE ppx: Hep gtt      Vascular Surgery  [id number] 83 y/o Male with PMHx of HTN, HLD, L carotid stenosis s/p CEA, T2DM, CAD S/p CABG, AVR with subsequent valve in valve TAVR via b/l femoral artery access c/b failed closure device 7/24 C/b RLE acute limb ischemia requiring R ileofemoral bypass w/ PTFE and RLE angiogram followed by LLE ALI RTOR for L SFA interposition bypass with reversed ipsilateral GSV 7/24, who went to rehab facility and subsequently developed bilateral groin incisional infections with R incisional dehiscence. CTA showed a left groin hematoma unchanged from prior imaging, non-expanding in size.   Now s/p exploration of right groin, fat and skin debridement, sartorius muscle flap, wound vac placement 8/10. Patient with a Hgb of 7 this morning, heparin was held.     PLAN:  - RTOR today (8/14) for additional debridement, possible muscle flap hep gtt held  - f/u plastics recs  - Transfuse 1uPRBC.   - Patient NPO  - Appreciate ID recs: IV vanco increased to 750 q12h  - Appreciate endo recs for glucose control  - Pain and anti-emetic PRN  - VTE ppx: Hep gtt    Vascular Surgery  [id number] 85 y/o Male with PMHx of HTN, HLD, L carotid stenosis s/p CEA, T2DM, CAD S/p CABG, AVR with subsequent valve in valve TAVR via b/l femoral artery access c/b failed closure device 7/24 C/b RLE acute limb ischemia requiring R ileofemoral bypass w/ PTFE and RLE angiogram followed by LLE ALI RTOR for L SFA interposition bypass with reversed ipsilateral GSV 7/24, who went to rehab facility and subsequently developed bilateral groin incisional infections with R incisional dehiscence. CTA showed a left groin hematoma unchanged from prior imaging, non-expanding in size.   Now s/p exploration of right groin, fat and skin debridement, sartorius muscle flap, wound vac placement 8/10. Patient found to have a Hgb of 7 this morning on pre-op labs, heparin was held. Patient HDS, asymptomatic.    PLAN:  - RTOR today (8/14) for additional debridement, possible muscle flap hep gtt held  - f/u plastics recs  - Transfuse 1uPRBC, f/u CBC.   - Patient NPO  - Appreciate ID recs: IV vanco increased to 750 q12h  - Appreciate endo recs for glucose control  - Pain and anti-emetic PRN  - VTE ppx: Hep gtt    Vascular Surgery  [id number]

## 2024-08-14 NOTE — PROGRESS NOTE ADULT - SUBJECTIVE AND OBJECTIVE BOX
Chief complaint  Patient is a 84y old  Male who presents with a chief complaint of RLE wound infection (14 Aug 2024 09:12)         Labs and Fingersticks  CAPILLARY BLOOD GLUCOSE      POCT Blood Glucose.: 117 mg/dL (14 Aug 2024 06:55)  POCT Blood Glucose.: 119 mg/dL (14 Aug 2024 00:09)  POCT Blood Glucose.: 110 mg/dL (13 Aug 2024 21:34)  POCT Blood Glucose.: 127 mg/dL (13 Aug 2024 17:02)      Anion Gap: 11 (08-14 @ 06:03)  Anion Gap: 11 (08-13 @ 08:52)      Calcium: 8.3 *L* (08-14 @ 06:03)  Calcium: 8.2 *L* (08-13 @ 08:52)  Albumin: 2.4 *L* (08-13 @ 08:52)    Alanine Aminotransferase (ALT/SGPT): 15 (08-13 @ 08:52)  Alkaline Phosphatase: 73 (08-13 @ 08:52)  Aspartate Aminotransferase (AST/SGOT): 15 (08-13 @ 08:52)        08-14    139  |  104  |  16  ----------------------------<  104<H>  3.9   |  24  |  0.81    Ca    8.3<L>      14 Aug 2024 06:03  Phos  2.8     08-14  Mg     1.9     08-14    TPro  5.6<L>  /  Alb  2.4<L>  /  TBili  0.3  /  DBili  x   /  AST  15  /  ALT  15  /  AlkPhos  73  08-13                        7.0    4.51  )-----------( 276      ( 14 Aug 2024 06:03 )             23.0     Medications  MEDICATIONS  (STANDING):  lactated ringers. 1000 milliLiter(s) (75 mL/Hr) IV Continuous <Continuous>      Physical Exam  General: Patient comfortable in bed   Vital Signs Last 12 Hrs  T(F): 97.9 (08-14-24 @ 13:15), Max: 97.9 (08-14-24 @ 05:13)  HR: 94 (08-14-24 @ 14:30) (89 - 107)  BP: 169/74 (08-14-24 @ 14:30) (105/82 - 176/70)  BP(mean): 106 (08-14-24 @ 14:30) (87 - 106)  RR: 16 (08-14-24 @ 14:30) (16 - 18)  SpO2: 98% (08-14-24 @ 14:30) (92% - 99%)    CVS: S1S2   Respiratory: No wheezing, no crepitations  GI: Abdomen soft, bowel sounds positive  Musculoskeletal:  moves all extremities  : Voiding

## 2024-08-14 NOTE — BRIEF OPERATIVE NOTE - NSICDXBRIEFPROCEDURE_GEN_ALL_CORE_FT
PROCEDURES:  Washout, wound, abdomen 14-Aug-2024 13:24:47  Shawn Vidales  
PROCEDURES:  Flap, muscle, sartorius 10-Aug-2024 22:54:06  Chuy Contreras

## 2024-08-14 NOTE — PRE-OP CHECKLIST - AICD PRESENT
[x][address]              Outpatient Pediatric Rehab Dept                                Outpatient Pediatric Rehab Dept              [phone], [address], [address] [facility],Third Floor                                             [address]              [phone]              Fax [phone]                                                    Fax: [phone] [facility] THERAPY EVALUATION     Patient Name: Irma Veiira                                  MR#: [id number]  Patient : 2014                                           Referring Physician: REINALDO Carter  Date of Evaluation: 18                                                                                                 Referring Diagnosis and ICD 10: Speech Delay (F80.9)           Date of Onset: ~ 7 months old   Treatment Diagnosis and ICD 10: Mixed Receptive-Expressive Disorder (F80.2)      SUBJECTIVE     Reason for Referral: Daria Toribio was referred to [facility]) Pediatric Rehabilitation due to concerns regarding his language development. Patient was accompanied to this initial evaluation by: Daniella Simon, mother                Caregiver Primary Concerns: Mother is concerned that Daria Toribio is about to turn [age] years old and only uses ~four words (\"mama\", \"justino\", \"papa\", and \"moo\"). Goals: Mother would like Daria Toribio to build his vocabulary and be able to communicate his wants/needs. Medical History: Daria Toribio has chronic ear infections. Tubes were placed in his ears ~4 months ago.        Pregnancy/Birth History:  [x]  Full Term     []  Premature     [] Caesarian                                             [] Complications     Developmental Milestones: Per scores:  Receptive Language: 85  Expressive Language: <55  Language Ability: 64    Guidelines for Interpreting the REEL-3 Ability Scores:  >130:       Very Superior  121-130:  Superior  111-120:  Above Average  :    Average  80-89:      Below Average  70-79:      Poor  <70:         Very Poor    Results of the REEL-3 indicate Celys receptive language abilities fall below average. His expressive language and overall language abilities can be considered very poor. Based upon results of this examination and clinical observations, Abhilash Jenkins presents with a mixed receptive-expressive language disorder when compared to his same-age peers. His expressive language skills fall below his receptive language skills. Per Awais's mother, Abhilash Jenkins expressively uses ~4 words (\"mama\", \"justnio\", \"papa\", and \"moo\") and one sign (\"more\"). During this evaluation, Abhilash Jenkins made eye contact with the clinician, smiled, and exhibited cooing of vowel sounds. Speech therapy is warranted at this time. At the age of 22 months, Abhilash Jenkins should be exhibiting the following abilities:   18-24 months  In this age range, little ones are learning words very quickly. Children should be using most of these sounds: b, m, p, n, t, d, k, g, f, ng, and s. At this stage, children:   Follow 2-step directions  e.g. go find your katie and show it to [employer] Use two pronouns  e.g. you, me, mine   Use two prepositions (i.e., on, in)   Puts two words together (\"more cookie,\" \"no juice,\" \"mommy book\").  Combine 2 words in short phrases  \"car go\"   Have speech that is easily understood at least half the time   Use words and sounds easily and effortlessly   Say more words every month   Begin to use simple, short questions (i.e., \"What's that?\", \"Where's mama? \")   Has an expressive vocabulary of at least 50 words      Based upon basal and ceiling criteria, it is assumed that any skill listed before 5 consecutive \"yes\" responses are perform    [x]unable to perform     []unknown/ not observed   When your baby makes \"ooh\" and \"aah\" sounds, do they sometimes start with other sounds like \"t-ah\", \"n-ah\", \"p-ah\", and \"k-ah\"? [] able to perform    [x]unable to perform     []unknown/ not observed   Does your baby use the same word forms consistently so that you recognize that she or he associates them with certain situations, such as when she or he wants water or a toy? [] able to perform    [x]unable to perform     []unknown/ not observed      The following receptive strengths and weaknesses were reported or observed:  Age Range/ Skill: Parent Report/ Observation:   13-18 months    Does your toddler appear to understand new words each week? [x]able to perform     []unable to perform     []unknown/ not observed   Can you tell that your toddler usually recognizes the moods of most speakers? Examples: sad versus happy, serious versus humorous [x]able to perform     []unable to perform     []unknown/ not observed   Does your toddler point to many different objects, or pictures of objects, when someone names them? [x]able to perform     []unable to perform     []unknown/ not observed   Does your toddler carry out a two-step request, like Please go to your room and bring back a diaper, or  your ball and give it to me?  [x]able to perform     []unable to perform     []unknown/ not observed   When you announce familiar routines such as Snack time!  or Bath time!, does your toddler seem to anticipate what is going to happen? [x]able to perform     []unable to perform     []unknown/ not observed   When someone names major body parts such as hands, legs, feet, and nose, does your toddler point to these on her or his own body? []able to perform     [x]unable to perform     []unknown/ not observed   19-24 months    If you talk to your toddler about a toy that is in another room, does she or he know what you mean?  [x]able to perform     []unable to Alternative Communication Device   [] Therapeutic Services for the use of Speech-Generating Device. [] Other: It is recommended that Tsering Garcia be seen 2 time(s) per week 12 weeks to address the following goals:     STGs:   1. Tsering Garcia will produce early-developing phonemes (b, m, p, n, t, d, k, g, f, h, y, w) and word aproximations spontaneously or through imitation in 80% of opportunities given maximum modeling and verbal prompting in 3/4 sessions. Jay [address] will imitate use of appropriate gesture/ baby sign to request desired toy in 4/5 opportunities per session. Jose Antonio Root will spontaneously use or imitate exclamatory expressions (\"Uh oh!\", \"Oh no!\", \"No-no! \") or animal sounds/environmental noises (i.e. \"moo\", \"neigh\", \"beep beep\") during structured play in 80% of opportunities given maximum verbal modeling and prompting in 3/4 sessions. Jay Mcbride will point receptively identify 80% of common items and/or actions utilizing picture cards, books, items, etc., given moderate verbal cues from SLP in 3/4 sessions. LTGs:    1. Tsering Garcia will demonstrate age appropriate language skills to increase overall communication. This plan was reviewed with the patient/family and they were in agreement. Duration of therapy is based on many variables including patients attendance, motivation, learning capacity, physiological status, and follow-through with home programming. Results of this eval were discussed with his caregivers. Response to results were positive. The following education was provided to the patient/family: Plan for treatment, Language Development education     Time in: 900  Time out: 1000  Total Time: 60 minutes     Therapist: Maxx Higuera MA, CF-SLP, Date: 8/1/2018, Time: 9:46AM      Marquis Hidalgo MS, CCC-SLP  Speech/Language Pathologist  8/2/2018  11:18 AM     Dear ROCIO Negrete-CHERYL,   Alex Grater has been evaluated for Speech Therapy services and for therapy to continue, insurance requires initial and periodic physician review of the treatment plan. Please review the above evaluation and verify that you agree therapy should continue by signing and faxing back to the number above. Physician Signature:______________________Date:______ Time:________  By signing above, therapists plan is approved by physician. no

## 2024-08-14 NOTE — PROGRESS NOTE ADULT - PROBLEM SELECTOR PLAN 1
pls resume insulins immediately postop - and when diet is advanced   Lantus to 18 units at bed time.    Admelog to 8 units before each meal in addition to LOW  Admelog correction scale coverage.  Will continue monitoring FS, log, and glucose trends, will Follow up.  Patient counseled for compliance with consistent low carb diet and exercise as tolerated outpatient.

## 2024-08-14 NOTE — PROGRESS NOTE ADULT - PROBLEM SELECTOR PLAN 1
Right groin infected femoral site with underlying graft. Follow up cultures and BC   s/p washout and fat necrosis removal, sartorius flap  VAC in place right groin, now with oozing of blood. To be addressed today by Vascular. Monitor Hgb   IV abx   plan RTOR today: acceptable cardiac risk to proceed  Plastics follow up

## 2024-08-14 NOTE — PROVIDER CONTACT NOTE (CRITICAL VALUE NOTIFICATION) - ACTION/TREATMENT ORDERED:
MD Luis Antonio Khanna & team made aware during morning roundss. Plan is to order/administer 1 unit PRBC.

## 2024-08-14 NOTE — PROGRESS NOTE ADULT - SUBJECTIVE AND OBJECTIVE BOX
Surgery Progress Note    SUBJECTIVE: Patient seen and examined at bedside. Patient has no complaints this morning. lying comfortably in bed.   --------------------------------------------------------------------------------------------------  OBJECTIVE:   Physical Exam:  General: AAOx3, NAD, lying comfortably in bed  HEENT: NC/AT  Respiratory: nonlabored breathing  Cardiovascular: RRR, normal S1 and S2, no murmurs or gallops  Abdomen: non-distended, soft, non-tender  Extremities: Right groin with vac on suction 75 mmHg. Left groin with staples intact, covered with gauze and tape, no strike through, c/d/i, erythematous.   --------------------------------------------------------------------------------------------------  V/S:  Vital Signs Last 24 Hrs  T(C): 36.6 (14 Aug 2024 05:13), Max: 36.7 (13 Aug 2024 21:31)  T(F): 97.9 (14 Aug 2024 05:13), Max: 98 (13 Aug 2024 21:31)  HR: 93 (14 Aug 2024 05:13) (92 - 103)  BP: 123/69 (14 Aug 2024 05:13) (106/52 - 127/70)  BP(mean): --  RR: 18 (14 Aug 2024 05:13) (18 - 18)  SpO2: 97% (14 Aug 2024 05:13) (95% - 99%)    Parameters below as of 14 Aug 2024 05:13  Patient On (Oxygen Delivery Method): room air        --------------------------------------------------------------------------------------------------  I/Os:    13 Aug 2024 07:01  -  14 Aug 2024 07:00  --------------------------------------------------------  IN:    Heparin: 45 mL    Heparin: 77 mL    IV PiggyBack: 250 mL    Oral Fluid: 560 mL  Total IN: 932 mL    OUT:    VAC (Vacuum Assisted Closure) System (mL): 100 mL    Voided (mL): 1900 mL  Total OUT: 2000 mL    Total NET: -1068 mL        --------------------------------------------------------------------------------------------------  LABS:                        7.0    4.51  )-----------( 276      ( 14 Aug 2024 06:03 )             23.0     14 Aug 2024 06:03    139    |  104    |  16     ----------------------------<  104    3.9     |  24     |  0.81     Ca    8.3        14 Aug 2024 06:03  Phos  2.8       14 Aug 2024 06:03  Mg     1.9       14 Aug 2024 06:03    TPro  5.6    /  Alb  2.4    /  TBili  0.3    /  DBili  x      /  AST  15     /  ALT  15     /  AlkPhos  73     13 Aug 2024 08:52    PT/INR - ( 14 Aug 2024 06:03 )   PT: 12.6 sec;   INR: 1.15 ratio         PTT - ( 14 Aug 2024 06:03 )  PTT:77.7 sec  CAPILLARY BLOOD GLUCOSE      POCT Blood Glucose.: 117 mg/dL (14 Aug 2024 06:55)  POCT Blood Glucose.: 119 mg/dL (14 Aug 2024 00:09)  POCT Blood Glucose.: 110 mg/dL (13 Aug 2024 21:34)  POCT Blood Glucose.: 127 mg/dL (13 Aug 2024 17:02)  POCT Blood Glucose.: 249 mg/dL (13 Aug 2024 12:34)  POCT Blood Glucose.: 256 mg/dL (13 Aug 2024 09:03)        LIVER FUNCTIONS - ( 13 Aug 2024 08:52 )  Alb: 2.4 g/dL / Pro: 5.6 g/dL / ALK PHOS: 73 U/L / ALT: 15 U/L / AST: 15 U/L / GGT: x             Culture - Surgical Swab (collected 11 Aug 2024 15:00)  Source: .Surgical Swab right groin #1  Preliminary Report (12 Aug 2024 22:22):    Moderate Staphylococcus epidermidis  Organism: Staphylococcus epidermidis (13 Aug 2024 18:09)  Organism: Staphylococcus epidermidis (13 Aug 2024 18:09)    Culture - Surgical Swab (collected 11 Aug 2024 15:00)  Source: .Surgical Swab right groin #2  Preliminary Report (12 Aug 2024 22:21):    Moderate Staphylococcus epidermidis  Organism: Staphylococcus epidermidis (13 Aug 2024 18:36)  Organism: Staphylococcus epidermidis (13 Aug 2024 18:36)    Culture - Surgical Swab (collected 11 Aug 2024 15:00)  Source: .Surgical Swab right groin deep wound  Preliminary Report (12 Aug 2024 22:21):    Moderate Staphylococcus epidermidis  Organism: Staphylococcus epidermidis (13 Aug 2024 18:37)  Organism: Staphylococcus epidermidis (13 Aug 2024 18:37)      Urinalysis Basic - ( 14 Aug 2024 06:03 )    Color: x / Appearance: x / SG: x / pH: x  Gluc: 104 mg/dL / Ketone: x  / Bili: x / Urobili: x   Blood: x / Protein: x / Nitrite: x   Leuk Esterase: x / RBC: x / WBC x   Sq Epi: x / Non Sq Epi: x / Bacteria: x      --------------------------------------------------------------------------------------------------  MEDICATIONS  (STANDING):  acetaminophen     Tablet .. 650 milliGRAM(s) Oral every 6 hours  aspirin  chewable 81 milliGRAM(s) Oral daily  atorvastatin 80 milliGRAM(s) Oral at bedtime  dextrose 5%. 1000 milliLiter(s) (50 mL/Hr) IV Continuous <Continuous>  dextrose 50% Injectable 25 Gram(s) IV Push once  glucagon  Injectable 1 milliGRAM(s) IntraMuscular once  insulin glargine Injectable (LANTUS) 18 Unit(s) SubCutaneous at bedtime  insulin lispro (ADMELOG) corrective regimen sliding scale   SubCutaneous three times a day before meals  insulin lispro (ADMELOG) corrective regimen sliding scale   SubCutaneous at bedtime  insulin lispro Injectable (ADMELOG) 10 Unit(s) SubCutaneous three times a day before meals  lactated ringers. 1000 milliLiter(s) (70 mL/Hr) IV Continuous <Continuous>  melatonin 3 milliGRAM(s) Oral at bedtime  metoprolol tartrate 50 milliGRAM(s) Oral every 12 hours  pantoprazole    Tablet 40 milliGRAM(s) Oral before breakfast  silver nitrate Applicator 1 Application(s) Topical once  tamsulosin 0.4 milliGRAM(s) Oral at bedtime  vancomycin  IVPB 1000 milliGRAM(s) IV Intermittent every 12 hours    MEDICATIONS  (PRN):  dextrose Oral Gel 15 Gram(s) Oral once PRN Blood Glucose LESS THAN 70 milliGRAM(s)/deciliter    --------------------------------------------------------------------------------------------------

## 2024-08-14 NOTE — PROGRESS NOTE ADULT - ASSESSMENT
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 CTA showed a left groin hematoma unchanged from prior imaging, non-expanding in size.   Now s/p exploration of right groin, fat and skin debridement, sartorius muscle flap, wound vac placement 8/10. Patient HDS overnight. Wound vac issues addressed by plastics in the am, now holding suction, kept suction at 75 mm Hg. Plastics planning to take patient back on Tuesday (8/13) for coverage vs debridement. Vac changed yesterday. Poor glucose control yesterday, endo consulted.       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 CTA showed a left groin hematoma unchanged from prior imaging, non-expanding in size.     Now s/p exploration of right groin, fat and skin debridement, sartorius muscle flap, wound vac placement 8/10. Patient HDS overnight. Wound vac issues addressed by plastics in the am, now holding suction, kept suction at 75 mm Hg. Drained 100cc over the past day. Plastics planning to take patient back today for coverage vs debridement.       Plan:  - due for Plastics debridement today  - Stop heparin at 930 AM; order in  - Endocrine following for elevated glucose; appreciate recs  - ID following; vanc 750 q12h  - DVT ppx: heparin gtt  - Home meds restarted  - Pain and anti-emetic PRN

## 2024-08-14 NOTE — PROGRESS NOTE ADULT - SUBJECTIVE AND OBJECTIVE BOX
infectious diseases progress note:    Patient is a 84y old  Male who presents with a chief complaint of RLE wound infection (14 Aug 2024 08:54)        Local infection of skin and subcutaneous tissue               Allergies    No Known Allergies    Intolerances        ANTIBIOTICS/RELEVANT:  antimicrobials  vancomycin  IVPB 1000 milliGRAM(s) IV Intermittent every 12 hours    immunologic:    OTHER:  acetaminophen     Tablet .. 650 milliGRAM(s) Oral every 6 hours  aspirin  chewable 81 milliGRAM(s) Oral daily  atorvastatin 80 milliGRAM(s) Oral at bedtime  dextrose 5%. 1000 milliLiter(s) IV Continuous <Continuous>  dextrose 50% Injectable 25 Gram(s) IV Push once  dextrose Oral Gel 15 Gram(s) Oral once PRN  glucagon  Injectable 1 milliGRAM(s) IntraMuscular once  insulin glargine Injectable (LANTUS) 18 Unit(s) SubCutaneous at bedtime  insulin lispro (ADMELOG) corrective regimen sliding scale   SubCutaneous at bedtime  insulin lispro (ADMELOG) corrective regimen sliding scale   SubCutaneous three times a day before meals  insulin lispro Injectable (ADMELOG) 7 Unit(s) SubCutaneous three times a day before meals  lactated ringers. 1000 milliLiter(s) IV Continuous <Continuous>  melatonin 3 milliGRAM(s) Oral at bedtime  metoprolol tartrate 50 milliGRAM(s) Oral every 12 hours  pantoprazole    Tablet 40 milliGRAM(s) Oral before breakfast  silver nitrate Applicator 1 Application(s) Topical once  tamsulosin 0.4 milliGRAM(s) Oral at bedtime      Objective:  Vital Signs Last 24 Hrs  T(C): 36.6 (14 Aug 2024 05:13), Max: 36.7 (13 Aug 2024 21:31)  T(F): 97.9 (14 Aug 2024 05:13), Max: 98 (13 Aug 2024 21:31)  HR: 93 (14 Aug 2024 05:13) (92 - 103)  BP: 123/69 (14 Aug 2024 05:13) (106/52 - 127/70)  BP(mean): --  RR: 18 (14 Aug 2024 05:13) (18 - 18)  SpO2: 97% (14 Aug 2024 05:13) (95% - 99%)    Parameters below as of 14 Aug 2024 05:13  Patient On (Oxygen Delivery Method): room air           Eyes:SHIRA, EOMI  Ear/Nose/Throat: no oral lesion, no sinus tenderness on percussion	  Neck:no JVD, no lymphadenopathy, supple  Respiratory: CTA saurabh  Cardiovascular: S1S2 RRR, no murmurs  Gastrointestinal:soft, (+) BS, no HSM  Extremities:no e/e/c        LABS:                        7.0    4.51  )-----------( 276      ( 14 Aug 2024 06:03 )             23.0     08-14    139  |  104  |  16  ----------------------------<  104<H>  3.9   |  24  |  0.81    Ca    8.3<L>      14 Aug 2024 06:03  Phos  2.8     08-14  Mg     1.9     08-14    TPro  5.6<L>  /  Alb  2.4<L>  /  TBili  0.3  /  DBili  x   /  AST  15  /  ALT  15  /  AlkPhos  73  08-13    PT/INR - ( 14 Aug 2024 06:03 )   PT: 12.6 sec;   INR: 1.15 ratio         PTT - ( 14 Aug 2024 06:03 )  PTT:77.7 sec  Urinalysis Basic - ( 14 Aug 2024 06:03 )    Color: x / Appearance: x / SG: x / pH: x  Gluc: 104 mg/dL / Ketone: x  / Bili: x / Urobili: x   Blood: x / Protein: x / Nitrite: x   Leuk Esterase: x / RBC: x / WBC x   Sq Epi: x / Non Sq Epi: x / Bacteria: x          MICROBIOLOGY:    RECENT CULTURES:  08-11 @ 15:00 .Surgical Swab right groin deep wound   ALLYSSA      Staphylococcus epidermidis  Staphylococcus epidermidis     Moderate Staphylococcus epidermidis    08-08 @ 15:37 Clean Catch Clean Catch (Midstream)                <10,000 CFU/mL Normal Urogenital Lashell    08-08 @ 14:34 .Other Wound right groin   ALLYSSA      Enterococcus faecalis  Enterococcus faecalis     Moderate Enterococcus faecalis  Moderate Staphylococcus epidermidis "Susceptibilities not performed"    08-08 @ 13:00 .Blood Blood                No growth at 5 days    08-08 @ 12:00 .Blood Blood                No growth at 5 days          RESPIRATORY CULTURES:              RADIOLOGY & ADDITIONAL STUDIES:        Pager [phone]  After 5 pm/weekends or if no response :[phone]

## 2024-08-14 NOTE — PROGRESS NOTE ADULT - ASSESSMENT
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 Pt was discharged to rehab and then sent back to [facility] for concerns of groin infection and was admitted on  8/8.  Pt now s/p R groin washout, debridement of fat necrosis and coevrage with sartorius flap on 8/10 and placement of wound vac.  In OR no obvious purulence/ since of infection surrounding PTFE grft but with diffuse fat necrosis and non-viable tissue.    Plan:  - OR today for R. groin washout and debridement  - please hold hep gtt at 0930  - vac changed at bedside 8/12  - If any concerns for expanding groin hematoma or profuse bleeding from groin should remove vac immediately to evaluate wound  - remainder care per primary    PRS  [phone]

## 2024-08-14 NOTE — PROGRESS NOTE ADULT - ASSESSMENT
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     Assessment  DMT2: 84y Male with DM T2 with hyperglycemia, A1C 8.5% , was on oral meds  at home, now on basal bolus insulin with coverage, blood sugars running high, eating meals.   NPO for OR today , washout   AS: on medications, stable, monitored. recent TAVR  HTN: on antihypertensive medications, monitored, asymptomatic.    Discussed plan and management wit Dr Sheldon Angulo NP-TEAMS

## 2024-08-14 NOTE — PROGRESS NOTE ADULT - SUBJECTIVE AND OBJECTIVE BOX
SUBJECTIVE:    Patient seen and examined at bedside. Reports no complaints in the morning. Denies fevers, chills, nausea, vomiting, diarrhea.    OBJECTIVE:    Vitals:  ============  T(F): 98 (14 Aug 2024 01:27), Max: 98.1 (13 Aug 2024 06:19)  HR: 93 (14 Aug 2024 01:27)  BP: 122/54 (14 Aug 2024 01:27)  RR: 18 (14 Aug 2024 01:27)  SpO2: 97% (14 Aug 2024 01:27) (95% - 99%)  temp max in last 48H T(F): , Max: 98.3 (08-13-24 @ 01:04)      Physical Exam:  General: AAOx3, NAD, lying comfortably in bed  Respiratory: nonlabored breathing  Abdomen: non-distended, soft, non-tender  Extremities: Left groin staple lines c/d/i, however, erythematous. R groin with wound vac, intermittedly holding suction at 75mmhg, leaking small amount of ss fluid.     I&O's Detail    12 Aug 2024 07:01  -  13 Aug 2024 07:00  --------------------------------------------------------  IN:    Heparin: 125 mL    IV PiggyBack: 250 mL    Oral Fluid: 800 mL  Total IN: 1175 mL    OUT:    VAC (Vacuum Assisted Closure) System (mL): 100 mL    Voided (mL): 451 mL  Total OUT: 551 mL    Total NET: 624 mL      13 Aug 2024 07:01  -  14 Aug 2024 04:44  --------------------------------------------------------  IN:    Heparin: 45 mL    Heparin: 77 mL    IV PiggyBack: 250 mL    Oral Fluid: 560 mL  Total IN: 932 mL    OUT:    VAC (Vacuum Assisted Closure) System (mL): 100 mL    Voided (mL): 1900 mL  Total OUT: 2000 mL    Total NET: -1068 mL      LABS:                         8.0    7.55  )-----------( 352      ( 13 Aug 2024 11:18 )             27.9     08-13    137  |  102  |  16  ----------------------------<  210<H>  4.2   |  24  |  0.76    Ca    8.2<L>      13 Aug 2024 08:52  Phos  2.2     08-13  Mg     1.9     08-13    TPro  5.6<L>  /  Alb  2.4<L>  /  TBili  0.3  /  DBili  x   /  AST  15  /  ALT  15  /  AlkPhos  73  08-13    PTT - ( 14 Aug 2024 01:38 )  PTT:78.0 sec  Urinalysis Basic - ( 13 Aug 2024 08:52 )    Color: x / Appearance: x / SG: x / pH: x  Gluc: 210 mg/dL / Ketone: x  / Bili: x / Urobili: x   Blood: x / Protein: x / Nitrite: x   Leuk Esterase: x / RBC: x / WBC x   Sq Epi: x / Non Sq Epi: x / Bacteria: x            RADIOLOGY, EKG & ADDITIONAL TESTS: Reviewed.

## 2024-08-14 NOTE — PROGRESS NOTE ADULT - SUBJECTIVE AND OBJECTIVE BOX
Plastic Surgery Progress Note (pg [facility]: [id number], [facility]: [phone])    SUBJECTIVE:   No acute events overnight, VSS. Patient resting comfortably. Pain well controlled, denies concerns    OBJECTIVE:  Vital Signs Last 24 Hrs  T(C): 36.6 (14 Aug 2024 05:13), Max: 36.7 (13 Aug 2024 21:31)  T(F): 97.9 (14 Aug 2024 05:13), Max: 98 (13 Aug 2024 21:31)  HR: 93 (14 Aug 2024 05:13) (92 - 103)  BP: 123/69 (14 Aug 2024 05:13) (106/52 - 127/70)  BP(mean): --  RR: 18 (14 Aug 2024 05:13) (18 - 18)  SpO2: 97% (14 Aug 2024 05:13) (95% - 99%)    Parameters below as of 14 Aug 2024 05:13  Patient On (Oxygen Delivery Method): room air        Physical Examination:  GEN: NAD, resting quietly  GROIN: R groin with wound vac in place, holding suction, L groin dressing cdi with mild surrounding induration

## 2024-08-14 NOTE — PRE-OP CHECKLIST - VERIFY SURGICAL SITE/SIDE WITH PATIENT
What Type Of Note Output Would You Prefer (Optional)?: Bullet Format
How Severe Are Your Spot(S)?: moderate
Have Your Spot(S) Been Treated In The Past?: has not been treated
Hpi Title: Evaluation of Skin Lesions
done

## 2024-08-14 NOTE — BRIEF OPERATIVE NOTE - OPERATION/FINDINGS
Exploration of R groin wound. Diffuse areas of fat necrosis but no obvious purulence or signs of infection. Debridement of fat and skin. After debridement pt with exposed PTFE bypass graft.  Decision made to proceed with sartorius muscle flap.  Sartorius was freed circumferentially and then divided superiorly.  Was then mobilized and used to cover graft.  Then placed NPWT (black foam w/ xeroform at 75mmHg)
Right groin sharp debridement and washout with pulse lavage. Application of wound vac.

## 2024-08-14 NOTE — PROGRESS NOTE ADULT - ASSESSMENT
84M with hx CAD s/p CABG, DM, AVR 2016, CEA.   Recently admitted for TAVR 7/24/2024.  Course complicated by acute limb ischemia on the same day - s/p R iliofemoral bypass w/ PTFE and RLE angiogram. Post-op, patient w/ LLE ALI RTOR 7/25 s/p L SFA interposition bypass w/ reversed ipsilateral GSV.   Pt was discharged to rehab but noted to have open right groin wound with drainage. No fever ro chills     Right groin infected femoral site with underlying graft.  BC so far negative.  Wound culture so far E feacalis and Staph epi.  Plan is for OR today    Underlying AVR  At risk for PVE  CT reviewed large hematoma in left groin / severe stenosis of fem artery   s/p debridement and flap on the right  left wd without drainage         Suggest:   vanco increased to 1 q 12 hr   all cultures  are positive for CNS  for additional surgery needs picc   repeat vanco level in 72 hrs

## 2024-08-14 NOTE — PROGRESS NOTE ADULT - SUBJECTIVE AND OBJECTIVE BOX
Subjective: Patient seen and examined. No new events except as noted.   feels ok   for OR today     REVIEW OF SYSTEMS:    CONSTITUTIONAL:= weakness, fevers or chills  EYES/ENT: No visual changes;  No vertigo or throat pain   NECK: No pain or stiffness  RESPIRATORY: No cough, wheezing, hemoptysis; No shortness of breath  CARDIOVASCULAR: No chest pain or palpitations  GASTROINTESTINAL: No abdominal or epigastric pain. No nausea, vomiting, or hematemesis; No diarrhea or constipation. No melena or hematochezia.  GENITOURINARY: No dysuria, frequency or hematuria  NEUROLOGICAL: No numbness or weakness  SKIN: No itching, burning, rashes, or lesions   All other review of systems is negative unless indicated above.    MEDICATIONS:  MEDICATIONS  (STANDING):  acetaminophen     Tablet .. 650 milliGRAM(s) Oral every 6 hours  aspirin  chewable 81 milliGRAM(s) Oral daily  atorvastatin 80 milliGRAM(s) Oral at bedtime  dextrose 5%. 1000 milliLiter(s) (50 mL/Hr) IV Continuous <Continuous>  dextrose 50% Injectable 25 Gram(s) IV Push once  glucagon  Injectable 1 milliGRAM(s) IntraMuscular once  insulin glargine Injectable (LANTUS) 18 Unit(s) SubCutaneous at bedtime  insulin lispro (ADMELOG) corrective regimen sliding scale   SubCutaneous at bedtime  insulin lispro (ADMELOG) corrective regimen sliding scale   SubCutaneous three times a day before meals  insulin lispro Injectable (ADMELOG) 10 Unit(s) SubCutaneous three times a day before meals  lactated ringers. 1000 milliLiter(s) (70 mL/Hr) IV Continuous <Continuous>  melatonin 3 milliGRAM(s) Oral at bedtime  metoprolol tartrate 50 milliGRAM(s) Oral every 12 hours  pantoprazole    Tablet 40 milliGRAM(s) Oral before breakfast  silver nitrate Applicator 1 Application(s) Topical once  tamsulosin 0.4 milliGRAM(s) Oral at bedtime  vancomycin  IVPB 1000 milliGRAM(s) IV Intermittent every 12 hours      PHYSICAL EXAM:  T(C): 36.6 (08-14-24 @ 05:13), Max: 36.7 (08-13-24 @ 21:31)  HR: 93 (08-14-24 @ 05:13) (92 - 103)  BP: 123/69 (08-14-24 @ 05:13) (106/52 - 127/70)  RR: 18 (08-14-24 @ 05:13) (18 - 18)  SpO2: 97% (08-14-24 @ 05:13) (95% - 99%)  Wt(kg): --  I&O's Summary    13 Aug 2024 07:01  -  14 Aug 2024 07:00  --------------------------------------------------------  IN: 2022 mL / OUT: 2275 mL / NET: -253 mL          Appearance: NAD  HEENT:   Dry  oral mucosa, PERRL, EOMI	  Lymphatic: No lymphadenopathy , no edema  Cardiovascular: Irregular S1 S2, No JVD, + murmurs , Peripheral pulses palpable 2+ bilaterally  Respiratory: Lungs clear to auscultation, normal effort 	  Gastrointestinal:  Soft, Non-tender, + BS	  Skin: No rashes, No ecchymoses, No cyanosis, warm to touch  Musculoskeletal: Normal range of motion, normal strength  Psychiatry:  Mood & affect appropriate  Ext: No edema  Left groin staple lines c/d/i, however, erythematous. R groin with wound vac, intermittedly holding suction at 75mmhg, leaking small amount of ss fluid.         LABS:    CARDIAC MARKERS:                                7.0    4.51  )-----------( 276      ( 14 Aug 2024 06:03 )             23.0     08-14    139  |  104  |  16  ----------------------------<  104<H>  3.9   |  24  |  0.81    Ca    8.3<L>      14 Aug 2024 06:03  Phos  2.8     08-14  Mg     1.9     08-14    TPro  5.6<L>  /  Alb  2.4<L>  /  TBili  0.3  /  DBili  x   /  AST  15  /  ALT  15  /  AlkPhos  73  08-13    proBNP:   Lipid Profile:   HgA1c:   TSH:             TELEMETRY: 	    ECG:  	  RADIOLOGY:   DIAGNOSTIC TESTING:  [ ] Echocardiogram:  [ ]  Catheterization:  [ ] Stress Test:    OTHER:

## 2024-08-14 NOTE — CHART NOTE - NSCHARTNOTEFT_GEN_A_CORE
Patient was evaluated prior to starting heparin drip.    Extremities: Right groin with vac on suction. Suction maintained and no Left groin with staples intact, covered with gauze and tape, no strike through, c/d/i. No ecchymosis noted on either groin.   No evidence of active bleeding or expanding hematoma.     - Plan to start heparin Patient was evaluated prior to starting heparin drip.    Extremities: Right groin with vac on suction. Suction maintained and no Left groin with staples intact, covered with gauze and tape, no strike through, c/d/i. No ecchymosis noted on either groin.   No evidence of active bleeding or expanding hematoma.     - Plan to start heparin at 500U/hr  - PTT goal 60-80

## 2024-08-15 LAB
ALBUMIN SERPL ELPH-MCNC: 2.4 G/DL — LOW (ref 3.3–5)
ALP SERPL-CCNC: 75 U/L — SIGNIFICANT CHANGE UP (ref 40–120)
ALT FLD-CCNC: 16 U/L — SIGNIFICANT CHANGE UP (ref 10–45)
ANION GAP SERPL CALC-SCNC: 10 MMOL/L — SIGNIFICANT CHANGE UP (ref 5–17)
APTT BLD: 30 SEC — SIGNIFICANT CHANGE UP (ref 24.5–35.6)
APTT BLD: 36 SEC — HIGH (ref 24.5–35.6)
AST SERPL-CCNC: 21 U/L — SIGNIFICANT CHANGE UP (ref 10–40)
BILIRUB SERPL-MCNC: 0.5 MG/DL — SIGNIFICANT CHANGE UP (ref 0.2–1.2)
BUN SERPL-MCNC: 17 MG/DL — SIGNIFICANT CHANGE UP (ref 7–23)
CALCIUM SERPL-MCNC: 8.9 MG/DL — SIGNIFICANT CHANGE UP (ref 8.4–10.5)
CHLORIDE SERPL-SCNC: 101 MMOL/L — SIGNIFICANT CHANGE UP (ref 96–108)
CO2 SERPL-SCNC: 25 MMOL/L — SIGNIFICANT CHANGE UP (ref 22–31)
CREAT SERPL-MCNC: 0.79 MG/DL — SIGNIFICANT CHANGE UP (ref 0.5–1.3)
EGFR: 89 ML/MIN/1.73M2 — SIGNIFICANT CHANGE UP
GLUCOSE BLDC GLUCOMTR-MCNC: 111 MG/DL — HIGH (ref 70–99)
GLUCOSE BLDC GLUCOMTR-MCNC: 126 MG/DL — HIGH (ref 70–99)
GLUCOSE BLDC GLUCOMTR-MCNC: 143 MG/DL — HIGH (ref 70–99)
GLUCOSE BLDC GLUCOMTR-MCNC: 239 MG/DL — HIGH (ref 70–99)
GLUCOSE SERPL-MCNC: 138 MG/DL — HIGH (ref 70–99)
HCT VFR BLD CALC: 29.8 % — LOW (ref 39–50)
HGB BLD-MCNC: 9.1 G/DL — LOW (ref 13–17)
MAGNESIUM SERPL-MCNC: 1.7 MG/DL — SIGNIFICANT CHANGE UP (ref 1.6–2.6)
MCHC RBC-ENTMCNC: 28.3 PG — SIGNIFICANT CHANGE UP (ref 27–34)
MCHC RBC-ENTMCNC: 30.5 GM/DL — LOW (ref 32–36)
MCV RBC AUTO: 92.8 FL — SIGNIFICANT CHANGE UP (ref 80–100)
NRBC # BLD: 0 /100 WBCS — SIGNIFICANT CHANGE UP (ref 0–0)
PHOSPHATE SERPL-MCNC: 2.6 MG/DL — SIGNIFICANT CHANGE UP (ref 2.5–4.5)
PLATELET # BLD AUTO: 302 K/UL — SIGNIFICANT CHANGE UP (ref 150–400)
POTASSIUM SERPL-MCNC: 4.4 MMOL/L — SIGNIFICANT CHANGE UP (ref 3.5–5.3)
POTASSIUM SERPL-SCNC: 4.4 MMOL/L — SIGNIFICANT CHANGE UP (ref 3.5–5.3)
PROT SERPL-MCNC: 5.8 G/DL — LOW (ref 6–8.3)
RBC # BLD: 3.21 M/UL — LOW (ref 4.2–5.8)
RBC # FLD: 20.4 % — HIGH (ref 10.3–14.5)
SODIUM SERPL-SCNC: 136 MMOL/L — SIGNIFICANT CHANGE UP (ref 135–145)
VANCOMYCIN TROUGH SERPL-MCNC: 14.1 UG/ML — SIGNIFICANT CHANGE UP (ref 10–20)
WBC # BLD: 5.89 K/UL — SIGNIFICANT CHANGE UP (ref 3.8–10.5)
WBC # FLD AUTO: 5.89 K/UL — SIGNIFICANT CHANGE UP (ref 3.8–10.5)

## 2024-08-15 PROCEDURE — 99232 SBSQ HOSP IP/OBS MODERATE 35: CPT

## 2024-08-15 RX ORDER — SODIUM PHOSPHATE, MONOBASIC, MONOHYDRATE AND SODIUM PHOSPHATE, DIBASIC ANHYDROUS 276; 142 MG/ML; MG/ML
15 INJECTION, SOLUTION INTRAVENOUS ONCE
Refills: 0 | Status: COMPLETED | OUTPATIENT
Start: 2024-08-15 | End: 2024-08-15

## 2024-08-15 RX ADMIN — Medication 70 MILLILITER(S): at 01:04

## 2024-08-15 RX ADMIN — Medication 7 UNIT(S): at 13:17

## 2024-08-15 RX ADMIN — Medication 7 UNIT(S): at 09:38

## 2024-08-15 RX ADMIN — Medication 2: at 13:16

## 2024-08-15 RX ADMIN — Medication 81 MILLIGRAM(S): at 12:30

## 2024-08-15 RX ADMIN — Medication 8 UNIT(S)/HR: at 19:15

## 2024-08-15 RX ADMIN — Medication 6 UNIT(S)/HR: at 09:05

## 2024-08-15 RX ADMIN — SODIUM PHOSPHATE, MONOBASIC, MONOHYDRATE AND SODIUM PHOSPHATE, DIBASIC ANHYDROUS 63.75 MILLIMOLE(S): 276; 142 INJECTION, SOLUTION INTRAVENOUS at 11:01

## 2024-08-15 RX ADMIN — Medication 8 UNIT(S)/HR: at 18:15

## 2024-08-15 RX ADMIN — INSULIN GLARGINE 18 UNIT(S): 100 INJECTION, SOLUTION SUBCUTANEOUS at 22:17

## 2024-08-15 RX ADMIN — Medication 100 GRAM(S): at 08:59

## 2024-08-15 RX ADMIN — Medication 250 MILLIGRAM(S): at 05:10

## 2024-08-15 RX ADMIN — Medication 5 UNIT(S)/HR: at 07:43

## 2024-08-15 RX ADMIN — Medication 250 MILLIGRAM(S): at 18:16

## 2024-08-15 RX ADMIN — Medication 8 UNIT(S)/HR: at 21:31

## 2024-08-15 RX ADMIN — Medication 5 UNIT(S)/HR: at 01:04

## 2024-08-15 RX ADMIN — Medication 8 UNIT(S): at 18:17

## 2024-08-15 NOTE — PROGRESS NOTE ADULT - ASSESSMENT
85 y/o Male with PMHx of HTN, HLD, L carotid stenosis s/p CEA, T2DM, CAD S/p CABG, AVR with subsequent valve in valve TAVR via b/l femoral artery access c/b failed closure device 7/24 C/b RLE acute limb ischemia requiring R ileofemoral bypass w/ PTFE and RLE angiogram followed by LLE ALI RTOR for L SFA interposition bypass with reversed ipsilateral GSV 7/24, who went to rehab facility and subsequently developed bilateral groin incisional infections with R incisional dehiscence. CTA showed a left groin hematoma unchanged from prior imaging, non-expanding in size.   Now s/p exploration of right groin, fat and skin debridement, sartorius muscle flap, wound vac placement 8/10.  ROTR with plastics on 8/14- s/p Right groin washout and debridement    PLAN:  - Wound vac change MWF  - plastics to check right groin wound on 8/16 for final recs  - Appreciate ID recs: IV vanco increased to 750 q12h  - Appreciate endo recs for glucose control  - Pain and anti-emetic PRN  - VTE ppx: Hep gtt    Vascular Surgery  [id number]

## 2024-08-15 NOTE — PROGRESS NOTE ADULT - ASSESSMENT
84M with hx CAD s/p CABG, DM, AVR 2016, CEA.   Recently admitted for TAVR 7/24/2024.  Course complicated by acute limb ischemia on the same day - s/p R iliofemoral bypass w/ PTFE and RLE angiogram. Post-op, patient w/ LLE ALI RTOR 7/25 s/p L SFA interposition bypass w/ reversed ipsilateral GSV.   Pt was discharged to rehab but noted to have open right groin wound with drainage. No fever ro chills     Right groin infected femoral site with underlying graft.  BC so far negative.  Wound culture so far E feacalis and Staph epi.  Plan is for OR today    Underlying AVR  At risk for PVE  CT reviewed large hematoma in left groin / severe stenosis of fem artery   s/p debridement and flap on the right   reexploration on 8/14        Suggest:   vanco increased to 1 q 12 hr   all cultures  are positive for CNS ( from OR)    for additional surgery needs picc   repeat vanco level in 72 hrs  awaiting closure

## 2024-08-15 NOTE — PROGRESS NOTE ADULT - SUBJECTIVE AND OBJECTIVE BOX
Subjective: Patient seen and examined. No new events except as noted.   s/p RTOR washout VAC change   feels ok     REVIEW OF SYSTEMS:    CONSTITUTIONAL: +weakness, fevers or chills  EYES/ENT: No visual changes;  No vertigo or throat pain   NECK: No pain or stiffness  RESPIRATORY: No cough, wheezing, hemoptysis; No shortness of breath  CARDIOVASCULAR: No chest pain or palpitations  GASTROINTESTINAL: No abdominal or epigastric pain. No nausea, vomiting, or hematemesis; No diarrhea or constipation. No melena or hematochezia.  GENITOURINARY: No dysuria, frequency or hematuria  NEUROLOGICAL: No numbness or weakness  SKIN: No itching, burning, rashes, or lesions   All other review of systems is negative unless indicated above.    MEDICATIONS:  MEDICATIONS  (STANDING):  aspirin  chewable 81 milliGRAM(s) Oral daily  dextrose 50% Injectable 12.5 Gram(s) IV Push once  dextrose 50% Injectable 25 Gram(s) IV Push once  dextrose 50% Injectable 25 Gram(s) IV Push once  dextrose 50% Injectable 25 Gram(s) IV Push once  heparin  Infusion 500 Unit(s)/Hr (6 mL/Hr) IV Continuous <Continuous>  insulin glargine Injectable (LANTUS) 18 Unit(s) SubCutaneous at bedtime  insulin lispro (ADMELOG) corrective regimen sliding scale   SubCutaneous three times a day before meals  insulin lispro (ADMELOG) corrective regimen sliding scale   SubCutaneous at bedtime  insulin lispro Injectable (ADMELOG) 7 Unit(s) SubCutaneous three times a day before meals  lactated ringers. 1000 milliLiter(s) (70 mL/Hr) IV Continuous <Continuous>  silver nitrate Applicator 1 Application(s) Topical once  sodium phosphate 15 milliMole(s)/250 mL IVPB 15 milliMole(s) IV Intermittent once  vancomycin  IVPB 1000 milliGRAM(s) IV Intermittent every 12 hours      PHYSICAL EXAM:  T(C): 36.8 (08-15-24 @ 09:30), Max: 36.8 (08-15-24 @ 01:28)  HR: 92 (08-15-24 @ 09:30) (84 - 107)  BP: 147/62 (08-15-24 @ 09:30) (105/82 - 176/70)  RR: 18 (08-15-24 @ 09:30) (16 - 18)  SpO2: 94% (08-15-24 @ 09:30) (92% - 100%)  Wt(kg): --  I&O's Summary    14 Aug 2024 07:01  -  15 Aug 2024 07:00  --------------------------------------------------------  IN: 1890 mL / OUT: 1800 mL / NET: 90 mL      Height (cm): 152.4 (08-14 @ 11:30)  Weight (kg): 54.4 (08-14 @ 11:30)  BMI (kg/m2): 23.4 (08-14 @ 11:30)  BSA (m2): 1.5 (08-14 @ 11:30)    Appearance: NAD	  HEENT:   Normal oral mucosa, PERRL, EOMI	  Lymphatic: No lymphadenopathy , no edema  Cardiovascular: Irregular S1 S2, No JVD, No murmurs , Peripheral pulses palpable 2+ bilaterally  Respiratory: Lungs clear to auscultation, normal effort 	  Gastrointestinal:  Soft, Non-tender, + BS	  Skin: No rashes, No ecchymoses, No cyanosis, warm to touch  Musculoskeletal: Normal range of motion, normal strength  Psychiatry:  Mood & affect appropriate  Right groin with vac on suction. Suction maintained and no Left groin with staples intact, covered with gauze and tape, no strike through, c/d/i. No ecchymosis noted on either groin.   No evidence of active bleeding or expanding hematoma.       LABS:    CARDIAC MARKERS:                                9.1    5.89  )-----------( 302      ( 15 Aug 2024 07:18 )             29.8     08-15    136  |  101  |  17  ----------------------------<  138<H>  4.4   |  25  |  0.79    Ca    8.9      15 Aug 2024 07:16  Phos  2.6     08-15  Mg     1.7     08-15    TPro  5.8<L>  /  Alb  2.4<L>  /  TBili  0.5  /  DBili  x   /  AST  21  /  ALT  16  /  AlkPhos  75  08-15    proBNP:   Lipid Profile:   HgA1c:   TSH:             TELEMETRY: 	    ECG:  	  RADIOLOGY:   DIAGNOSTIC TESTING:  [ ] Echocardiogram:  [ ]  Catheterization:  [ ] Stress Test:    OTHER:

## 2024-08-15 NOTE — PROGRESS NOTE ADULT - ASSESSMENT
83 y/o male w/ PMHx of HTN, HLD, Type 2 Diabetes, CAD s/p CABG x3/bioprosthetic AVR 2011 at  and subsequent PCI 5/2019, s/p TAVR 7/24, on Eliquis, presents to the ER with son for right groin infection. states TAVR was done through right groin. states has been at rehab facility and noticed groin became swollen. states then developed drainage and erythema to the area. states had complication during procedure and entered left groin as well. states noticed left side also swollen but not as bad. patient denies f/n/v/d, CP, SOB, HA, dizziness, abdominal pain.

## 2024-08-15 NOTE — PROGRESS NOTE ADULT - PROBLEM SELECTOR PLAN 1
Lantus to 18 units at bed time.    Admelog to 8 units before each meal in addition to LOW  Admelog correction scale coverage.  Will continue monitoring FS, log, and glucose trends, will Follow up.  Patient counseled for compliance with consistent low carb diet and exercise as tolerated outpatient.  Pt advised regarding home insulin therapy

## 2024-08-15 NOTE — PROGRESS NOTE ADULT - PROBLEM SELECTOR PLAN 1
Right groin infected femoral site with underlying graft. Follow up cultures and BC   s/p washout and fat necrosis removal, sartorius flap  VAC in place right groin, now with oozing of blood. To be addressed today by Vascular. Monitor Hgb   IV abx   s/p RTOR for washout and VAC change

## 2024-08-15 NOTE — PROGRESS NOTE ADULT - SUBJECTIVE AND OBJECTIVE BOX
SURGERY DAILY PROGRESS NOTE:     Overnight Events:  No acute events overnight.    SUBJECTIVE: Patient seen and evaluated on AM rounds. Pt is resting comfortably in bed. Patient complaining of right calf pain. Pain is adequately controlled on current regimen.    OBJECTIVE:  Vital Signs Last 24 Hrs  T(C): 36.8 (15 Aug 2024 09:30), Max: 36.8 (15 Aug 2024 01:28)  T(F): 98.2 (15 Aug 2024 09:30), Max: 98.2 (15 Aug 2024 01:28)  HR: 92 (15 Aug 2024 09:30) (84 - 107)  BP: 147/62 (15 Aug 2024 09:30) (105/82 - 176/70)  BP(mean): 92 (14 Aug 2024 16:00) (87 - 106)  RR: 18 (15 Aug 2024 09:30) (16 - 18)  SpO2: 94% (15 Aug 2024 09:30) (92% - 100%)    Parameters below as of 15 Aug 2024 09:30  Patient On (Oxygen Delivery Method): room air      I&O's Detail    14 Aug 2024 07:01  -  15 Aug 2024 07:00  --------------------------------------------------------  IN:    Heparin: 35 mL    IV PiggyBack: 250 mL    Lactated Ringers: 225 mL    Lactated Ringers: 840 mL    Oral Fluid: 540 mL  Total IN: 1890 mL    OUT:    VAC (Vacuum Assisted Closure) System (mL): 0 mL    Voided (mL): 1800 mL  Total OUT: 1800 mL    Total NET: 90 mL        Daily Height in cm: 152.4 (14 Aug 2024 11:30)    Daily     LABS:                        9.1    5.89  )-----------( 302      ( 15 Aug 2024 07:18 )             29.8     08-15    136  |  101  |  17  ----------------------------<  138<H>  4.4   |  25  |  0.79    Ca    8.9      15 Aug 2024 07:16  Phos  2.6     08-15  Mg     1.7     08-15    TPro  5.8<L>  /  Alb  2.4<L>  /  TBili  0.5  /  DBili  x   /  AST  21  /  ALT  16  /  AlkPhos  75  08-15    PT/INR - ( 14 Aug 2024 06:03 )   PT: 12.6 sec;   INR: 1.15 ratio         PTT - ( 15 Aug 2024 07:18 )  PTT:30.0 sec  Urinalysis Basic - ( 15 Aug 2024 07:16 )    Color: x / Appearance: x / SG: x / pH: x  Gluc: 138 mg/dL / Ketone: x  / Bili: x / Urobili: x   Blood: x / Protein: x / Nitrite: x   Leuk Esterase: x / RBC: x / WBC x   Sq Epi: x / Non Sq Epi: x / Bacteria: x        PHYSICAL EXAM:  General: AAOx3, NAD, lying comfortably in bed  HEENT: NC/AT  Respiratory: nonlabored breathing  Cardiovascular: RRR, normal S1 and S2, no murmurs or gallops  Abdomen: non-distended, soft, non-tender  Extremities: Right groin with vac on suction 75 mmHg. Left groin with staples intact, covered with gauze and tape, no strike through, c/d/i, erythematous.

## 2024-08-15 NOTE — PROGRESS NOTE ADULT - SUBJECTIVE AND OBJECTIVE BOX
Chief complaint  Patient is a 84y old  Male who presents with a chief complaint of RLE wound infection (15 Aug 2024 09:43)         Labs and Fingersticks  CAPILLARY BLOOD GLUCOSE      POCT Blood Glucose.: 111 mg/dL (15 Aug 2024 17:49)  POCT Blood Glucose.: 239 mg/dL (15 Aug 2024 13:12)  POCT Blood Glucose.: 143 mg/dL (15 Aug 2024 09:22)  POCT Blood Glucose.: 215 mg/dL (14 Aug 2024 22:44)      Anion Gap: 10 (08-15 @ 07:16)  Anion Gap: 11 (08-14 @ 06:03)      Calcium: 8.9 (08-15 @ 07:16)  Calcium: 8.3 *L* (08-14 @ 06:03)  Albumin: 2.4 *L* (08-15 @ 07:16)    Alanine Aminotransferase (ALT/SGPT): 16 (08-15 @ 07:16)  Alkaline Phosphatase: 75 (08-15 @ 07:16)  Aspartate Aminotransferase (AST/SGOT): 21 (08-15 @ 07:16)        08-15    136  |  101  |  17  ----------------------------<  138<H>  4.4   |  25  |  0.79    Ca    8.9      15 Aug 2024 07:16  Phos  2.6     08-15  Mg     1.7     08-15    TPro  5.8<L>  /  Alb  2.4<L>  /  TBili  0.5  /  DBili  x   /  AST  21  /  ALT  16  /  AlkPhos  75  08-15                        9.1    5.89  )-----------( 302      ( 15 Aug 2024 07:18 )             29.8     Medications  MEDICATIONS  (STANDING):  aspirin  chewable 81 milliGRAM(s) Oral daily  dextrose 50% Injectable 25 Gram(s) IV Push once  dextrose 50% Injectable 25 Gram(s) IV Push once  dextrose 50% Injectable 25 Gram(s) IV Push once  dextrose 50% Injectable 12.5 Gram(s) IV Push once  heparin  Infusion 500 Unit(s)/Hr (8 mL/Hr) IV Continuous <Continuous>  insulin glargine Injectable (LANTUS) 18 Unit(s) SubCutaneous at bedtime  insulin lispro (ADMELOG) corrective regimen sliding scale   SubCutaneous three times a day before meals  insulin lispro (ADMELOG) corrective regimen sliding scale   SubCutaneous at bedtime  insulin lispro Injectable (ADMELOG) 8 Unit(s) SubCutaneous three times a day before meals  lactated ringers. 1000 milliLiter(s) (70 mL/Hr) IV Continuous <Continuous>  silver nitrate Applicator 1 Application(s) Topical once  vancomycin  IVPB 1000 milliGRAM(s) IV Intermittent every 12 hours      Physical Exam  General: Patient comfortable in bed   Vital Signs Last 12 Hrs  T(F): 98.1 (08-15-24 @ 17:33), Max: 98.2 (08-15-24 @ 09:30)  HR: 94 (08-15-24 @ 17:33) (85 - 99)  BP: 130/62 (08-15-24 @ 17:33) (125/64 - 151/60)  BP(mean): --  RR: 18 (08-15-24 @ 17:33) (18 - 18)  SpO2: 93% (08-15-24 @ 17:33) (93% - 94%)    CVS: S1S2   Respiratory: No wheezing, no crepitations  GI: Abdomen soft, bowel sounds positive  Musculoskeletal:  moves all extremities  : Voiding

## 2024-08-15 NOTE — PROGRESS NOTE ADULT - SUBJECTIVE AND OBJECTIVE BOX
SUBJECTIVE:    Patient seen and examined at bedside. Patient came to floors after a procedure yesterday, transferred from PACU overnight. Reports no complaints in the morning. Denies fevers, chills, nausea, vomiting, diarrhea      Vitals:  ============  T(F): 98.2 (15 Aug 2024 01:28), Max: 98.2 (15 Aug 2024 01:28)  HR: 89 (15 Aug 2024 01:28)  BP: 133/59 (15 Aug 2024 01:28)  RR: 18 (15 Aug 2024 01:28)  SpO2: 94% (15 Aug 2024 01:28) (92% - 100%)  temp max in last 48H T(F): , Max: 98.2 (08-15-24 @ 01:28)      PHYSICAL EXAM    Respiratory: nonlabored breathing  Cardiovascular: RRR, normal S1 and S2, no murmurs or gallops  Abdomen: non-distended, soft, non-tender  Extremities: Right groin with vac on suction. Suction maintained and no Left groin with staples intact, covered with gauze and tape, no strike through, c/d/i. No ecchymosis noted on either groin.   No evidence of active bleeding or expanding hematoma.       I&O's Detail    13 Aug 2024 07:01  -  14 Aug 2024 07:00  --------------------------------------------------------  IN:    Heparin: 45 mL    Heparin: 77 mL    IV PiggyBack: 250 mL    Lactated Ringers: 840 mL    Oral Fluid: 810 mL  Total IN: 2022 mL    OUT:    VAC (Vacuum Assisted Closure) System (mL): 175 mL    Voided (mL): 2100 mL  Total OUT: 2275 mL    Total NET: -253 mL      14 Aug 2024 07:01  -  15 Aug 2024 04:44  --------------------------------------------------------  IN:    IV PiggyBack: 250 mL    Lactated Ringers: 225 mL    Oral Fluid: 180 mL  Total IN: 655 mL    OUT:    VAC (Vacuum Assisted Closure) System (mL): 0 mL    Voided (mL): 1500 mL  Total OUT: 1500 mL    Total NET: -845 mL          LABS:                         10.1   6.04  )-----------( 288      ( 14 Aug 2024 15:35 )             33.1     08-14    139  |  104  |  16  ----------------------------<  104<H>  3.9   |  24  |  0.81    Ca    8.3<L>      14 Aug 2024 06:03  Phos  2.8     08-14  Mg     1.9     08-14    TPro  5.6<L>  /  Alb  2.4<L>  /  TBili  0.3  /  DBili  x   /  AST  15  /  ALT  15  /  AlkPhos  73  08-13    PT/INR - ( 14 Aug 2024 06:03 )   PT: 12.6 sec;   INR: 1.15 ratio         PTT - ( 14 Aug 2024 06:03 )  PTT:77.7 sec  Urinalysis Basic - ( 14 Aug 2024 06:03 )    Color: x / Appearance: x / SG: x / pH: x  Gluc: 104 mg/dL / Ketone: x  / Bili: x / Urobili: x   Blood: x / Protein: x / Nitrite: x   Leuk Esterase: x / RBC: x / WBC x   Sq Epi: x / Non Sq Epi: x / Bacteria: x            RADIOLOGY, EKG & ADDITIONAL TESTS: Reviewed.  SUBJECTIVE:    Patient seen and examined at bedside. Patient came to floors after a procedure yesterday, transferred from PACU overnight. Patient is reporting R calf pain, which the patient has had before. Denies fevers, chills, nausea, vomiting, diarrhea      Vitals:  ============  T(F): 98.2 (15 Aug 2024 01:28), Max: 98.2 (15 Aug 2024 01:28)  HR: 89 (15 Aug 2024 01:28)  BP: 133/59 (15 Aug 2024 01:28)  RR: 18 (15 Aug 2024 01:28)  SpO2: 94% (15 Aug 2024 01:28) (92% - 100%)  temp max in last 48H T(F): , Max: 98.2 (08-15-24 @ 01:28)      PHYSICAL EXAM    Respiratory: nonlabored breathing  Cardiovascular: RRR, normal S1 and S2, no murmurs or gallops  Abdomen: non-distended, soft, non-tender  Extremities: Right groin with vac on suction. Suction maintained and no Left groin with staples intact, covered with gauze and tape, no strike through, c/d/i. No ecchymosis noted on either groin.   No evidence of active bleeding or expanding hematoma.       I&O's Detail    13 Aug 2024 07:01  -  14 Aug 2024 07:00  --------------------------------------------------------  IN:    Heparin: 45 mL    Heparin: 77 mL    IV PiggyBack: 250 mL    Lactated Ringers: 840 mL    Oral Fluid: 810 mL  Total IN: 2022 mL    OUT:    VAC (Vacuum Assisted Closure) System (mL): 175 mL    Voided (mL): 2100 mL  Total OUT: 2275 mL    Total NET: -253 mL      14 Aug 2024 07:01  -  15 Aug 2024 04:44  --------------------------------------------------------  IN:    IV PiggyBack: 250 mL    Lactated Ringers: 225 mL    Oral Fluid: 180 mL  Total IN: 655 mL    OUT:    VAC (Vacuum Assisted Closure) System (mL): 0 mL    Voided (mL): 1500 mL  Total OUT: 1500 mL    Total NET: -845 mL          LABS:                         10.1   6.04  )-----------( 288      ( 14 Aug 2024 15:35 )             33.1     08-14    139  |  104  |  16  ----------------------------<  104<H>  3.9   |  24  |  0.81    Ca    8.3<L>      14 Aug 2024 06:03  Phos  2.8     08-14  Mg     1.9     08-14    TPro  5.6<L>  /  Alb  2.4<L>  /  TBili  0.3  /  DBili  x   /  AST  15  /  ALT  15  /  AlkPhos  73  08-13    PT/INR - ( 14 Aug 2024 06:03 )   PT: 12.6 sec;   INR: 1.15 ratio         PTT - ( 14 Aug 2024 06:03 )  PTT:77.7 sec  Urinalysis Basic - ( 14 Aug 2024 06:03 )    Color: x / Appearance: x / SG: x / pH: x  Gluc: 104 mg/dL / Ketone: x  / Bili: x / Urobili: x   Blood: x / Protein: x / Nitrite: x   Leuk Esterase: x / RBC: x / WBC x   Sq Epi: x / Non Sq Epi: x / Bacteria: x            RADIOLOGY, EKG & ADDITIONAL TESTS: Reviewed.

## 2024-08-15 NOTE — PROGRESS NOTE ADULT - SUBJECTIVE AND OBJECTIVE BOX
infectious diseases progress note:    Patient is a 84y old  Male who presents with a chief complaint of RLE wound infection (15 Aug 2024 07:10)        Local infection of skin and subcutaneous tissue             Allergies    No Known Allergies    Intolerances        ANTIBIOTICS/RELEVANT:  antimicrobials  vancomycin  IVPB 1000 milliGRAM(s) IV Intermittent every 12 hours    immunologic:    OTHER:  aspirin  chewable 81 milliGRAM(s) Oral daily  dextrose 50% Injectable 25 Gram(s) IV Push once  dextrose 50% Injectable 25 Gram(s) IV Push once  dextrose 50% Injectable 25 Gram(s) IV Push once  dextrose 50% Injectable 12.5 Gram(s) IV Push once  dextrose Oral Gel 15 Gram(s) Oral once PRN  dextrose Oral Gel 15 Gram(s) Oral once PRN  heparin  Infusion 500 Unit(s)/Hr IV Continuous <Continuous>  insulin glargine Injectable (LANTUS) 18 Unit(s) SubCutaneous at bedtime  insulin lispro (ADMELOG) corrective regimen sliding scale   SubCutaneous at bedtime  insulin lispro (ADMELOG) corrective regimen sliding scale   SubCutaneous three times a day before meals  insulin lispro Injectable (ADMELOG) 7 Unit(s) SubCutaneous three times a day before meals  lactated ringers. 1000 milliLiter(s) IV Continuous <Continuous>  magnesium sulfate  IVPB 1 Gram(s) IV Intermittent once  silver nitrate Applicator 1 Application(s) Topical once  sodium phosphate 15 milliMole(s)/250 mL IVPB 15 milliMole(s) IV Intermittent once      Objective:  Vital Signs Last 24 Hrs  T(C): 36.6 (15 Aug 2024 06:29), Max: 36.8 (15 Aug 2024 01:28)  T(F): 97.9 (15 Aug 2024 06:29), Max: 98.2 (15 Aug 2024 01:28)  HR: 85 (15 Aug 2024 06:29) (84 - 107)  BP: 151/60 (15 Aug 2024 06:29) (105/82 - 176/70)  BP(mean): 92 (14 Aug 2024 16:00) (87 - 106)  RR: 18 (15 Aug 2024 06:29) (16 - 18)  SpO2: 93% (15 Aug 2024 06:29) (92% - 100%)    Parameters below as of 15 Aug 2024 06:29  Patient On (Oxygen Delivery Method): room air        PHYSICAL EXAM:  Constitutional:Well-developed, well nourished--no acute distress  Eyes:SHIRA, EOMI  Ear/Nose/Throat: no oral lesion, no sinus tenderness on percussion	  Neck:no JVD, no lymphadenopathy, supple  Respiratory: CTA saurabh  Cardiovascular: S1S2 RRR, no murmurs  Gastrointestinal:soft, (+) BS, no HSM  Extremities:no e/e/c        LABS:                        9.1    5.89  )-----------( 302      ( 15 Aug 2024 07:18 )             29.8     08-15    136  |  101  |  17  ----------------------------<  138<H>  4.4   |  25  |  0.79    Ca    8.9      15 Aug 2024 07:16  Phos  2.6     08-15  Mg     1.7     08-15    TPro  5.8<L>  /  Alb  2.4<L>  /  TBili  0.5  /  DBili  x   /  AST  21  /  ALT  16  /  AlkPhos  75  08-15    PT/INR - ( 14 Aug 2024 06:03 )   PT: 12.6 sec;   INR: 1.15 ratio         PTT - ( 15 Aug 2024 07:18 )  PTT:30.0 sec  Urinalysis Basic - ( 15 Aug 2024 07:16 )    Color: x / Appearance: x / SG: x / pH: x  Gluc: 138 mg/dL / Ketone: x  / Bili: x / Urobili: x   Blood: x / Protein: x / Nitrite: x   Leuk Esterase: x / RBC: x / WBC x   Sq Epi: x / Non Sq Epi: x / Bacteria: x          MICROBIOLOGY:    RECENT CULTURES:  08-11 @ 15:00 .Surgical Swab right groin deep wound   [facility]SS[facility]      Staphylococcus epidermidis  Staphylococcus epidermidis     Moderate Staphylococcus epidermidis    08-08 @ 15:37 Clean Catch Clean Catch (Midstream)                <10,000 CFU/mL Normal Urogenital Lashell    08-08 @ 14:34 .Other Wound right groin   [facility]      Enterococcus faecalis  Enterococcus faecalis     Moderate Enterococcus faecalis  Moderate Staphylococcus epidermidis "Susceptibilities not performed"    08-08 @ 13:00 .Blood Blood                No growth at 5 days    08-08 @ 12:00 .Blood Blood                No growth at 5 days          RESPIRATORY CULTURES:              RADIOLOGY & ADDITIONAL STUDIES:        Pager [phone]  After 5 pm/weekends or if no response :[phone]

## 2024-08-15 NOTE — PROGRESS NOTE ADULT - ASSESSMENT
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 Pt was discharged to rehab and then sent back to [facility] for concerns of groin infection and was admitted on  8/8.  Pt now s/p R groin washout, debridement of fat necrosis and coevrage with sartorius flap on 8/10 and placement of wound vac.  In OR no obvious purulence/ since of infection surrounding PTFE grft but with diffuse fat necrosis and non-viable tissue, now s/p additional R. groin washout and debridement (8/14/24)    Plan:  - MWF vac changes  - monitor vac  - If any concerns for expanding groin hematoma or profuse bleeding from groin should remove vac immediately to evaluate wound  - remainder care per primary    PRS  179

## 2024-08-15 NOTE — PROGRESS NOTE ADULT - ASSESSMENT
83 y/o Male with PMHx of HTN, HLD, L carotid stenosis s/p CEA, T2DM, CAD S/p CABG, AVR with subsequent valve in valve TAVR via b/l femoral artery access c/b failed closure device 7/24 C/b RLE acute limb ischemia requiring R ileofemoral bypass w/ PTFE and RLE angiogram followed by LLE ALI RTOR for L SFA interposition bypass with reversed ipsilateral GSV 7/24, who went to rehab facility and subsequently developed bilateral groin incisional infections with R incisional dehiscence. CTA showed a left groin hematoma unchanged from prior imaging, non-expanding in size.   Now s/p exploration of right groin, fat and skin debridement, sartorius muscle flap, wound vac placement 8/10. Patient found to have a Hgb of 7 this morning on pre-op labs, heparin was held. Patient HDS, asymptomatic.    PLAN:  - POD1 s/p revisional R groin washout + debridement by plastic surgery  - Restarted heparin GTT 500mL/hr; slowly titrate up  - Appreciate ID recs: IV vanco 1000mg  - Appreciate endo recs for glucose control  - Pain and anti-emetic PRN  - VTE ppx: Hep gtt  - OOB as tolerated  - 83 y/o Male with PMHx of HTN, HLD, L carotid stenosis s/p CEA, T2DM, CAD S/p CABG, AVR with subsequent valve in valve TAVR via b/l femoral artery access c/b failed closure device 7/24 C/b RLE acute limb ischemia requiring R ileofemoral bypass w/ PTFE and RLE angiogram followed by LLE ALI RTOR for L SFA interposition bypass with reversed ipsilateral GSV 7/24, who went to rehab facility and subsequently developed bilateral groin incisional infections with R incisional dehiscence. CTA showed a left groin hematoma unchanged from prior imaging, non-expanding in size.   Now s/p exploration of right groin, fat and skin debridement, sartorius muscle flap, wound vac placement 8/10. Patient found to have a Hgb of 7 this morning on pre-op labs, heparin was held. Patient HDS, asymptomatic.    PLAN:  - POD1 s/p revisional R groin washout + debridement by plastic surgery.  - Plastic surgery will check right groin wound tommorow for final recs.  - Restarted heparin GTT 500mL/hr; slowly titrate up  - Appreciate ID recs: IV vanco 1000mg  - Appreciate endo recs for glucose control  - Pain and anti-emetic PRN  - VTE ppx: Hep gtt  - OOB as tolerated

## 2024-08-15 NOTE — PROGRESS NOTE ADULT - ASSESSMENT
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     Assessment  DMT2: 84y Male with DM T2 with hyperglycemia, A1C 8.5% , was on oral meds  at home, now on basal bolus insulin with coverage, blood sugars running high, eating meals.   s/p OR for washout   AS: on medications, stable, monitored. recent TAVR  HTN: on antihypertensive medications, monitored, asymptomatic.    Discussed plan and management wit Dr Sheldon Angulo NP-[facility]AMS

## 2024-08-15 NOTE — PROGRESS NOTE ADULT - SUBJECTIVE AND OBJECTIVE BOX
Plastic Surgery Progress Note (pg [facility]: [id number], [facility]: [phone])    SUBJECTIVE:   No acute events overnight, tachy to 107. Patient resting comfortably. Pain well controlled in R and L groin, endorsing mild R calf pain     OBJECTIVE:  Vital Signs Last 24 Hrs  T(C): 36.8 (15 Aug 2024 01:28), Max: 36.8 (15 Aug 2024 01:28)  T(F): 98.2 (15 Aug 2024 01:28), Max: 98.2 (15 Aug 2024 01:28)  HR: 89 (15 Aug 2024 01:28) (84 - 107)  BP: 133/59 (15 Aug 2024 01:28) (105/82 - 176/70)  BP(mean): 92 (14 Aug 2024 16:00) (87 - 106)  RR: 18 (15 Aug 2024 01:28) (16 - 18)  SpO2: 94% (15 Aug 2024 01:28) (92% - 100%)    Parameters below as of 15 Aug 2024 01:28  Patient On (Oxygen Delivery Method): room air        Physical Examination:  GEN: NAD, resting quietly  GROIN:  R groin with wound vac in place, holding suction, L groin dressing cdi with mild surrounding induration   RLE: R calf TTP

## 2024-08-16 LAB
ANION GAP SERPL CALC-SCNC: 9 MMOL/L — SIGNIFICANT CHANGE UP (ref 5–17)
APTT BLD: 37.8 SEC — HIGH (ref 24.5–35.6)
APTT BLD: 53.9 SEC — HIGH (ref 24.5–35.6)
APTT BLD: 54 SEC — HIGH (ref 24.5–35.6)
BUN SERPL-MCNC: 14 MG/DL — SIGNIFICANT CHANGE UP (ref 7–23)
CALCIUM SERPL-MCNC: 8.6 MG/DL — SIGNIFICANT CHANGE UP (ref 8.4–10.5)
CHLORIDE SERPL-SCNC: 105 MMOL/L — SIGNIFICANT CHANGE UP (ref 96–108)
CO2 SERPL-SCNC: 26 MMOL/L — SIGNIFICANT CHANGE UP (ref 22–31)
CREAT SERPL-MCNC: 0.7 MG/DL — SIGNIFICANT CHANGE UP (ref 0.5–1.3)
CULTURE RESULTS: ABNORMAL
EGFR: 92 ML/MIN/1.73M2 — SIGNIFICANT CHANGE UP
GLUCOSE BLDC GLUCOMTR-MCNC: 164 MG/DL — HIGH (ref 70–99)
GLUCOSE BLDC GLUCOMTR-MCNC: 213 MG/DL — HIGH (ref 70–99)
GLUCOSE BLDC GLUCOMTR-MCNC: 228 MG/DL — HIGH (ref 70–99)
GLUCOSE BLDC GLUCOMTR-MCNC: 93 MG/DL — SIGNIFICANT CHANGE UP (ref 70–99)
GLUCOSE SERPL-MCNC: 87 MG/DL — SIGNIFICANT CHANGE UP (ref 70–99)
HCT VFR BLD CALC: 28.1 % — LOW (ref 39–50)
HGB BLD-MCNC: 8.5 G/DL — LOW (ref 13–17)
MAGNESIUM SERPL-MCNC: 1.8 MG/DL — SIGNIFICANT CHANGE UP (ref 1.6–2.6)
MCHC RBC-ENTMCNC: 28.1 PG — SIGNIFICANT CHANGE UP (ref 27–34)
MCHC RBC-ENTMCNC: 30.2 GM/DL — LOW (ref 32–36)
MCV RBC AUTO: 92.7 FL — SIGNIFICANT CHANGE UP (ref 80–100)
NRBC # BLD: 0 /100 WBCS — SIGNIFICANT CHANGE UP (ref 0–0)
ORGANISM # SPEC MICROSCOPIC CNT: ABNORMAL
PHOSPHATE SERPL-MCNC: 2.9 MG/DL — SIGNIFICANT CHANGE UP (ref 2.5–4.5)
PLATELET # BLD AUTO: 258 K/UL — SIGNIFICANT CHANGE UP (ref 150–400)
POTASSIUM SERPL-MCNC: 3.8 MMOL/L — SIGNIFICANT CHANGE UP (ref 3.5–5.3)
POTASSIUM SERPL-SCNC: 3.8 MMOL/L — SIGNIFICANT CHANGE UP (ref 3.5–5.3)
RBC # BLD: 3.03 M/UL — LOW (ref 4.2–5.8)
RBC # FLD: 19.4 % — HIGH (ref 10.3–14.5)
SODIUM SERPL-SCNC: 140 MMOL/L — SIGNIFICANT CHANGE UP (ref 135–145)
SPECIMEN SOURCE: SIGNIFICANT CHANGE UP
WBC # BLD: 4.85 K/UL — SIGNIFICANT CHANGE UP (ref 3.8–10.5)
WBC # FLD AUTO: 4.85 K/UL — SIGNIFICANT CHANGE UP (ref 3.8–10.5)

## 2024-08-16 PROCEDURE — 99232 SBSQ HOSP IP/OBS MODERATE 35: CPT

## 2024-08-16 RX ORDER — INSULIN GLARGINE 100 [IU]/ML
15 INJECTION, SOLUTION SUBCUTANEOUS AT BEDTIME
Refills: 0 | Status: DISCONTINUED | OUTPATIENT
Start: 2024-08-16 | End: 2024-08-18

## 2024-08-16 RX ADMIN — Medication 11 UNIT(S)/HR: at 22:04

## 2024-08-16 RX ADMIN — Medication 81 MILLIGRAM(S): at 12:04

## 2024-08-16 RX ADMIN — Medication 11 UNIT(S)/HR: at 16:25

## 2024-08-16 RX ADMIN — Medication 2: at 18:52

## 2024-08-16 RX ADMIN — INSULIN GLARGINE 15 UNIT(S): 100 INJECTION, SOLUTION SUBCUTANEOUS at 22:03

## 2024-08-16 RX ADMIN — Medication 250 MILLIGRAM(S): at 05:48

## 2024-08-16 RX ADMIN — Medication 6 UNIT(S): at 18:51

## 2024-08-16 RX ADMIN — Medication 70 MILLILITER(S): at 18:04

## 2024-08-16 RX ADMIN — Medication 2: at 12:04

## 2024-08-16 RX ADMIN — Medication 10 UNIT(S)/HR: at 02:32

## 2024-08-16 RX ADMIN — Medication 250 MILLIGRAM(S): at 18:03

## 2024-08-16 RX ADMIN — Medication 10 UNIT(S)/HR: at 14:44

## 2024-08-16 RX ADMIN — Medication 6 UNIT(S): at 12:04

## 2024-08-16 RX ADMIN — Medication 70 MILLILITER(S): at 22:05

## 2024-08-16 NOTE — PROGRESS NOTE ADULT - SUBJECTIVE AND OBJECTIVE BOX
Chief complaint  Patient is a 84y old  Male who presents with a chief complaint of RLE wound infection (16 Aug 2024 10:30)         Labs and Fingersticks  CAPILLARY BLOOD GLUCOSE      POCT Blood Glucose.: 228 mg/dL (16 Aug 2024 11:59)  POCT Blood Glucose.: 93 mg/dL (16 Aug 2024 09:05)  POCT Blood Glucose.: 126 mg/dL (15 Aug 2024 21:46)  POCT Blood Glucose.: 111 mg/dL (15 Aug 2024 17:49)      Anion Gap: 9 (08-16 @ 07:52)  Anion Gap: 10 (08-15 @ 07:16)      Calcium: 8.6 (08-16 @ 07:52)  Calcium: 8.9 (08-15 @ 07:16)  Albumin: 2.4 *L* (08-15 @ 07:16)    Alanine Aminotransferase (ALT/SGPT): 16 (08-15 @ 07:16)  Alkaline Phosphatase: 75 (08-15 @ 07:16)  Aspartate Aminotransferase (AST/SGOT): 21 (08-15 @ 07:16)        08-16    140  |  105  |  14  ----------------------------<  87  3.8   |  26  |  0.70    Ca    8.6      16 Aug 2024 07:52  Phos  2.9     08-16  Mg     1.8     08-16    TPro  5.8<L>  /  Alb  2.4<L>  /  TBili  0.5  /  DBili  x   /  AST  21  /  ALT  16  /  AlkPhos  75  08-15                        8.5    4.85  )-----------( 258      ( 16 Aug 2024 07:52 )             28.1     Medications  MEDICATIONS  (STANDING):  aspirin  chewable 81 milliGRAM(s) Oral daily  dextrose 50% Injectable 25 Gram(s) IV Push once  dextrose 50% Injectable 25 Gram(s) IV Push once  dextrose 50% Injectable 25 Gram(s) IV Push once  dextrose 50% Injectable 12.5 Gram(s) IV Push once  heparin  Infusion 500 Unit(s)/Hr (10 mL/Hr) IV Continuous <Continuous>  insulin glargine Injectable (LANTUS) 15 Unit(s) SubCutaneous at bedtime  insulin lispro (ADMELOG) corrective regimen sliding scale   SubCutaneous at bedtime  insulin lispro (ADMELOG) corrective regimen sliding scale   SubCutaneous three times a day before meals  insulin lispro Injectable (ADMELOG) 6 Unit(s) SubCutaneous three times a day before meals  lactated ringers. 1000 milliLiter(s) (70 mL/Hr) IV Continuous <Continuous>  silver nitrate Applicator 1 Application(s) Topical once  vancomycin  IVPB 1000 milliGRAM(s) IV Intermittent every 12 hours      Physical Exam  General: Patient comfortable in bed  Vital Signs Last 12 Hrs  T(F): 98.4 (08-16-24 @ 13:34), Max: 98.4 (08-16-24 @ 13:34)  HR: 90 (08-16-24 @ 13:34) (90 - 95)  BP: 119/65 (08-16-24 @ 13:34) (119/65 - 144/66)  BP(mean): --  RR: 18 (08-16-24 @ 13:34) (17 - 18)  SpO2: 98% (08-16-24 @ 13:34) (95% - 98%)    CVS: S1S2   Respiratory: No wheezing, no crepitations  GI: Abdomen soft, bowel sounds positive  Musculoskeletal:  moves all extremities  : Voiding

## 2024-08-16 NOTE — PROGRESS NOTE ADULT - PROBLEM SELECTOR PLAN 1
Lantus 16 units at bed time.    Admelog to 6 units before each meal in addition to LOW  Admelog correction scale coverage.  Will continue monitoring FS, log, and glucose trends, will Follow up.  Patient counseled for compliance with consistent low carb diet and exercise as tolerated outpatient.  Pt advised regarding home insulin therapy

## 2024-08-16 NOTE — PROGRESS NOTE ADULT - ASSESSMENT
85 y/o male w/ PMHx of HTN, HLD, Type 2 Diabetes, CAD s/p CABG x3/bioprosthetic AVR 2011 at [facility] and subsequent PCI 5/2019, s/p TAVR 7/24, on Eliquis, presents to the ER with son for right groin infection. states TAVR was done through right groin. states has been at rehab facility and noticed groin became swollen. states then developed drainage and erythema to the area. states had complication during procedure and entered left groin as well. states noticed left side also swollen but not as bad. patient denies f/n/v/d, CP, SOB, HA, dizziness, abdominal pain.

## 2024-08-16 NOTE — PROGRESS NOTE ADULT - SUBJECTIVE AND OBJECTIVE BOX
Subjective: Patient seen and examined. No new events except as noted.   feels well   VAC changes this am     REVIEW OF SYSTEMS:    CONSTITUTIONAL: No weakness, fevers or chills  EYES/ENT: No visual changes;  No vertigo or throat pain   NECK: No pain or stiffness  RESPIRATORY: No cough, wheezing, hemoptysis; No shortness of breath  CARDIOVASCULAR: No chest pain or palpitations  GASTROINTESTINAL: No abdominal or epigastric pain. No nausea, vomiting, or hematemesis; No diarrhea or constipation. No melena or hematochezia.  GENITOURINARY: No dysuria, frequency or hematuria  NEUROLOGICAL: No numbness or weakness  SKIN: No itching, burning, rashes, or lesions   All other review of systems is negative unless indicated above.    MEDICATIONS:  MEDICATIONS  (STANDING):  aspirin  chewable 81 milliGRAM(s) Oral daily  dextrose 50% Injectable 25 Gram(s) IV Push once  dextrose 50% Injectable 25 Gram(s) IV Push once  dextrose 50% Injectable 25 Gram(s) IV Push once  dextrose 50% Injectable 12.5 Gram(s) IV Push once  heparin  Infusion 500 Unit(s)/Hr (10 mL/Hr) IV Continuous <Continuous>  insulin glargine Injectable (LANTUS) 15 Unit(s) SubCutaneous at bedtime  insulin lispro (ADMELOG) corrective regimen sliding scale   SubCutaneous at bedtime  insulin lispro (ADMELOG) corrective regimen sliding scale   SubCutaneous three times a day before meals  insulin lispro Injectable (ADMELOG) 6 Unit(s) SubCutaneous three times a day before meals  lactated ringers. 1000 milliLiter(s) (70 mL/Hr) IV Continuous <Continuous>  silver nitrate Applicator 1 Application(s) Topical once  vancomycin  IVPB 1000 milliGRAM(s) IV Intermittent every 12 hours      PHYSICAL EXAM:  T(C): 36.4 (08-16-24 @ 09:22), Max: 36.7 (08-15-24 @ 14:15)  HR: 92 (08-16-24 @ 09:22) (92 - 99)  BP: 135/66 (08-16-24 @ 09:22) (125/64 - 144/66)  RR: 18 (08-16-24 @ 09:22) (17 - 18)  SpO2: 95% (08-16-24 @ 09:22) (93% - 98%)  Wt(kg): --  I&O's Summary    15 Aug 2024 07:01  -  16 Aug 2024 07:00  --------------------------------------------------------  IN: 2944 mL / OUT: 1820 mL / NET: 1124 mL        Appearance: NAD	  HEENT:   Normal oral mucosa, PERRL, EOMI	  Lymphatic: No lymphadenopathy , no edema  Cardiovascular: Irregular S1 S2, No JVD, No murmurs , Peripheral pulses palpable 2+ bilaterally  Respiratory: Lungs clear to auscultation, normal effort 	  Gastrointestinal:  Soft, Non-tender, + BS	  Skin: No rashes, No ecchymoses, No cyanosis, warm to touch  Musculoskeletal: Normal range of motion, normal strength  Psychiatry:  Mood & affect appropriate  Right groin with vac on suction. Suction maintained and no Left groin with staples intact, covered with gauze and tape, no strike through, c/d/i. No ecchymosis noted on either groin.   No evidence of active bleeding or expanding hematoma.   LABS:    CARDIAC MARKERS:                                8.5    4.85  )-----------( 258      ( 16 Aug 2024 07:52 )             28.1     08-16    140  |  105  |  14  ----------------------------<  87  3.8   |  26  |  0.70    Ca    8.6      16 Aug 2024 07:52  Phos  2.9     08-16  Mg     1.8     08-16    TPro  5.8<L>  /  Alb  2.4<L>  /  TBili  0.5  /  DBili  x   /  AST  21  /  ALT  16  /  AlkPhos  75  08-15    proBNP:   Lipid Profile:   HgA1c:   TSH:             TELEMETRY: 	    ECG:  	  RADIOLOGY:   DIAGNOSTIC TESTING:  [ ] Echocardiogram:  [ ]  Catheterization:  [ ] Stress Test:    OTHER:

## 2024-08-16 NOTE — PROGRESS NOTE ADULT - SUBJECTIVE AND OBJECTIVE BOX
SURGERY DAILY PROGRESS NOTE:     Overnight Events:  No acute events overnight.    SUBJECTIVE: Patient seen and evaluated on AM rounds. Pt is resting comfortably in bed with no complaints. Denies fever, chills, N/V, chest pain, or shortness of breath. Tolerating diet. Pain is adequately controlled on current regimen.    OBJECTIVE:  Vital Signs Last 24 Hrs  T(C): 36.4 (16 Aug 2024 09:22), Max: 36.8 (15 Aug 2024 09:30)  T(F): 97.6 (16 Aug 2024 09:22), Max: 98.2 (15 Aug 2024 09:30)  HR: 92 (16 Aug 2024 09:22) (92 - 99)  BP: 135/66 (16 Aug 2024 09:22) (125/64 - 147/62)  BP(mean): --  RR: 18 (16 Aug 2024 09:22) (17 - 18)  SpO2: 95% (16 Aug 2024 09:22) (93% - 98%)    Parameters below as of 16 Aug 2024 09:22  Patient On (Oxygen Delivery Method): room air      I&O's Detail    15 Aug 2024 07:01  -  16 Aug 2024 07:00  --------------------------------------------------------  IN:    Heparin: 184 mL    Lactated Ringers: 1740 mL    Oral Fluid: 1020 mL  Total IN: 2944 mL    OUT:    VAC (Vacuum Assisted Closure) System (mL): 50 mL    Voided (mL): 1770 mL  Total OUT: 1820 mL    Total NET: 1124 mL        Daily     Daily     LABS:                        8.5    4.85  )-----------( 258      ( 16 Aug 2024 07:52 )             28.1     08-16    140  |  105  |  14  ----------------------------<  87  3.8   |  26  |  0.70    Ca    8.6      16 Aug 2024 07:52  Phos  2.9     08-16  Mg     1.8     08-16    TPro  5.8<L>  /  Alb  2.4<L>  /  TBili  0.5  /  DBili  x   /  AST  21  /  ALT  16  /  AlkPhos  75  08-15    PTT - ( 16 Aug 2024 07:52 )  PTT:53.9 sec  Urinalysis Basic - ( 16 Aug 2024 07:52 )    Color: x / Appearance: x / SG: x / pH: x  Gluc: 87 mg/dL / Ketone: x  / Bili: x / Urobili: x   Blood: x / Protein: x / Nitrite: x   Leuk Esterase: x / RBC: x / WBC x   Sq Epi: x / Non Sq Epi: x / Bacteria: x              PHYSICAL EXAM:  General: AAOx3, NAD, lying comfortably in bed  HEENT: NC/AT  Respiratory: nonlabored breathing  Cardiovascular: RRR  Abdomen: non-distended, soft, non-tender  Extremities: Right groin with vac on suction 75 mmHg. Left groin with staples intact, covered with gauze and tegaderm, no strike through, c/d/i, changed dressing today, slight firmness underneath incision.

## 2024-08-16 NOTE — PROGRESS NOTE ADULT - ASSESSMENT
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     Assessment  DMT2: 84y Male with DM T2 with hyperglycemia, A1C 8.5% , was on oral meds  at home, now on basal bolus insulin with coverage, blood sugars running high, eating meals.   s/p OR for washout , now postop   AS: on medications, stable, monitored. recent TAVR  HTN: on antihypertensive medications, monitored, asymptomatic.    Discussed plan and management wit Dr Sheldon Angulo NP-[facility]

## 2024-08-16 NOTE — PROGRESS NOTE ADULT - SUBJECTIVE AND OBJECTIVE BOX
infectious diseases progress note:    Patient is a 84y old  Male who presents with a chief complaint of RLE wound infection (16 Aug 2024 07:02)        Local infection of skin and subcutaneous tissue               Allergies    No Known Allergies    Intolerances        ANTIBIOTICS/RELEVANT:  antimicrobials  vancomycin  IVPB 1000 milliGRAM(s) IV Intermittent every 12 hours    immunologic:    OTHER:  aspirin  chewable 81 milliGRAM(s) Oral daily  dextrose 50% Injectable 25 Gram(s) IV Push once  dextrose 50% Injectable 25 Gram(s) IV Push once  dextrose 50% Injectable 25 Gram(s) IV Push once  dextrose 50% Injectable 12.5 Gram(s) IV Push once  dextrose Oral Gel 15 Gram(s) Oral once PRN  dextrose Oral Gel 15 Gram(s) Oral once PRN  heparin  Infusion 500 Unit(s)/Hr IV Continuous <Continuous>  insulin glargine Injectable (LANTUS) 18 Unit(s) SubCutaneous at bedtime  insulin lispro (ADMELOG) corrective regimen sliding scale   SubCutaneous three times a day before meals  insulin lispro (ADMELOG) corrective regimen sliding scale   SubCutaneous at bedtime  insulin lispro Injectable (ADMELOG) 8 Unit(s) SubCutaneous three times a day before meals  lactated ringers. 1000 milliLiter(s) IV Continuous <Continuous>  silver nitrate Applicator 1 Application(s) Topical once      Objective:  Vital Signs Last 24 Hrs  T(C): 36.6 (16 Aug 2024 05:54), Max: 36.8 (15 Aug 2024 09:30)  T(F): 97.8 (16 Aug 2024 05:54), Max: 98.2 (15 Aug 2024 09:30)  HR: 95 (16 Aug 2024 05:54) (92 - 99)  BP: 144/66 (16 Aug 2024 05:54) (125/64 - 147/62)  BP(mean): --  RR: 17 (16 Aug 2024 05:54) (17 - 18)  SpO2: 98% (16 Aug 2024 05:54) (93% - 98%)    Parameters below as of 16 Aug 2024 05:54  Patient On (Oxygen Delivery Method): room air           Eyes:SHIRA, EOMI  Ear/Nose/Throat: no oral lesion, no sinus tenderness on percussion	  Neck:no JVD, no lymphadenopathy, supple  Respiratory: CTA saurabh  Cardiovascular: S1S2 RRR, no murmurs  Gastrointestinal:soft, (+) BS, no HSM  Extremities:no e/e/c        LABS:                        8.5    4.85  )-----------( 258      ( 16 Aug 2024 07:52 )             28.1     08-16    140  |  105  |  14  ----------------------------<  87  3.8   |  26  |  0.70    Ca    8.6      16 Aug 2024 07:52  Phos  2.9     08-16  Mg     1.8     08-16    TPro  5.8<L>  /  Alb  2.4<L>  /  TBili  0.5  /  DBili  x   /  AST  21  /  ALT  16  /  AlkPhos  75  08-15    PTT - ( 16 Aug 2024 07:52 )  PTT:53.9 sec  Urinalysis Basic - ( 16 Aug 2024 07:52 )    Color: x / Appearance: x / SG: x / pH: x  Gluc: 87 mg/dL / Ketone: x  / Bili: x / Urobili: x   Blood: x / Protein: x / Nitrite: x   Leuk Esterase: x / RBC: x / WBC x   Sq Epi: x / Non Sq Epi: x / Bacteria: x          MICROBIOLOGY:    RECENT CULTURES:  08-11 @ 15:00 .Surgical Swab right groin deep wound   ALLYSSA      Staphylococcus epidermidis  Staphylococcus epidermidis     Moderate Staphylococcus epidermidis          RESPIRATORY CULTURES:              RADIOLOGY & ADDITIONAL STUDIES:        Pager [phone]  After 5 pm/weekends or if no response :[phone]

## 2024-08-16 NOTE — PROGRESS NOTE ADULT - ASSESSMENT
83 y/o Male with PMHx of HTN, HLD, L carotid stenosis s/p CEA, T2DM, CAD S/p CABG, AVR with subsequent valve in valve TAVR via b/l femoral artery access c/b failed closure device 7/24 C/b RLE acute limb ischemia requiring R ileofemoral bypass w/ PTFE and RLE angiogram followed by LLE ALI RTOR for L SFA interposition bypass with reversed ipsilateral GSV 7/24, who went to rehab facility and subsequently developed bilateral groin incisional infections with R incisional dehiscence. CTA showed a left groin hematoma unchanged from prior imaging, non-expanding in size.     Now s/p exploration of right groin, fat and skin debridement, sartorius muscle flap, wound vac placement 8/10.    ROTR with plastics on 8/14- s/p Right groin washout and debridement. Plastics checked on him yesterday.    PLAN:  - Wound vac change MWF; drained 50cc overnight  - Appreciate ID recs: IV vanco increased to 1g q12h  - Appreciate endo recs for glucose control  - Pain and anti-emetic PRN  - VTE ppx: Hep gtt; adjust with PTT  - Dispo: home dc with wound vac 83 y/o Male with PMHx of HTN, HLD, L carotid stenosis s/p CEA, T2DM, CAD S/p CABG, AVR with subsequent valve in valve TAVR via b/l femoral artery access c/b failed closure device 7/24 C/b RLE acute limb ischemia requiring R ileofemoral bypass w/ PTFE and RLE angiogram followed by LLE ALI RTOR for L SFA interposition bypass with reversed ipsilateral GSV 7/24, who went to rehab facility and subsequently developed bilateral groin incisional infections with R incisional dehiscence. CTA showed a left groin hematoma unchanged from prior imaging, non-expanding in size.     Now s/p exploration of right groin, fat and skin debridement, sartorius muscle flap, wound vac placement 8/10.    ROTR with plastics on 8/14- s/p Right groin washout and debridement. Plastics checked on him yesterday.    PLAN:  - Wound vac change MWF; drained 50cc overnight.  - Plastics to check wound vac today.  - Appreciate ID recs: IV vanco increased to 1g q12h  - Appreciate endo recs for glucose control  - Pain and anti-emetic PRN  - VTE ppx: Hep gtt; adjust with PTT (8AM)  - Dispo: home dc with wound vac

## 2024-08-16 NOTE — PROGRESS NOTE ADULT - ASSESSMENT
85 y/o Male with PMHx of HTN, HLD, L carotid stenosis s/p CEA, T2DM, CAD S/p CABG, AVR with subsequent valve in valve TAVR via b/l femoral artery access c/b failed closure device 7/24 C/b RLE acute limb ischemia requiring R ileofemoral bypass w/ PTFE and RLE angiogram followed by LLE ALI RTOR for L SFA interposition bypass with reversed ipsilateral GSV 7/24, who went to rehab facility and subsequently developed bilateral groin incisional infections with R incisional dehiscence. CTA showed a left groin hematoma unchanged from prior imaging, non-expanding in size. Now s/p exploration of right groin, fat and skin debridement, sartorius muscle flap, wound vac placement 8/10.ROTR with plastics on 8/14- s/p Right groin washout and debridement. Patient doing well clinically. Plastics evaluated wound vac this morning, determined patient will need wav changes at home. Patient HDS.    PLAN:  - Wound vac change MWF  - plastics to check right groin wound on 8/16 for final recs - will be DC home with vac changes  - Appreciate ID recs: IV vanco 750 q12h  - Appreciate endo recs for glucose control  - Pain and anti-emetic PRN  - VTE ppx: Hep gtt, goal 60-80    Vascular Surgery  [id number]

## 2024-08-16 NOTE — PROGRESS NOTE ADULT - SUBJECTIVE AND OBJECTIVE BOX
Overnight Events:  PTT 37.8 overnight. Otherwise no acute events overnight.     SUBJECTIVE: Patient seen and evaluated at bedside. Reports pain is adequately controlled on current regimen, no changes from yesterday. Denies fevers, chills, nausea, vomiting, diarrhea. Passing gas, has not had BM for 3-4 days and is urinating well.    Vitals:  ============  T(F): 98.1 (16 Aug 2024 01:40), Max: 98.2 (15 Aug 2024 09:30)  HR: 96 (16 Aug 2024 01:40)  BP: 133/79 (16 Aug 2024 01:40)  RR: 18 (16 Aug 2024 01:40)  SpO2: 97% (16 Aug 2024 01:40) (93% - 97%)  temp max in last 48H T(F): , Max: 98.2 (08-15-24 @ 01:28)      EXAM:  General: AAOx3, NAD, lying comfortably in bed  Abdomen: non-distended, soft, non-tender  Extremities: Right groin with vac on suction 75 mmHg. Left groin with staples intact, covered with gauze and tape, no strike through, c/d/i, erythematous.       LABS:                         9.1    5.89  )-----------( 302      ( 15 Aug 2024 07:18 )             29.8     08-15    136  |  101  |  17  ----------------------------<  138<H>  4.4   |  25  |  0.79    Ca    8.9      15 Aug 2024 07:16  Phos  2.6     08-15  Mg     1.7     08-15    TPro  5.8<L>  /  Alb  2.4<L>  /  TBili  0.5  /  DBili  x   /  AST  21  /  ALT  16  /  AlkPhos  75  08-15    PT/INR - ( 14 Aug 2024 06:03 )   PT: 12.6 sec;   INR: 1.15 ratio         PTT - ( 16 Aug 2024 01:02 )  PTT:37.8 sec  Urinalysis Basic - ( 15 Aug 2024 07:16 )    Color: x / Appearance: x / SG: x / pH: x  Gluc: 138 mg/dL / Ketone: x  / Bili: x / Urobili: x   Blood: x / Protein: x / Nitrite: x   Leuk Esterase: x / RBC: x / WBC x   Sq Epi: x / Non Sq Epi: x / Bacteria: x            RADIOLOGY, EKG & ADDITIONAL TESTS: Reviewed.     I&O's Detail    14 Aug 2024 07:01  -  15 Aug 2024 07:00  --------------------------------------------------------  IN:    Heparin: 35 mL    IV PiggyBack: 250 mL    Lactated Ringers: 225 mL    Lactated Ringers: 840 mL    Oral Fluid: 540 mL  Total IN: 1890 mL    OUT:    VAC (Vacuum Assisted Closure) System (mL): 0 mL    Voided (mL): 1800 mL  Total OUT: 1800 mL    Total NET: 90 mL      15 Aug 2024 07:01  -  16 Aug 2024 05:34  --------------------------------------------------------  IN:    Heparin: 94 mL    Lactated Ringers: 840 mL    Oral Fluid: 660 mL  Total IN: 1594 mL    OUT:    VAC (Vacuum Assisted Closure) System (mL): 50 mL    Voided (mL): 1150 mL  Total OUT: 1200 mL    Total NET: 394 mL       Overnight Events:  PTT 37.8 overnight, adjusted heparin - now 10cc/hr. Otherwise no acute events overnight.     SUBJECTIVE: Patient seen and evaluated at bedside. Reports pain is adequately controlled on current regimen, no changes from yesterday. Denies fevers, chills, nausea, vomiting, diarrhea. Passing gas, has not had BM for 3-4 days and is urinating well.    Vitals:  ============  T(F): 98.1 (16 Aug 2024 01:40), Max: 98.2 (15 Aug 2024 09:30)  HR: 96 (16 Aug 2024 01:40)  BP: 133/79 (16 Aug 2024 01:40)  RR: 18 (16 Aug 2024 01:40)  SpO2: 97% (16 Aug 2024 01:40) (93% - 97%)  temp max in last 48H T(F): , Max: 98.2 (08-15-24 @ 01:28)      EXAM:  General: AAOx3, NAD, lying comfortably in bed  Abdomen: non-distended, soft, non-tender  Extremities: Right groin with vac on suction 75 mmHg. Left groin with staples intact, covered with gauze and tape, no strike through, c/d/i, erythematous.       LABS:                         9.1    5.89  )-----------( 302      ( 15 Aug 2024 07:18 )             29.8     08-15    136  |  101  |  17  ----------------------------<  138<H>  4.4   |  25  |  0.79    Ca    8.9      15 Aug 2024 07:16  Phos  2.6     08-15  Mg     1.7     08-15    TPro  5.8<L>  /  Alb  2.4<L>  /  TBili  0.5  /  DBili  x   /  AST  21  /  ALT  16  /  AlkPhos  75  08-15    PT/INR - ( 14 Aug 2024 06:03 )   PT: 12.6 sec;   INR: 1.15 ratio         PTT - ( 16 Aug 2024 01:02 )  PTT:37.8 sec  Urinalysis Basic - ( 15 Aug 2024 07:16 )    Color: x / Appearance: x / SG: x / pH: x  Gluc: 138 mg/dL / Ketone: x  / Bili: x / Urobili: x   Blood: x / Protein: x / Nitrite: x   Leuk Esterase: x / RBC: x / WBC x   Sq Epi: x / Non Sq Epi: x / Bacteria: x            RADIOLOGY, EKG & ADDITIONAL TESTS: Reviewed.     I&O's Detail    14 Aug 2024 07:01  -  15 Aug 2024 07:00  --------------------------------------------------------  IN:    Heparin: 35 mL    IV PiggyBack: 250 mL    Lactated Ringers: 225 mL    Lactated Ringers: 840 mL    Oral Fluid: 540 mL  Total IN: 1890 mL    OUT:    VAC (Vacuum Assisted Closure) System (mL): 0 mL    Voided (mL): 1800 mL  Total OUT: 1800 mL    Total NET: 90 mL      15 Aug 2024 07:01  -  16 Aug 2024 05:34  --------------------------------------------------------  IN:    Heparin: 94 mL    Lactated Ringers: 840 mL    Oral Fluid: 660 mL  Total IN: 1594 mL    OUT:    VAC (Vacuum Assisted Closure) System (mL): 50 mL    Voided (mL): 1150 mL  Total OUT: 1200 mL    Total NET: 394 mL

## 2024-08-16 NOTE — PROGRESS NOTE ADULT - ASSESSMENT
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 Pt was discharged to rehab and then sent back to [facility] for concerns of groin infection and was admitted on  8/8.  Pt now s/p R groin washout, debridement of fat necrosis and coevrage with sartorius flap on 8/10 and placement of wound vac.  In OR no obvious purulence/ since of infection surrounding PTFE grft but with diffuse fat necrosis and non-viable tissue, now s/p additional R. groin washout and debridement (8/14/24)    Plan:  - MWF vac changes, will be dc home with vac changes  - monitor vac  - If any concerns for expanding groin hematoma or profuse bleeding from groin should remove vac immediately to evaluate wound  - remainder care per primary

## 2024-08-16 NOTE — PROGRESS NOTE ADULT - ASSESSMENT
84M with hx CAD s/p CABG, DM, AVR 2016, CEA.   Recently admitted for TAVR 7/24/2024.  Course complicated by acute limb ischemia on the same day - s/p R iliofemoral bypass w/ PTFE and RLE angiogram. Post-op, patient w/ LLE ALI RTOR 7/25 s/p L SFA interposition bypass w/ reversed ipsilateral GSV.   Pt was discharged to rehab but noted to have open right groin wound with drainage. No fever ro chills     Right groin infected femoral site with underlying graft.  BC so far negative.  Wound culture so far E feacalis and Staph epi.  Plan is for OR today    Underlying AVR  At risk for PVE  CT reviewed large hematoma in left groin / severe stenosis of fem artery   s/p debridement and flap on the right   reexploration on 8/14   Infected Graft      Suggest:   vanco increased to 1 q 12 hr   all cultures  are positive for CNS ( from OR)       vanco level ok  will need 6 weeks of IV vanco for intravascular infection.  ( s/p flap closure).  left groin swollen but no signs of infection   ID to cover enxt 5 days

## 2024-08-16 NOTE — PROGRESS NOTE ADULT - SUBJECTIVE AND OBJECTIVE BOX
Plastic Surgery Progress Note (pg [facility]: [id number], [facility]: [phone])    SUBJECTIVE:   No acute events overnight, VSS. Patient resting comfortably. Pain well controlled, denies concerns    OBJECTIVE:  Vital Signs Last 24 Hrs  T(C): 36.6 (16 Aug 2024 05:54), Max: 36.8 (15 Aug 2024 09:30)  T(F): 97.8 (16 Aug 2024 05:54), Max: 98.2 (15 Aug 2024 09:30)  HR: 95 (16 Aug 2024 05:54) (92 - 99)  BP: 144/66 (16 Aug 2024 05:54) (125/64 - 147/62)  BP(mean): --  RR: 17 (16 Aug 2024 05:54) (17 - 18)  SpO2: 98% (16 Aug 2024 05:54) (93% - 98%)    Parameters below as of 16 Aug 2024 05:54  Patient On (Oxygen Delivery Method): room air        Physical Examination:  GEN: NAD, resting quietly  GROIN:  R groin with wound vac in place, holding suction, L groin dressing cdi with mild surrounding induration

## 2024-08-17 LAB
ANION GAP SERPL CALC-SCNC: 10 MMOL/L — SIGNIFICANT CHANGE UP (ref 5–17)
APTT BLD: 61.3 SEC — HIGH (ref 24.5–35.6)
APTT BLD: 62 SEC — HIGH (ref 24.5–35.6)
BUN SERPL-MCNC: 14 MG/DL — SIGNIFICANT CHANGE UP (ref 7–23)
C PEPTIDE SERPL-MCNC: 0.7 NG/ML — LOW (ref 1.1–4.4)
CALCIUM SERPL-MCNC: 8.7 MG/DL — SIGNIFICANT CHANGE UP (ref 8.4–10.5)
CHLORIDE SERPL-SCNC: 106 MMOL/L — SIGNIFICANT CHANGE UP (ref 96–108)
CO2 SERPL-SCNC: 24 MMOL/L — SIGNIFICANT CHANGE UP (ref 22–31)
CREAT SERPL-MCNC: 0.67 MG/DL — SIGNIFICANT CHANGE UP (ref 0.5–1.3)
EGFR: 93 ML/MIN/1.73M2 — SIGNIFICANT CHANGE UP
GLUCOSE BLDC GLUCOMTR-MCNC: 108 MG/DL — HIGH (ref 70–99)
GLUCOSE BLDC GLUCOMTR-MCNC: 135 MG/DL — HIGH (ref 70–99)
GLUCOSE BLDC GLUCOMTR-MCNC: 187 MG/DL — HIGH (ref 70–99)
GLUCOSE BLDC GLUCOMTR-MCNC: 266 MG/DL — HIGH (ref 70–99)
GLUCOSE SERPL-MCNC: 156 MG/DL — HIGH (ref 70–99)
HCT VFR BLD CALC: 29.1 % — LOW (ref 39–50)
HGB BLD-MCNC: 8.9 G/DL — LOW (ref 13–17)
MAGNESIUM SERPL-MCNC: 1.7 MG/DL — SIGNIFICANT CHANGE UP (ref 1.6–2.6)
MCHC RBC-ENTMCNC: 28.1 PG — SIGNIFICANT CHANGE UP (ref 27–34)
MCHC RBC-ENTMCNC: 30.6 GM/DL — LOW (ref 32–36)
MCV RBC AUTO: 91.8 FL — SIGNIFICANT CHANGE UP (ref 80–100)
NRBC # BLD: 0 /100 WBCS — SIGNIFICANT CHANGE UP (ref 0–0)
PHOSPHATE SERPL-MCNC: 2.7 MG/DL — SIGNIFICANT CHANGE UP (ref 2.5–4.5)
PLATELET # BLD AUTO: 269 K/UL — SIGNIFICANT CHANGE UP (ref 150–400)
POTASSIUM SERPL-MCNC: 3.9 MMOL/L — SIGNIFICANT CHANGE UP (ref 3.5–5.3)
POTASSIUM SERPL-SCNC: 3.9 MMOL/L — SIGNIFICANT CHANGE UP (ref 3.5–5.3)
RBC # BLD: 3.17 M/UL — LOW (ref 4.2–5.8)
RBC # FLD: 18.9 % — HIGH (ref 10.3–14.5)
SODIUM SERPL-SCNC: 140 MMOL/L — SIGNIFICANT CHANGE UP (ref 135–145)
VANCOMYCIN TROUGH SERPL-MCNC: 15.7 UG/ML — SIGNIFICANT CHANGE UP (ref 10–20)
WBC # BLD: 4.75 K/UL — SIGNIFICANT CHANGE UP (ref 3.8–10.5)
WBC # FLD AUTO: 4.75 K/UL — SIGNIFICANT CHANGE UP (ref 3.8–10.5)

## 2024-08-17 RX ORDER — METOPROLOL TARTRATE 100 MG/1
50 TABLET ORAL
Refills: 0 | Status: DISCONTINUED | OUTPATIENT
Start: 2024-08-17 | End: 2024-08-20

## 2024-08-17 RX ADMIN — Medication 11 UNIT(S)/HR: at 19:35

## 2024-08-17 RX ADMIN — Medication 81 MILLIGRAM(S): at 13:01

## 2024-08-17 RX ADMIN — Medication 70 MILLILITER(S): at 09:03

## 2024-08-17 RX ADMIN — Medication 11 UNIT(S)/HR: at 01:30

## 2024-08-17 RX ADMIN — Medication 8 UNIT(S): at 13:01

## 2024-08-17 RX ADMIN — Medication 6 UNIT(S): at 09:02

## 2024-08-17 RX ADMIN — INSULIN GLARGINE 15 UNIT(S): 100 INJECTION, SOLUTION SUBCUTANEOUS at 22:16

## 2024-08-17 RX ADMIN — METOPROLOL TARTRATE 50 MILLIGRAM(S): 100 TABLET ORAL at 17:56

## 2024-08-17 RX ADMIN — Medication 3: at 13:01

## 2024-08-17 RX ADMIN — Medication 11 UNIT(S)/HR: at 08:17

## 2024-08-17 RX ADMIN — Medication 250 MILLIGRAM(S): at 06:13

## 2024-08-17 RX ADMIN — Medication 250 MILLIGRAM(S): at 18:27

## 2024-08-17 RX ADMIN — Medication 8 UNIT(S): at 17:56

## 2024-08-17 RX ADMIN — Medication 1: at 09:02

## 2024-08-17 NOTE — PROGRESS NOTE ADULT - ASSESSMENT
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     Assessment  DMT2: 84y Male with DM T2 with hyperglycemia, A1C 8.5% , was on oral meds  at home, now on basal bolus insulin with coverage, blood sugars running high, eating meals.   s/p OR for washout , now postop   AS: on medications, stable, monitored. recent TAVR  HTN: on antihypertensive medications, monitored, asymptomatic.    Discussed plan and management wit Dr Sheldon Angulo NP-TEAMS             84M PMH HTN, HLD, L carotid stenosis s/p CEA, T2DM, CAD S/p CABG AVR with subsequent valve in valve TAVR via b/l femoral artery access c/b failed closure device 7/24 C/b RLE acute limb ischemia requiring R ileofemoral bypass, now with wound dehiscence.     Assessment  DMT2: 84y Male with DM T2 with hyperglycemia, A1C 8.5% , was on oral meds  at home, now on basal bolus insulin with coverage, blood sugars running high, eating meals.   s/p OR for washout , now postop   AS: on medications, stable, monitored. recent TAVR  HTN: on antihypertensive medications, monitored, asymptomatic.    Discussed plan and management wit Dr Oneil Angulo NP-TEAMS

## 2024-08-17 NOTE — PROGRESS NOTE ADULT - SUBJECTIVE AND OBJECTIVE BOX
Plastic Surgery Progress Note (pg [facility]: [id number], [facility]: [phone])    SUBJECTIVE:   No acute events overnight, VSS. Patient resting comfortably. Pain well controlled, denies concerns    OBJECTIVE:  Vital Signs Last 24 Hrs  T(C): 36.7 (17 Aug 2024 06:40), Max: 37.1 (16 Aug 2024 16:30)  T(F): 98.1 (17 Aug 2024 06:40), Max: 98.8 (16 Aug 2024 16:30)  HR: 81 (17 Aug 2024 06:40) (81 - 92)  BP: 126/78 (17 Aug 2024 06:40) (119/65 - 138/71)  BP(mean): --  RR: 18 (17 Aug 2024 06:40) (18 - 18)  SpO2: 97% (17 Aug 2024 06:40) (95% - 98%)    Parameters below as of 17 Aug 2024 06:40  Patient On (Oxygen Delivery Method): room air        Physical Examination:  GEN: NAD, resting quietly  GROIN:  R groin with wound vac in place, holding suction, L groin dressing cdi with mild surrounding induration

## 2024-08-17 NOTE — PROGRESS NOTE ADULT - ASSESSMENT
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 Pt was discharged to rehab and then sent back to [facility] for concerns of groin infection and was admitted on  8/8.  Pt now s/p R groin washout, debridement of fat necrosis and coevrage with sartorius flap on 8/10 and placement of wound vac.  In OR no obvious purulence/ since of infection surrounding PTFE grft but with diffuse fat necrosis and non-viable tissue, now s/p additional R. groin washout and debridement (8/14/24)    Plan:  - MWF vac changes, will be dc home with vac changes  - monitor vac  - If any concerns for expanding groin hematoma or profuse bleeding from groin should remove vac immediately to evaluate wound  - remainder care per primary

## 2024-08-17 NOTE — PROGRESS NOTE ADULT - ASSESSMENT
85 y/o Male with PMHx of HTN, HLD, L carotid stenosis s/p CEA, T2DM, CAD S/p CABG, AVR with subsequent valve in valve TAVR via b/l femoral artery access c/b failed closure device 7/24 C/b RLE acute limb ischemia requiring R ileofemoral bypass w/ PTFE and RLE angiogram followed by LLE ALI RTOR for L SFA interposition bypass with reversed ipsilateral GSV 7/24, who went to rehab facility and subsequently developed bilateral groin incisional infections with R incisional dehiscence. CTA showed a left groin hematoma unchanged from prior imaging, non-expanding in size. Now s/p exploration of right groin, fat and skin debridement, sartorius muscle flap, wound vac placement 8/10. ROTR with plastics on 8/14- s/p Right groin washout and debridement. Plastic recs; patient to go home with wound vac. Patient doing well clinically. Patient HDS.    PLAN:  - Wound vac change MWF  - Appreciate plastic recs; will be DC home with vac changes  - Appreciate endo recs for glucose control  - Pain and anti-emetic PRN  - VTE ppx: Hep gtt, goal 60-80  - Appreciate ID recs: IV vanco 750 q12h for 6 weeks  - f/u with ID to transition to PO antibiotics or if patient requires PICC  - Dispo: Home PT, rolling walker    Vascular surgery [phone]

## 2024-08-17 NOTE — PROGRESS NOTE ADULT - SUBJECTIVE AND OBJECTIVE BOX
Chief complaint  Patient is a 84y old  Male who presents with a chief complaint of RLE wound infection (17 Aug 2024 08:45)         Labs and Fingersticks  CAPILLARY BLOOD GLUCOSE      POCT Blood Glucose.: 135 mg/dL (17 Aug 2024 17:36)  POCT Blood Glucose.: 266 mg/dL (17 Aug 2024 12:12)  POCT Blood Glucose.: 187 mg/dL (17 Aug 2024 09:01)  POCT Blood Glucose.: 164 mg/dL (16 Aug 2024 21:41)  POCT Blood Glucose.: 213 mg/dL (16 Aug 2024 18:48)      Anion Gap: 10 (08-17 @ 07:09)  Anion Gap: 9 (08-16 @ 07:52)      Calcium: 8.7 (08-17 @ 07:09)  Calcium: 8.6 (08-16 @ 07:52)          08-17    140  |  106  |  14  ----------------------------<  156<H>  3.9   |  24  |  0.67    Ca    8.7      17 Aug 2024 07:09  Phos  2.7     08-17  Mg     1.7     08-17                          8.9    4.75  )-----------( 269      ( 17 Aug 2024 07:09 )             29.1     Medications  MEDICATIONS  (STANDING):  aspirin  chewable 81 milliGRAM(s) Oral daily  dextrose 50% Injectable 12.5 Gram(s) IV Push once  dextrose 50% Injectable 25 Gram(s) IV Push once  dextrose 50% Injectable 25 Gram(s) IV Push once  dextrose 50% Injectable 25 Gram(s) IV Push once  heparin  Infusion 500 Unit(s)/Hr (11 mL/Hr) IV Continuous <Continuous>  insulin glargine Injectable (LANTUS) 15 Unit(s) SubCutaneous at bedtime  insulin lispro (ADMELOG) corrective regimen sliding scale   SubCutaneous at bedtime  insulin lispro (ADMELOG) corrective regimen sliding scale   SubCutaneous three times a day before meals  insulin lispro Injectable (ADMELOG) 8 Unit(s) SubCutaneous three times a day before meals  metoprolol tartrate 50 milliGRAM(s) Oral two times a day  silver nitrate Applicator 1 Application(s) Topical once  vancomycin  IVPB 1000 milliGRAM(s) IV Intermittent every 12 hours      Physical Exam  General: Patient comfortable in bed   Vital Signs Last 12 Hrs  T(F): 98.1 (08-17-24 @ 17:03), Max: 98.1 (08-17-24 @ 06:40)  HR: 85 (08-17-24 @ 17:03) (72 - 93)  BP: 138/65 (08-17-24 @ 17:03) (116/68 - 138/65)  BP(mean): --  RR: 18 (08-17-24 @ 17:03) (18 - 18)  SpO2: 96% (08-17-24 @ 17:03) (96% - 98%)    CVS: S1S2   Respiratory: No wheezing, no crepitations  GI: Abdomen soft, bowel sounds positive  Musculoskeletal:  moves all extremities  : Voiding

## 2024-08-17 NOTE — PROGRESS NOTE ADULT - PROBLEM SELECTOR PLAN 1
Lantus 15 units at bed time.    Admelog 8 units before each meal in addition to LOW  Admelog correction scale coverage.  Will continue monitoring FS, log, and glucose trends, will Follow up.  Patient counseled for compliance with consistent low carb diet and exercise as tolerated outpatient.  Pt advised regarding home insulin therapy

## 2024-08-17 NOTE — PROGRESS NOTE ADULT - SUBJECTIVE AND OBJECTIVE BOX
Subjective: Patient seen and examined. No new events except as noted.   Wife at bedside       REVIEW OF SYSTEMS:    CONSTITUTIONAL:+ weakness, fevers or chills  EYES/ENT: No visual changes;  No vertigo or throat pain   NECK: No pain or stiffness  RESPIRATORY: No cough, wheezing, hemoptysis; No shortness of breath  CARDIOVASCULAR: No chest pain or palpitations  GASTROINTESTINAL: No abdominal or epigastric pain. No nausea, vomiting, or hematemesis; No diarrhea or constipation. No melena or hematochezia.  GENITOURINARY: No dysuria, frequency or hematuria  NEUROLOGICAL: No numbness or weakness  SKIN: No itching, burning, rashes, or lesions   All other review of systems is negative unless indicated above.    MEDICATIONS:  MEDICATIONS  (STANDING):  aspirin  chewable 81 milliGRAM(s) Oral daily  dextrose 50% Injectable 12.5 Gram(s) IV Push once  dextrose 50% Injectable 25 Gram(s) IV Push once  dextrose 50% Injectable 25 Gram(s) IV Push once  dextrose 50% Injectable 25 Gram(s) IV Push once  heparin  Infusion 500 Unit(s)/Hr (11 mL/Hr) IV Continuous <Continuous>  insulin glargine Injectable (LANTUS) 15 Unit(s) SubCutaneous at bedtime  insulin lispro (ADMELOG) corrective regimen sliding scale   SubCutaneous three times a day before meals  insulin lispro (ADMELOG) corrective regimen sliding scale   SubCutaneous at bedtime  insulin lispro Injectable (ADMELOG) 8 Unit(s) SubCutaneous three times a day before meals  metoprolol tartrate 50 milliGRAM(s) Oral two times a day  silver nitrate Applicator 1 Application(s) Topical once  vancomycin  IVPB 1000 milliGRAM(s) IV Intermittent every 12 hours      PHYSICAL EXAM:  T(C): 36.7 (08-17-24 @ 21:44), Max: 36.8 (08-16-24 @ 21:50)  HR: 67 (08-17-24 @ 21:44) (67 - 93)  BP: 115/57 (08-17-24 @ 21:44) (115/57 - 138/71)  RR: 18 (08-17-24 @ 21:44) (18 - 18)  SpO2: 97% (08-17-24 @ 21:44) (96% - 98%)  Wt(kg): --  I&O's Summary    16 Aug 2024 07:01  -  17 Aug 2024 07:00  --------------------------------------------------------  IN: 790 mL / OUT: 3000 mL / NET: -2210 mL    17 Aug 2024 07:01  -  17 Aug 2024 21:45  --------------------------------------------------------  IN: 1654 mL / OUT: 1150 mL / NET: 504 mL          Appearance: NAD	  HEENT:   Normal oral mucosa, PERRL, EOMI	  Lymphatic: No lymphadenopathy , no edema  Cardiovascular: Irregular S1 S2, No JVD, No murmurs , Peripheral pulses palpable 2+ bilaterally  Respiratory: Lungs clear to auscultation, normal effort 	  Gastrointestinal:  Soft, Non-tender, + BS	  Skin: No rashes, No ecchymoses, No cyanosis, warm to touch  Musculoskeletal: Normal range of motion, normal strength  Psychiatry:  Mood & affect appropriate  Right groin with vac on suction. Suction maintained and no Left groin with staples intact, covered with gauze and tape, no strike through, c/d/i. No ecchymosis noted on either groin.   No evidence of active bleeding or expanding hematoma.       LABS:    CARDIAC MARKERS:                                8.9    4.75  )-----------( 269      ( 17 Aug 2024 07:09 )             29.1     08-17    140  |  106  |  14  ----------------------------<  156<H>  3.9   |  24  |  0.67    Ca    8.7      17 Aug 2024 07:09  Phos  2.7     08-17  Mg     1.7     08-17            TELEMETRY: 	    ECG:  	  RADIOLOGY:   DIAGNOSTIC TESTING:  [ ] Echocardiogram:  [ ]  Catheterization:  [ ] Stress Test:    OTHER:

## 2024-08-17 NOTE — PROGRESS NOTE ADULT - ASSESSMENT
85 y/o male w/ PMHx of HTN, HLD, Type 2 Diabetes, CAD s/p CABG x3/bioprosthetic AVR 2011 at [facility] and subsequent PCI 5/2019, s/p TAVR 7/24, on Eliquis, presents to the ER with son for right groin infection. states TAVR was done through right groin. states has been at rehab facility and noticed groin became swollen. states then developed drainage and erythema to the area. states had complication during procedure and entered left groin as well. states noticed left side also swollen but not as bad. patient denies f/n/v/d, CP, SOB, HA, dizziness, abdominal pain.

## 2024-08-17 NOTE — PROGRESS NOTE ADULT - SUBJECTIVE AND OBJECTIVE BOX
SUBJECTIVE:    Patient was seen and examined at bedside.        Vitals:  ============  T(F): 98.2 (17 Aug 2024 01:35), Max: 98.8 (16 Aug 2024 16:30)  HR: 85 (17 Aug 2024 01:35)  BP: 138/71 (17 Aug 2024 01:35)  RR: 18 (17 Aug 2024 01:35)  SpO2: 97% (17 Aug 2024 01:35) (95% - 98%)  temp max in last 48H T(F): , Max: 98.8 (08-16-24 @ 16:30)    PHYSICAL EXAM:  General: AAOx3, NAD, lying comfortably in bed  HEENT: NC/AT  Respiratory: nonlabored breathing  Cardiovascular: RRR  Abdomen: non-distended, soft, non-tender  Extremities: Right groin with vac on suction 75 mmHg. Left groin with staples intact, covered with gauze and tegaderm, no strike through, c/d/i, changed dressing today, slight firmness underneath incision.      I&O's Detail    15 Aug 2024 07:01  -  16 Aug 2024 07:00  --------------------------------------------------------  IN:    Heparin: 184 mL    Lactated Ringers: 1740 mL    Oral Fluid: 1020 mL  Total IN: 2944 mL    OUT:    VAC (Vacuum Assisted Closure) System (mL): 50 mL    Voided (mL): 1770 mL  Total OUT: 1820 mL    Total NET: 1124 mL      16 Aug 2024 07:01  -  17 Aug 2024 05:06  --------------------------------------------------------  IN:    Oral Fluid: 540 mL  Total IN: 540 mL    OUT:    Voided (mL): 2350 mL  Total OUT: 2350 mL    Total NET: -1810 mL    LABS:                         8.5    4.85  )-----------( 258      ( 16 Aug 2024 07:52 )             28.1     08-16    140  |  105  |  14  ----------------------------<  87  3.8   |  26  |  0.70    Ca    8.6      16 Aug 2024 07:52  Phos  2.9     08-16  Mg     1.8     08-16    TPro  5.8<L>  /  Alb  2.4<L>  /  TBili  0.5  /  DBili  x   /  AST  21  /  ALT  16  /  AlkPhos  75  08-15    PTT - ( 17 Aug 2024 00:30 )  PTT:61.3 sec  Urinalysis Basic - ( 16 Aug 2024 07:52 )    Color: x / Appearance: x / SG: x / pH: x  Gluc: 87 mg/dL / Ketone: x  / Bili: x / Urobili: x   Blood: x / Protein: x / Nitrite: x   Leuk Esterase: x / RBC: x / WBC x   Sq Epi: x / Non Sq Epi: x / Bacteria: x            RADIOLOGY, EKG & ADDITIONAL TESTS: Reviewed.    SUBJECTIVE:    Patient was seen and examined at bedside. PTT 61 overnight - heparin was therapeutic and was unchanged. Otherwise no overnight events. Had a bowel movement. Denies fevers, chills, nausea, vomiting, diarrhea.        Vitals:  ============  T(F): 98.2 (17 Aug 2024 01:35), Max: 98.8 (16 Aug 2024 16:30)  HR: 85 (17 Aug 2024 01:35)  BP: 138/71 (17 Aug 2024 01:35)  RR: 18 (17 Aug 2024 01:35)  SpO2: 97% (17 Aug 2024 01:35) (95% - 98%)  temp max in last 48H T(F): , Max: 98.8 (08-16-24 @ 16:30)    PHYSICAL EXAM:  General: AAOx3, NAD, lying comfortably in bed  HEENT: NC/AT  Respiratory: nonlabored breathing  Cardiovascular: RRR  Abdomen: non-distended, soft, non-tender  Extremities: Right groin with vac on suction 75 mmHg. Left groin with staples intact, covered with gauze and tegaderm, no strike through, c/d/i, changed dressing today, slight firmness underneath incision.      I&O's Detail    15 Aug 2024 07:01  -  16 Aug 2024 07:00  --------------------------------------------------------  IN:    Heparin: 184 mL    Lactated Ringers: 1740 mL    Oral Fluid: 1020 mL  Total IN: 2944 mL    OUT:    VAC (Vacuum Assisted Closure) System (mL): 50 mL    Voided (mL): 1770 mL  Total OUT: 1820 mL    Total NET: 1124 mL      16 Aug 2024 07:01  -  17 Aug 2024 05:06  --------------------------------------------------------  IN:    Oral Fluid: 540 mL  Total IN: 540 mL    OUT:    Voided (mL): 2350 mL  Total OUT: 2350 mL    Total NET: -1810 mL    LABS:                         8.5    4.85  )-----------( 258      ( 16 Aug 2024 07:52 )             28.1     08-16    140  |  105  |  14  ----------------------------<  87  3.8   |  26  |  0.70    Ca    8.6      16 Aug 2024 07:52  Phos  2.9     08-16  Mg     1.8     08-16    TPro  5.8<L>  /  Alb  2.4<L>  /  TBili  0.5  /  DBili  x   /  AST  21  /  ALT  16  /  AlkPhos  75  08-15    PTT - ( 17 Aug 2024 00:30 )  PTT:61.3 sec  Urinalysis Basic - ( 16 Aug 2024 07:52 )    Color: x / Appearance: x / SG: x / pH: x  Gluc: 87 mg/dL / Ketone: x  / Bili: x / Urobili: x   Blood: x / Protein: x / Nitrite: x   Leuk Esterase: x / RBC: x / WBC x   Sq Epi: x / Non Sq Epi: x / Bacteria: x            RADIOLOGY, EKG & ADDITIONAL TESTS: Reviewed.    SUBJECTIVE:    Overnight: PTT 61 overnight - heparin was therapeutic and was unchanged. No overnight events.     Patient was seen and examined at bedside. Resting comfortably in bed. Had a bowel movement. Denies fevers, chills, nausea, vomiting, diarrhea.        Vitals:  ============  T(F): 98.2 (17 Aug 2024 01:35), Max: 98.8 (16 Aug 2024 16:30)  HR: 85 (17 Aug 2024 01:35)  BP: 138/71 (17 Aug 2024 01:35)  RR: 18 (17 Aug 2024 01:35)  SpO2: 97% (17 Aug 2024 01:35) (95% - 98%)  temp max in last 48H T(F): , Max: 98.8 (08-16-24 @ 16:30)    PHYSICAL EXAM:  General: AAOx3, NAD, lying comfortably in bed  HEENT: NC/AT  Respiratory: nonlabored breathing  Cardiovascular: RRR  Abdomen: non-distended, soft, non-tender  Extremities: Right groin with vac on suction 75 mmHg. Left groin with staples intact, covered with gauze and tegaderm, no strike through, c/d/i, changed dressing today, slight firmness underneath incision.      I&O's Detail    15 Aug 2024 07:01  -  16 Aug 2024 07:00  --------------------------------------------------------  IN:    Heparin: 184 mL    Lactated Ringers: 1740 mL    Oral Fluid: 1020 mL  Total IN: 2944 mL    OUT:    VAC (Vacuum Assisted Closure) System (mL): 50 mL    Voided (mL): 1770 mL  Total OUT: 1820 mL    Total NET: 1124 mL      16 Aug 2024 07:01  -  17 Aug 2024 05:06  --------------------------------------------------------  IN:    Oral Fluid: 540 mL  Total IN: 540 mL    OUT:    Voided (mL): 2350 mL  Total OUT: 2350 mL    Total NET: -1810 mL    LABS:                         8.5    4.85  )-----------( 258      ( 16 Aug 2024 07:52 )             28.1     08-16    140  |  105  |  14  ----------------------------<  87  3.8   |  26  |  0.70    Ca    8.6      16 Aug 2024 07:52  Phos  2.9     08-16  Mg     1.8     08-16    TPro  5.8<L>  /  Alb  2.4<L>  /  TBili  0.5  /  DBili  x   /  AST  21  /  ALT  16  /  AlkPhos  75  08-15    PTT - ( 17 Aug 2024 00:30 )  PTT:61.3 sec  Urinalysis Basic - ( 16 Aug 2024 07:52 )    Color: x / Appearance: x / SG: x / pH: x  Gluc: 87 mg/dL / Ketone: x  / Bili: x / Urobili: x   Blood: x / Protein: x / Nitrite: x   Leuk Esterase: x / RBC: x / WBC x   Sq Epi: x / Non Sq Epi: x / Bacteria: x            RADIOLOGY, EKG & ADDITIONAL TESTS: Reviewed.

## 2024-08-17 NOTE — PROGRESS NOTE ADULT - SUBJECTIVE AND OBJECTIVE BOX
SUBJECTIVE:    Overnight: PTT 61 overnight - heparin was therapeutic and was unchanged. No overnight events.     Patient was seen and examined at bedside. Resting comfortably in bed. Had a bowel movement. Denies fevers, chills, nausea, vomiting, diarrhea.    Vitals:  ============  T(F): 98.2 (17 Aug 2024 01:35), Max: 98.8 (16 Aug 2024 16:30)  HR: 85 (17 Aug 2024 01:35)  BP: 138/71 (17 Aug 2024 01:35)  RR: 18 (17 Aug 2024 01:35)  SpO2: 97% (17 Aug 2024 01:35) (95% - 98%)  temp max in last 48H T(F): , Max: 98.8 (08-16-24 @ 16:30)    PHYSICAL EXAM:  General: AAOx3, NAD, lying comfortably in bed  HEENT: NC/AT  Respiratory: nonlabored breathing  Cardiovascular: RRR  Abdomen: non-distended, soft, non-tender  Extremities: Right groin with vac holding suction 75 mmHg. Left groin with staples intact, covered with gauze and tegaderm, no strike through, c/d/i, changed dressing today, indurated underneath incision.    I&O's Detail    15 Aug 2024 07:01  -  16 Aug 2024 07:00  --------------------------------------------------------  IN:    Heparin: 184 mL    Lactated Ringers: 1740 mL    Oral Fluid: 1020 mL  Total IN: 2944 mL    OUT:    VAC (Vacuum Assisted Closure) System (mL): 50 mL    Voided (mL): 1770 mL  Total OUT: 1820 mL    Total NET: 1124 mL      16 Aug 2024 07:01  -  17 Aug 2024 05:06  --------------------------------------------------------  IN:    Oral Fluid: 540 mL  Total IN: 540 mL    OUT:    Voided (mL): 2350 mL  Total OUT: 2350 mL    Total NET: -1810 mL    LABS:                         8.5    4.85  )-----------( 258      ( 16 Aug 2024 07:52 )             28.1     08-16    140  |  105  |  14  ----------------------------<  87  3.8   |  26  |  0.70    Ca    8.6      16 Aug 2024 07:52  Phos  2.9     08-16  Mg     1.8     08-16    TPro  5.8<L>  /  Alb  2.4<L>  /  TBili  0.5  /  DBili  x   /  AST  21  /  ALT  16  /  AlkPhos  75  08-15    PTT - ( 17 Aug 2024 00:30 )  PTT:61.3 sec  Urinalysis Basic - ( 16 Aug 2024 07:52 )    Color: x / Appearance: x / SG: x / pH: x  Gluc: 87 mg/dL / Ketone: x  / Bili: x / Urobili: x   Blood: x / Protein: x / Nitrite: x   Leuk Esterase: x / RBC: x / WBC x   Sq Epi: x / Non Sq Epi: x / Bacteria: x

## 2024-08-17 NOTE — PROGRESS NOTE ADULT - ASSESSMENT
83 y/o Male with PMHx of HTN, HLD, L carotid stenosis s/p CEA, T2DM, CAD S/p CABG, AVR with subsequent valve in valve TAVR via b/l femoral artery access c/b failed closure device 7/24 C/b RLE acute limb ischemia requiring R ileofemoral bypass w/ PTFE and RLE angiogram followed by LLE ALI RTOR for L SFA interposition bypass with reversed ipsilateral GSV 7/24, who went to rehab facility and subsequently developed bilateral groin incisional infections with R incisional dehiscence. CTA showed a left groin hematoma unchanged from prior imaging, non-expanding in size. Now s/p exploration of right groin, fat and skin debridement, sartorius muscle flap, wound vac placement 8/10.ROTR with plastics on 8/14- s/p Right groin washout and debridement. Patient doing well clinically. Plastics evaluated wound vac this morning, determined patient will need wav changes at home. Patient HDS.      PLAN:  - Wound vac change MWF  - Patient insurance was not in network with the supplier we use for Wound ritter - sending to other suppliers  - plastics to check right groin wound on 8/16 for final recs - will be DC home with vac changes  - Appreciate ID recs: IV vanco 750 q12h  - Appreciate endo recs for glucose control  - Pain and anti-emetic PRN  - VTE ppx: Hep gtt, goal 60-80 85 y/o Male with PMHx of HTN, HLD, L carotid stenosis s/p CEA, T2DM, CAD S/p CABG, AVR with subsequent valve in valve TAVR via b/l femoral artery access c/b failed closure device 7/24 C/b RLE acute limb ischemia requiring R ileofemoral bypass w/ PTFE and RLE angiogram followed by LLE ALI RTOR for L SFA interposition bypass with reversed ipsilateral GSV 7/24, who went to rehab facility and subsequently developed bilateral groin incisional infections with R incisional dehiscence. CTA showed a left groin hematoma unchanged from prior imaging, non-expanding in size. Now s/p exploration of right groin, fat and skin debridement, sartorius muscle flap, wound vac placement 8/10.ROTR with plastics on 8/14- s/p Right groin washout and debridement. Patient doing well clinically. Plastics evaluated wound vac this morning, determined patient will need wav changes at home. Patient HDS.      PLAN:  - Wound vac change MWF  - Patient insurance was not in network with the supplier we use for Wound ritter - sending to other suppliers  - plastics to check right groin wound on 8/16 for final recs - will be DC home with vac changes  - Appreciate ID recs: IV vanco 750 q12h  - Appreciate endo recs for glucose control  - Pain and anti-emetic PRN  - VTE ppx: Hep gtt, goal 60-80  - Requires PICC line per ID for IV antibiotic prior to discharge 85 y/o Male with PMHx of HTN, HLD, L carotid stenosis s/p CEA, T2DM, CAD S/p CABG, AVR with subsequent valve in valve TAVR via b/l femoral artery access c/b failed closure device 7/24 C/b RLE acute limb ischemia requiring R ileofemoral bypass w/ PTFE and RLE angiogram followed by LLE ALI RTOR for L SFA interposition bypass with reversed ipsilateral GSV 7/24, who went to rehab facility and subsequently developed bilateral groin incisional infections with R incisional dehiscence. CTA showed a left groin hematoma unchanged from prior imaging, non-expanding in size. Now s/p exploration of right groin, fat and skin debridement, sartorius muscle flap, wound vac placement 8/10.ROTR with plastics on 8/14- s/p Right groin washout and debridement. Patient doing well clinically. Plastics evaluated wound vac this morning, determined patient will need wav changes at home. Patient HDS.      PLAN:  - Wound vac change MWF  - Patient insurance was not in network with the supplier we use for Wound ritter - sent to other suppliers and need to f/u  - Appreciate plastic recs; will be DC home with vac changes  - Appreciate endo recs for glucose control  - Pain and anti-emetic PRN  - VTE ppx: Hep gtt, goal 60-80  - Appreciate ID recs: IV vanco 750 q12h for 6 weeks  - f/u with ID to transition to PO antibiotics or if patient requires PICC  - Dispo: pending 83 y/o Male with PMHx of HTN, HLD, L carotid stenosis s/p CEA, T2DM, CAD S/p CABG, AVR with subsequent valve in valve TAVR via b/l femoral artery access c/b failed closure device 7/24 C/b RLE acute limb ischemia requiring R ileofemoral bypass w/ PTFE and RLE angiogram followed by LLE ALI RTOR for L SFA interposition bypass with reversed ipsilateral GSV 7/24, who went to rehab facility and subsequently developed bilateral groin incisional infections with R incisional dehiscence. CTA showed a left groin hematoma unchanged from prior imaging, non-expanding in size. Now s/p exploration of right groin, fat and skin debridement, sartorius muscle flap, wound vac placement 8/10.ROTR with plastics on 8/14- s/p Right groin washout and debridement. Patient doing well clinically. Plastic recs; patient to go home with wound vac. Patient HDS.      PLAN:  - Wound vac change MWF  - Appreciate plastic recs; will be DC home with vac changes  - Appreciate endo recs for glucose control  - Pain and anti-emetic PRN  - VTE ppx: Hep gtt, goal 60-80  - Appreciate ID recs: IV vanco 750 q12h for 6 weeks  - f/u with ID to transition to PO antibiotics or if patient requires PICC  - Dispo: pending 85 y/o Male with PMHx of HTN, HLD, L carotid stenosis s/p CEA, T2DM, CAD S/p CABG, AVR with subsequent valve in valve TAVR via b/l femoral artery access c/b failed closure device 7/24 C/b RLE acute limb ischemia requiring R ileofemoral bypass w/ PTFE and RLE angiogram followed by LLE ALI RTOR for L SFA interposition bypass with reversed ipsilateral GSV 7/24, who went to rehab facility and subsequently developed bilateral groin incisional infections with R incisional dehiscence. CTA showed a left groin hematoma unchanged from prior imaging, non-expanding in size. Now s/p exploration of right groin, fat and skin debridement, sartorius muscle flap, wound vac placement 8/10.ROTR with plastics on 8/14- s/p Right groin washout and debridement. Patient doing well clinically. Plastic recs; patient to go home with wound vac. Patient HDS.      PLAN:  - Wound vac change MWF  - Appreciate plastic recs; will be DC home with vac changes  - Appreciate endo recs for glucose control  - Pain and anti-emetic PRN  - VTE ppx: Hep gtt, goal 60-80  - Appreciate ID recs: IV vanco 750 q12h for 6 weeks  - f/u with ID to transition to PO antibiotics or if patient requires PICC  - Dispo: home PT

## 2024-08-18 LAB
ANION GAP SERPL CALC-SCNC: 12 MMOL/L — SIGNIFICANT CHANGE UP (ref 5–17)
APTT BLD: 66.9 SEC — HIGH (ref 24.5–35.6)
BUN SERPL-MCNC: 16 MG/DL — SIGNIFICANT CHANGE UP (ref 7–23)
CALCIUM SERPL-MCNC: 9 MG/DL — SIGNIFICANT CHANGE UP (ref 8.4–10.5)
CHLORIDE SERPL-SCNC: 104 MMOL/L — SIGNIFICANT CHANGE UP (ref 96–108)
CO2 SERPL-SCNC: 25 MMOL/L — SIGNIFICANT CHANGE UP (ref 22–31)
CREAT SERPL-MCNC: 0.85 MG/DL — SIGNIFICANT CHANGE UP (ref 0.5–1.3)
EGFR: 87 ML/MIN/1.73M2 — SIGNIFICANT CHANGE UP
GLUCOSE BLDC GLUCOMTR-MCNC: 122 MG/DL — HIGH (ref 70–99)
GLUCOSE BLDC GLUCOMTR-MCNC: 137 MG/DL — HIGH (ref 70–99)
GLUCOSE BLDC GLUCOMTR-MCNC: 163 MG/DL — HIGH (ref 70–99)
GLUCOSE BLDC GLUCOMTR-MCNC: 246 MG/DL — HIGH (ref 70–99)
GLUCOSE SERPL-MCNC: 115 MG/DL — HIGH (ref 70–99)
HCT VFR BLD CALC: 32.5 % — LOW (ref 39–50)
HGB BLD-MCNC: 9.4 G/DL — LOW (ref 13–17)
MAGNESIUM SERPL-MCNC: 1.8 MG/DL — SIGNIFICANT CHANGE UP (ref 1.6–2.6)
MCHC RBC-ENTMCNC: 28 PG — SIGNIFICANT CHANGE UP (ref 27–34)
MCHC RBC-ENTMCNC: 28.9 GM/DL — LOW (ref 32–36)
MCV RBC AUTO: 96.7 FL — SIGNIFICANT CHANGE UP (ref 80–100)
NRBC # BLD: 0 /100 WBCS — SIGNIFICANT CHANGE UP (ref 0–0)
PHOSPHATE SERPL-MCNC: 3 MG/DL — SIGNIFICANT CHANGE UP (ref 2.5–4.5)
PLATELET # BLD AUTO: 292 K/UL — SIGNIFICANT CHANGE UP (ref 150–400)
POTASSIUM SERPL-MCNC: 4.1 MMOL/L — SIGNIFICANT CHANGE UP (ref 3.5–5.3)
POTASSIUM SERPL-SCNC: 4.1 MMOL/L — SIGNIFICANT CHANGE UP (ref 3.5–5.3)
RBC # BLD: 3.36 M/UL — LOW (ref 4.2–5.8)
RBC # FLD: 18.6 % — HIGH (ref 10.3–14.5)
SODIUM SERPL-SCNC: 141 MMOL/L — SIGNIFICANT CHANGE UP (ref 135–145)
VANCOMYCIN TROUGH SERPL-MCNC: 19.4 UG/ML — SIGNIFICANT CHANGE UP (ref 10–20)
WBC # BLD: 4.7 K/UL — SIGNIFICANT CHANGE UP (ref 3.8–10.5)
WBC # FLD AUTO: 4.7 K/UL — SIGNIFICANT CHANGE UP (ref 3.8–10.5)

## 2024-08-18 RX ORDER — INSULIN GLARGINE 100 [IU]/ML
14 INJECTION, SOLUTION SUBCUTANEOUS AT BEDTIME
Refills: 0 | Status: DISCONTINUED | OUTPATIENT
Start: 2024-08-18 | End: 2024-08-20

## 2024-08-18 RX ADMIN — INSULIN GLARGINE 14 UNIT(S): 100 INJECTION, SOLUTION SUBCUTANEOUS at 21:30

## 2024-08-18 RX ADMIN — Medication 11 UNIT(S)/HR: at 19:18

## 2024-08-18 RX ADMIN — Medication 11 UNIT(S)/HR: at 07:18

## 2024-08-18 RX ADMIN — Medication 2: at 12:47

## 2024-08-18 RX ADMIN — Medication 1: at 09:04

## 2024-08-18 RX ADMIN — Medication 8 UNIT(S): at 12:48

## 2024-08-18 RX ADMIN — Medication 8 UNIT(S): at 09:05

## 2024-08-18 RX ADMIN — METOPROLOL TARTRATE 50 MILLIGRAM(S): 100 TABLET ORAL at 17:06

## 2024-08-18 RX ADMIN — Medication 250 MILLIGRAM(S): at 05:01

## 2024-08-18 RX ADMIN — Medication 250 MILLIGRAM(S): at 17:06

## 2024-08-18 RX ADMIN — Medication 8 UNIT(S): at 17:53

## 2024-08-18 RX ADMIN — Medication 81 MILLIGRAM(S): at 12:46

## 2024-08-18 RX ADMIN — METOPROLOL TARTRATE 50 MILLIGRAM(S): 100 TABLET ORAL at 05:01

## 2024-08-18 NOTE — PROGRESS NOTE ADULT - PROBLEM SELECTOR PLAN 1
Lantus 14 units at bed time.    Admelog 8 units before each meal in addition to LOW  Admelog correction scale coverage.  Will continue monitoring FS, log, and glucose trends, will Follow up.  Patient counseled for compliance with consistent low carb diet and exercise as tolerated outpatient.  Pt advised regarding home insulin therapy

## 2024-08-18 NOTE — PROGRESS NOTE ADULT - SUBJECTIVE AND OBJECTIVE BOX
Chief complaint  Patient is a 84y old  Male who presents with a chief complaint of RLE wound infection (18 Aug 2024 09:02)         Labs and Fingersticks  CAPILLARY BLOOD GLUCOSE      POCT Blood Glucose.: 246 mg/dL (18 Aug 2024 12:19)  POCT Blood Glucose.: 163 mg/dL (18 Aug 2024 08:59)  POCT Blood Glucose.: 108 mg/dL (17 Aug 2024 21:21)  POCT Blood Glucose.: 135 mg/dL (17 Aug 2024 17:36)      Anion Gap: 12 (08-18 @ 07:08)  Anion Gap: 10 (08-17 @ 07:09)      Calcium: 9.0 (08-18 @ 07:08)  Calcium: 8.7 (08-17 @ 07:09)          08-18    141  |  104  |  16  ----------------------------<  115<H>  4.1   |  25  |  0.85    Ca    9.0      18 Aug 2024 07:08  Phos  3.0     08-18  Mg     1.8     08-18                          9.4    4.70  )-----------( 292      ( 18 Aug 2024 07:08 )             32.5     Medications  MEDICATIONS  (STANDING):  aspirin  chewable 81 milliGRAM(s) Oral daily  dextrose 50% Injectable 25 Gram(s) IV Push once  dextrose 50% Injectable 25 Gram(s) IV Push once  dextrose 50% Injectable 25 Gram(s) IV Push once  dextrose 50% Injectable 12.5 Gram(s) IV Push once  heparin  Infusion 500 Unit(s)/Hr (11 mL/Hr) IV Continuous <Continuous>  insulin glargine Injectable (LANTUS) 14 Unit(s) SubCutaneous at bedtime  insulin lispro (ADMELOG) corrective regimen sliding scale   SubCutaneous three times a day before meals  insulin lispro (ADMELOG) corrective regimen sliding scale   SubCutaneous at bedtime  insulin lispro Injectable (ADMELOG) 8 Unit(s) SubCutaneous three times a day before meals  metoprolol tartrate 50 milliGRAM(s) Oral two times a day  silver nitrate Applicator 1 Application(s) Topical once  vancomycin  IVPB 1000 milliGRAM(s) IV Intermittent every 12 hours      Physical Exam  General: Patient comfortable in bed   Vital Signs Last 12 Hrs  T(F): 97.8 (08-18-24 @ 14:20), Max: 98.3 (08-18-24 @ 05:00)  HR: 85 (08-18-24 @ 14:20) (80 - 98)  BP: 105/55 (08-18-24 @ 14:20) (105/55 - 137/66)  BP(mean): --  RR: 18 (08-18-24 @ 14:20) (18 - 18)  SpO2: 95% (08-18-24 @ 14:20) (95% - 99%)    CVS: S1S2   Respiratory: No wheezing, no crepitations  GI: Abdomen soft, bowel sounds positive  Musculoskeletal:  moves all extremities  : Voiding

## 2024-08-18 NOTE — PROGRESS NOTE ADULT - ASSESSMENT
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     Assessment  DMT2: 84y Male with DM T2 with hyperglycemia, A1C 8.5% , was on oral meds  at home, now on basal bolus insulin with coverage, blood sugars running high, eating meals.   s/p OR for washout , now postop and eating full meals, not eating low carb at times  AS: on medications, stable, monitored. recent TAVR  HTN: on antihypertensive medications, monitored, asymptomatic.    Discussed plan and management wit Dr Oneil Angulo NP-TEAMS

## 2024-08-18 NOTE — PROGRESS NOTE ADULT - ASSESSMENT
83 y/o male w/ PMHx of HTN, HLD, Type 2 Diabetes, CAD s/p CABG x3/bioprosthetic AVR 2011 at [facility] and subsequent PCI 5/2019, s/p TAVR 7/24, on Eliquis, presents to the ER with son for right groin infection. states TAVR was done through right groin. states has been at rehab facility and noticed groin became swollen. states then developed drainage and erythema to the area. states had complication during procedure and entered left groin as well. states noticed left side also swollen but not as bad. patient denies f/n/v/d, CP, SOB, HA, dizziness, abdominal pain.

## 2024-08-18 NOTE — PROGRESS NOTE ADULT - ASSESSMENT
85 y/o Male with PMHx of HTN, HLD, L carotid stenosis s/p CEA, T2DM, CAD S/p CABG, AVR with subsequent valve in valve TAVR via b/l femoral artery access c/b failed closure device 7/24 C/b RLE acute limb ischemia requiring R ileofemoral bypass w/ PTFE and RLE angiogram followed by LLE ALI RTOR for L SFA interposition bypass with reversed ipsilateral GSV 7/24, who went to rehab facility and subsequently developed bilateral groin incisional infections with R incisional dehiscence. CTA showed a left groin hematoma unchanged from prior imaging, non-expanding in size. Now s/p exploration of right groin, fat and skin debridement, sartorius muscle flap, wound vac placement 8/10. ROTR with plastics on 8/14- s/p Right groin washout and debridement. Plastic recs; patient to go home with wound vac. Patient doing well clinically. Patient HDS.    PLAN:  - Wound vac change MWF  - Appreciate plastic recs; will be DC home with vac changes  - Appreciate endo recs for glucose control  - Pain and anti-emetic PRN  - VTE ppx: Hep gtt, goal 60-80  - Appreciate ID recs: IV vanco 750 q12h for 6 weeks  - f/u with PICC line placement, appreciate ID recommendations  - Dispo: Home PT, rolling walker    Vascular surgery [phone]

## 2024-08-18 NOTE — PROGRESS NOTE ADULT - SUBJECTIVE AND OBJECTIVE BOX
Subjective: Patient seen and examined. No new events except as noted.   resting comfortably     REVIEW OF SYSTEMS:    CONSTITUTIONAL: No weakness, fevers or chills  EYES/ENT: No visual changes;  No vertigo or throat pain   NECK: No pain or stiffness  RESPIRATORY: No cough, wheezing, hemoptysis; No shortness of breath  CARDIOVASCULAR: No chest pain or palpitations  GASTROINTESTINAL: No abdominal or epigastric pain. No nausea, vomiting, or hematemesis; No diarrhea or constipation. No melena or hematochezia.  GENITOURINARY: No dysuria, frequency or hematuria  NEUROLOGICAL: No numbness or weakness  SKIN: No itching, burning, rashes, or lesions   All other review of systems is negative unless indicated above.    MEDICATIONS:  MEDICATIONS  (STANDING):  aspirin  chewable 81 milliGRAM(s) Oral daily  dextrose 50% Injectable 12.5 Gram(s) IV Push once  dextrose 50% Injectable 25 Gram(s) IV Push once  dextrose 50% Injectable 25 Gram(s) IV Push once  dextrose 50% Injectable 25 Gram(s) IV Push once  heparin  Infusion 500 Unit(s)/Hr (11 mL/Hr) IV Continuous <Continuous>  insulin glargine Injectable (LANTUS) 15 Unit(s) SubCutaneous at bedtime  insulin lispro (ADMELOG) corrective regimen sliding scale   SubCutaneous at bedtime  insulin lispro (ADMELOG) corrective regimen sliding scale   SubCutaneous three times a day before meals  insulin lispro Injectable (ADMELOG) 8 Unit(s) SubCutaneous three times a day before meals  metoprolol tartrate 50 milliGRAM(s) Oral two times a day  silver nitrate Applicator 1 Application(s) Topical once  vancomycin  IVPB 1000 milliGRAM(s) IV Intermittent every 12 hours      PHYSICAL EXAM:  T(C): 36.8 (08-18-24 @ 05:00), Max: 37.1 (08-18-24 @ 01:03)  HR: 98 (08-18-24 @ 05:00) (67 - 98)  BP: 130/69 (08-18-24 @ 05:00) (115/57 - 138/65)  RR: 18 (08-18-24 @ 05:00) (18 - 18)  SpO2: 97% (08-18-24 @ 05:00) (95% - 98%)  Wt(kg): --  I&O's Summary    17 Aug 2024 07:01  -  18 Aug 2024 07:00  --------------------------------------------------------  IN: 1873 mL / OUT: 2050 mL / NET: -177 mL                Appearance: NAD	  HEENT:   Normal oral mucosa, PERRL, EOMI	  Lymphatic: No lymphadenopathy , no edema  Cardiovascular: Irregular S1 S2, No JVD, No murmurs , Peripheral pulses palpable 2+ bilaterally  Respiratory: Lungs clear to auscultation, normal effort 	  Gastrointestinal:  Soft, Non-tender, + BS	  Skin: No rashes, No ecchymoses, No cyanosis, warm to touch  Musculoskeletal: Normal range of motion, normal strength  Psychiatry:  Mood & affect appropriate  Right groin with vac on suction. Suction maintained and no Left groin with staples intact, covered with gauze and tape, no strike through, c/d/i. No ecchymosis noted on either groin.   No evidence of active bleeding or expanding hematoma.         LABS:    CARDIAC MARKERS:                                9.4    4.70  )-----------( 292      ( 18 Aug 2024 07:08 )             32.5     08-18    141  |  104  |  16  ----------------------------<  115<H>  4.1   |  25  |  0.85    Ca    9.0      18 Aug 2024 07:08  Phos  3.0     08-18  Mg     1.8     08-18      proBNP:   Lipid Profile:   HgA1c:   TSH:             TELEMETRY: 	    ECG:  	  RADIOLOGY:   DIAGNOSTIC TESTING:  [ ] Echocardiogram:  [ ]  Catheterization:  [ ] Stress Test:    OTHER:

## 2024-08-18 NOTE — PROGRESS NOTE ADULT - SUBJECTIVE AND OBJECTIVE BOX
Surgery Progress Note    SUBJECTIVE: Patient seen and examined at bedside. Patient report doing well, no complaints.   --------------------------------------------------------------------------------------------------  OBJECTIVE:   Physical Exam:  General: AAOx3, NAD, lying comfortably in bed  HEENT: NC/AT  Respiratory: nonlabored breathing  Cardiovascular: RRR, normal S1 and S2, no murmurs or gallops  Abdomen: non-distended, soft, non-tender  Extremities: Right groin with vac holding suction 75 mmHg. Left groin with staples intact, covered with gauze and tegaderm, no strike through, c/d/i, changed dressing today, indurated underneath incision.  --------------------------------------------------------------------------------------------------  V/S:  Vital Signs Last 24 Hrs  T(C): 36.8 (18 Aug 2024 05:00), Max: 37.1 (18 Aug 2024 01:03)  T(F): 98.3 (18 Aug 2024 05:00), Max: 98.7 (18 Aug 2024 01:03)  HR: 98 (18 Aug 2024 05:00) (67 - 98)  BP: 130/69 (18 Aug 2024 05:00) (115/57 - 138/65)  BP(mean): --  RR: 18 (18 Aug 2024 05:00) (18 - 18)  SpO2: 97% (18 Aug 2024 05:00) (95% - 98%)    Parameters below as of 18 Aug 2024 05:00  Patient On (Oxygen Delivery Method): room air        --------------------------------------------------------------------------------------------------  I/Os:    17 Aug 2024 07:01  -  18 Aug 2024 07:00  --------------------------------------------------------  IN:    Heparin: 253 mL    IV PiggyBack: 250 mL    Lactated Ringers: 350 mL    Oral Fluid: 1020 mL  Total IN: 1873 mL    OUT:    VAC (Vacuum Assisted Closure) System (mL): 125 mL    Voided (mL): 1925 mL  Total OUT: 2050 mL    Total NET: -177 mL        --------------------------------------------------------------------------------------------------  LABS:                        9.4    4.70  )-----------( 292      ( 18 Aug 2024 07:08 )             32.5     17 Aug 2024 07:09    140    |  106    |  14     ----------------------------<  156    3.9     |  24     |  0.67     Ca    8.7        17 Aug 2024 07:09  Phos  2.7       17 Aug 2024 07:09  Mg     1.7       17 Aug 2024 07:09      PTT - ( 18 Aug 2024 07:08 )  PTT:66.9 sec  CAPILLARY BLOOD GLUCOSE      POCT Blood Glucose.: 108 mg/dL (17 Aug 2024 21:21)  POCT Blood Glucose.: 135 mg/dL (17 Aug 2024 17:36)  POCT Blood Glucose.: 266 mg/dL (17 Aug 2024 12:12)  POCT Blood Glucose.: 187 mg/dL (17 Aug 2024 09:01)            Urinalysis Basic - ( 17 Aug 2024 07:09 )    Color: x / Appearance: x / SG: x / pH: x  Gluc: 156 mg/dL / Ketone: x  / Bili: x / Urobili: x   Blood: x / Protein: x / Nitrite: x   Leuk Esterase: x / RBC: x / WBC x   Sq Epi: x / Non Sq Epi: x / Bacteria: x      --------------------------------------------------------------------------------------------------  MEDICATIONS  (STANDING):  aspirin  chewable 81 milliGRAM(s) Oral daily  dextrose 50% Injectable 25 Gram(s) IV Push once  dextrose 50% Injectable 25 Gram(s) IV Push once  dextrose 50% Injectable 25 Gram(s) IV Push once  dextrose 50% Injectable 12.5 Gram(s) IV Push once  heparin  Infusion 500 Unit(s)/Hr (11 mL/Hr) IV Continuous <Continuous>  insulin glargine Injectable (LANTUS) 15 Unit(s) SubCutaneous at bedtime  insulin lispro (ADMELOG) corrective regimen sliding scale   SubCutaneous three times a day before meals  insulin lispro (ADMELOG) corrective regimen sliding scale   SubCutaneous at bedtime  insulin lispro Injectable (ADMELOG) 8 Unit(s) SubCutaneous three times a day before meals  metoprolol tartrate 50 milliGRAM(s) Oral two times a day  silver nitrate Applicator 1 Application(s) Topical once  vancomycin  IVPB 1000 milliGRAM(s) IV Intermittent every 12 hours    MEDICATIONS  (PRN):  dextrose Oral Gel 15 Gram(s) Oral once PRN Blood Glucose LESS THAN 70 milliGRAM(s)/deciliter  dextrose Oral Gel 15 Gram(s) Oral once PRN Blood Glucose LESS THAN 70 milliGRAM(s)/deciliter    --------------------------------------------------------------------------------------------------

## 2024-08-19 ENCOUNTER — TRANSCRIPTION ENCOUNTER (OUTPATIENT)
Age: 84
End: 2024-08-19

## 2024-08-19 LAB
ANION GAP SERPL CALC-SCNC: 9 MMOL/L — SIGNIFICANT CHANGE UP (ref 5–17)
APTT BLD: 62.5 SEC — HIGH (ref 24.5–35.6)
BUN SERPL-MCNC: 16 MG/DL — SIGNIFICANT CHANGE UP (ref 7–23)
CALCIUM SERPL-MCNC: 8.8 MG/DL — SIGNIFICANT CHANGE UP (ref 8.4–10.5)
CHLORIDE SERPL-SCNC: 105 MMOL/L — SIGNIFICANT CHANGE UP (ref 96–108)
CO2 SERPL-SCNC: 26 MMOL/L — SIGNIFICANT CHANGE UP (ref 22–31)
CREAT SERPL-MCNC: 0.77 MG/DL — SIGNIFICANT CHANGE UP (ref 0.5–1.3)
EGFR: 89 ML/MIN/1.73M2 — SIGNIFICANT CHANGE UP
GLUCOSE BLDC GLUCOMTR-MCNC: 145 MG/DL — HIGH (ref 70–99)
GLUCOSE BLDC GLUCOMTR-MCNC: 145 MG/DL — HIGH (ref 70–99)
GLUCOSE BLDC GLUCOMTR-MCNC: 147 MG/DL — HIGH (ref 70–99)
GLUCOSE BLDC GLUCOMTR-MCNC: 193 MG/DL — HIGH (ref 70–99)
GLUCOSE SERPL-MCNC: 124 MG/DL — HIGH (ref 70–99)
HCT VFR BLD CALC: 28.2 % — LOW (ref 39–50)
HGB BLD-MCNC: 8.4 G/DL — LOW (ref 13–17)
MAGNESIUM SERPL-MCNC: 1.8 MG/DL — SIGNIFICANT CHANGE UP (ref 1.6–2.6)
MCHC RBC-ENTMCNC: 28.7 PG — SIGNIFICANT CHANGE UP (ref 27–34)
MCHC RBC-ENTMCNC: 29.8 GM/DL — LOW (ref 32–36)
MCV RBC AUTO: 96.2 FL — SIGNIFICANT CHANGE UP (ref 80–100)
NRBC # BLD: 0 /100 WBCS — SIGNIFICANT CHANGE UP (ref 0–0)
PHOSPHATE SERPL-MCNC: 3.1 MG/DL — SIGNIFICANT CHANGE UP (ref 2.5–4.5)
PLATELET # BLD AUTO: 267 K/UL — SIGNIFICANT CHANGE UP (ref 150–400)
POTASSIUM SERPL-MCNC: 4 MMOL/L — SIGNIFICANT CHANGE UP (ref 3.5–5.3)
POTASSIUM SERPL-SCNC: 4 MMOL/L — SIGNIFICANT CHANGE UP (ref 3.5–5.3)
RBC # BLD: 2.93 M/UL — LOW (ref 4.2–5.8)
RBC # FLD: 18.1 % — HIGH (ref 10.3–14.5)
SODIUM SERPL-SCNC: 140 MMOL/L — SIGNIFICANT CHANGE UP (ref 135–145)
VANCOMYCIN TROUGH SERPL-MCNC: 18.9 UG/ML — SIGNIFICANT CHANGE UP (ref 10–20)
WBC # BLD: 3.73 K/UL — LOW (ref 3.8–10.5)
WBC # FLD AUTO: 3.73 K/UL — LOW (ref 3.8–10.5)

## 2024-08-19 PROCEDURE — 99233 SBSQ HOSP IP/OBS HIGH 50: CPT

## 2024-08-19 PROCEDURE — 71045 X-RAY EXAM CHEST 1 VIEW: CPT | Mod: 26

## 2024-08-19 RX ORDER — HEPARIN SODIUM,BOVINE 1000/ML
1100 VIAL (ML) INJECTION
Qty: 25000 | Refills: 0 | Status: DISCONTINUED | OUTPATIENT
Start: 2024-08-19 | End: 2024-08-19

## 2024-08-19 RX ORDER — VANCOMYCIN/0.9 % SOD CHLORIDE 1.75G/25
1 PLASTIC BAG, INJECTION (ML) INTRAVENOUS
Qty: 0 | Refills: 0 | DISCHARGE
Start: 2024-08-19

## 2024-08-19 RX ORDER — APIXABAN 5 MG/1
2.5 TABLET, FILM COATED ORAL EVERY 12 HOURS
Refills: 0 | Status: DISCONTINUED | OUTPATIENT
Start: 2024-08-19 | End: 2024-08-20

## 2024-08-19 RX ORDER — CHLORHEXIDINE GLUCONATE 40 MG/ML
1 SOLUTION TOPICAL
Refills: 0 | Status: DISCONTINUED | OUTPATIENT
Start: 2024-08-19 | End: 2024-08-20

## 2024-08-19 RX ORDER — SODIUM CHLORIDE 9 MG/ML
10 INJECTION INTRAMUSCULAR; INTRAVENOUS; SUBCUTANEOUS
Refills: 0 | Status: DISCONTINUED | OUTPATIENT
Start: 2024-08-19 | End: 2024-08-20

## 2024-08-19 RX ORDER — VANCOMYCIN/0.9 % SOD CHLORIDE 1.75G/25
1 PLASTIC BAG, INJECTION (ML) INTRAVENOUS
Qty: 78 | Refills: 0
Start: 2024-08-19 | End: 2024-09-26

## 2024-08-19 RX ORDER — BENZOCAINE .06; .7 ML/1; ML/1
0 SWAB TOPICAL
Qty: 100 | Refills: 1
Start: 2024-08-19

## 2024-08-19 RX ORDER — APIXABAN 5 MG/1
2.5 TABLET, FILM COATED ORAL
Refills: 0 | Status: DISCONTINUED | OUTPATIENT
Start: 2024-08-19 | End: 2024-08-19

## 2024-08-19 RX ORDER — INSULIN GLARGINE 100 [IU]/ML
10 INJECTION, SOLUTION SUBCUTANEOUS
Qty: 1 | Refills: 0
Start: 2024-08-19 | End: 2024-09-17

## 2024-08-19 RX ADMIN — Medication 8 UNIT(S): at 17:31

## 2024-08-19 RX ADMIN — Medication 25 GRAM(S): at 09:20

## 2024-08-19 RX ADMIN — INSULIN GLARGINE 14 UNIT(S): 100 INJECTION, SOLUTION SUBCUTANEOUS at 23:07

## 2024-08-19 RX ADMIN — Medication 8 UNIT(S): at 11:56

## 2024-08-19 RX ADMIN — METOPROLOL TARTRATE 50 MILLIGRAM(S): 100 TABLET ORAL at 05:40

## 2024-08-19 RX ADMIN — Medication 81 MILLIGRAM(S): at 11:06

## 2024-08-19 RX ADMIN — METOPROLOL TARTRATE 50 MILLIGRAM(S): 100 TABLET ORAL at 17:31

## 2024-08-19 RX ADMIN — Medication 250 MILLIGRAM(S): at 18:41

## 2024-08-19 RX ADMIN — APIXABAN 2.5 MILLIGRAM(S): 5 TABLET, FILM COATED ORAL at 23:07

## 2024-08-19 RX ADMIN — Medication 1: at 11:56

## 2024-08-19 RX ADMIN — Medication 250 MILLIGRAM(S): at 05:40

## 2024-08-19 RX ADMIN — APIXABAN 2.5 MILLIGRAM(S): 5 TABLET, FILM COATED ORAL at 11:06

## 2024-08-19 RX ADMIN — Medication 8 UNIT(S): at 09:59

## 2024-08-19 NOTE — DISCHARGE NOTE PROVIDER - CARE PROVIDERS DIRECT ADDRESSES
,[email or web address],DirectAddress_Unknown

## 2024-08-19 NOTE — DISCHARGE NOTE PROVIDER - PROVIDER TOKENS
PROVIDER:[TOKEN:[[id number]],FOLLOWUP:[1 week]] PROVIDER:[TOKEN:[[id number]],FOLLOWUP:[1 week]],PROVIDER:[TOKEN:[[id number]],FOLLOWUP:[1 week]],PROVIDER:[TOKEN:[[id number]],FOLLOWUP:[1 week]] PROVIDER:[TOKEN:[[id number]],FOLLOWUP:[1 week]],PROVIDER:[TOKEN:[[id number]],FOLLOWUP:[1 week]],PROVIDER:[TOKEN:[[id number]],FOLLOWUP:[1 week]]

## 2024-08-19 NOTE — DISCHARGE NOTE PROVIDER - CARE PROVIDER_API CALL
Criilo Wade)  Vascular Surgery  [address], [address]  [place] [address]  Phone: [phone]  Fax: [phone]  Follow Up Time: 1 week   Cirilo Wade)  Vascular Surgery  [address], [address]  [place] [address]  Phone: [phone]  Fax: [phone]  Follow Up Time: 1 week    Jovan Walsh)  Plastic Surgery  [address], [address]  [place] [address]  Phone: [phone]  Fax: [phone]  Follow Up Time: 1 week    Lara Riggs)  Endocrinology/Metab/Diabetes  [address] [place] [address]  Phone: [phone]  Fax: [phone]  Follow Up Time: 1 week   Manvar-Singh, Pallavi Buddhadev  Vascular Surgery  [address]  [place] [address]  Phone: [phone]  Fax: [phone]  Follow Up Time: 1 week    Jovan Walsh)  Plastic Surgery  [address], [address]  [place] [address]  Phone: [phone]  Fax: [phone]  Follow Up Time: 1 week    Lara Riggs)  Endocrinology/Metab/Diabetes  [address] [place] [address]  Phone: [phone]  Fax: [phone]  Follow Up Time: 1 week

## 2024-08-19 NOTE — PROGRESS NOTE ADULT - ASSESSMENT
84 m with DM, CAD s/p CABG,  AVR 2016, CEA.   Recently admitted for TAVR 7/24/2024.  Course complicated by acute limb ischemia on the same day - s/p R iliofemoral bypass w/ PTFE and RLE angiogram. Post-op, patient w/ LLE ALI RTOR 7/25 s/p L SFA interposition bypass w/ reversed ipsilateral GSV.   Pt was discharged to rehab but noted to have open right groin wound with drainage. No fever ro chills  blood cx negative   CT: Loculated gas in the bilateral groin subjacent to recent incisions likely postsurgical in etiology. Small locules of gas are also noted along the course of the right common femoral artery also likely   postprocedural. 4.1 x 4.5 x 7.6 cm (AP x TR x CC) hematoma surrounding the left superficial femoral artery near the distal anastomosis. No evidence of active hemorrhage.   s/p Exploration of R groin wound 8/10. Diffuse areas of fat necrosis but no obvious purulence or signs of infection. Debridement of fat and skin. After debridement pt with exposed PTFE bypass graft.  Decision made to proceed with sartorius muscle flap.  Sartorius was freed circumferentially and then divided superiorly.  Was then mobilized and used to cover gra  s/p Right groin sharp debridement and washout with pulse lavage. Application of wound vac 8/14  Right groin infected femoral site with underlying graft.  BC so far negative.  Wound culture so far E feacalis and Staph epi.  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

## 2024-08-19 NOTE — DISCHARGE NOTE NURSING/CASE MANAGEMENT/SOCIAL WORK - NSDCDMENAME_GEN_ALL_CORE_FT
Mark for wound vac # [phone] [employer]- [facility] [employer] [phone]/ [phone]

## 2024-08-19 NOTE — DISCHARGE NOTE PROVIDER - HOSPITAL COURSE
HPI:  84M PMH HTN, HLD, L carotid stenosis s/p CEA, T2DM, CAD S/p CABG AVR with subsequent valve in valve TAVR via b/l femoral artery access c/b failed closure device 7/24 C/b RLE acute limb ischemia requiring R ileofemoral bypass w/ PTFE and RLE angiogram followed by LLE ALI RTOR for  L SFA interposition bypass with reversed ipsilateral GSV 7/24, who went to rehab facility and subsequently developed bilateral groin incisional infections with R incisional dehiscence.   Patient was admitted under vascular surgery for evaluation and management.  Patient was found to have L groin hematoma on CTA.     Patient underwent exploration of right groin, fat and skin debridement, sartorius muscle flap, and wound vac placement on 8/10. The patient tolerated the procedure well.   On 8/14 the patient underwent right groin washout and debridment with plastic surgery. The patient tolerated the procedure well. Since these surgeries he has had a wound vacuum on his R groin and will need this to be changed after discharge MWF.     Physical therapy evaluated the patient and recommended home PT with rolling walker.  Patient got PICC line on 8/19 for IV vancomycin treatment for 6 weeks after discharge.   On 8/19 patient was transitioned from a heparin drip to eliquis.     On the day of discharge, the patient's vitals are stable, pain is controlled, voiding urine, passing gas/stool, tolerating a diet, and ambulating. Pt will f/u with Dr. Wade in 1-2 weeks. Pt will f/u with PCP in 1-2 weeks. Patient with follow up with plastic surgery in 1-2 weeks. 84M with PMHx of HTN, HLD, L carotid stenosis s/p CEA, T2DM, CAD s/p CABG, AVR with subsequent valve in valve TAVR via b/l femoral artery access c/b failed closure device (7/24) C/b RLE acute limb ischemia requiring R ileofemoral bypass w/ PTFE and RLE angiogram followed by LLE ALI RTOR for L SFA interposition bypass with reversed ipsilateral GSV (7/24), who went to rehab facility and subsequently developed b/l groin incisional infections with R incisional dehiscence. CTA aorta (8/08/24) showed a Left groin hematoma unchanged from prior imaging, non-expanding in size.     Patient was admitted under Vascular Surgery for evaluation and management. Pt was started on a heparin gtt in lieu of Eliquis in preparation for OR.  Cardiology was consulted for cardiac co-management and clearance; clearance documented on 8/09    Plastic surgery was consulted for possible muscle flap during groin washout.   Pt was taken to the OR on 8/10, and is s/p exploration of right groin, fat and skin debridement, sartorius muscle flap, and wound vac placement. The patient tolerated the procedure well (see operative report for full details).   Pt was taken back to the OR on 8/14, and is s/p right groin washout and debridement. The patient tolerated the procedure well (see operative report for full details).   Since these surgeries, he has had a wound vacuum on his R groin, which was changed MWF. Left groin staples were removed on 8/20.  Pt was transitioned back to Eliquis on 8/19.     Infectious Diseases was consulted for antibiotics recommendations, and initially recommended IV Vancomycin and Cefepime. BCx (8/08) ngtd. Wound cx (8/08) grew Enterococcus faecalis. OR wound cx (8/11/24) grew Staphylococcus epidermidis. IV Cefepime was discontinued on 8/12, while IV Vancomycin was continued   Patient got a PICC line on 8/19 to complete IV vancomycin treatment x 6 weeks post OR (End Date: 9/25/24)    Endocrinology was consulted for hx of DM II and wound infection; recommendations were followed. Pt will be discharged with Admelog 5u TID with meals and Lantus 10u at bedtime.    Pt will be discharged with wound vac; VNS has been set up for wound vac changes 3x/week    Physical therapy evaluated the patient and recommended home PT with rolling walker.     On the day of discharge, the patient's vitals are stable, pain is controlled, voiding urine, passing gas/stool, tolerating a diet, and ambulating. Pt will f/u with Dr. Wade (vascular surgery), Dr. Walsh (plastic surgery), and Dr. Lara Riggs/[facility] (endocrinology) in 1-2 weeks. Pt will f/u with PCP in 1-2 weeks. 84M with PMHx of HTN, HLD, L carotid stenosis s/p CEA, T2DM, CAD s/p CABG, AVR with subsequent valve in valve TAVR via b/l femoral artery access c/b failed closure device (7/24) and then C/b RLE acute limb ischemia requiring R ileofemoral bypass w/ PTFE and RLE angiogram followed by LLE ALI RTOR for L SFA interposition bypass with reversed ipsilateral GSV (7/24), who went to rehab facility and subsequently developed b/l groin incisional infections with R incisional dehiscence. CTA aorta (8/08/24) showed a Left groin hematoma unchanged from prior imaging, non-expanding in size.     Patient was admitted under Vascular Surgery for evaluation and management. Pt was started on a heparin gtt in lieu of Eliquis in preparation for OR.  Cardiology was consulted for cardiac co-management and clearance; clearance documented on 8/09    Plastic surgery was consulted for possible muscle flap during groin washout.   Pt was taken to the OR on 8/10, and is s/p exploration of right groin, fat and skin debridement, sartorius muscle flap, and wound vac placement. The patient tolerated the procedure well (see operative report for full details).   Pt was taken back to the OR on 8/14, and is s/p right groin washout and debridement. The patient tolerated the procedure well (see operative report for full details).   Since these surgeries, he has had a wound vacuum on his Right groin, which was changed MWF. Left groin staples were removed on 8/20.  Pt was transitioned back to Eliquis on 8/19.     Infectious Diseases was consulted for antibiotics recommendations, and initially recommended IV Vancomycin and Cefepime. BCx (8/08) ngtd. Wound cx (8/08) grew Enterococcus faecalis. OR wound cx (8/11/24) grew Staphylococcus epidermidis. IV Cefepime was discontinued on 8/12, while IV Vancomycin was continued   Patient got a PICC line on 8/19 to complete IV vancomycin treatment x 6 weeks post OR (End Date: 9/25/24)    Endocrinology was consulted for hx of DM II and wound infection; recommendations were followed. Pt will be discharged with Admelog 5u TID with meals and Lantus 10u at bedtime.    Pt will be discharged with wound vac; VNS has been set up for wound vac changes 3x/week    Physical therapy evaluated the patient and recommended home PT with rolling walker.     On the day of discharge, the patient's vitals are stable, pain is controlled, voiding urine, passing gas/stool, tolerating a diet, and ambulating. Pt will f/u with Dr. Wade (vascular surgery), Dr. Walsh (plastic surgery), and Dr. Lara Riggs/[facility] (endocrinology) in 1-2 weeks. Pt will f/u with PCP in 1-2 weeks. 84M with PMHx of HTN, HLD, L carotid stenosis s/p CEA, T2DM, CAD s/p CABG, AVR with subsequent valve in valve TAVR via b/l femoral artery access c/b failed closure device (7/24) and then C/b RLE acute limb ischemia requiring R ileofemoral bypass w/ PTFE and RLE angiogram followed by LLE ALI RTOR for L SFA interposition bypass with reversed ipsilateral GSV (7/24), who went to rehab facility and subsequently developed b/l groin incisional infections with R incisional dehiscence. CTA aorta (8/08/24) showed a Left groin hematoma unchanged from prior imaging, non-expanding in size.     Patient was admitted under Vascular Surgery for evaluation and management. Pt was started on a heparin gtt in lieu of Eliquis in preparation for OR.  Cardiology was consulted for cardiac co-management and clearance; clearance documented on 8/09    Plastic surgery was consulted for possible muscle flap during groin washout.   Pt was taken to the OR on 8/10, and is s/p exploration of right groin, fat and skin debridement, sartorius muscle flap, and wound vac placement. The patient tolerated the procedure well (see operative report for full details).   Pt was taken back to the OR on 8/14, and is s/p right groin washout and debridement. The patient tolerated the procedure well (see operative report for full details).   Since these surgeries, he has had a wound vacuum on his Right groin, which was changed MWF. Left groin staples were removed on 8/20.  Pt was transitioned back to Eliquis on 8/19.     Infectious Diseases was consulted for antibiotics recommendations, and initially recommended IV Vancomycin and Cefepime. BCx (8/08) ngtd. Wound cx (8/08) grew Enterococcus faecalis. OR wound cx (8/11/24) grew Staphylococcus epidermidis. IV Cefepime was discontinued on 8/12, while IV Vancomycin was continued   Patient got a PICC line on 8/19 to complete IV vancomycin treatment x 6 weeks post OR (End Date: 9/25/24)    Endocrinology was consulted for hx of DM II and wound infection; recommendations were followed. Pt will be discharged with Admelog 5u TID with meals and Lantus 10u at bedtime.    Pt will be discharged with right groin wound vac; VNS has been set up for wound vac changes 3x/week  Pt will continue to apply betadine, and cover with gauze and tape to left groin  Physical therapy evaluated the patient and recommended home PT with rolling walker.     On the day of discharge, the patient's vitals are stable, pain is controlled, voiding urine, passing gas/stool, tolerating a diet, and ambulating. Pt will f/u with Dr. Wilson (vascular surgery), Dr. Walsh (plastic surgery), and Dr. Lara iRggs/[facility] (endocrinology) in 1-2 weeks. Pt will f/u with PCP in 1-2 weeks.

## 2024-08-19 NOTE — PROGRESS NOTE ADULT - ASSESSMENT
83 y/o male w/ PMHx of HTN, HLD, Type 2 Diabetes, CAD s/p CABG x3/bioprosthetic AVR 2011 at [facility] and subsequent PCI 5/2019, s/p TAVR 7/24, on Eliquis, presents to the ER with son for right groin infection. states TAVR was done through right groin. states has been at rehab facility and noticed groin became swollen. states then developed drainage and erythema to the area. states had complication during procedure and entered left groin as well. states noticed left side also swollen but not as bad. patient denies f/n/v/d, CP, SOB, HA, dizziness, abdominal pain.

## 2024-08-19 NOTE — PROGRESS NOTE ADULT - SUBJECTIVE AND OBJECTIVE BOX
Subjective: Patient seen and examined. No new events except as noted.     REVIEW OF SYSTEMS:    CONSTITUTIONAL: + weakness, fevers or chills  EYES/ENT: No visual changes;  No vertigo or throat pain   NECK: No pain or stiffness  RESPIRATORY: No cough, wheezing, hemoptysis; No shortness of breath  CARDIOVASCULAR: No chest pain or palpitations  GASTROINTESTINAL: No abdominal or epigastric pain. No nausea, vomiting, or hematemesis; No diarrhea or constipation. No melena or hematochezia.  GENITOURINARY: No dysuria, frequency or hematuria  NEUROLOGICAL: No numbness or weakness  SKIN: No itching, burning, rashes, or lesions   All other review of systems is negative unless indicated above.    MEDICATIONS:  MEDICATIONS  (STANDING):  aspirin  chewable 81 milliGRAM(s) Oral daily  dextrose 50% Injectable 12.5 Gram(s) IV Push once  dextrose 50% Injectable 25 Gram(s) IV Push once  dextrose 50% Injectable 25 Gram(s) IV Push once  dextrose 50% Injectable 25 Gram(s) IV Push once  heparin  Infusion 500 Unit(s)/Hr (11 mL/Hr) IV Continuous <Continuous>  insulin glargine Injectable (LANTUS) 14 Unit(s) SubCutaneous at bedtime  insulin lispro (ADMELOG) corrective regimen sliding scale   SubCutaneous at bedtime  insulin lispro (ADMELOG) corrective regimen sliding scale   SubCutaneous three times a day before meals  insulin lispro Injectable (ADMELOG) 8 Unit(s) SubCutaneous three times a day before meals  metoprolol tartrate 50 milliGRAM(s) Oral two times a day  silver nitrate Applicator 1 Application(s) Topical once  vancomycin  IVPB 1000 milliGRAM(s) IV Intermittent every 12 hours      PHYSICAL EXAM:  T(C): 36.7 (08-19-24 @ 05:15), Max: 37.1 (08-18-24 @ 16:50)  HR: 69 (08-19-24 @ 05:15) (69 - 85)  BP: 148/72 (08-19-24 @ 05:15) (105/55 - 148/72)  RR: 18 (08-19-24 @ 05:15) (17 - 18)  SpO2: 99% (08-19-24 @ 05:15) (94% - 99%)  Wt(kg): --  I&O's Summary    18 Aug 2024 07:01  -  19 Aug 2024 07:00  --------------------------------------------------------  IN: 1493 mL / OUT: 2675 mL / NET: -1182 mL          Appearance: NAD	  HEENT:   Normal oral mucosa, PERRL, EOMI	  Lymphatic: No lymphadenopathy , no edema  Cardiovascular: Irregular S1 S2, No JVD, No murmurs , Peripheral pulses palpable 2+ bilaterally  Respiratory: Lungs clear to auscultation, normal effort 	  Gastrointestinal:  Soft, Non-tender, + BS	  Skin: No rashes, No ecchymoses, No cyanosis, warm to touch  Musculoskeletal: Normal range of motion, normal strength  Psychiatry:  Mood & affect appropriate  Right groin with vac on suction. Suction maintained and no Left groin with staples intact, covered with gauze and tape, no strike through, c/d/i. No ecchymosis noted on either groin.   No evidence of active bleeding or expanding hematoma.     LABS:    CARDIAC MARKERS:                                8.4    3.73  )-----------( 267      ( 19 Aug 2024 07:09 )             28.2     08-18    141  |  104  |  16  ----------------------------<  115<H>  4.1   |  25  |  0.85    Ca    9.0      18 Aug 2024 07:08  Phos  3.0     08-18  Mg     1.8     08-18            TELEMETRY: 	    ECG:  	  RADIOLOGY:   DIAGNOSTIC TESTING:  [ ] Echocardiogram:  [ ]  Catheterization:  [ ] Stress Test:    OTHER:

## 2024-08-19 NOTE — ADVANCED PRACTICE NURSE CONSULT - REASON FOR CONSULT
Vascular Access Team    Evaluation for: Bedside SL PICC placement  Requested by name: NaomiLazaro sneed  Date/Time: 8/19@ 8:32    Indication: R groin abscess s/p LE bypass/ long term Vanco  Allergy to CHG or Heparin or Lidocaine: NKDA    Platelets(>20): 267  INR(<3): 1.15  eGFR(>40): 89  Blood cultures sent: 8/8  Blood culture negative in 48hrs: NG  Anticoagulants: Hep gtt/ asa  Arms DVT: no  Mastectomy: no  Fistula: no  PPM/Defib: no  IR or Nephrology or ID clearance needed: no    Consent obtained: yes      Plan: Bedside picc order evaluated.   
Bedside picc order placed.   Indication: SL picc placement for long term antibiotics

## 2024-08-19 NOTE — PROGRESS NOTE ADULT - SUBJECTIVE AND OBJECTIVE BOX
Chief complaint  Patient is a 84y old  Male who presents with a chief complaint of RLE wound infection (19 Aug 2024 08:09)         Labs and Fingersticks  CAPILLARY BLOOD GLUCOSE      POCT Blood Glucose.: 193 mg/dL (19 Aug 2024 11:53)  POCT Blood Glucose.: 145 mg/dL (19 Aug 2024 09:11)  POCT Blood Glucose.: 122 mg/dL (18 Aug 2024 21:09)  POCT Blood Glucose.: 137 mg/dL (18 Aug 2024 17:42)      Anion Gap: 9 (08-19 @ 07:09)  Anion Gap: 12 (08-18 @ 07:08)      Calcium: 8.8 (08-19 @ 07:09)  Calcium: 9.0 (08-18 @ 07:08)          08-19    140  |  105  |  16  ----------------------------<  124<H>  4.0   |  26  |  0.77    Ca    8.8      19 Aug 2024 07:09  Phos  3.1     08-19  Mg     1.8     08-19                          8.4    3.73  )-----------( 267      ( 19 Aug 2024 07:09 )             28.2     Medications  MEDICATIONS  (STANDING):  apixaban 2.5 milliGRAM(s) Oral every 12 hours  aspirin  chewable 81 milliGRAM(s) Oral daily  dextrose 50% Injectable 12.5 Gram(s) IV Push once  dextrose 50% Injectable 25 Gram(s) IV Push once  dextrose 50% Injectable 25 Gram(s) IV Push once  dextrose 50% Injectable 25 Gram(s) IV Push once  insulin glargine Injectable (LANTUS) 14 Unit(s) SubCutaneous at bedtime  insulin lispro (ADMELOG) corrective regimen sliding scale   SubCutaneous at bedtime  insulin lispro (ADMELOG) corrective regimen sliding scale   SubCutaneous three times a day before meals  insulin lispro Injectable (ADMELOG) 8 Unit(s) SubCutaneous three times a day before meals  metoprolol tartrate 50 milliGRAM(s) Oral two times a day  silver nitrate Applicator 1 Application(s) Topical once  vancomycin  IVPB 1000 milliGRAM(s) IV Intermittent every 12 hours      Physical Exam  General: Patient comfortable in bed   Vital Signs Last 12 Hrs  T(F): 98.1 (08-19-24 @ 10:25), Max: 98.1 (08-19-24 @ 05:15)  HR: 75 (08-19-24 @ 10:25) (69 - 75)  BP: 99/53 (08-19-24 @ 12:00) (99/53 - 148/72)  BP(mean): --  RR: 18 (08-19-24 @ 12:00) (18 - 18)  SpO2: 98% (08-19-24 @ 12:00) (95% - 99%)    CVS: S1S2   Respiratory: No wheezing, no crepitations  GI: Abdomen soft, bowel sounds positive  Musculoskeletal:  moves all extremities  : Voiding

## 2024-08-19 NOTE — DISCHARGE NOTE NURSING/CASE MANAGEMENT/SOCIAL WORK - PATIENT PORTAL LINK FT
You can access the FollowMyHealth Patient Portal offered by [facility] by registering at the following website: [email or web address]. By joining [employer]’s FollowMyHealth portal, you will also be able to view your health information using other applications (apps) compatible with our system.

## 2024-08-19 NOTE — DISCHARGE NOTE PROVIDER - NSDCCPTREATMENT_GEN_ALL_CORE_FT
PRINCIPAL PROCEDURE  Procedure: Washout, wound, abdomen  Findings and Treatment: WOUND CARE: Staples will be removed at follow up office visit.  You will undergo wound vacuum changes three times weekly during your healing process.   BATHING: Please do not submerge wound underwater or shower. You may sponge bathe. Ideally you should keep the wound vacuum as dry as possible.   ACTIVITY: No heavy lifting anything more than 10-15lbs or straining. Otherwise, you may return to your usual level of physical activity. If you are taking narcotic pain medication (such as Percocet), do NOT drive a car, operate machinery or make important decisions.  NOTIFY YOUR SURGEON IF: You have any bleeding that does not stop, any pus draining from your wound, any fever (over 100.4 F) or chills, persistent nausea/vomiting with inability to tolerate food or liquids, persistent diarrhea, or if your pain is not controlled on your discharge pain medications.  FOLLOW-UP:  1. Please call to make a follow-up appointment within one week of discharge with Dr. Wade.  2. Please follow up with your primary care physician in one week regarding your hospitalization.  3. Please call to make a follow-up appointment within one week of discharge with Dr. Carlin with plastic surgery.         SECONDARY PROCEDURE  Procedure: Flap, muscle, sartorius  Findings and Treatment:      PRINCIPAL PROCEDURE  Procedure: Exploration of groin  Findings and Treatment: WOUND CARE: You will be discharged with a wound vacuum. Visiting Nurse Services will aid in wound vac changes 3x/week  BATHING: Keep the right groin clean and dry.   You may shower and/or sponge bathe. Remove left groin outer dressing prior to shower. Let soap and water run over incision; do NOT scrub incision. Pat left groin dry after, and replace with gauze with paper tape. Do no submerge the incision underwater for the next 2 weeks.   ACTIVITY: No heavy lifting anything more than 10-15lbs or straining. Otherwise, you may return to your usual level of physical activity. If you are taking narcotic pain medication (such as Percocet), do NOT drive a car, operate machinery or make important decisions.  PAIN: A prescription for oxycodone has been sent to the pharmacy. You should only take these for severe pain. For mild or moderate pain, you may take 975mg of tylenol every 6 hours. Do not exceed more than 4G per day.   NOTIFY YOUR SURGEON IF: You have any bleeding that does not stop, any pus draining from your wound, any fever (over 100.4 F) or chills, persistent nausea/vomiting with inability to tolerate food or liquids, persistent diarrhea, loss of motor function/sensation, numbness/tingling, worsening weakness or if severe groin pain is not controlled on your discharge pain medications.  FOLLOW-UP:  1. Please call to make a follow-up appointment within one to two weeks of discharge with Dr. Wade (vascular surgery) and Dr. Walsh (plastic surgery)  2. Please follow up with your primary care physician in one week regarding your hospitalization.        SECONDARY PROCEDURE  Procedure: Flap, muscle, sartorius  Findings and Treatment: Please follow up with Dr. Walsh (plastic surgery)     PRINCIPAL PROCEDURE  Procedure: Exploration of groin  Findings and Treatment: WOUND CARE: You will be discharged with a wound vacuum. Visiting Nurse Services will aid in wound vac changes 3x/week  BATHING: Keep the right groin clean and dry.   You may shower and/or sponge bathe. Remove left groin outer dressing prior to shower. Let soap and water run over incision; do NOT scrub incision. Pat left groin dry after, and replace with gauze with paper tape. Do no submerge the incision underwater for the next 2 weeks.   ACTIVITY: No heavy lifting anything more than 10-15lbs or straining. Otherwise, you may return to your usual level of physical activity. If you are taking narcotic pain medication (such as Percocet), do NOT drive a car, operate machinery or make important decisions.  PAIN: For pain, you may take 975mg of tylenol every 6 hours. Do not exceed more than 4G per day.   NOTIFY YOUR SURGEON IF: You have any bleeding that does not stop, any pus draining from your wound, any fever (over 100.4 F) or chills, persistent nausea/vomiting with inability to tolerate food or liquids, persistent diarrhea, loss of motor function/sensation, numbness/tingling, worsening weakness or if severe groin pain is not controlled on your discharge pain medications.  FOLLOW-UP:  1. Please call to make a follow-up appointment within one to two weeks of discharge with Dr. Wade (vascular surgery) and Dr. Walsh (plastic surgery)  2. Please follow up with your primary care physician in one week regarding your hospitalization.      SECONDARY PROCEDURE  Procedure: Flap, muscle, sartorius  Findings and Treatment: Please follow up with Dr. Walsh (plastic surgery) in 1-2 weeks     PRINCIPAL PROCEDURE  Procedure: Exploration of groin  Findings and Treatment: WOUND CARE: You will be discharged with a wound vacuum on the right groin. Visiting Nurse Services will aid in wound vac changes 3x/week  Apply betadine to left groin, and cover with gauze and tape   BATHING: Keep the right groin clean and dry. Keep the wound vac on the right groin  You may shower and/or sponge bathe. Remove left groin outer dressing prior to shower. Let soap and water run over incision; do NOT scrub incision. Pat left groin dry after, and replace with gauze with paper tape. Do no submerge the incision underwater for the next 2 weeks.   ACTIVITY: No heavy lifting anything more than 10-15lbs or straining. Otherwise, you may return to your usual level of physical activity. If you are taking narcotic pain medication (such as Percocet), do NOT drive a car, operate machinery or make important decisions.  PAIN: For pain, you may take 975mg of tylenol every 6 hours. Do not exceed more than 4G per day.   NOTIFY YOUR SURGEON IF: You have any bleeding that does not stop, any pus draining from your wound, any fever (over 100.4 F) or chills, persistent nausea/vomiting with inability to tolerate food or liquids, persistent diarrhea, loss of motor function/sensation, numbness/tingling, worsening weakness or if severe groin pain is not controlled on your discharge pain medications.  FOLLOW-UP:  1. Please call to make a follow-up appointment within one to two weeks of discharge with Dr. Wilson (vascular surgery) and Dr. Walsh (plastic surgery)  2. Please follow up with your primary care physician in one week regarding your hospitalization.      SECONDARY PROCEDURE  Procedure: Flap, muscle, sartorius  Findings and Treatment: Please follow up with Dr. Walsh (plastic surgery) in 1-2 weeks

## 2024-08-19 NOTE — PROGRESS NOTE ADULT - PROBLEM SELECTOR PLAN 1
Lantus 14 units at bed time.    Admelog 8 units before each meal in addition to LOW  Admelog correction scale coverage.  Will continue monitoring FS, log, and glucose trends, will Follow up.  Patient counseled for compliance with consistent low carb diet and exercise as tolerated outpatient.  Pt advised regarding home insulin therapy  They are agreeable to insulins at home   Insulin PEN teaching requested per nursing staff    DC recs   Stop all home PO DM meds, Jardiance, Metformin, Glimepiride  DC with insulin pen basal bolus   Humalog or Admelog insulin pen,  5 units three times a day with meals  Lantus or basaglar pen 10 units once nightly  at bedtime  Can substitute these insulins for generic brands depending on insurance  Pls send home with new glucometer + supplies  Can FU outpatient Dr Riggs endocrine  FU outpt PCP Lantus 14 units at bed time.    Admelog 8 units before each meal in addition to LOW  Admelog correction scale coverage.  Will continue monitoring FS, log, and glucose trends, will Follow up.  Patient counseled for compliance with consistent low carb diet and exercise as tolerated outpatient.  Pt advised regarding home insulin therapy  They are agreeable to insulins at home   Insulin PEN teaching requested per nursing staff    DC recs   Stop all home PO DM meds, Jardiance, Metformin, Glimepiride  DC with insulin pen basal bolus   Humalog or Admelog insulin pen,  5 units three times a day with meals  Lantus or basaglar pen 10 units once nightly  at bedtime  Can substitute these insulins for generic brands depending on insurance  Pls send home with new glucometer + supplies  [facility]   Can FU outpatient Dr Riggs endocrine  FU outpt PCP

## 2024-08-19 NOTE — CHART NOTE - NSCHARTNOTEFT_GEN_A_CORE
The beneficiary will require a 3:1 commode  The beneficiary is confined to a single room without a toilet in that room.       Vascular Surgery  [id number]

## 2024-08-19 NOTE — DISCHARGE NOTE PROVIDER - NSDCHHHOMEBOUNDOTHER_GEN_ALL_CORE_FT
Needs home IV antibiotics  Needs home wound vac changes  Need home physical therapy IV antibiotics  Right groin wound vac changes  Left groin wound care: Apply betadine, and cover with gauze and tape  Home physical therapy

## 2024-08-19 NOTE — PROGRESS NOTE ADULT - ASSESSMENT
83 y/o Male with PMHx of HTN, HLD, L carotid stenosis s/p CEA, T2DM, CAD S/p CABG, AVR with subsequent valve in valve TAVR via b/l femoral artery access c/b failed closure device 7/24 C/b RLE acute limb ischemia requiring R ileofemoral bypass w/ PTFE and RLE angiogram followed by LLE ALI RTOR for L SFA interposition bypass with reversed ipsilateral GSV 7/24, who went to rehab facility and subsequently developed bilateral groin incisional infections with R incisional dehiscence. CTA showed a left groin hematoma unchanged from prior imaging, non-expanding in size. Now s/p exploration of right groin, fat and skin debridement, sartorius muscle flap, wound vac placement 8/10. ROTR with plastics on 8/14- s/p Right groin washout and debridement. Plastic recs; patient to go home with wound vac. Patient doing well clinically. Patient HDS.    PLAN:  - Wound vac change MWF  - Appreciate plastic recs; will be DC home with vac changes  - Appreciate endo recs for glucose control  - Pain and anti-emetic PRN  - VTE ppx: Hep gtt, goal 60-80  - Appreciate ID recs: IV vanco 750 q12h for 6 weeks  - f/u with PICC line placement, appreciate ID recommendations  - Dispo: Home PT, rolling walker    Vascular surgery [id number] 85 y/o Male with PMHx of HTN, HLD, L carotid stenosis s/p CEA, T2DM, CAD S/p CABG, AVR with subsequent valve in valve TAVR via b/l femoral artery access c/b failed closure device 7/24 C/b RLE acute limb ischemia requiring R ileofemoral bypass w/ PTFE and RLE angiogram followed by LLE ALI RTOR for L SFA interposition bypass with reversed ipsilateral GSV 7/24, who went to rehab facility and subsequently developed bilateral groin incisional infections with R incisional dehiscence. CTA showed a left groin hematoma unchanged from prior imaging, non-expanding in size. Now s/p exploration of right groin, fat and skin debridement, sartorius muscle flap, wound vac placement 8/10. ROTR with plastics on 8/14- s/p Right groin washout and debridement. Plastic recs; patient to go home with wound vac. Patient clinically stable overnight, recovering well. Will need long term vancomycin and PICC placement today.     PLAN:  - Wound vac change MWF, please send photo documentation to primary team via TEAMS  - Appreciate plastic recs; will be DC home with vac changes, please document how long ptt needs the wound vacs  - Appreciate endo recs for glucose control  - Pain and anti-emetic PRN  - VTE ppx: Hep gtt, goal 60-80  - Appreciate ID recs: IV vanco 750 q12h for 6 weeks, reconsult for outpatient monitoring labs and dosage/frequency appreciated  - PICC line placement today, appreciate ID recommendations  - Dispo: Home PT, rolling walker, wound vacs    Vascular surgery [phone]

## 2024-08-19 NOTE — PROGRESS NOTE ADULT - PROBLEM SELECTOR PLAN 1
Right groin infected femoral site with underlying graft. Follow up cultures and BC   s/p washout and fat necrosis removal, sartorius flap  VAC in place right groin, now with oozing of blood. To be addressed today by Vascular. Monitor Hgb   IV abx   s/p RTOR for washout and VAC change  Plan for PICC. Please consult IR

## 2024-08-19 NOTE — PROGRESS NOTE ADULT - ASSESSMENT
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 Pt was discharged to rehab and then sent back to [facility] for concerns of groin infection and was admitted on  8/8.  Pt now s/p R groin washout, debridement of fat necrosis and coevrage with sartorius flap on 8/10 and placement of wound vac.  In OR no obvious purulence/ since of infection surrounding PTFE grft but with diffuse fat necrosis and non-viable tissue, now s/p additional R. groin washout and debridement (8/14/24)    Plan:  - MWF vac changes, will be dc home with vac changes  - monitor vac  - If any concerns for expanding groin hematoma or profuse bleeding from groin should remove vac immediately to evaluate wound  - remainder care per primary

## 2024-08-19 NOTE — PROGRESS NOTE ADULT - SUBJECTIVE AND OBJECTIVE BOX
Follow Up:  MRSE graft infection    Interval History/ROS: pt afebrile, denied cough, vomiting or chest pain          Allergies  No Known Allergies        ANTIMICROBIALS:  vancomycin  IVPB 1000 every 12 hours      OTHER MEDS:  apixaban 2.5 milliGRAM(s) Oral every 12 hours  aspirin  chewable 81 milliGRAM(s) Oral daily  dextrose 50% Injectable 25 Gram(s) IV Push once  dextrose 50% Injectable 25 Gram(s) IV Push once  dextrose 50% Injectable 25 Gram(s) IV Push once  dextrose 50% Injectable 12.5 Gram(s) IV Push once  dextrose Oral Gel 15 Gram(s) Oral once PRN  dextrose Oral Gel 15 Gram(s) Oral once PRN  insulin glargine Injectable (LANTUS) 14 Unit(s) SubCutaneous at bedtime  insulin lispro (ADMELOG) corrective regimen sliding scale   SubCutaneous at bedtime  insulin lispro (ADMELOG) corrective regimen sliding scale   SubCutaneous three times a day before meals  insulin lispro Injectable (ADMELOG) 8 Unit(s) SubCutaneous three times a day before meals  metoprolol tartrate 50 milliGRAM(s) Oral two times a day  silver nitrate Applicator 1 Application(s) Topical once      Vital Signs Last 24 Hrs  T(C): 36.9 (19 Aug 2024 14:15), Max: 36.9 (18 Aug 2024 21:25)  T(F): 98.5 (19 Aug 2024 14:15), Max: 98.5 (18 Aug 2024 21:25)  HR: 86 (19 Aug 2024 14:15) (69 - 86)  BP: 111/72 (19 Aug 2024 14:15) (99/53 - 148/72)  BP(mean): --  RR: 18 (19 Aug 2024 14:15) (17 - 18)  SpO2: 95% (19 Aug 2024 14:15) (94% - 99%)    Parameters below as of 19 Aug 2024 14:15  Patient On (Oxygen Delivery Method): room air        Physical Exam:  General:    NAD,  non toxic  Respiratory:    comfortable on RA  abd:     soft,     no tenderness  :   no CVAT,  no suprapubic tenderness,   no  niño  Musculoskeletal:   no joint swelling  vascular: R groin vac, L with sutures, PICC  Skin:    no rash                          8.4    3.73  )-----------( 267      ( 19 Aug 2024 07:09 )             28.2       08-19    140  |  105  |  16  ----------------------------<  124<H>  4.0   |  26  |  0.77    Ca    8.8      19 Aug 2024 07:09  Phos  3.1     08-19  Mg     1.8     08-19        Urinalysis Basic - ( 19 Aug 2024 07:09 )    Color: x / Appearance: x / SG: x / pH: x  Gluc: 124 mg/dL / Ketone: x  / Bili: x / Urobili: x   Blood: x / Protein: x / Nitrite: x   Leuk Esterase: x / RBC: x / WBC x   Sq Epi: x / Non Sq Epi: x / Bacteria: x        MICROBIOLOGY:  v  .Surgical Swab right groin deep wound  08-11-24   Moderate Staphylococcus epidermidis  --  Staphylococcus epidermidis      Clean Catch Clean Catch (Midstream)  08-08-24   <10,000 CFU/mL Normal Urogenital Lashell  --  --      .Other Wound right groin  08-08-24   Moderate Enterococcus faecalis  Moderate Staphylococcus epidermidis "Susceptibilities not performed"  --  Enterococcus faecalis      .Blood Blood  08-08-24   No growth at 5 days  --  --      .Blood Blood  08-08-24   No growth at 5 days  --  --                RADIOLOGY:  Images independently visualized and reviewed personally, findings as below  < from: Xray Chest 1 View- PORTABLE-Urgent (Xray Chest 1 View- PORTABLE-Urgent .) (08.19.24 @ 14:27) >    IMPRESSION:  Right upper extremity central venous catheter terminates in SVC.  Mild pulmonary edema.  Patchy left basilar opacities are similar versus decreased from prior.      < end of copied text >  < from: CT Angio Abd Aorta w/run-off w/ IV Cont (08.08.24 @ 17:25) >  IMPRESSION:  Left groin hematoma surrounding the left superficial femoral artery   measuring up to 7.6 cm craniocaudally as described above. No evidence of   active bleed. No right or left groin abscess. Soft tissue gas in the   right and left groin and surrounding the right common femoral artery is   likely postprocedural in etiology. Continued clinical follow-up is   advised.    Severe stenosis of the proximal to mid right anterior tibial artery and   posterior tibial artery.  Severe stenosis of the left posterior tibial artery.      < end of copied text >

## 2024-08-19 NOTE — DISCHARGE NOTE PROVIDER - NSFOLLOWUPCLINICS_GEN_ALL_ED_FT
[facility] [place]  Plastic Surgery  [address]  [place] [address]  Phone: [phone]  Fax:   Follow Up Time: 1 week     [facility] [place]  Plastic Surgery  [address]  [place] [address]  Phone: [phone]  Fax:   Follow Up Time: 1 week    [facility]  Endocrinology  [address] [place] [id number]  Phone: [phone]  Fax:   Follow Up Time: 1 week

## 2024-08-19 NOTE — ADVANCED PRACTICE NURSE CONSULT - ASSESSMENT
Picc Insertion Note  Patient or patient representative educated about central line associated blood stream infection prevention practices.  Catheter type: 4F,  SL Picc  : Bard  Power injectable: Yes  Lot#[id number]    Informed consent obtained by covering floor team.  Procedure assisted by: KASSI Galvin RN  Time out was preformed, confirming the patient's first and last name, date of birth, MR#, procedure, and correct site prior to start of procedure.    Patient was placed with HOB 30 degrees. Patient placement site was prepped with chlorhexidine solution, then draped using maximum sterile barrier protection. The area was injected with 2 ml of 1% lidocaine. Using the Bard Site Rite 8, the catheter was placed using the Modified Seldinger Technique. Strict adherence to outline aseptic technique including handwashing, glove and gown, utilizing mask and cap, plus draping the patient with a sterile drape was observed, patient wore mask. Upon completion of line placement, the insertion site was covered with a sterile occlusive CHG dressing. Pt tolerated procedure well.    VS: WNL           All materials used for catheter insertion, including the intact guide wires, were accounted for at the end of the procedure.  Number of attempts: 1  Complications/Comments: None    Emergency Placement:  No  Site: New   Anatomical Site of insertion:  Right Basilic  Catheter size/length:  4F,   37cm  US guided Bard  single lumen power picc placed    Post procedure verification with chest Xray as per orders.

## 2024-08-19 NOTE — PROGRESS NOTE ADULT - ASSESSMENT
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     Assessment  DMT2: 84y Male with DM T2 with hyperglycemia, A1C 8.5% , was on oral meds  at home, now on basal bolus insulin with coverage, blood sugars running high, eating meals. Pt agreeable to home insulins, insulin teaching requested  s/p OR for washout , now postop and eating full meals, not eating low carb at times.  Pt and son agreeable to home insulins for better glycemic control for his wound.   AS: on medications, stable, monitored. recent TAVR  HTN: on antihypertensive medications, monitored, asymptomatic.    Discussed plan and management wit Dr Oneil Angulo NP-TEAMS             84M PMH HTN, HLD, L carotid stenosis s/p CEA, T2DM, CAD S/p CABG AVR with subsequent valve in valve TAVR via b/l femoral artery access c/b failed closure device 7/24 C/b RLE acute limb ischemia requiring R ileofemoral bypass, now with wound dehiscence.     Assessment  DMT2: 84y Male with DM T2 with hyperglycemia, A1C 8.5% , was on oral meds  at home, now on basal bolus insulin with coverage, blood sugars running high, eating meals. Pt agreeable to home insulins, insulin teaching requested  s/p OR for washout , now postop and eating full meals, not eating low carb at times.  Pt and son agreeable to home insulins for better glycemic control for his wound.   AS: on medications, stable, monitored. recent TAVR  HTN: on antihypertensive medications, monitored, asymptomatic.    Discussed plan and management wit Dr Oneil Angulo NP-TEAMS      [facility] meds

## 2024-08-19 NOTE — DISCHARGE NOTE NURSING/CASE MANAGEMENT/SOCIAL WORK - NSDCPEFALRISK_GEN_ALL_CORE
For information on Fall & Injury Prevention, visit: [email or web address] OR  [email or web address] OR  https://www.cdc.gov/steadi/patient.html

## 2024-08-19 NOTE — DISCHARGE NOTE PROVIDER - NSDCCPCAREPLAN_GEN_ALL_CORE_FT
PRINCIPAL DISCHARGE DIAGNOSIS  Diagnosis: Skin infection  Assessment and Plan of Treatment:      PRINCIPAL DISCHARGE DIAGNOSIS  Diagnosis: Wound of groin  Assessment and Plan of Treatment: HOSPITAL COURSE: Plastic surgery performed two operations on you:   - 8/10: Exploration of right groin, fat and skin debridement, sartorius muscle flap, and wound vac placement   - 8/14: Right groin washout and debridement.   ANTIBIOTICS: Infectious Diseases saw you in the hospital. You were treated with IV antibiotics, and will be discharged with IV Vancomycin 1gm two times a day to complete a 6 week course after your surgery (End Date: 9/25/24)  FOLLOW UP: Please follow up with Dr. Wade (vascular surgery), Dr. Walsh (vascular surgery), and Dr. Witt (infectious diseases)      SECONDARY DISCHARGE DIAGNOSES  Diagnosis: Type 2 diabetes mellitus  Assessment and Plan of Treatment: HOSPITAL COURSE: Endocrinology saw you in the hospital. Medication adjustments were made.   MEDICATIONS: Stop all home PO DM meds, Jardiance, Metformin, Glimepiride  Continue with Novolog 5units 3 times a day with meals  Continue with Lantus 10 units once a night at bedtime  Insulin supplies were sent to [facility].  HOME CARE INSTRUCTIONS:  Make sure you get your HgA1c checked every three months.  If you take insulin, check your blood glucose before meals and at bedtime.  It's important not to skip any meals.  Keep a log of your blood glucose results and always take it with you to your doctor appointments.  Keep a list of your current medications including injectables and over the counter medications and bring this medication list with you to all your doctor appointments.  If you have not seen your ophthalmologist this year call for appointment.  Check your feet daily for redness, sores, or openings. Do not self treat. If no improvement in two days call your primary care physician for an appointment.  Low blood sugar (hypoglycemia) is a blood sugar below 70mg/dl. Check your blood sugar if you feel signs/symptoms of hypoglycemia. If your blood sugar is below 70 take 15 grams of carbohydrates (ex 4 oz of apple juice, 3-4 glucose tablets, or 4-6 oz of regular soda) wait 15 minutes and repeat blood sugar to make sure it comes up above 70.  If your blood sugar is above 70 and you are due for a meal, have a meal.  If you are not due for a meal have a snack.  This snack helps keeps your blood sugar at a safe range.  FOLLOW UP: Please follow up with Dr. Riggs/[facility] in 1-2 weeks     PRINCIPAL DISCHARGE DIAGNOSIS  Diagnosis: Wound of groin  Assessment and Plan of Treatment: HOSPITAL COURSE: Plastic surgery performed two operations on you:   - 8/10: Exploration of right groin, fat and skin debridement, sartorius muscle flap, and wound vac placement   - 8/14: Right groin washout and debridement.   ANTIBIOTICS: Infectious Diseases saw you in the hospital. You were treated with IV antibiotics, and will be discharged with IV Vancomycin 1gm two times a day to complete a 6 week course after your surgery (End Date: 9/25/24)  FOLLOW UP: Please follow up with Dr. Wade (vascular surgery), Dr. Walsh (vascular surgery), and Dr. Witt (infectious diseases)      SECONDARY DISCHARGE DIAGNOSES  Diagnosis: Type 2 diabetes mellitus  Assessment and Plan of Treatment: HOSPITAL COURSE: Endocrinology saw you in the hospital. Medication adjustments were made.   MEDICATIONS: Stop all home oral Diabetes medications: Jardiance, Metformin, Glimepiride  Continue with Novolog 5units 3 times a day with meals  Continue with Lantus 10 units once a night at bedtime  Insulin supplies were sent to [facility].  HOME CARE INSTRUCTIONS:  Make sure you get your HgA1c checked every three months.  If you take insulin, check your blood glucose before meals and at bedtime.  It's important not to skip any meals.  Keep a log of your blood glucose results and always take it with you to your doctor appointments.  Keep a list of your current medications including injectables and over the counter medications and bring this medication list with you to all your doctor appointments.  If you have not seen your ophthalmologist this year call for appointment.  Check your feet daily for redness, sores, or openings. Do not self treat. If no improvement in two days call your primary care physician for an appointment.  Low blood sugar (hypoglycemia) is a blood sugar below 70mg/dl. Check your blood sugar if you feel signs/symptoms of hypoglycemia. If your blood sugar is below 70 take 15 grams of carbohydrates (ex 4 oz of apple juice, 3-4 glucose tablets, or 4-6 oz of regular soda) wait 15 minutes and repeat blood sugar to make sure it comes up above 70.  If your blood sugar is above 70 and you are due for a meal, have a meal.  If you are not due for a meal have a snack.  This snack helps keeps your blood sugar at a safe range.  FOLLOW UP: Please follow up with Dr. Riggs/[facility] in 1-2 weeks     PRINCIPAL DISCHARGE DIAGNOSIS  Diagnosis: Wound of groin  Assessment and Plan of Treatment: HOSPITAL COURSE: Plastic surgery performed two operations on you:   - 8/10: Exploration of right groin, fat and skin debridement, sartorius muscle flap, and wound vac placement   - 8/14: Right groin washout and debridement.   ANTIBIOTICS: Infectious Diseases saw you in the hospital. You were treated with IV antibiotics, and will be discharged with IV Vancomycin 1gm two times a day to complete a 6 week course after your surgery (End Date: 9/25/24)  FOLLOW UP: Please follow up with Dr. Wilsno (vascular surgery), Dr. Walsh (vascular surgery), and Dr. Witt (infectious diseases)      SECONDARY DISCHARGE DIAGNOSES  Diagnosis: Type 2 diabetes mellitus  Assessment and Plan of Treatment: HOSPITAL COURSE: Endocrinology saw you in the hospital. Medication adjustments were made.   MEDICATIONS: Stop all home oral Diabetes medications: Jardiance, Metformin, Glimepiride  Continue with Novolog 5units 3 times a day with meals  Continue with Lantus 10 units once a night at bedtime  Insulin supplies were sent to [facility].  HOME CARE INSTRUCTIONS:  Make sure you get your HgA1c checked every three months.  If you take insulin, check your blood glucose before meals and at bedtime.  It's important not to skip any meals.  Keep a log of your blood glucose results and always take it with you to your doctor appointments.  Keep a list of your current medications including injectables and over the counter medications and bring this medication list with you to all your doctor appointments.  If you have not seen your ophthalmologist this year call for appointment.  Check your feet daily for redness, sores, or openings. Do not self treat. If no improvement in two days call your primary care physician for an appointment.  Low blood sugar (hypoglycemia) is a blood sugar below 70mg/dl. Check your blood sugar if you feel signs/symptoms of hypoglycemia. If your blood sugar is below 70 take 15 grams of carbohydrates (ex 4 oz of apple juice, 3-4 glucose tablets, or 4-6 oz of regular soda) wait 15 minutes and repeat blood sugar to make sure it comes up above 70.  If your blood sugar is above 70 and you are due for a meal, have a meal.  If you are not due for a meal have a snack.  This snack helps keeps your blood sugar at a safe range.  FOLLOW UP: Please follow up with Dr. Riggs/[facility] in 1-2 weeks

## 2024-08-19 NOTE — CHART NOTE - NSCHARTNOTESELECT_GEN_ALL_CORE
Chart event note
Event Note
Event note
Post-op check
Pre-Op Note/Event Note
3:1 Commode
Preop note/Event Note

## 2024-08-19 NOTE — PROGRESS NOTE ADULT - SUBJECTIVE AND OBJECTIVE BOX
Plastic Surgery Progress Note (pg [facility]: [id number], [facility]: [phone])    SUBJECTIVE:   No acute events overnight, VSS. Patient resting comfortably. Pain well controlled, denies concerns    OBJECTIVE:  Vital Signs Last 24 Hrs  T(C): 36.7 (19 Aug 2024 05:15), Max: 37.1 (18 Aug 2024 16:50)  T(F): 98.1 (19 Aug 2024 05:15), Max: 98.7 (18 Aug 2024 16:50)  HR: 69 (19 Aug 2024 05:15) (69 - 85)  BP: 148/72 (19 Aug 2024 05:15) (105/55 - 148/72)  BP(mean): --  RR: 18 (19 Aug 2024 05:15) (17 - 18)  SpO2: 99% (19 Aug 2024 05:15) (94% - 99%)    Parameters below as of 19 Aug 2024 05:15  Patient On (Oxygen Delivery Method): room air        Physical Examination:  GEN: NAD, resting quietly  GROIN:  R groin with wound vac in place, holding suction, L groin dressing cdi with mild surrounding induration

## 2024-08-19 NOTE — DISCHARGE NOTE PROVIDER - NSDCMRMEDTOKEN_GEN_ALL_CORE_FT
3:1 Commode: Use as Needed  alcohol swabs : Apply topically to affected area 4 times a day  aspirin 81 mg oral tablet, chewable: 1 tab(s) orally once a day  atorvastatin 80 mg oral tablet: 1 tab(s) orally once a day  Eliquis 2.5 mg oral tablet: 1 tab(s) orally 2 times a day  glimepiride 2 mg oral tablet: 1 tab(s) orally once a day  glucometer (per patient&#x27;s insurance): Test blood sugars four times a day. Dispense #1 glucometer.  HumaLOG KwikPen 100 units/mL injectable solution: 5 unit(s) subcutaneous 3 times a day (with meals) unit(s) subcutaneous 3 times a day (with meals)  Insulin Pen Needles, 4mm: 1 application subcutaneously 4 times a day. ** Use with insulin pen **  Jardiance 10 mg oral tablet: 1 tab(s) orally once a day (in the morning)  lancets: 1 application subcutaneously 4 times a day  Lantus Solostar Pen 100 units/mL subcutaneous solution: 10 unit(s) subcutaneous once a day (at bedtime) unit(s) subcutaneous once a day  Lopressor 50 mg oral tablet: 1 tab(s) orally 2 times a day 6am and 6 pm  melatonin 3 mg oral tablet: 1 tab(s) orally once a day (at bedtime)  metFORMIN 500 mg oral tablet: 1 tab(s) orally 2 times a day  pantoprazole 40 mg oral delayed release tablet: 1 tab(s) orally once a day (before a meal)  Plavix 75 mg oral tablet: 1 tab(s) orally once a day  polyethylene glycol 3350 oral powder for reconstitution: 17 gram(s) orally once a day  Rolling walker: use as directed  senna leaf extract oral tablet: 2 tab(s) orally once a day (at bedtime)  tamsulosin 0.4 mg oral capsule: 1 cap(s) orally once a day (at bedtime)  vancomycin 1 g intravenous injection: 1 gram(s) intravenous every 12 hours  Weekly CBC, BMP, vanco level. End Date: 9/25/24: Please fax to [phone]. Attn: Dr. Pedro Witt   3:1 Commode: Use as Needed  alcohol swabs : Apply topically to affected area 4 times a day  aspirin 81 mg oral tablet, chewable: 1 tab(s) orally once a day  atorvastatin 80 mg oral tablet: 1 tab(s) orally once a day  Eliquis 2.5 mg oral tablet: 1 tab(s) orally 2 times a day  glimepiride 2 mg oral tablet: 1 tab(s) orally once a day  glucometer (per patient&#x27;s insurance): Test blood sugars four times a day. Dispense #1 glucometer.  HumaLOG KwikPen 100 units/mL injectable solution: 5 unit(s) subcutaneous 3 times a day (with meals) unit(s) subcutaneous 3 times a day (with meals)  Insulin Pen Needles, 4mm: 1 application subcutaneously 4 times a day. ** Use with insulin pen **  Jardiance 10 mg oral tablet: 1 tab(s) orally once a day (in the morning)  lancets: 1 application subcutaneously 4 times a day  Lantus Solostar Pen 100 units/mL subcutaneous solution: 10 unit(s) subcutaneous once a day (at bedtime) unit(s) subcutaneous once a day  Lopressor 50 mg oral tablet: 1 tab(s) orally 2 times a day 6am and 6 pm  melatonin 3 mg oral tablet: 1 tab(s) orally once a day (at bedtime)  metFORMIN 500 mg oral tablet: 1 tab(s) orally 2 times a day  pantoprazole 40 mg oral delayed release tablet: 1 tab(s) orally once a day (before a meal)  Plavix 75 mg oral tablet: 1 tab(s) orally once a day  polyethylene glycol 3350 oral powder for reconstitution: 17 gram(s) orally once a day  Rolling walker: use as directed  senna leaf extract oral tablet: 2 tab(s) orally once a day (at bedtime)  tamsulosin 0.4 mg oral capsule: 1 cap(s) orally once a day (at bedtime)  test strips (per patient&#x27;s insurance): 1 application subcutaneously 4 times a day. ** Compatible with patient&#x27;s glucometer **  vancomycin 1 g intravenous injection: 1 gram(s) intravenous every 12 hours  Weekly CBC, BMP, vanco level. End Date: 9/25/24: Please fax to [phone]. Attn: Dr. Pedro Witt   3:1 Commode: Use as Needed  acetaminophen 325 mg oral tablet: 3 tab(s) orally every 6 hours  alcohol swabs : Apply topically to affected area 4 times a day  aspirin 81 mg oral tablet, chewable: 1 tab(s) orally once a day  atorvastatin 80 mg oral tablet: 1 tab(s) orally once a day  Eliquis 2.5 mg oral tablet: 1 tab(s) orally 2 times a day  glucometer (per patient&#x27;s insurance): Test blood sugars four times a day. Dispense #1 glucometer.  HumaLOG KwikPen 100 units/mL injectable solution: 5 unit(s) subcutaneous 3 times a day (with meals) unit(s) subcutaneous 3 times a day (with meals)  Insulin Pen Needles, 4mm: 1 application subcutaneously 4 times a day. ** Use with insulin pen **  lancets: 1 application subcutaneously 4 times a day  Lantus Solostar Pen 100 units/mL subcutaneous solution: 10 unit(s) subcutaneous once a day (at bedtime) unit(s) subcutaneous once a day  Lopressor 50 mg oral tablet: 1 tab(s) orally 2 times a day 6am and 6 pm  melatonin 3 mg oral tablet: 1 tab(s) orally once a day (at bedtime)  pantoprazole 40 mg oral delayed release tablet: 1 tab(s) orally once a day (before a meal)  Plavix 75 mg oral tablet: 1 tab(s) orally once a day  polyethylene glycol 3350 oral powder for reconstitution: 17 gram(s) orally once a day  Rolling walker: use as directed  senna leaf extract oral tablet: 2 tab(s) orally once a day (at bedtime)  tamsulosin 0.4 mg oral capsule: 1 cap(s) orally once a day (at bedtime)  test strips (per patient&#x27;s insurance): 1 application subcutaneously 4 times a day. ** Compatible with patient&#x27;s glucometer **  vancomycin 1 g intravenous injection: 1 gram(s) intravenous every 12 hours  Weekly CBC, BMP, vanco level. End Date: 9/25/24: Please fax to [phone]. Attn: Dr. Pedro Witt

## 2024-08-19 NOTE — DISCHARGE NOTE PROVIDER - NSDCACTIVITY_GEN_ALL_CORE
Do not drive or operate machinery/Do not make important decisions/No heavy lifting/straining/Follow Instructions Provided by your Surgical Team Do not drive or operate machinery/Do not make important decisions/Stairs allowed/Walking - Indoors allowed/No heavy lifting/straining/Walking - Outdoors allowed/Follow Instructions Provided by your Surgical Team

## 2024-08-19 NOTE — PROGRESS NOTE ADULT - SUBJECTIVE AND OBJECTIVE BOX
SURGERY DAILY PROGRESS NOTE:     Overnight Events:  No acute events overnight.    SUBJECTIVE: Patient seen and evaluated on AM rounds. Pt is resting comfortably in bed with no complaints. Denies fever, chills, N/V, chest pain, or shortness of breath. Patient is passing gas and having bowel movements. Tolerating diet. Ambulating well. Pain is adequately controlled on current regimen.    OBJECTIVE:  Vital Signs Last 24 Hrs  T(C): 36.7 (19 Aug 2024 05:15), Max: 37.1 (18 Aug 2024 16:50)  T(F): 98.1 (19 Aug 2024 05:15), Max: 98.7 (18 Aug 2024 16:50)  HR: 69 (19 Aug 2024 05:15) (69 - 85)  BP: 148/72 (19 Aug 2024 05:15) (105/55 - 148/72)  BP(mean): --  RR: 18 (19 Aug 2024 05:15) (17 - 18)  SpO2: 99% (19 Aug 2024 05:15) (94% - 99%)    Parameters below as of 19 Aug 2024 05:15  Patient On (Oxygen Delivery Method): room air      I&O's Detail    17 Aug 2024 07:01  -  18 Aug 2024 07:00  --------------------------------------------------------  IN:    Heparin: 253 mL    IV PiggyBack: 250 mL    Lactated Ringers: 350 mL    Oral Fluid: 1020 mL  Total IN: 1873 mL    OUT:    VAC (Vacuum Assisted Closure) System (mL): 125 mL    Voided (mL): 1925 mL  Total OUT: 2050 mL    Total NET: -177 mL      18 Aug 2024 07:01  -  19 Aug 2024 05:54  --------------------------------------------------------  IN:    Heparin: 242 mL    IV PiggyBack: 500 mL    Oral Fluid: 740 mL  Total IN: 1482 mL    OUT:    VAC (Vacuum Assisted Closure) System (mL): 50 mL    Voided (mL): 2625 mL  Total OUT: 2675 mL    Total NET: -1193 mL        Daily     Daily     LABS:                        9.4    4.70  )-----------( 292      ( 18 Aug 2024 07:08 )             32.5     08-18    141  |  104  |  16  ----------------------------<  115<H>  4.1   |  25  |  0.85    Ca    9.0      18 Aug 2024 07:08  Phos  3.0     08-18  Mg     1.8     08-18      PTT - ( 18 Aug 2024 07:08 )  PTT:66.9 sec  Urinalysis Basic - ( 18 Aug 2024 07:08 )    Color: x / Appearance: x / SG: x / pH: x  Gluc: 115 mg/dL / Ketone: x  / Bili: x / Urobili: x   Blood: x / Protein: x / Nitrite: x   Leuk Esterase: x / RBC: x / WBC x   Sq Epi: x / Non Sq Epi: x / Bacteria: x            Physical Exam:  General: AAOx3, NAD, lying comfortably in bed  HEENT: NC/AT  Respiratory: nonlabored breathing  Cardiovascular: RRR, normal S1 and S2, no murmurs or gallops  Abdomen: non-distended, soft, non-tender  Extremities: Right groin with vac holding suction 75 mmHg. Left groin with staples intact, covered with gauze and tegaderm, no strike through, c/d/i, changed dressing today, indurated underneath incision. SURGERY DAILY PROGRESS NOTE:     Overnight Events:  No acute events overnight.    SUBJECTIVE: Patient seen and evaluated on AM rounds. Pt is resting comfortably in bed with no complaints. Denies fever, chills, N/V, chest pain, or shortness of breath. Tolerating diet.  Pain is adequately controlled on current regimen.    OBJECTIVE:  Vital Signs Last 24 Hrs  T(C): 36.7 (19 Aug 2024 05:15), Max: 37.1 (18 Aug 2024 16:50)  T(F): 98.1 (19 Aug 2024 05:15), Max: 98.7 (18 Aug 2024 16:50)  HR: 69 (19 Aug 2024 05:15) (69 - 85)  BP: 148/72 (19 Aug 2024 05:15) (105/55 - 148/72)  BP(mean): --  RR: 18 (19 Aug 2024 05:15) (17 - 18)  SpO2: 99% (19 Aug 2024 05:15) (94% - 99%)    Parameters below as of 19 Aug 2024 05:15  Patient On (Oxygen Delivery Method): room air      I&O's Detail    17 Aug 2024 07:01  -  18 Aug 2024 07:00  --------------------------------------------------------  IN:    Heparin: 253 mL    IV PiggyBack: 250 mL    Lactated Ringers: 350 mL    Oral Fluid: 1020 mL  Total IN: 1873 mL    OUT:    VAC (Vacuum Assisted Closure) System (mL): 125 mL    Voided (mL): 1925 mL  Total OUT: 2050 mL    Total NET: -177 mL      18 Aug 2024 07:01  -  19 Aug 2024 05:54  --------------------------------------------------------  IN:    Heparin: 242 mL    IV PiggyBack: 500 mL    Oral Fluid: 740 mL  Total IN: 1482 mL    OUT:    VAC (Vacuum Assisted Closure) System (mL): 50 mL    Voided (mL): 2625 mL  Total OUT: 2675 mL    Total NET: -1193 mL        Daily     Daily     LABS:                        9.4    4.70  )-----------( 292      ( 18 Aug 2024 07:08 )             32.5     08-18    141  |  104  |  16  ----------------------------<  115<H>  4.1   |  25  |  0.85    Ca    9.0      18 Aug 2024 07:08  Phos  3.0     08-18  Mg     1.8     08-18      PTT - ( 18 Aug 2024 07:08 )  PTT:66.9 sec  Urinalysis Basic - ( 18 Aug 2024 07:08 )    Color: x / Appearance: x / SG: x / pH: x  Gluc: 115 mg/dL / Ketone: x  / Bili: x / Urobili: x   Blood: x / Protein: x / Nitrite: x   Leuk Esterase: x / RBC: x / WBC x   Sq Epi: x / Non Sq Epi: x / Bacteria: x            Physical Exam:  General: AAOx3, NAD, lying comfortably in bed  HEENT: NC/AT  Respiratory: no increased WOB, on RA  Cardiovascular: RRR  Abdomen: S NTND  Extremities: Right groin with vac holding suction 75 mmHg. Left groin with staples intact, covered with gauze and tape, no strike through, c/d/i, changed dressing today. incision slightly erythematous.

## 2024-08-20 VITALS
RESPIRATION RATE: 18 BRPM | SYSTOLIC BLOOD PRESSURE: 124 MMHG | OXYGEN SATURATION: 97 % | TEMPERATURE: 98 F | HEART RATE: 97 BPM | DIASTOLIC BLOOD PRESSURE: 74 MMHG

## 2024-08-20 LAB
ANION GAP SERPL CALC-SCNC: 9 MMOL/L — SIGNIFICANT CHANGE UP (ref 5–17)
BUN SERPL-MCNC: 16 MG/DL — SIGNIFICANT CHANGE UP (ref 7–23)
CALCIUM SERPL-MCNC: 9 MG/DL — SIGNIFICANT CHANGE UP (ref 8.4–10.5)
CHLORIDE SERPL-SCNC: 105 MMOL/L — SIGNIFICANT CHANGE UP (ref 96–108)
CO2 SERPL-SCNC: 25 MMOL/L — SIGNIFICANT CHANGE UP (ref 22–31)
CREAT SERPL-MCNC: 0.7 MG/DL — SIGNIFICANT CHANGE UP (ref 0.5–1.3)
EGFR: 92 ML/MIN/1.73M2 — SIGNIFICANT CHANGE UP
GLUCOSE BLDC GLUCOMTR-MCNC: 114 MG/DL — HIGH (ref 70–99)
GLUCOSE BLDC GLUCOMTR-MCNC: 135 MG/DL — HIGH (ref 70–99)
GLUCOSE BLDC GLUCOMTR-MCNC: 202 MG/DL — HIGH (ref 70–99)
GLUCOSE SERPL-MCNC: 134 MG/DL — HIGH (ref 70–99)
HCT VFR BLD CALC: 28 % — LOW (ref 39–50)
HGB BLD-MCNC: 8.2 G/DL — LOW (ref 13–17)
MAGNESIUM SERPL-MCNC: 1.9 MG/DL — SIGNIFICANT CHANGE UP (ref 1.6–2.6)
MCHC RBC-ENTMCNC: 28 PG — SIGNIFICANT CHANGE UP (ref 27–34)
MCHC RBC-ENTMCNC: 29.3 GM/DL — LOW (ref 32–36)
MCV RBC AUTO: 95.6 FL — SIGNIFICANT CHANGE UP (ref 80–100)
NRBC # BLD: 0 /100 WBCS — SIGNIFICANT CHANGE UP (ref 0–0)
PHOSPHATE SERPL-MCNC: 3.3 MG/DL — SIGNIFICANT CHANGE UP (ref 2.5–4.5)
PLATELET # BLD AUTO: 250 K/UL — SIGNIFICANT CHANGE UP (ref 150–400)
POTASSIUM SERPL-MCNC: 4 MMOL/L — SIGNIFICANT CHANGE UP (ref 3.5–5.3)
POTASSIUM SERPL-SCNC: 4 MMOL/L — SIGNIFICANT CHANGE UP (ref 3.5–5.3)
RBC # BLD: 2.93 M/UL — LOW (ref 4.2–5.8)
RBC # FLD: 17.8 % — HIGH (ref 10.3–14.5)
SODIUM SERPL-SCNC: 139 MMOL/L — SIGNIFICANT CHANGE UP (ref 135–145)
WBC # BLD: 3.88 K/UL — SIGNIFICANT CHANGE UP (ref 3.8–10.5)
WBC # FLD AUTO: 3.88 K/UL — SIGNIFICANT CHANGE UP (ref 3.8–10.5)

## 2024-08-20 PROCEDURE — 82435 ASSAY OF BLOOD CHLORIDE: CPT

## 2024-08-20 PROCEDURE — C1751: CPT

## 2024-08-20 PROCEDURE — 83735 ASSAY OF MAGNESIUM: CPT

## 2024-08-20 PROCEDURE — 87641 MR-STAPH DNA AMP PROBE: CPT

## 2024-08-20 PROCEDURE — 83605 ASSAY OF LACTIC ACID: CPT

## 2024-08-20 PROCEDURE — 85018 HEMOGLOBIN: CPT

## 2024-08-20 PROCEDURE — 80053 COMPREHEN METABOLIC PANEL: CPT

## 2024-08-20 PROCEDURE — 87040 BLOOD CULTURE FOR BACTERIA: CPT

## 2024-08-20 PROCEDURE — 84132 ASSAY OF SERUM POTASSIUM: CPT

## 2024-08-20 PROCEDURE — C1769: CPT

## 2024-08-20 PROCEDURE — 71045 X-RAY EXAM CHEST 1 VIEW: CPT

## 2024-08-20 PROCEDURE — 36430 TRANSFUSION BLD/BLD COMPNT: CPT

## 2024-08-20 PROCEDURE — 75635 CT ANGIO ABDOMINAL ARTERIES: CPT | Mod: MC

## 2024-08-20 PROCEDURE — 82803 BLOOD GASES ANY COMBINATION: CPT

## 2024-08-20 PROCEDURE — 81001 URINALYSIS AUTO W/SCOPE: CPT

## 2024-08-20 PROCEDURE — 84681 ASSAY OF C-PEPTIDE: CPT

## 2024-08-20 PROCEDURE — C9399: CPT

## 2024-08-20 PROCEDURE — 86923 COMPATIBILITY TEST ELECTRIC: CPT

## 2024-08-20 PROCEDURE — 85610 PROTHROMBIN TIME: CPT

## 2024-08-20 PROCEDURE — 96375 TX/PRO/DX INJ NEW DRUG ADDON: CPT

## 2024-08-20 PROCEDURE — 99232 SBSQ HOSP IP/OBS MODERATE 35: CPT

## 2024-08-20 PROCEDURE — 85025 COMPLETE CBC W/AUTO DIFF WBC: CPT

## 2024-08-20 PROCEDURE — C1889: CPT

## 2024-08-20 PROCEDURE — P9040: CPT

## 2024-08-20 PROCEDURE — 87070 CULTURE OTHR SPECIMN AEROBIC: CPT

## 2024-08-20 PROCEDURE — 82947 ASSAY GLUCOSE BLOOD QUANT: CPT

## 2024-08-20 PROCEDURE — 84100 ASSAY OF PHOSPHORUS: CPT

## 2024-08-20 PROCEDURE — 80048 BASIC METABOLIC PNL TOTAL CA: CPT

## 2024-08-20 PROCEDURE — 84295 ASSAY OF SERUM SODIUM: CPT

## 2024-08-20 PROCEDURE — 85027 COMPLETE CBC AUTOMATED: CPT

## 2024-08-20 PROCEDURE — 87086 URINE CULTURE/COLONY COUNT: CPT

## 2024-08-20 PROCEDURE — 82330 ASSAY OF CALCIUM: CPT

## 2024-08-20 PROCEDURE — 87075 CULTR BACTERIA EXCEPT BLOOD: CPT

## 2024-08-20 PROCEDURE — 85014 HEMATOCRIT: CPT

## 2024-08-20 PROCEDURE — 97116 GAIT TRAINING THERAPY: CPT

## 2024-08-20 PROCEDURE — 97161 PT EVAL LOW COMPLEX 20 MIN: CPT

## 2024-08-20 PROCEDURE — 96374 THER/PROPH/DIAG INJ IV PUSH: CPT

## 2024-08-20 PROCEDURE — 36569 INSJ PICC 5 YR+ W/O IMAGING: CPT

## 2024-08-20 PROCEDURE — 87640 STAPH A DNA AMP PROBE: CPT

## 2024-08-20 PROCEDURE — 97605 NEG PRS WND THER DME<=50SQCM: CPT

## 2024-08-20 PROCEDURE — 86901 BLOOD TYPING SEROLOGIC RH(D): CPT

## 2024-08-20 PROCEDURE — 85730 THROMBOPLASTIN TIME PARTIAL: CPT

## 2024-08-20 PROCEDURE — 97164 PT RE-EVAL EST PLAN CARE: CPT

## 2024-08-20 PROCEDURE — 82962 GLUCOSE BLOOD TEST: CPT

## 2024-08-20 PROCEDURE — 36415 COLL VENOUS BLD VENIPUNCTURE: CPT

## 2024-08-20 PROCEDURE — 87186 SC STD MICRODIL/AGAR DIL: CPT

## 2024-08-20 PROCEDURE — 86850 RBC ANTIBODY SCREEN: CPT

## 2024-08-20 PROCEDURE — 97530 THERAPEUTIC ACTIVITIES: CPT

## 2024-08-20 PROCEDURE — 87077 CULTURE AEROBIC IDENTIFY: CPT

## 2024-08-20 PROCEDURE — 99285 EMERGENCY DEPT VISIT HI MDM: CPT

## 2024-08-20 PROCEDURE — 86900 BLOOD TYPING SEROLOGIC ABO: CPT

## 2024-08-20 PROCEDURE — 80202 ASSAY OF VANCOMYCIN: CPT

## 2024-08-20 RX ORDER — ACETAMINOPHEN 325 MG/1
3 TABLET ORAL
Qty: 0 | Refills: 0 | DISCHARGE
Start: 2024-08-20

## 2024-08-20 RX ORDER — ACETAMINOPHEN 325 MG/1
975 TABLET ORAL EVERY 6 HOURS
Refills: 0 | Status: DISCONTINUED | OUTPATIENT
Start: 2024-08-20 | End: 2024-08-20

## 2024-08-20 RX ADMIN — Medication 250 MILLIGRAM(S): at 17:08

## 2024-08-20 RX ADMIN — APIXABAN 2.5 MILLIGRAM(S): 5 TABLET, FILM COATED ORAL at 11:56

## 2024-08-20 RX ADMIN — Medication 2: at 13:00

## 2024-08-20 RX ADMIN — Medication 81 MILLIGRAM(S): at 11:57

## 2024-08-20 RX ADMIN — Medication 250 MILLIGRAM(S): at 06:34

## 2024-08-20 RX ADMIN — Medication 100 GRAM(S): at 08:39

## 2024-08-20 RX ADMIN — Medication 8 UNIT(S): at 13:01

## 2024-08-20 RX ADMIN — Medication 8 UNIT(S): at 18:25

## 2024-08-20 RX ADMIN — CHLORHEXIDINE GLUCONATE 1 APPLICATION(S): 40 SOLUTION TOPICAL at 06:33

## 2024-08-20 RX ADMIN — METOPROLOL TARTRATE 50 MILLIGRAM(S): 100 TABLET ORAL at 06:33

## 2024-08-20 RX ADMIN — METOPROLOL TARTRATE 50 MILLIGRAM(S): 100 TABLET ORAL at 17:09

## 2024-08-20 RX ADMIN — Medication 8 UNIT(S): at 08:41

## 2024-08-20 NOTE — PROGRESS NOTE ADULT - PROBLEM SELECTOR PLAN 3
s/p TAVR   stable   ASA
s/p TAVR   stable   ASA
Suggest to continue medications, monitoring, FU primary team recommendations.
s/p TAVR   stable   ASA
s/p TAVR   stable   ASA
Suggest to continue medications, monitoring, FU primary team recommendations.
s/p TAVR   stable   ASA
Suggest to continue medications, monitoring, FU primary team recommendations.
s/p TAVR   stable   ASA
Suggest to continue medications, monitoring, FU primary team recommendations.
s/p TAVR   stable   DC plavix  ASA
s/p TAVR   stable   ASA

## 2024-08-20 NOTE — PROGRESS NOTE ADULT - PROBLEM SELECTOR PLAN 1
Right groin infected femoral site with underlying graft. Follow up cultures and BC   s/p washout and fat necrosis removal, sartorius flap  VAC in place right groin, now with oozing of blood. To be addressed today by Vascular. Monitor Hgb   IV abx   s/p RTOR for washout and VAC change  s/p PICC  ready for DC once service in place for at home VAC change and IV abx

## 2024-08-20 NOTE — PROGRESS NOTE ADULT - TIME BILLING
Advanced care planning was discussed with patient and family.  Advanced care planning forms were reviewed and discussed as appropriate.  Differential diagnosis and plan of care discussed with patient after the evaluation.   Pain assessed and judicious use of narcotics when appropriate was discussed.  Importance of Fall prevention discussed.  Counseling on Smoking and Alcohol cessation was offered when appropriate.  Counseling on Diet, exercise, and medication compliance was done.

## 2024-08-20 NOTE — PROGRESS NOTE ADULT - SUBJECTIVE AND OBJECTIVE BOX
SURGERY DAILY PROGRESS NOTE:     Overnight Events:  No acute events overnight.    SUBJECTIVE: Patient seen and evaluated on AM rounds. Pt is resting comfortably in bed with no complaints. Patient is passing gas and having bowel movements. Tolerating diet. Transfers to chair. Pain is adequately controlled on current regimen.    OBJECTIVE:  Vital Signs Last 24 Hrs  T(C): 36.6 (20 Aug 2024 06:30), Max: 36.9 (19 Aug 2024 14:15)  T(F): 97.9 (20 Aug 2024 06:30), Max: 98.5 (19 Aug 2024 14:15)  HR: 89 (20 Aug 2024 06:30) (68 - 89)  BP: 151/70 (20 Aug 2024 06:30) (99/53 - 151/70)  BP(mean): --  RR: 18 (20 Aug 2024 06:30) (18 - 18)  SpO2: 95% (20 Aug 2024 06:30) (95% - 98%)    Parameters below as of 20 Aug 2024 06:30  Patient On (Oxygen Delivery Method): room air      I&O's Detail    19 Aug 2024 07:01  -  20 Aug 2024 07:00  --------------------------------------------------------  IN:    Heparin: 11 mL    IV PiggyBack: 300 mL    Oral Fluid: 680 mL  Total IN: 991 mL    OUT:    Voided (mL): 1250 mL  Total OUT: 1250 mL    Total NET: -259 mL        Daily     Daily     LABS:                        8.2    3.88  )-----------( 250      ( 20 Aug 2024 07:02 )             28.0     08-20    139  |  105  |  16  ----------------------------<  134<H>  4.0   |  25  |  0.70    Ca    9.0      20 Aug 2024 07:02  Phos  3.3     08-20  Mg     1.9     08-20      PTT - ( 19 Aug 2024 07:13 )  PTT:62.5 sec  Urinalysis Basic - ( 20 Aug 2024 07:02 )    Color: x / Appearance: x / SG: x / pH: x  Gluc: 134 mg/dL / Ketone: x  / Bili: x / Urobili: x   Blood: x / Protein: x / Nitrite: x   Leuk Esterase: x / RBC: x / WBC x   Sq Epi: x / Non Sq Epi: x / Bacteria: x            Physical Exam:  General: AAOx3, NAD, lying comfortably in bed  HEENT: NC/AT  Respiratory: no increased WOB, on RA  Cardiovascular: RRR  Abdomen: S NTND  Extremities: Right groin with vac holding suction 75 mmHg. Left groin with staples intact, covered with gauze and tape, no strike through, c/d/i,. Staples removed on AM rounds. Incision erythematous, no pus. Wound vac now portable as of yesterday.

## 2024-08-20 NOTE — PROGRESS NOTE ADULT - PROBLEM SELECTOR PROBLEM 4
Other persistent atrial fibrillation

## 2024-08-20 NOTE — PROGRESS NOTE ADULT - SUBJECTIVE AND OBJECTIVE BOX
Plastic Surgery Progress Note (pg [facility]: [id number], [facility]: [phone])    SUBJECTIVE:   No acute events overnight, VSS. Patient resting comfortably. Pain well controlled, denies concerns    OBJECTIVE:  Vital Signs Last 24 Hrs  T(C): 36.8 (19 Aug 2024 21:32), Max: 36.9 (19 Aug 2024 14:15)  T(F): 98.2 (19 Aug 2024 21:32), Max: 98.5 (19 Aug 2024 14:15)  HR: 68 (19 Aug 2024 21:32) (68 - 86)  BP: 105/67 (19 Aug 2024 21:32) (99/53 - 128/77)  BP(mean): --  RR: 18 (19 Aug 2024 21:32) (18 - 18)  SpO2: 95% (19 Aug 2024 21:32) (95% - 98%)    Parameters below as of 19 Aug 2024 21:32  Patient On (Oxygen Delivery Method): room air        Physical Examination:  GEN: NAD, resting quietly  GROIN:  R groin with wound vac in place, holding suction, L groin dressing cdi

## 2024-08-20 NOTE — PROGRESS NOTE ADULT - PROBLEM SELECTOR PROBLEM 2
Groin abscess
Groin abscess
PVD (peripheral vascular disease)
Groin abscess
Groin abscess
PVD (peripheral vascular disease)
Groin abscess
PVD (peripheral vascular disease)
Groin abscess
PVD (peripheral vascular disease)
Groin abscess
Groin abscess
PVD (peripheral vascular disease)

## 2024-08-20 NOTE — PROGRESS NOTE ADULT - ASSESSMENT
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     Assessment  DMT2: 84y Male with DM T2 with hyperglycemia, A1C 8.5% , was on oral meds  at home, now on basal bolus insulin with coverage, blood sugars running high, eating meals. Pt agreeable to home insulins, insulin teaching requested  s/p OR for washout , now postop and eating full meals, not eating low carb at times.  Pt and son agreeable to home insulins for better glycemic control for his wound.   Educated completed at bedside with wife and son present  Pt was able to self administer insulins safety with practice demo pens   AS: on medications, stable, monitored. recent TAVR  HTN: on antihypertensive medications, monitored, asymptomatic.    Discussed plan and management wit Dr Oneil Angulo NP-TEAMS      [facility]

## 2024-08-20 NOTE — PROGRESS NOTE ADULT - ASSESSMENT
83 y/o male w/ PMHx of HTN, HLD, Type 2 Diabetes, CAD s/p CABG x3/bioprosthetic AVR 2011 at [facility] and subsequent PCI 5/2019, s/p TAVR 7/24, on Eliquis, presents to the ER with son for right groin infection. states TAVR was done through right groin. states has been at rehab facility and noticed groin became swollen. states then developed drainage and erythema to the area. states had complication during procedure and entered left groin as well. states noticed left side also swollen but not as bad. patient denies f/n/v/d, CP, SOB, HA, dizziness, abdominal pain.

## 2024-08-20 NOTE — PROGRESS NOTE ADULT - ASSESSMENT
84 m with DM, CAD s/p CABG,  AVR 2016, CEA.   Recently admitted for TAVR 7/24/2024.  Course complicated by acute limb ischemia on the same day - s/p R iliofemoral bypass w/ PTFE and RLE angiogram. Post-op, patient w/ LLE ALI RTOR 7/25 s/p L SFA interposition bypass w/ reversed ipsilateral GSV.   Pt was discharged to rehab but noted to have open right groin wound with drainage. No fever ro chills  blood cx negative   CT: Loculated gas in the bilateral groin subjacent to recent incisions likely postsurgical in etiology. Small locules of gas are also noted along the course of the right common femoral artery also likely   postprocedural. 4.1 x 4.5 x 7.6 cm (AP x TR x CC) hematoma surrounding the left superficial femoral artery near the distal anastomosis. No evidence of active hemorrhage.   s/p Exploration of R groin wound 8/10. Diffuse areas of fat necrosis but no obvious purulence or signs of infection. Debridement of fat and skin. After debridement pt with exposed PTFE bypass graft.  Decision made to proceed with sartorius muscle flap.  Sartorius was freed circumferentially and then divided superiorly.  Was then mobilized and used to cover gra  s/p Right groin sharp debridement and washout with pulse lavage. Application of wound vac 8/14  Right groin infected femoral site with underlying graft.  BC so far negative.  Wound culture so far E feacalis and Staph epi.  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

## 2024-08-20 NOTE — PROGRESS NOTE ADULT - ASSESSMENT
85 y/o Male with PMHx of HTN, HLD, L carotid stenosis s/p CEA, T2DM, CAD S/p CABG, AVR with subsequent valve in valve TAVR via b/l femoral artery access c/b failed closure device 7/24 C/b RLE acute limb ischemia requiring R ileofemoral bypass w/ PTFE and RLE angiogram followed by LLE ALI RTOR for L SFA interposition bypass with reversed ipsilateral GSV 7/24, who went to rehab facility and subsequently developed bilateral groin incisional infections with R incisional dehiscence. CTA showed a left groin hematoma unchanged from prior imaging, non-expanding in size. Now s/p exploration of right groin, fat and skin debridement, sartorius muscle flap, wound vac placement 8/10. ROTR with plastics on 8/14- s/p Right groin washout and debridement. Plastic recs; patient to go home with wound vac. Patient clinically stable overnight, recovering well. R PICC placed yesterday. Ptt pending discharge with home wound vac changes and IV vancomycin until 9/25. Will need weekly CBC Cr and Vanco level while on abx.     PLAN:  - Wound vac change MWF, please send photo documentation to primary team via TEAMS  - Appreciate plastic recs; will be DC home with vac changes, please document how long ptt needs the wound vacs  - Appreciate endo recs for glucose control  - Pain and anti-emetic PRN  - VTE ppx: switched to eliquis 8/19  - Appreciate ID recs: IV vanco 1 g q12h until 9/25, reconsult for outpatient monitoring labs and dosage/frequency appreciated  - PICC line placed 8/19, appreciate ID recommendations  - Dispo: Home PT, rolling walker, wound vacs, iv abx, weekly labs    Vascular surgery [phone]

## 2024-08-20 NOTE — PROGRESS NOTE ADULT - SUBJECTIVE AND OBJECTIVE BOX
Subjective: Patient seen and examined. No new events except as noted.   s/p PICC      REVIEW OF SYSTEMS:    CONSTITUTIONAL: + weakness, fevers or chills  EYES/ENT: No visual changes;  No vertigo or throat pain   NECK: No pain or stiffness  RESPIRATORY: No cough, wheezing, hemoptysis; No shortness of breath  CARDIOVASCULAR: No chest pain or palpitations  GASTROINTESTINAL: No abdominal or epigastric pain. No nausea, vomiting, or hematemesis; No diarrhea or constipation. No melena or hematochezia.  GENITOURINARY: No dysuria, frequency or hematuria  NEUROLOGICAL: No numbness or weakness  SKIN: No itching, burning, rashes, or lesions   All other review of systems is negative unless indicated above.    MEDICATIONS:  MEDICATIONS  (STANDING):  apixaban 2.5 milliGRAM(s) Oral every 12 hours  aspirin  chewable 81 milliGRAM(s) Oral daily  chlorhexidine 2% Cloths 1 Application(s) Topical <User Schedule>  chlorhexidine 4% Liquid 1 Application(s) Topical <User Schedule>  dextrose 50% Injectable 25 Gram(s) IV Push once  dextrose 50% Injectable 25 Gram(s) IV Push once  dextrose 50% Injectable 25 Gram(s) IV Push once  dextrose 50% Injectable 12.5 Gram(s) IV Push once  insulin glargine Injectable (LANTUS) 14 Unit(s) SubCutaneous at bedtime  insulin lispro (ADMELOG) corrective regimen sliding scale   SubCutaneous at bedtime  insulin lispro (ADMELOG) corrective regimen sliding scale   SubCutaneous three times a day before meals  insulin lispro Injectable (ADMELOG) 8 Unit(s) SubCutaneous three times a day before meals  magnesium sulfate  IVPB 1 Gram(s) IV Intermittent once  metoprolol tartrate 50 milliGRAM(s) Oral two times a day  silver nitrate Applicator 1 Application(s) Topical once  vancomycin  IVPB 1000 milliGRAM(s) IV Intermittent every 12 hours      PHYSICAL EXAM:  T(C): 36.6 (08-20-24 @ 06:30), Max: 36.9 (08-19-24 @ 14:15)  HR: 89 (08-20-24 @ 06:30) (68 - 89)  BP: 151/70 (08-20-24 @ 06:30) (99/53 - 151/70)  RR: 18 (08-20-24 @ 06:30) (18 - 18)  SpO2: 95% (08-20-24 @ 06:30) (95% - 98%)  Wt(kg): --  I&O's Summary    19 Aug 2024 07:01  -  20 Aug 2024 07:00  --------------------------------------------------------  IN: 991 mL / OUT: 1250 mL / NET: -259 mL            Appearance: NAD	  HEENT:   Normal oral mucosa, PERRL, EOMI	  Lymphatic: No lymphadenopathy , no edema  Cardiovascular: Irregular S1 S2, No JVD, No murmurs , Peripheral pulses palpable 2+ bilaterally  Respiratory: Lungs clear to auscultation, normal effort 	  Gastrointestinal:  Soft, Non-tender, + BS	  Skin: No rashes, No ecchymoses, No cyanosis, warm to touch  Musculoskeletal: Normal range of motion, normal strength  Psychiatry:  Mood & affect appropriate  Right groin with vac on suction. Suction maintained and no Left groin with staples intact, covered with gauze and tape, no strike through, c/d/i. No ecchymosis noted on either groin.   No evidence of active bleeding or expanding hematoma  + RUE PICC       LABS:    CARDIAC MARKERS:                                8.2    3.88  )-----------( 250      ( 20 Aug 2024 07:02 )             28.0     08-20    139  |  105  |  16  ----------------------------<  134<H>  4.0   |  25  |  0.70    Ca    9.0      20 Aug 2024 07:02  Phos  3.3     08-20  Mg     1.9     08-20      TELEMETRY: 	    ECG:  	  RADIOLOGY:   DIAGNOSTIC TESTING:  [ ] Echocardiogram:  [ ]  Catheterization:  [ ] Stress Test:    OTHER:

## 2024-08-20 NOTE — PROGRESS NOTE ADULT - REASON FOR ADMISSION
RLE wound infection

## 2024-08-20 NOTE — PROGRESS NOTE ADULT - PROBLEM SELECTOR PLAN 1
Lantus 14 units at bed time.    Admelog 8 units before each meal in addition to LOW  Admelog correction scale coverage.  Will continue monitoring FS, log, and glucose trends, will Follow up.  Patient counseled for compliance with consistent low carb diet and exercise as tolerated outpatient.  Pt advised regarding home insulin therapy  They are agreeable to insulins at home   Insulin PEN teaching requested per nursing staff    DC recs   Stop all home PO DM meds, Jardiance, Metformin, Glimepiride  DC with insulin pen basal bolus   Humalog or Admelog insulin pen,  5 units three times a day with meals  Lantus or basaglar pen 10 units once nightly  at bedtime  Can substitute these insulins for generic brands depending on insurance  Pls send home with new glucometer + supplies  [facility]   Can FU outpatient Dr Riggs endocrine  FU outpt PCP

## 2024-08-20 NOTE — PROGRESS NOTE ADULT - PROBLEM SELECTOR PROBLEM 3
Aortic valve stenosis

## 2024-08-20 NOTE — PROGRESS NOTE ADULT - SUBJECTIVE AND OBJECTIVE BOX
Chief complaint  Patient is a 84y old  Male who presents with a chief complaint of RLE wound infection (20 Aug 2024 14:27)         Labs and Fingersticks  CAPILLARY BLOOD GLUCOSE      POCT Blood Glucose.: 202 mg/dL (20 Aug 2024 12:02)  POCT Blood Glucose.: 135 mg/dL (20 Aug 2024 08:02)  POCT Blood Glucose.: 147 mg/dL (19 Aug 2024 21:36)  POCT Blood Glucose.: 145 mg/dL (19 Aug 2024 17:17)      Anion Gap: 9 (08-20 @ 07:02)  Anion Gap: 9 (08-19 @ 07:09)      Calcium: 9.0 (08-20 @ 07:02)  Calcium: 8.8 (08-19 @ 07:09)          08-20    139  |  105  |  16  ----------------------------<  134<H>  4.0   |  25  |  0.70    Ca    9.0      20 Aug 2024 07:02  Phos  3.3     08-20  Mg     1.9     08-20                          8.2    3.88  )-----------( 250      ( 20 Aug 2024 07:02 )             28.0     Medications  MEDICATIONS  (STANDING):  acetaminophen     Tablet .. 975 milliGRAM(s) Oral every 6 hours  apixaban 2.5 milliGRAM(s) Oral every 12 hours  aspirin  chewable 81 milliGRAM(s) Oral daily  chlorhexidine 2% Cloths 1 Application(s) Topical <User Schedule>  chlorhexidine 4% Liquid 1 Application(s) Topical <User Schedule>  dextrose 50% Injectable 25 Gram(s) IV Push once  dextrose 50% Injectable 25 Gram(s) IV Push once  dextrose 50% Injectable 25 Gram(s) IV Push once  dextrose 50% Injectable 12.5 Gram(s) IV Push once  insulin glargine Injectable (LANTUS) 14 Unit(s) SubCutaneous at bedtime  insulin lispro (ADMELOG) corrective regimen sliding scale   SubCutaneous at bedtime  insulin lispro (ADMELOG) corrective regimen sliding scale   SubCutaneous three times a day before meals  insulin lispro Injectable (ADMELOG) 8 Unit(s) SubCutaneous three times a day before meals  metoprolol tartrate 50 milliGRAM(s) Oral two times a day  silver nitrate Applicator 1 Application(s) Topical once  vancomycin  IVPB 1000 milliGRAM(s) IV Intermittent every 12 hours      Physical Exam  General: Patient comfortable in bed  Vital Signs Last 12 Hrs  T(F): 97.7 (08-20-24 @ 09:12), Max: 97.9 (08-20-24 @ 06:30)  HR: 79 (08-20-24 @ 09:12) (79 - 89)  BP: 131/70 (08-20-24 @ 09:12) (131/70 - 151/70)  BP(mean): --  RR: 18 (08-20-24 @ 09:12) (18 - 18)  SpO2: 96% (08-20-24 @ 09:12) (95% - 96%)    CVS: S1S2   Respiratory: No wheezing, no crepitations  GI: Abdomen soft, bowel sounds positive  Musculoskeletal:  moves all extremities  : Voiding

## 2024-08-20 NOTE — PROGRESS NOTE ADULT - SUBJECTIVE AND OBJECTIVE BOX
Follow Up:  MRSE graft infection    Interval History/ROS: pt afebrile, denied cough, vomiting or chest pain, s/p picc            Allergies  No Known Allergies        ANTIMICROBIALS:  vancomycin  IVPB 1000 every 12 hours      OTHER MEDS:  acetaminophen     Tablet .. 975 milliGRAM(s) Oral every 6 hours  apixaban 2.5 milliGRAM(s) Oral every 12 hours  aspirin  chewable 81 milliGRAM(s) Oral daily  chlorhexidine 2% Cloths 1 Application(s) Topical <User Schedule>  chlorhexidine 4% Liquid 1 Application(s) Topical <User Schedule>  dextrose 50% Injectable 12.5 Gram(s) IV Push once  dextrose 50% Injectable 25 Gram(s) IV Push once  dextrose 50% Injectable 25 Gram(s) IV Push once  dextrose 50% Injectable 25 Gram(s) IV Push once  dextrose Oral Gel 15 Gram(s) Oral once PRN  dextrose Oral Gel 15 Gram(s) Oral once PRN  insulin glargine Injectable (LANTUS) 14 Unit(s) SubCutaneous at bedtime  insulin lispro (ADMELOG) corrective regimen sliding scale   SubCutaneous three times a day before meals  insulin lispro (ADMELOG) corrective regimen sliding scale   SubCutaneous at bedtime  insulin lispro Injectable (ADMELOG) 8 Unit(s) SubCutaneous three times a day before meals  metoprolol tartrate 50 milliGRAM(s) Oral two times a day  silver nitrate Applicator 1 Application(s) Topical once  sodium chloride 0.9% lock flush 10 milliLiter(s) IV Push every 1 hour PRN      Vital Signs Last 24 Hrs  T(C): 36.5 (20 Aug 2024 09:12), Max: 36.8 (19 Aug 2024 17:13)  T(F): 97.7 (20 Aug 2024 09:12), Max: 98.3 (19 Aug 2024 17:13)  HR: 79 (20 Aug 2024 09:12) (68 - 89)  BP: 131/70 (20 Aug 2024 09:12) (105/67 - 151/70)  BP(mean): --  RR: 18 (20 Aug 2024 09:12) (18 - 18)  SpO2: 96% (20 Aug 2024 09:12) (95% - 97%)    Parameters below as of 20 Aug 2024 09:12  Patient On (Oxygen Delivery Method): room air        Physical Exam:  General:    NAD,  non toxic  Respiratory:    comfortable on RA  abd:     soft,     no tenderness  :   no CVAT,  no suprapubic tenderness,   no  niño  Musculoskeletal:   no joint swelling  vascular: R groin vac, L with sutures, PICC  Skin:    no rash                            8.2    3.88  )-----------( 250      ( 20 Aug 2024 07:02 )             28.0       08-20    139  |  105  |  16  ----------------------------<  134<H>  4.0   |  25  |  0.70    Ca    9.0      20 Aug 2024 07:02  Phos  3.3     08-20  Mg     1.9     08-20        Urinalysis Basic - ( 20 Aug 2024 07:02 )    Color: x / Appearance: x / SG: x / pH: x  Gluc: 134 mg/dL / Ketone: x  / Bili: x / Urobili: x   Blood: x / Protein: x / Nitrite: x   Leuk Esterase: x / RBC: x / WBC x   Sq Epi: x / Non Sq Epi: x / Bacteria: x        MICROBIOLOGY:  Vancomycin Level, Trough: 18.9 ug/mL (08-19-24 @ 17:48)  v  .Surgical Swab right groin deep wound  08-11-24   Moderate Staphylococcus epidermidis  --  Staphylococcus epidermidis      Clean Catch Clean Catch (Midstream)  08-08-24   <10,000 CFU/mL Normal Urogenital Lashell  --  --      .Other Wound right groin  08-08-24   Moderate Enterococcus faecalis  Moderate Staphylococcus epidermidis "Susceptibilities not performed"  --  Enterococcus faecalis      .Blood Blood  08-08-24   No growth at 5 days  --  --      .Blood Blood  08-08-24   No growth at 5 days  --  --                RADIOLOGY:  Images independently visualized and reviewed personally, findings as below  < from: Xray Chest 1 View- PORTABLE-Urgent (Xray Chest 1 View- PORTABLE-Urgent .) (08.19.24 @ 14:27) >  IMPRESSION:  Right upper extremity central venous catheter terminates in SVC.  Mild pulmonary edema.  Patchy left basilar opacities are similar versus decreased from prior.      < end of copied text >  < from: CT Angio Abd Aorta w/run-off w/ IV Cont (08.08.24 @ 17:25) >  IMPRESSION:  Left groin hematoma surrounding the left superficial femoral artery   measuring up to 7.6 cm craniocaudally as described above. No evidence of   active bleed. No right or left groin abscess. Soft tissue gas in the   right and left groin and surrounding the right common femoral artery is   likely postprocedural in etiology. Continued clinical follow-up is   advised.    Severe stenosis of the proximal to mid right anterior tibial artery and   posterior tibial artery.  Severe stenosis of the left posterior tibial artery.    < end of copied text >

## 2024-08-20 NOTE — PROGRESS NOTE ADULT - ASSESSMENT
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 Pt was discharged to rehab and then sent back to [facility] for concerns of groin infection and was admitted on  8/8.  Pt now s/p R groin washout, debridement of fat necrosis and coevrage with sartorius flap on 8/10 and placement of wound vac.  In OR no obvious purulence/ since of infection surrounding PTFE grft but with diffuse fat necrosis and non-viable tissue, now s/p additional R. groin washout and debridement (8/14/24)    Plan:  - MWF vac changes, will be dc home with vac changes  - stable for dc from plastics pov  - monitor vac  - If any concerns for expanding groin hematoma or profuse bleeding from groin should remove vac immediately to evaluate wound  - remainder care per primary

## 2024-08-20 NOTE — PROGRESS NOTE ADULT - PROVIDER SPECIALTY LIST ADULT
Endocrinology
Plastic Surgery
Vascular Surgery
Infectious Disease
Plastic Surgery
Plastic Surgery
Vascular Surgery
Endocrinology
Endocrinology
Infectious Disease
Infectious Disease
Plastic Surgery
Vascular Surgery
Cardiology
Infectious Disease
Plastic Surgery
Vascular Surgery
Cardiology
Endocrinology
Cardiology
Endocrinology
Cardiology
Endocrinology
Endocrinology
Cardiology
Endocrinology
Cardiology

## 2024-08-20 NOTE — PROGRESS NOTE ADULT - PROBLEM SELECTOR PLAN 4
rate controlled   Heparin gtt
rate controlled  restart Heparin gtt as above
rate controlled  restart Heparin gtt when ok with surgery
rate controlled   Heparin gtt  can change to eliquis
rate controlled   Heparin gtt on hold for OR
rate controlled  restart Heparin gtt when ok with surgery
rate controlled   Heparin gtt
rate controlled  Heparin gtt
rate controlled  restart Heparin gtt as above
rate controlled   Heparin gtt  can change to eliquis
rate controlled   Heparin gtt  can change to eliquis
rate controlled  Eliquis

## 2024-08-20 NOTE — PROGRESS NOTE ADULT - PROBLEM SELECTOR PLAN 2
CTA as above   s/p recent bilateral LE bypass   Orders per vascular
Suggest to continue medications, monitoring, FU primary team recommendations.
s/p recent bilateral LE bypass   Orders per vascular
CTA as above   s/p recent bilateral LE bypass   Orders per vascular
CTA as above   s/p recent bilateral LE bypass   Orders per vascular
Suggest to continue medications, monitoring, FU primary team recommendations.
Suggest to continue medications, monitoring, FU primary team recommendations.
CTA as above   s/p recent bilateral LE bypass   Orders per vascular
CTA as above   s/p recent bilateral LE bypass   Orders per vascular
Suggest to continue medications, monitoring, FU primary team recommendations.
Suggest to continue medications, monitoring, FU primary team recommendations.
s/p recent bilateral LE bypass   Orders per vascular
CTA as above   s/p recent bilateral LE bypass   Orders per vascular
s/p recent bilateral LE bypass   Orders per vascular

## 2024-08-20 NOTE — PROGRESS NOTE ADULT - PROBLEM SELECTOR PROBLEM 1
Groin abscess
Groin abscess
DM2 (diabetes mellitus, type 2)
Groin abscess
DM2 (diabetes mellitus, type 2)
Groin abscess
DM2 (diabetes mellitus, type 2)
Groin abscess

## 2024-08-21 ENCOUNTER — TRANSCRIPTION ENCOUNTER (OUTPATIENT)
Age: 84
End: 2024-08-21

## 2024-08-22 ENCOUNTER — APPOINTMENT (OUTPATIENT)
Dept: CARE COORDINATION | Facility: HOME HEALTH | Age: 84
End: 2024-08-22

## 2024-08-22 VITALS
OXYGEN SATURATION: 99 % | SYSTOLIC BLOOD PRESSURE: 104 MMHG | DIASTOLIC BLOOD PRESSURE: 64 MMHG | RESPIRATION RATE: 14 BRPM | HEART RATE: 98 BPM

## 2024-08-22 RX ORDER — POLYETHYLENE GLYCOL 3350 17 G/17G
17 POWDER, FOR SOLUTION ORAL DAILY
Qty: 20 | Refills: 0 | Status: ACTIVE | COMMUNITY
Start: 2024-08-22

## 2024-08-22 RX ORDER — PANTOPRAZOLE 40 MG/1
40 TABLET, DELAYED RELEASE ORAL
Qty: 30 | Refills: 0 | Status: ACTIVE | COMMUNITY
Start: 2024-08-22

## 2024-08-22 RX ORDER — GLUCOSAMINE HCL/CHONDROITIN SU 500-400 MG
3 CAPSULE ORAL
Qty: 30 | Refills: 0 | Status: ACTIVE | COMMUNITY
Start: 2024-08-22

## 2024-08-22 RX ORDER — INSULIN LISPRO 100 [IU]/ML
100 INJECTION, SOLUTION INTRAVENOUS; SUBCUTANEOUS
Refills: 0 | Status: ACTIVE | COMMUNITY
Start: 2024-08-22

## 2024-08-22 RX ORDER — INSULIN GLARGINE 100 [IU]/ML
100 INJECTION, SOLUTION SUBCUTANEOUS AT BEDTIME
Refills: 0 | Status: ACTIVE | COMMUNITY
Start: 2024-08-22

## 2024-08-22 RX ORDER — TAMSULOSIN HYDROCHLORIDE 0.4 MG/1
0.4 CAPSULE ORAL
Qty: 30 | Refills: 0 | Status: ACTIVE | COMMUNITY
Start: 2024-08-22

## 2024-08-22 RX ORDER — SENNA 8.6 MG/1
8.6 TABLET, FILM COATED ORAL
Qty: 60 | Refills: 0 | Status: ACTIVE | COMMUNITY
Start: 2024-08-22

## 2024-08-22 RX ORDER — ACETAMINOPHEN 325 MG/1
325 TABLET ORAL EVERY 6 HOURS
Qty: 56 | Refills: 0 | Status: ACTIVE | COMMUNITY
Start: 2024-08-22

## 2024-08-22 RX ORDER — VANCOMYCIN HYDROCHLORIDE 1 G/20ML
1 INJECTION, POWDER, LYOPHILIZED, FOR SOLUTION INTRAVENOUS
Refills: 0 | Status: ACTIVE | COMMUNITY
Start: 2024-08-22

## 2024-08-22 NOTE — ASSESSMENT
[FreeTextEntry1] : Pt recovering well at home s/p cardiac surgery. Good family support was noted from wife and Bertha (whom translated as needed via phone call).  Pt reports he is taking meds as prescribed. Pt is aware of F/U appts scheduled and that need to be scheduled as listed below. B/L LE pitting edema noted. Advised pt to keep LE elevated w/ 2 pillows under legs majority of the time & to walk around as tolerated. Pt aware to notify [facility] team of any developing SOB or wt gain.

## 2024-08-22 NOTE — REASON FOR VISIT
[Post Hospitalization] : a post hospitalization visit [Spouse] : spouse [FreeTextEntry1] : FOLLOW YOUR HEART - Transitional Care Management Program - [facility]

## 2024-08-22 NOTE — PATIENT PROFILE ADULT - NSPROGENOTHERPROVIDER_GEN_A_NUR
You may protect the callus on the right plantar foot with a bordered foam. Change as needed. Wear proper fitting shoes. Inspect your feet daily for any new concerns.     Moisturize your feet daily.      Wear Tubigrip daily to the right leg for control of swelling. Apply Tubigrip from the base of the toes to right below the knee bend.      Follow a nutritious diet (increase protein, Vitamin A & C, & Zinc) and maintain normal blood sugar levels for optimal health.     Follow up in the [facility] in 1 month or sooner for any concerns.  
none

## 2024-08-22 NOTE — PLAN
[TextEntry] : 1) Weigh yourself in the AM prior to eating & drinking. Contact FY team if you note a wt gain of 1-2 lbs overnight or 5 lbs within 1 week. Continue current meds. Keep your legs elevated & ambulate as tolerated. Continue incentive spirometry 10x/hr while awake. Shower using mild soap daily. Your diet should be low salt, low fat, high protein. 2) Call [facility] team 24/7 w/ any questions, issues, or concerns. My number [phone] was provided to the pt. Explained to pt I personally work from Mon to Fri from 8a to 4p but before or after those hours this number still functions 24/7 and pt will get in touch w/ a team member of CT Surgeon at all times. 3) Report to your FY NP any signs of infection such as redness, swelling, warmth, drainage, or pain at an incision site. Additionally, report to your [facility] NP if you develop a fever. 4) Do not lift anything more than 5 lbs. 5) Do not drive until you are cleared to do so by your surgeon. 6) Please walk 3x/day.  7) Continue monitoring glucose levels 4x/day. Keep a diary of your FS results to bring to your endocrinologist. 8) Please maintain your MCOT as directed for continued cardiac monitoring.  FOLLOW UP APPOINTMENTS: CTSx: Dr. Cisse - Call [facility]TS[facility] office to book your POV  CARDIOLOGIST/PCP: Dr. Perez - Pt advised to call to schedule appt ASAP ENDO: Dr. Riggs- F/U within 1 week VASCULAR: Dr. Wislon (9-9-24) PLASTIC SURG: Dr Walsh - Call to schedule appt in 1 week

## 2024-08-22 NOTE — HISTORY OF PRESENT ILLNESS
[FreeTextEntry1] : 84M with PMHx of HTN, HLD, L carotid stenosis s/p CEA, T2DM, CAD s/p CABG, AVR with subsequent valve in valve TAVR via b/l femoral artery access c/b failed closure device (7/24) and then C/b RLE acute limb ischemia requiring R ileofemoral bypass w/ PTFE and RLE angiogram followed by LLE ALI RTOR for L SFA interposition bypass with reversed ipsilateral GSV (7/24), who went to rehab facility and subsequently developed b/l groin incisional infections with R incisional dehiscence. CTA aorta (8/08/24) showed a Left groin hematoma unchanged from prior imaging, non-expanding in size.  Patient was admitted under Vascular Surgery for evaluation and management. Pt was started on a heparin gtt in lieu of Eliquis in preparation for OR. Cardiology was consulted for cardiac co-management and clearance; clearance documented on 8/09  Plastic surgery was consulted for possible muscle flap during groin washout. Pt was taken to the OR on 8/10, and is s/p exploration of right groin, fat and skin debridement, sartorius muscle flap, and wound vac placement. The patient tolerated the procedure well (see operative report for full details). Pt was taken back to the OR on 8/14, and is s/p right groin washout and debridement. The patient tolerated the procedure well (see operative report for full details). Since these surgeries, he has had a wound vacuum on his Right groin, which was changed MWF. Left groin staples were removed on 8/20. Pt was transitioned back to Eliquis on 8/19.  Infectious Diseases was consulted for antibiotics recommendations, and initially recommended IV Vancomycin and Cefepime. BCx (8/08) ngtd. Wound cx (8/08) grew Enterococcus faecalis. OR wound cx (8/11/24) grew Staphylococcus epidermidis. IV Cefepime was discontinued on 8/12, while IV Vancomycin was continued Patient got a PICC line on 8/19 to complete IV vancomycin treatment x 6 weeks post OR (End Date: 9/25/24)  Endocrinology was consulted for hx of DM II and wound infection; recommendations were followed. Pt will be discharged with Admelog 5u TID with meals and Lantus 10u at bedtime.  Pt will be discharged with right groin wound vac; VNS has been set up for wound vac changes 3x/week Pt will continue to apply betadine, and cover with gauze and tape to left groin Physical therapy evaluated the patient and recommended home PT with rolling walker.  On the day of discharge, the patient's vitals are stable, pain is controlled, voiding urine, passing gas/stool, tolerating a diet, and ambulating. Pt will f/u with Dr. Wilson (vascular surgery), Dr. Walsh (plastic surgery), and Dr. Lara Riggs/[facility] (endocrinology) in 1-2 weeks. Pt will f/u with PCP in 1-2 weeks.  8/22- [facility]en by [facility] for a f/u home visit. Emotional support and education provided.  All questions answered. Pt overall is recovering well w/o complaints.

## 2024-08-22 NOTE — PHYSICAL EXAM
[Sclera] : the sclera and conjunctiva were normal [Neck Appearance] : the appearance of the neck was normal [] : no respiratory distress [Respiration, Rhythm And Depth] : normal respiratory rhythm and effort [Exaggerated Use Of Accessory Muscles For Inspiration] : no accessory muscle use [Auscultation Breath Sounds / Voice Sounds] : lungs were clear to auscultation bilaterally [Apical Impulse] : the apical impulse was normal [Heart Rate And Rhythm] : heart rate was normal and rhythm regular [Heart Sounds] : normal S1 and S2 [Heart Sounds Gallop] : no gallops [Murmurs] : no murmurs [Heart Sounds Pericardial Friction Rub] : no pericardial rub [Examination Of The Chest] : the chest was normal in appearance [Chest Visual Inspection Thoracic Asymmetry] : no chest asymmetry [Bowel Sounds] : normal bowel sounds [Diminished Respiratory Excursion] : normal chest expansion [Abdomen Soft] : soft [Skin Color & Pigmentation] : normal skin color and pigmentation [FreeTextEntry1] : RUE - PICC Line in place w/ C/D/I dressing [No Focal Deficits] : no focal deficits [Oriented To Time, Place, And Person] : oriented to person, place, and time [Impaired Insight] : insight and judgment were intact [Affect] : the affect was normal [Mood] : the mood was normal

## 2024-08-27 ENCOUNTER — NON-APPOINTMENT (OUTPATIENT)
Age: 84
End: 2024-08-27

## 2024-08-27 ENCOUNTER — APPOINTMENT (OUTPATIENT)
Dept: VASCULAR SURGERY | Facility: CLINIC | Age: 84
End: 2024-08-27
Payer: MEDICARE

## 2024-08-27 VITALS
TEMPERATURE: 98.2 F | WEIGHT: 123 LBS | BODY MASS INDEX: 24.15 KG/M2 | HEIGHT: 60 IN | DIASTOLIC BLOOD PRESSURE: 63 MMHG | HEART RATE: 89 BPM | SYSTOLIC BLOOD PRESSURE: 123 MMHG

## 2024-08-27 DIAGNOSIS — I73.9 PERIPHERAL VASCULAR DISEASE, UNSPECIFIED: ICD-10-CM

## 2024-08-27 PROCEDURE — 99024 POSTOP FOLLOW-UP VISIT: CPT

## 2024-08-27 NOTE — ASSESSMENT
[FreeTextEntry1] : Patient is an 84-year-old male with past medical history of hypertension, hypercholesterolemia, carotid artery disease status post left CEA (Dr. Salgado), type 2 diabetes, aortic stenosis who recently underwent a TAVR on July 24, 2020 for complicated by bilateral lower extremity superficial femoral artery dissection and right iliofemoral endarterectomy and bypass (Dr. Wade intraop) and a left superficial femoral artery interposition bypass with reversed saphenous vein graft ( same day postop) here for follow-up and evaluation of groin wounds.  Left groin wound sharply debrided at bedside in the office after painting the wound with Betadine.  Fibrinous  and fat necrosis exudate and necrotic fat sharply excised with scissors.  Wound packed with half inch iodoform packing.  Final wound measurements measuring 5 cm x 2 cm x 2 cm.  Plan Patient will follow-up with plastic surgery (Dr. Walsh) to assess right groin wound for possible future skin graft.  Continue wound VAC to the area. Patient given orders for left groin wound VAC placement to control serous drainage and accelerate wound healing process. Follow-up in 2 weeks to assess groin wounds. Will follow for carotid stenosis as well as peripheral arterial disease going forward. Continue antibiotics per ID recommendations  [Arterial/Venous Disease] : arterial/venous disease [Medication Management] : medication management

## 2024-08-27 NOTE — DISCHARGE NOTE PROVIDER - NSDCCAREPROVSEEN_GEN_ALL_CORE_FT
Cirilo Wade
no abdominal pain, no bloating, no constipation, no diarrhea, no nausea and no vomiting.

## 2024-08-27 NOTE — PHYSICAL EXAM
[Normal Rate and Rhythm] : normal rate and rhythm [0] : right 0 [2+] : left 2+ [Ankle Swelling (On Exam)] : present [Ankle Swelling Bilaterally] : bilaterally  [Ankle Swelling On The Right] : mild [Varicose Veins Of Lower Extremities] : not present [] : not present [Abdomen Tenderness] : ~T ~M No abdominal tenderness [Alert] : alert [Oriented to Person] : oriented to person [Oriented to Place] : oriented to place [Oriented to Time] : oriented to time [Calm] : calm [de-identified] : NAD [de-identified] : unlabored breathing [de-identified] : supple, no masses [FreeTextEntry1] : right groin wound with wound vac in place with good seal left groin wound with fibrinous exudate and fat necrosis and serous drainage; no lymphatic or purulent drainage appreciated No erythema bilaterally [de-identified] : FROM of all 4 extremities

## 2024-08-27 NOTE — HISTORY OF PRESENT ILLNESS
[FreeTextEntry1] : Patient is an 84-year-old male with past medical history of hypertension, hypercholesterolemia, carotid artery disease status post left CEA (Dr. Salgado), type 2 diabetes, aortic stenosis who recently underwent a TAVR on July 24, 2020 for complicated by bilateral lower extremity superficial femoral artery dissection and right iliofemoral endarterectomy and bypass (Dr. Wade intraop) and a left superficial femoral artery interposition bypass with reversed saphenous vein graft ( same day postop) here for follow-up and evaluation of groin wounds.  He was recently and readmitted to [facility] for right groin dehiscence and fat necrosis concerning for possible infection and underwent a right groin exploration, debridement, washout and sartorius flap.  Wound VAC is in place.  He is now concerned about the appearance of the left groin which was intact and healing well but the inferior aspect of the incision has now dehisced with obvious fat necrosis.  No evidence of erythema or purulent drainage.  Patient's wife states there is a copious amount of clear drainage from the open wound daily.  He ambulates uneventfully and occasionally complains of right calf pain which improves upon rest.  Denies rest pain in the feet.  He is currently on antibiotics via PICC line.

## 2024-09-03 ENCOUNTER — APPOINTMENT (OUTPATIENT)
Dept: PLASTIC SURGERY | Facility: CLINIC | Age: 84
End: 2024-09-03

## 2024-09-03 VITALS
HEART RATE: 81 BPM | HEIGHT: 60 IN | WEIGHT: 123 LBS | SYSTOLIC BLOOD PRESSURE: 120 MMHG | DIASTOLIC BLOOD PRESSURE: 66 MMHG | TEMPERATURE: 97.9 F | BODY MASS INDEX: 24.15 KG/M2 | OXYGEN SATURATION: 98 %

## 2024-09-03 DIAGNOSIS — Z95.2 PRESENCE OF PROSTHETIC HEART VALVE: ICD-10-CM

## 2024-09-03 PROCEDURE — 99024 POSTOP FOLLOW-UP VISIT: CPT

## 2024-09-05 NOTE — PATIENT PROFILE ADULT - NSPROHMDIABETBLDGLCTST_GEN_A_NUR
----- Message from Sandee Bray sent at 9/5/2024 10:31 AM CDT -----  Type:  Sooner Apoointment Request    Caller is requesting a sooner appointment.  Caller declined first available appointment listed below.  Caller will not accept being placed on the waitlist and is requesting a message be sent to doctor.  Name of Caller: Pt  When is the first available appointment?  Symptoms: Reschedule 9/12 appt  Would the patient rather a call back or a response via [facility]? Call  Best Call Back Number:  [phone]  Additional Information:  Pt will need to be rescheduled for another date.   pt states 1-2 times a week

## 2024-09-05 NOTE — PHYSICAL EXAM
[NI] : Normal [de-identified] : Right groin healing well no sign of infection no erythema +granulation.  Vac dressing replaced  Left groin wound clean no sign of infection no erythema +granulation

## 2024-09-05 NOTE — ADDENDUM
[FreeTextEntry1] :  I JABIER Arzate acted as a scribe for Dr. Jovan Walsh for this patient visit. normal

## 2024-09-05 NOTE — HISTORY OF PRESENT ILLNESS
[FreeTextEntry1] : Pt s/p right groin debridement, right groin exposure and sartorius flap.  Pt has a VAC dressing on right groin.   Here for wound check.

## 2024-09-05 NOTE — REASON FOR VISIT
[Follow-Up: _____] : a [unfilled] follow-up visit [Family Member] : family member [FreeTextEntry1] : Concern about left side opening. Right side crown closure is healing well.

## 2024-09-05 NOTE — REVIEW OF SYSTEMS
[Fever] : no fever [Chills] : no chills [Negative] : Respiratory [de-identified] : Right groin VAC dressing in place and left groin dressing in place

## 2024-09-05 NOTE — REVIEW OF SYSTEMS
[Fever] : no fever [Chills] : no chills [Negative] : Respiratory [de-identified] : Right groin VAC dressing in place and left groin dressing in place

## 2024-09-05 NOTE — PHYSICAL EXAM
[NI] : Normal [de-identified] : Right groin healing well no sign of infection no erythema +granulation.  Vac dressing replaced  Left groin wound clean no sign of infection no erythema +granulation

## 2024-09-09 ENCOUNTER — APPOINTMENT (OUTPATIENT)
Dept: VASCULAR SURGERY | Facility: CLINIC | Age: 84
End: 2024-09-09

## 2024-09-11 ENCOUNTER — APPOINTMENT (OUTPATIENT)
Dept: VASCULAR SURGERY | Facility: CLINIC | Age: 84
End: 2024-09-11

## 2024-09-11 VITALS
DIASTOLIC BLOOD PRESSURE: 61 MMHG | SYSTOLIC BLOOD PRESSURE: 111 MMHG | OXYGEN SATURATION: 99 % | BODY MASS INDEX: 23.16 KG/M2 | HEART RATE: 69 BPM | TEMPERATURE: 98.1 F | HEIGHT: 60 IN | WEIGHT: 118 LBS

## 2024-09-11 PROCEDURE — 99024 POSTOP FOLLOW-UP VISIT: CPT

## 2024-09-11 RX ORDER — SODIUM HYPOCHLORITE 1.25 MG/ML
0.12 SOLUTION TOPICAL
Qty: 1 | Refills: 1 | Status: DISCONTINUED | COMMUNITY
Start: 2024-09-03 | End: 2024-09-11

## 2024-09-17 ENCOUNTER — NON-APPOINTMENT (OUTPATIENT)
Age: 84
End: 2024-09-17

## 2024-09-30 ENCOUNTER — APPOINTMENT (OUTPATIENT)
Dept: PLASTIC SURGERY | Facility: CLINIC | Age: 84
End: 2024-09-30
Payer: MEDICARE

## 2024-09-30 VITALS
DIASTOLIC BLOOD PRESSURE: 73 MMHG | WEIGHT: 120 LBS | OXYGEN SATURATION: 98 % | TEMPERATURE: 98.3 F | RESPIRATION RATE: 16 BRPM | BODY MASS INDEX: 23.56 KG/M2 | HEIGHT: 60 IN | HEART RATE: 85 BPM | SYSTOLIC BLOOD PRESSURE: 152 MMHG

## 2024-09-30 DIAGNOSIS — Z95.2 PRESENCE OF PROSTHETIC HEART VALVE: ICD-10-CM

## 2024-09-30 PROCEDURE — 99024 POSTOP FOLLOW-UP VISIT: CPT

## 2024-10-01 NOTE — REVIEW OF SYSTEMS
[Fever] : no fever [Chills] : no chills [Negative] : Respiratory [de-identified] : VAC dressing on bilateral groins

## 2024-10-01 NOTE — REVIEW OF SYSTEMS
[Fever] : no fever [Chills] : no chills [Negative] : Respiratory [de-identified] : VAC dressing on bilateral groins

## 2024-10-01 NOTE — REASON FOR VISIT
[Post Op: _________] : a [unfilled] post op visit [FreeTextEntry1] : The patient is an 84 year old male here s/p right groin debridement, right groin exposure and sartorius flap (DOS: 08/14/24.) Patient states that he feels pain in the back of his right leg but denies fever or chills. Patient still has 2 drains remaining.  Patient is accompanied by wife.

## 2024-10-01 NOTE — PHYSICAL EXAM
[NI] : Normal [de-identified] : Right groin excellent granulation tissue wound filled in completely up to skin no sign of infection no erythema Left groin wound clean excellent granulation.  Almost filled in completely.  Wound VAC replaced

## 2024-10-01 NOTE — HISTORY OF PRESENT ILLNESS
[FreeTextEntry1] : Pt s/p right groin debridement, right groin exposure and sartorius flap.  Pt has a VAC dressing on both groins.  Here for wound check.  Pt doing well no complaints.  States left groin wound VAC placed last week

## 2024-10-01 NOTE — PHYSICAL EXAM
[NI] : Normal [de-identified] : Right groin excellent granulation tissue wound filled in completely up to skin no sign of infection no erythema Left groin wound clean excellent granulation.  Almost filled in completely.  Wound VAC replaced

## 2024-10-14 ENCOUNTER — APPOINTMENT (OUTPATIENT)
Dept: PLASTIC SURGERY | Facility: CLINIC | Age: 84
End: 2024-10-14
Payer: MEDICARE

## 2024-10-14 ENCOUNTER — APPOINTMENT (OUTPATIENT)
Dept: VASCULAR SURGERY | Facility: CLINIC | Age: 84
End: 2024-10-14

## 2024-10-14 VITALS
TEMPERATURE: 98.9 F | BODY MASS INDEX: 23.56 KG/M2 | WEIGHT: 120 LBS | DIASTOLIC BLOOD PRESSURE: 56 MMHG | HEART RATE: 85 BPM | OXYGEN SATURATION: 97 % | SYSTOLIC BLOOD PRESSURE: 151 MMHG | HEIGHT: 60 IN

## 2024-10-14 PROCEDURE — 99024 POSTOP FOLLOW-UP VISIT: CPT

## 2024-11-11 ENCOUNTER — APPOINTMENT (OUTPATIENT)
Dept: PLASTIC SURGERY | Facility: CLINIC | Age: 84
End: 2024-11-11

## 2024-11-11 VITALS
SYSTOLIC BLOOD PRESSURE: 130 MMHG | WEIGHT: 124 LBS | OXYGEN SATURATION: 97 % | RESPIRATION RATE: 16 BRPM | BODY MASS INDEX: 24.35 KG/M2 | HEART RATE: 68 BPM | TEMPERATURE: 98.7 F | DIASTOLIC BLOOD PRESSURE: 71 MMHG | HEIGHT: 60 IN

## 2024-11-11 DIAGNOSIS — Z95.2 PRESENCE OF PROSTHETIC HEART VALVE: ICD-10-CM

## 2024-11-11 PROCEDURE — 99212 OFFICE O/P EST SF 10 MIN: CPT

## 2024-11-14 ENCOUNTER — APPOINTMENT (OUTPATIENT)
Dept: VASCULAR SURGERY | Facility: CLINIC | Age: 84
End: 2024-11-14
Payer: MEDICARE

## 2024-11-14 VITALS
HEART RATE: 76 BPM | DIASTOLIC BLOOD PRESSURE: 78 MMHG | SYSTOLIC BLOOD PRESSURE: 125 MMHG | BODY MASS INDEX: 24.35 KG/M2 | WEIGHT: 124 LBS | TEMPERATURE: 97.6 F | HEIGHT: 60 IN

## 2024-11-14 DIAGNOSIS — R60.0 LOCALIZED EDEMA: ICD-10-CM

## 2024-11-14 DIAGNOSIS — I73.9 PERIPHERAL VASCULAR DISEASE, UNSPECIFIED: ICD-10-CM

## 2024-11-14 PROCEDURE — 99213 OFFICE O/P EST LOW 20 MIN: CPT

## 2024-11-14 PROCEDURE — 93970 EXTREMITY STUDY: CPT

## 2024-11-14 PROCEDURE — 93925 LOWER EXTREMITY STUDY: CPT

## 2024-11-17 PROBLEM — R60.0 EDEMA, LOWER EXTREMITY: Status: ACTIVE | Noted: 2024-11-17

## 2024-12-02 ENCOUNTER — APPOINTMENT (OUTPATIENT)
Dept: PLASTIC SURGERY | Facility: CLINIC | Age: 84
End: 2024-12-02

## 2024-12-02 VITALS
HEIGHT: 60 IN | TEMPERATURE: 98 F | OXYGEN SATURATION: 98 % | WEIGHT: 124 LBS | SYSTOLIC BLOOD PRESSURE: 130 MMHG | BODY MASS INDEX: 24.35 KG/M2 | DIASTOLIC BLOOD PRESSURE: 79 MMHG | HEART RATE: 73 BPM

## 2024-12-02 DIAGNOSIS — Z95.2 PRESENCE OF PROSTHETIC HEART VALVE: ICD-10-CM

## 2024-12-02 PROCEDURE — 99213 OFFICE O/P EST LOW 20 MIN: CPT

## 2025-02-26 ENCOUNTER — APPOINTMENT (OUTPATIENT)
Dept: VASCULAR SURGERY | Facility: CLINIC | Age: 85
End: 2025-02-26

## 2025-03-03 ENCOUNTER — APPOINTMENT (OUTPATIENT)
Dept: VASCULAR SURGERY | Facility: CLINIC | Age: 85
End: 2025-03-03

## (undated) DEVICE — SUT BIOSYN 4-0 18" P-12

## (undated) DEVICE — GLV 8.5 PROTEXIS (WHITE)

## (undated) DEVICE — TUBING CONTRAST INJECTION HIGH PRESSURE 1200PSI 72"

## (undated) DEVICE — DRAPE SPLIT SHEET 77" X 108"

## (undated) DEVICE — PACK FEM/POP

## (undated) DEVICE — DRAPE MINOR PROCEDURE

## (undated) DEVICE — SOL IRR BAG NS 0.9% 1000ML

## (undated) DEVICE — SUT POLYSORB 3-0 30" V-20 UNDYED

## (undated) DEVICE — ELCTR BVI ACCU-TEMP CAUTERY SHAFT FINE TIP 1/2"

## (undated) DEVICE — MARKING PEN W RULER

## (undated) DEVICE — MEDICATION LABELS W MARKER

## (undated) DEVICE — SUT PROLENE 5-0 36" C-1

## (undated) DEVICE — SYR TB 1CC 25G X 5/8 (BLUE)

## (undated) DEVICE — SOL IRR BAG NS 0.9% 3000ML

## (undated) DEVICE — DRSG TEGADERM 6"X8"

## (undated) DEVICE — SUT SOFSILK 0 18" TIES

## (undated) DEVICE — DRSG VAC WHITEFOAM LARGE (WHITE)

## (undated) DEVICE — CANISTER W/GEL INFOVAC 1000ML

## (undated) DEVICE — VENODYNE/SCD SLEEVE CALF LARGE

## (undated) DEVICE — ULTRASOUND TRANSDUCER COVER

## (undated) DEVICE — DRAPE 1/2 SHEET 40X57"

## (undated) DEVICE — DRSG VAC GRANUFOAM MEDIUM (BLACK)

## (undated) DEVICE — SPECIMEN CONTAINER 100ML

## (undated) DEVICE — CANISTER KCI 500ML GEL SENSA TRAC

## (undated) DEVICE — DRAPE MAYO STAND 30"

## (undated) DEVICE — SUT PROLENE 6-0 18" C-1

## (undated) DEVICE — DRSG STERISTRIPS 0.5 X 4"

## (undated) DEVICE — STRYKER INTERPULSE HANDPIECE W IRR SUCTION TUBE

## (undated) DEVICE — SUCTION YANKAUER NO CONTROL VENT

## (undated) DEVICE — SUT PROLENE 5-0 30" RB-2

## (undated) DEVICE — CANISTER W/GEL INFOVAC 500ML

## (undated) DEVICE — GOWN XL

## (undated) DEVICE — GOWN TRIMAX LG

## (undated) DEVICE — PACK EXTREMITY

## (undated) DEVICE — BULLDOG SPRING CLIP 6MM SOFT/SOFT

## (undated) DEVICE — LAP PAD 18 X 18"

## (undated) DEVICE — DRSG VAC GRANUFOAM SMALL (BLACK)

## (undated) DEVICE — SUT PLEDGET 9MM X 4MM X 1.5MM

## (undated) DEVICE — SOL IRR POUR NS 0.9% 500ML

## (undated) DEVICE — TUBING SUCTION 20FT

## (undated) DEVICE — DRAPE TOWEL BLUE 17" X 24"

## (undated) DEVICE — DRAIN JACKSON PRATT 10MM FLAT FULL NO TROCAR

## (undated) DEVICE — ELCTR BOVIE TIP BLADE MEGADYNE E-Z CLEAN 2.5" (SHORT)

## (undated) DEVICE — SUT PROLENE 6-0 24" CC

## (undated) DEVICE — STAPLER SKIN VISI-STAT 35 WIDE

## (undated) DEVICE — POSITIONER FOAM EGG CRATE ULNAR 2PCS (PINK)

## (undated) DEVICE — STOPCOCK 3-WAY

## (undated) DEVICE — DRSG OPSITE 13.75 X 4"

## (undated) DEVICE — TUNNELER SHEATH GREEN LARGE

## (undated) DEVICE — GLV 7.5 PROTEXIS (WHITE)

## (undated) DEVICE — WARMING BLANKET FULL UNDERBODY

## (undated) DEVICE — SUT SILK 2-0 30" SH

## (undated) DEVICE — DRAPE LIGHT HANDLE COVER (BLUE)

## (undated) DEVICE — BAND RADIAL R 24CM REG

## (undated) DEVICE — DRAIN RESERVOIR FOR JACKSON PRATT 100CC CARDINAL

## (undated) DEVICE — DRAPE C ARM UNIVERSAL

## (undated) DEVICE — PACK MAJOR ABDOMINAL SUPINE

## (undated) DEVICE — DRAPE IOBAN 23" X 23"

## (undated) DEVICE — DRAPE INSTRUMENT POUCH 6.75" X 11"

## (undated) DEVICE — DRSG DERMABOND 0.7ML

## (undated) DEVICE — SYR LUER LOK 20CC

## (undated) DEVICE — SUT PROLENE 6-0 4-30" C-1

## (undated) DEVICE — DRSG COMBINE 5X9"

## (undated) DEVICE — Device

## (undated) DEVICE — DRAPE 3/4 SHEET W REINFORCEMENT 56X77"

## (undated) DEVICE — SOL IRR POUR H2O 250ML

## (undated) DEVICE — PACK CAROTID ENDARTERECTOMY

## (undated) DEVICE — SUT BOOT STANDARD (ASSORTED) 5 PAIR

## (undated) DEVICE — STOPCOCK 4-WAY W SWIVEL MALE LUER LOCK NON VENTED RED CAP

## (undated) DEVICE — DRSG KLING 6"

## (undated) DEVICE — SUT SOFSILK 3-0 18" TIES

## (undated) DEVICE — PACING CABLE TEMP MEDTRONIC WITH PAC-LOC

## (undated) DEVICE — SAW BLADE STRYKER STERNUM 31MM X 6.27 X .79

## (undated) DEVICE — SAW BLADE MICROAIRE STERNUM 1X34X9.4MM

## (undated) DEVICE — ZIMMER BLADE DERMATONE

## (undated) DEVICE — WARMING BLANKET UPPER ADULT

## (undated) DEVICE — SUT SOFSILK 2-0 18" V-20 (POP-OFF)

## (undated) DEVICE — SUT POLYSORB 2-0 30" GS-21 UNDYED

## (undated) DEVICE — PACK CARDIAC YELLOW

## (undated) DEVICE — ZIMMER DERMACARRIER SKIN GRAFT MESH 3:1

## (undated) DEVICE — ELCTR BOVIE PENCIL HANDPIECE

## (undated) DEVICE — WARMING BLANKET LOWER ADULT

## (undated) DEVICE — DRSG TEGADERM 8 X 12"

## (undated) DEVICE — SUT PROLENE 6-0 4-30" BV-1

## (undated) DEVICE — VENODYNE/SCD SLEEVE CALF MEDIUM

## (undated) DEVICE — SYR ASEPTO

## (undated) DEVICE — GLV 8 PROTEXIS (WHITE)

## (undated) DEVICE — FOLEY TRAY 16FR 5CC LTX UMETER CLOSED

## (undated) DEVICE — VESSEL LOOP MINI-RED 0.7" X 16"

## (undated) DEVICE — HEMOSTASIS VALVE PHD SM BORE W/ SPRING METAL INSERTION TOOL AND TORQUE DEVICE

## (undated) DEVICE — DRSG XEROFORM 5 X 9"

## (undated) DEVICE — DRAIN JACKSON PRATT 7MM FLAT FULL NO TROCAR

## (undated) DEVICE — DRSG VAC GRANUFOAM LARGE (BLACK)

## (undated) DEVICE — ZIMMER DERMACARRIER SKIN GRAFT MESH 1.5:1

## (undated) DEVICE — AORTIC PUNCH 4.0MM STANDARD HANDLE

## (undated) DEVICE — SUT PROLENE 7-0 4-24" BV-1

## (undated) DEVICE — SUT PROLENE 6-0 30" C-1

## (undated) DEVICE — MNFLD SETUP

## (undated) DEVICE — GLV 8 SENSICARE W ALOE

## (undated) DEVICE — SHIELD CATH CONTAMINATION 7.5-8.5FR TWISTLOCK ADAPT

## (undated) DEVICE — SKIN STAPLE REMOVAL

## (undated) DEVICE — PREP BETADINE 5% STERILE OPTHALMIC SOLUTION

## (undated) DEVICE — DRSG XEROFORM 1 X 8"

## (undated) DEVICE — GLV 7 PROTEXIS (WHITE)

## (undated) DEVICE — VISITEC 4X4

## (undated) DEVICE — ZIMMER DERMACARRIER SKIN GRAFT MESH 8"

## (undated) DEVICE — INFLATION DEVICE BASIXCOMPAK

## (undated) DEVICE — CLAMP BULLDOG MAXI 45 DEGREE (ORANGE) DISP

## (undated) DEVICE — GLV 6.5 PROTEXIS (WHITE)

## (undated) DEVICE — PREP CHLORAPREP HI-LITE ORANGE 26ML

## (undated) DEVICE — SUT SOFSILK 4-0 18" TIES

## (undated) DEVICE — BLADE SCALPEL SAFETYLOCK #11

## (undated) DEVICE — TUBING TRUWAVE PRESSURE MALE/FEMALE 12"

## (undated) DEVICE — SUT MONOCRYL 4-0 18" PS-2

## (undated) DEVICE — SUT SOFSILK 2-0 18" TIES

## (undated) DEVICE — DRAPE MAGNETIC INSTRUMENT MEDIUM

## (undated) DEVICE — BLADE SCALPEL SAFETYLOCK #15

## (undated) DEVICE — BLADE SCALPEL SAFETYLOCK #10